# Patient Record
Sex: FEMALE | Race: WHITE | NOT HISPANIC OR LATINO | URBAN - METROPOLITAN AREA
[De-identification: names, ages, dates, MRNs, and addresses within clinical notes are randomized per-mention and may not be internally consistent; named-entity substitution may affect disease eponyms.]

---

## 2017-01-17 ENCOUNTER — LAB CONVERSION - ENCOUNTER (OUTPATIENT)
Dept: OTHER | Facility: OTHER | Age: 57
End: 2017-01-17

## 2017-01-17 LAB
A/G RATIO (HISTORICAL): 1.7 (CALC) (ref 1–2.5)
ALBUMIN SERPL BCP-MCNC: 4.3 G/DL (ref 3.6–5.1)
ALP SERPL-CCNC: 79 U/L (ref 33–130)
ALT SERPL W P-5'-P-CCNC: 75 U/L (ref 6–29)
AST SERPL W P-5'-P-CCNC: 44 U/L (ref 10–35)
BILIRUB SERPL-MCNC: 0.3 MG/DL (ref 0.2–1.2)
BUN SERPL-MCNC: 20 MG/DL (ref 7–25)
BUN/CREA RATIO (HISTORICAL): ABNORMAL (CALC) (ref 6–22)
CALCIUM SERPL-MCNC: 10.3 MG/DL (ref 8.6–10.4)
CHLORIDE SERPL-SCNC: 102 MMOL/L (ref 98–110)
CHOLEST SERPL-MCNC: 193 MG/DL (ref 125–200)
CHOLEST/HDLC SERPL: 4.4 (CALC)
CO2 SERPL-SCNC: 28 MMOL/L (ref 20–31)
CREAT SERPL-MCNC: 0.77 MG/DL (ref 0.5–1.05)
EGFR AFRICAN AMERICAN (HISTORICAL): 99 ML/MIN/1.73M2
EGFR-AMERICAN CALC (HISTORICAL): 86 ML/MIN/1.73M2
GAMMA GLOBULIN (HISTORICAL): 2.6 G/DL (CALC) (ref 1.9–3.7)
GLUCOSE (HISTORICAL): 71 MG/DL (ref 65–99)
HBA1C MFR BLD HPLC: 9.1 % OF TOTAL HGB
HDLC SERPL-MCNC: 44 MG/DL
LDL CHOLESTEROL (HISTORICAL): 129 MG/DL (CALC)
NON-HDL-CHOL (CHOL-HDL) (HISTORICAL): 149 MG/DL (CALC)
POTASSIUM SERPL-SCNC: 3.9 MMOL/L (ref 3.5–5.3)
SODIUM SERPL-SCNC: 139 MMOL/L (ref 135–146)
TOTAL PROTEIN (HISTORICAL): 6.9 G/DL (ref 6.1–8.1)
TRIGL SERPL-MCNC: 101 MG/DL

## 2017-01-19 ENCOUNTER — ALLSCRIPTS OFFICE VISIT (OUTPATIENT)
Dept: OTHER | Facility: OTHER | Age: 57
End: 2017-01-19

## 2017-01-19 ENCOUNTER — GENERIC CONVERSION - ENCOUNTER (OUTPATIENT)
Dept: OTHER | Facility: OTHER | Age: 57
End: 2017-01-19

## 2017-01-23 LAB
HPV 18 (HISTORICAL): NOT DETECTED
HPV HIGH RISK 16/18 (HISTORICAL): NOT DETECTED
HPV16 (HISTORICAL): NOT DETECTED
PAP (HISTORICAL): NORMAL

## 2017-05-01 ENCOUNTER — LAB CONVERSION - ENCOUNTER (OUTPATIENT)
Dept: OTHER | Facility: OTHER | Age: 57
End: 2017-05-01

## 2017-05-01 LAB — HBA1C MFR BLD HPLC: 6.3 % OF TOTAL HGB

## 2017-05-03 ENCOUNTER — ALLSCRIPTS OFFICE VISIT (OUTPATIENT)
Dept: OTHER | Facility: OTHER | Age: 57
End: 2017-05-03

## 2017-05-03 DIAGNOSIS — E78.00 PURE HYPERCHOLESTEROLEMIA: ICD-10-CM

## 2017-05-03 DIAGNOSIS — I10 ESSENTIAL (PRIMARY) HYPERTENSION: ICD-10-CM

## 2017-05-03 DIAGNOSIS — E11.9 TYPE 2 DIABETES MELLITUS WITHOUT COMPLICATIONS (HCC): ICD-10-CM

## 2017-09-05 ENCOUNTER — GENERIC CONVERSION - ENCOUNTER (OUTPATIENT)
Dept: OTHER | Facility: OTHER | Age: 57
End: 2017-09-05

## 2017-09-24 DIAGNOSIS — M25.512 PAIN IN LEFT SHOULDER: ICD-10-CM

## 2017-10-16 ENCOUNTER — LAB CONVERSION - ENCOUNTER (OUTPATIENT)
Dept: OTHER | Facility: OTHER | Age: 57
End: 2017-10-16

## 2017-10-16 LAB
A/G RATIO (HISTORICAL): 1.8 (CALC) (ref 1–2.5)
ALBUMIN SERPL BCP-MCNC: 4.3 G/DL (ref 3.6–5.1)
ALP SERPL-CCNC: 70 U/L (ref 33–130)
ALT SERPL W P-5'-P-CCNC: 48 U/L (ref 6–29)
AST SERPL W P-5'-P-CCNC: 28 U/L (ref 10–35)
BILIRUB SERPL-MCNC: 0.3 MG/DL (ref 0.2–1.2)
BUN SERPL-MCNC: 17 MG/DL (ref 7–25)
BUN/CREA RATIO (HISTORICAL): ABNORMAL (CALC) (ref 6–22)
CALCIUM SERPL-MCNC: 10.2 MG/DL (ref 8.6–10.4)
CHLORIDE SERPL-SCNC: 104 MMOL/L (ref 98–110)
CHOLEST SERPL-MCNC: 178 MG/DL
CHOLEST/HDLC SERPL: 4.2 (CALC)
CO2 SERPL-SCNC: 32 MMOL/L (ref 20–31)
CREAT SERPL-MCNC: 0.8 MG/DL (ref 0.5–1.05)
CREATININE, RANDOM URINE (HISTORICAL): 280 MG/DL (ref 20–320)
EGFR AFRICAN AMERICAN (HISTORICAL): 94 ML/MIN/1.73M2
EGFR-AMERICAN CALC (HISTORICAL): 81 ML/MIN/1.73M2
GAMMA GLOBULIN (HISTORICAL): 2.4 G/DL (CALC) (ref 1.9–3.7)
GLUCOSE (HISTORICAL): 94 MG/DL (ref 65–99)
HBA1C MFR BLD HPLC: 7.5 % OF TOTAL HGB
HDLC SERPL-MCNC: 42 MG/DL
LDL CHOLESTEROL (HISTORICAL): 114 MG/DL (CALC)
MAGNESIUM, UR (HISTORICAL): 4.4 MG/DL
MICROALBUMIN/CREATININE RATIO (HISTORICAL): 16 MCG/MG CREAT
NON-HDL-CHOL (CHOL-HDL) (HISTORICAL): 136 MG/DL (CALC)
POTASSIUM SERPL-SCNC: 3.6 MMOL/L (ref 3.5–5.3)
SODIUM SERPL-SCNC: 142 MMOL/L (ref 135–146)
TOTAL PROTEIN (HISTORICAL): 6.7 G/DL (ref 6.1–8.1)
TRIGL SERPL-MCNC: 114 MG/DL

## 2017-10-20 ENCOUNTER — GENERIC CONVERSION - ENCOUNTER (OUTPATIENT)
Dept: OTHER | Facility: OTHER | Age: 57
End: 2017-10-20

## 2017-11-17 ENCOUNTER — GENERIC CONVERSION - ENCOUNTER (OUTPATIENT)
Dept: OTHER | Facility: OTHER | Age: 57
End: 2017-11-17

## 2017-12-15 ENCOUNTER — GENERIC CONVERSION - ENCOUNTER (OUTPATIENT)
Dept: OBGYN CLINIC | Facility: CLINIC | Age: 57
End: 2017-12-15

## 2018-01-11 NOTE — PROGRESS NOTES
Assessment    1  Encounter for gynecological examination with Papanicolaou smear of cervix   (V72 31,V76 2) (A22 716,P01 8)   2  Encounter for preventive health examination (V70 0) (Z00 00)    Plan  Encounter for gynecological examination with Papanicolaou smear of cervix    · Follow-up visit in 1 year Evaluation and Treatment  Follow-up  Status: Hold For -  Scheduling  Requested for: [id number]   Ordered; For: Encounter for gynecological examination with Papanicolaou smear of cervix; Ordered By: Noelle Lerma Performed:  Due: [id number]    Discussion/Summary  healthy adult female Currently, she eats a healthy diet  the risks and benefits of cervical cancer screening were discussed Pap test was done today Breast cancer screening: the risks and benefits of breast cancer screening were discussed and mammogram is current  Colorectal cancer screening: the risks and benefits of colorectal cancer screening were discussed and colorectal cancer screening is current  Normal APE  continue f/u with PCP  Chief Complaint  Annual Gyn Exam      History of Present Illness  HPI: Here for annual gyn exam - no vaginal bleeding or discharge - no menopausal complaints - bowels and bladder normal - up to date with colonoscopy, mammogram, blood work - and f/u with PMD -   GYN , Adult Female [facility]: The patient is being seen for a gynecology evaluation  General Health: The patient's health since the last visit is described as good  Lifestyle:  She consumes a diverse and healthy diet  She does not have any weight concerns  She does not exercise regularly  She does not use tobacco  She denies alcohol use  She denies drug use  Reproductive health: the patient is postmenopausal    Screening: cancer screening reviewed and current  metabolic screening reviewed and current  risk screening reviewed and current        Review of Systems    Constitutional: No fever, no chills, feels well, no tiredness, no recent weight gain or loss    ENT: no ear ache, no loss of hearing, no nosebleeds or nasal discharge, no sore throat or hoarseness  Cardiovascular: no complaints of slow or fast heart rate, no chest pain, no palpitations, no leg claudication or lower extremity edema  Respiratory: no complaints of shortness of breath, no wheezing, no dyspnea on exertion, no orthopnea or PND  Breasts: no complaints of breast pain, breast lump or nipple discharge  Gastrointestinal: no complaints of abdominal pain, no constipation, no nausea or diarrhea, no vomiting, no bloody stools  Genitourinary: no complaints of dysuria, no incontinence, no pelvic pain, no dysmenorrhea, no vaginal discharge or abnormal vaginal bleeding  Musculoskeletal: no complaints of arthralgia, no myalgia, no joint swelling or stiffness, no limb pain or swelling  Integumentary: no complaints of skin rash or lesion, no itching or dry skin, no skin wounds  Neurological: no complaints of headache, no confusion, no numbness or tingling, no dizziness or fainting  ROS reviewed  Active Problems    1  Abnormal weight gain (783 1) (R63 5)   2  Arthritis (716 90) (M19 90)   3  Carpal tunnel syndrome, unspecified laterality (354 0) (G56 00)   4  Chest pain (786 50) (R07 9)   5  Diabetes mellitus type II, controlled (250 00) (E11 9)   6  Diabetic Autonomic Neuropathy - Uncontrolled (337 1)   7  DM2 (diabetes mellitus, type 2) (250 00) (E11 9)   8  Dyspnea on exertion (786 09) (R06 09)   9  Elevated LFTs (790 6) (R79 89)   10  Headache (784 0) (R51)   11  Hypercholesterolemia (272 0) (E78 0)   12  Hypertension (401 9) (I10)   13  Insomnia (780 52) (G47 00)   14  Left flank pain (789 09) (R10 9)   15  Obesity (278 00) (E66 9)   16  Pain in joint of right shoulder region (719 41) (M25 511)   17   Rib pain on left side (786 50) (R07 81)    Past Medical History    · History of Breast cancer screening (V76 10) (Z12 39)   · History of Colon cancer screening (V76 51) (Z12 11)   ·  2 para 2 (V22 2) (Z33 1)   · History of Visit for routine gyn exam (V72 31) (Z01 419)    Surgical History    · History of Heart Surgery   · History of Hysterectomy   · History of Total Abdominal Hysterectomy    Family History    · Adopted (V68 89) (Z02 82)   · Family history unknown (V49 89) (Z78 9)    Social History    · Denied: History of Alcohol Use (History)   · Cultural background   · NON-   · Denied: History of Daily Coffee Consumption (___ Cups/Day)   · Denied: History of Daily Cola Consumption (___ Cans/Day)   · Denied: History of Daily Tea Consumption (___ Cups/Day)   · Marital History - Currently    · Never A Smoker   · No drug use   · Personal Protective Equipment Seatbelts   · Primary spoken language English   · Racial background   · WHITE   · Retired    Current Meds   1  Aspirin 81 MG Oral Tablet Delayed Release; Take 1 daily Recorded   2  BD Pen Needle Short U/F 31G X 8 MM Miscellaneous; Therapy: 91ZSB7922 to (Last Cheryl Corporal)  Requested for: 2011 Ordered   3  [employer] Fish Oil CAPS; TAKE 1 CAPSULE Daily Recorded   4  Daily Multiple Vitamin TABS; CHEW OR SWALLOW 1 TABLET DAILY Recorded   5  Hydrochlorothiazide 50 MG Oral Tablet; TAKE 1 TABLET TWICE DAILY; Therapy: 75ZPU2038 to (Evaluate:2016)  Requested for: 24TSR8715; Last   Rx:2015 Ordered   6  Lisinopril 5 MG Oral Tablet; TAKE 1 TABLET DAILY AS DIRECTED; Therapy: 00FOY1902 to (Evaluate:2016)  Requested for: 86HCY7317; Last   Rx:2015 Ordered   7  MetFORMIN HCl  MG Oral Tablet Extended Release 24 Hour; TAKE 2 TABLET   Twice daily  Requested for: 84KDU6173; Last Rx:2015 Ordered   8  Omega-3-acid Ethyl Esters 1 GM Oral Capsule; TAKE TWO (2) CAPSULE(S) TWICE   DAILY; Therapy: 98JSD6307 to (Evaluate:2016); Last Rx:2015 Ordered   9  OneTouch Ultra Blue In [employer]; USE 1 TEST STRIP SIX TIMES DAILY; Therapy: 07YXB8561 to (Evaluate:2016)  Requested for: 64HEI3857;  Last Rx:19Nov2015 Ordered   10  Pravastatin Sodium 10 MG Oral Tablet; Take 1 tablet twice daily; Last Rx:19Nov2015    Ordered   11  ReliOn N SUSP; 30 units in am 30 units in pm Recorded   12  UltiCare Short Pen Needles 31G X 8 MM Miscellaneous; INJECTING FOUR TIMES    DAILY; Therapy: 08Oag5250 to (Evaluate:42Zaj1710)  Requested for: 22EMF2666; Last    Rx:19Nov2015 Ordered   13  Zolpidem Tartrate 5 MG Oral Tablet; TAKE 1 TABLET AT BEDTIME AS NEEDED; Last    Rx:19Nov2015 Ordered    Allergies    1  Lasix TABS   2  Latex Gloves MISC   3  Lipitor TABS    Vitals   Recorded: 93SRN0793 53:60VQ   Systolic 888, LUE, Sitting   Diastolic 80, LUE, Sitting   Height 5 ft 4 in   Weight 191 lb    BMI Calculated 32 79   BSA Calculated 1 92     Physical Exam    Constitutional   General appearance: No acute distress, well appearing and well nourished  overweight white female in NAD  Neck   Neck: Normal, supple, trachea midline, no masses  Thyroid: Normal, no thyromegaly  Pulmonary   Respiratory effort: No increased work of breathing or signs of respiratory distress  Auscultation of lungs: Clear to auscultation  Cardiovascular   Auscultation of heart: Normal rate and rhythm, normal S1 and S2, no murmurs  Peripheral vascular exam: Normal pulses Throughout  Genitourinary   External genitalia: Normal and no lesions appreciated  Vagina: Normal, no lesions or dryness appreciated  Urethra: Normal     Urethral meatus: Normal     Cervix: Surgically absent  cuff intact and well healed - pap taken without friability  Uterus: Surgically absent  Adnexa/parametria: Normal, non-tender and no fullness or masses appreciated  Anus, perineum, and rectum: Normal sphincter tone, no masses, and no prolapse  Chest   Breasts: Normal and no dimpling or skin changes noted  Abdomen   Abdomen: Normal, non-tender, and no organomegaly noted  Liver and spleen: No hepatomegaly or splenomegaly      Examination for hernias: No hernias appreciated  Stool sample for occult blood: Negative  Lymphatic   Palpation of lymph nodes in neck, axillae, groin and/or other locations: No lymphadenopathy or masses noted      Psychiatric   Orientation to person, place, and time: Normal     Mood and affect: Normal        Future Appointments    Date/Time Provider Specialty Site   03/24/2016 08:30 AM Shira Garcia, [address] [place] [address]     Signatures   Electronically signed by : DORA Huerta ; Jan 14 2016  4:49PM EST                       (Author)

## 2018-01-12 VITALS
HEART RATE: 114 BPM | SYSTOLIC BLOOD PRESSURE: 132 MMHG | BODY MASS INDEX: 31.99 KG/M2 | TEMPERATURE: 98.6 F | DIASTOLIC BLOOD PRESSURE: 68 MMHG | WEIGHT: 187.38 LBS | RESPIRATION RATE: 16 BRPM | HEIGHT: 64 IN

## 2018-01-14 VITALS
HEIGHT: 64 IN | DIASTOLIC BLOOD PRESSURE: 62 MMHG | BODY MASS INDEX: 32.27 KG/M2 | SYSTOLIC BLOOD PRESSURE: 136 MMHG | WEIGHT: 189 LBS

## 2018-01-22 VITALS
WEIGHT: 185.25 LBS | BODY MASS INDEX: 31.63 KG/M2 | TEMPERATURE: 97.7 F | DIASTOLIC BLOOD PRESSURE: 78 MMHG | SYSTOLIC BLOOD PRESSURE: 142 MMHG | HEIGHT: 64 IN | HEART RATE: 70 BPM

## 2018-01-25 ENCOUNTER — OFFICE VISIT (OUTPATIENT)
Dept: OBGYN CLINIC | Facility: CLINIC | Age: 58
End: 2018-01-25
Payer: COMMERCIAL

## 2018-01-25 VITALS — SYSTOLIC BLOOD PRESSURE: 128 MMHG | BODY MASS INDEX: 31.76 KG/M2 | WEIGHT: 185 LBS | DIASTOLIC BLOOD PRESSURE: 72 MMHG

## 2018-01-25 DIAGNOSIS — Z01.419 ENCOUNTER FOR ANNUAL ROUTINE GYNECOLOGICAL EXAMINATION: Primary | ICD-10-CM

## 2018-01-25 PROCEDURE — S0612 ANNUAL GYNECOLOGICAL EXAMINA: HCPCS | Performed by: OBSTETRICS & GYNECOLOGY

## 2018-01-25 RX ORDER — OMEGA-3-ACID ETHYL ESTERS 1 G/1
2 CAPSULE, LIQUID FILLED ORAL 2 TIMES DAILY
COMMUNITY
End: 2019-03-06 | Stop reason: SDUPTHER

## 2018-01-25 RX ORDER — METFORMIN HYDROCHLORIDE 500 MG/1
2 TABLET, EXTENDED RELEASE ORAL 2 TIMES DAILY
COMMUNITY
Start: 2016-12-01 | End: 2018-09-10 | Stop reason: SDUPTHER

## 2018-01-25 RX ORDER — HYDROCHLOROTHIAZIDE 50 MG/1
1 TABLET ORAL 2 TIMES DAILY
COMMUNITY
Start: 2013-01-28 | End: 2019-02-24 | Stop reason: SDUPTHER

## 2018-01-25 RX ORDER — PRAVASTATIN SODIUM 20 MG
1 TABLET ORAL DAILY
COMMUNITY
Start: 2017-03-24 | End: 2018-12-11 | Stop reason: SDUPTHER

## 2018-01-25 RX ORDER — LISINOPRIL 5 MG/1
1 TABLET ORAL DAILY
COMMUNITY
Start: 2013-01-28 | End: 2018-09-10 | Stop reason: SDUPTHER

## 2018-01-25 NOTE — PROGRESS NOTES
Assessment/Plan:    Normal APE  Up to date with mammogram and colonoscopy         Subjective:      Patient ID: Yinka Jones is a 60 y o  female  Here for annual gyn exam - overall well - no vaginal bleeding or discharge - rare menopausal complaints with few hot flashes - improved since last year - bowels and bladder normal and up to date with mammogram, colonoscopy and blood work         The following portions of the patient's history were reviewed and updated as appropriate:   She  has a past medical history of Arthritis; Diabetes mellitus ([address] );  2 para 2; and Hyperlipidemia  She  does not have any pertinent problems on file  She  has a past surgical history that includes Cardiac surgery; Hysterectomy; and Total abdominal hysterectomy  Her She was adopted  Family history is unknown by patient  She  reports that she has never smoked  She does not have any smokeless tobacco history on file  She reports that she drinks alcohol  She reports that she does not use drugs  Current Outpatient Prescriptions   Medication Sig Dispense Refill    hydrochlorothiazide (HYDRODIURIL) 50 mg tablet Take 1 tablet by mouth 2 (two) times a day      insulin detemir (LEVEMIR) 100 units/mL subcutaneous injection Inject under the skin Twice daily      insulin lispro (HUMALOG) 100 units/mL injection Inject under the skin      lisinopril (ZESTRIL) 5 mg tablet Take 1 tablet by mouth daily      metFORMIN (GLUCOPHAGE-XR) 500 mg 24 hr tablet Take 2 tablets by mouth 2 (two) times a day      omega-3-acid ethyl esters (LOVAZA) 1 g capsule Take 2 g by mouth 2 (two) times a day      pravastatin (PRAVACHOL) 20 mg tablet Take 1 tablet by mouth daily       No current facility-administered medications for this visit  No current outpatient prescriptions on file prior to visit  No current facility-administered medications on file prior to visit  She is allergic to atorvastatin; furosemide; and latex       Review of Systems Constitutional: Negative for fatigue, fever and unexpected weight change  HENT: Negative for dental problem, ear pain, sinus pain and sinus pressure  Eyes: Negative for discharge  Respiratory: Negative for cough, chest tightness, shortness of breath and wheezing  Cardiovascular: Negative for chest pain, palpitations and leg swelling  Gastrointestinal: Negative for abdominal pain, blood in stool, constipation, diarrhea, nausea and vomiting  Genitourinary: Negative for difficulty urinating, dysuria, pelvic pain, vaginal bleeding, vaginal discharge and vaginal pain  Musculoskeletal: Negative for back pain, joint swelling and neck pain  Neurological: Negative for dizziness, seizures, weakness, light-headedness, numbness and headaches  Objective:     Physical Exam   Constitutional: She is oriented to person, place, and time  She appears well-developed and well-nourished  HENT:   Head: Normocephalic and atraumatic  Neck: Normal range of motion  No thyromegaly present  Cardiovascular: Normal rate, regular rhythm and normal heart sounds  Pulmonary/Chest: Effort normal and breath sounds normal    Abdominal: Soft  She exhibits no mass  There is no tenderness  There is no rebound and no guarding  Genitourinary: Rectum normal, vagina normal and uterus normal  Rectal exam shows no mass, no tenderness, anal tone normal and guaiac negative stool  No breast swelling, tenderness or discharge  Uterus is not enlarged and not tender  Cervix exhibits friability  Cervix exhibits no discharge  Right adnexum displays no mass, no tenderness and no fullness  Left adnexum displays no mass, no tenderness and no fullness  Neurological: She is alert and oriented to person, place, and time  Psychiatric: She has a normal mood and affect   Her behavior is normal

## 2018-01-29 LAB
HPV HR 12 DNA CVX QL NAA+PROBE: NOT DETECTED
HPV16 DNA SPEC QL NAA+PROBE: NOT DETECTED
HPV18 DNA SPEC QL NAA+PROBE: NOT DETECTED
THIN PREP CVX: NORMAL

## 2018-02-15 ENCOUNTER — TELEPHONE (OUTPATIENT)
Dept: FAMILY MEDICINE CLINIC | Facility: CLINIC | Age: 58
End: 2018-02-15

## 2018-02-15 NOTE — TELEPHONE ENCOUNTER
If she does to a [facility]'s lab the orders are in computer but a=i also printed them out and will be available for

## 2018-02-22 ENCOUNTER — APPOINTMENT (OUTPATIENT)
Dept: LAB | Facility: CLINIC | Age: 58
End: 2018-02-22
Payer: COMMERCIAL

## 2018-02-22 DIAGNOSIS — I10 ESSENTIAL (PRIMARY) HYPERTENSION: ICD-10-CM

## 2018-02-22 DIAGNOSIS — E11.9 TYPE 2 DIABETES MELLITUS WITHOUT COMPLICATIONS (HCC): ICD-10-CM

## 2018-02-22 DIAGNOSIS — E78.00 PURE HYPERCHOLESTEROLEMIA: ICD-10-CM

## 2018-02-22 LAB
ALBUMIN SERPL BCP-MCNC: 4 G/DL (ref 3.5–5)
ALP SERPL-CCNC: 66 U/L (ref 46–116)
ALT SERPL W P-5'-P-CCNC: 57 U/L (ref 12–78)
ANION GAP SERPL CALCULATED.3IONS-SCNC: 5 MMOL/L (ref 4–13)
AST SERPL W P-5'-P-CCNC: 41 U/L (ref 5–45)
BILIRUB SERPL-MCNC: 0.31 MG/DL (ref 0.2–1)
BUN SERPL-MCNC: 19 MG/DL (ref 5–25)
CALCIUM ALBUM COR SERPL-MCNC: 10.8 MG/DL (ref 8.3–10.1)
CALCIUM SERPL-MCNC: 10.8 MG/DL (ref 8.3–10.1)
CHLORIDE SERPL-SCNC: 101 MMOL/L (ref 100–108)
CHOLEST SERPL-MCNC: 173 MG/DL (ref 50–200)
CO2 SERPL-SCNC: 31 MMOL/L (ref 21–32)
CREAT SERPL-MCNC: 0.68 MG/DL (ref 0.6–1.3)
EST. AVERAGE GLUCOSE BLD GHB EST-MCNC: 212 MG/DL
GFR SERPL CREATININE-BSD FRML MDRD: 96 ML/MIN/1.73SQ M
GLUCOSE P FAST SERPL-MCNC: 40 MG/DL (ref 65–99)
HBA1C MFR BLD: 9 % (ref 4.2–6.3)
HDLC SERPL-MCNC: 51 MG/DL (ref 40–60)
LDLC SERPL CALC-MCNC: 101 MG/DL (ref 0–100)
POTASSIUM SERPL-SCNC: 4.3 MMOL/L (ref 3.5–5.3)
PROT SERPL-MCNC: 8 G/DL (ref 6.4–8.2)
SODIUM SERPL-SCNC: 137 MMOL/L (ref 136–145)
TRIGL SERPL-MCNC: 105 MG/DL

## 2018-02-22 PROCEDURE — 80061 LIPID PANEL: CPT

## 2018-02-22 PROCEDURE — 83036 HEMOGLOBIN GLYCOSYLATED A1C: CPT

## 2018-02-22 PROCEDURE — 80053 COMPREHEN METABOLIC PANEL: CPT

## 2018-02-22 PROCEDURE — 36415 COLL VENOUS BLD VENIPUNCTURE: CPT

## 2018-02-23 ENCOUNTER — OFFICE VISIT (OUTPATIENT)
Dept: FAMILY MEDICINE CLINIC | Facility: CLINIC | Age: 58
End: 2018-02-23
Payer: COMMERCIAL

## 2018-02-23 VITALS
DIASTOLIC BLOOD PRESSURE: 78 MMHG | HEART RATE: 78 BPM | TEMPERATURE: 97.9 F | BODY MASS INDEX: 30.22 KG/M2 | WEIGHT: 177 LBS | HEIGHT: 64 IN | SYSTOLIC BLOOD PRESSURE: 108 MMHG

## 2018-02-23 DIAGNOSIS — I10 ESSENTIAL HYPERTENSION: ICD-10-CM

## 2018-02-23 DIAGNOSIS — M54.6 THORACIC BACK PAIN, UNSPECIFIED BACK PAIN LATERALITY, UNSPECIFIED CHRONICITY: ICD-10-CM

## 2018-02-23 DIAGNOSIS — E78.00 HYPERCHOLESTEROLEMIA: ICD-10-CM

## 2018-02-23 DIAGNOSIS — E13.9 DIABETES 1.5, MANAGED AS TYPE 2 (HCC): Primary | ICD-10-CM

## 2018-02-23 PROCEDURE — 99214 OFFICE O/P EST MOD 30 MIN: CPT | Performed by: FAMILY MEDICINE

## 2018-02-23 RX ORDER — MELOXICAM 15 MG/1
15 TABLET ORAL DAILY
Qty: 30 TABLET | Refills: 0 | Status: SHIPPED | OUTPATIENT
Start: 2018-02-23 | End: 2020-08-21 | Stop reason: ALTCHOICE

## 2018-02-23 RX ORDER — METHOCARBAMOL 500 MG/1
500 TABLET, FILM COATED ORAL
Qty: 30 TABLET | Refills: 0 | Status: SHIPPED | OUTPATIENT
Start: 2018-02-23 | End: 2020-08-21 | Stop reason: ALTCHOICE

## 2018-02-23 NOTE — PROGRESS NOTES
Patient ID: Yinka Jones is a 60 y o  female  HPI: 60 y  o female presents for follow up of NIDDM, htn, and hypercholesterolemia  Her hgbA1c went up to 9 0 because she left her insulin in car and it froze and pt was unable to take her insulin for past 3 mos  She c/o thoracic back pain on right when she twists or turns  SUBJECTIVE    Family History   Problem Relation Age of Onset    Adopted:  Yes    Family history unknown: Yes     Social History     Social History    Marital status: /Civil Union     Spouse name: N/A    Number of children: N/A    Years of education: N/A     Occupational History    Retired      Social History Main Topics    Smoking status: Never Smoker    Smokeless tobacco: Not on file    Alcohol use Yes    Drug use: No    Sexual activity: Not on file     Other Topics Concern    Not on file     Social History Narrative    Personal Protective Equipment Seatbelts         Past Medical History:   Diagnosis Date    Arthritis     Diabetes mellitus ([facility] )      2 para 2      x2 -, -    Hyperlipidemia      Past Surgical History:   Procedure Laterality Date    CARDIAC SURGERY      HYSTERECTOMY      TOTAL ABDOMINAL HYSTERECTOMY      2010     Allergies   Allergen Reactions    Atorvastatin      Reaction Date: 2011;     Furosemide      Reaction Date: 2011;     Latex        Current Outpatient Prescriptions:     hydrochlorothiazide (HYDRODIURIL) 50 mg tablet, Take 1 tablet by mouth 2 (two) times a day, Disp: , Rfl:     insulin detemir (LEVEMIR) 100 units/mL subcutaneous injection, Inject under the skin Twice daily, Disp: , Rfl:     insulin lispro (HUMALOG) 100 units/mL injection, Inject under the skin, Disp: , Rfl:     lisinopril (ZESTRIL) 5 mg tablet, Take 1 tablet by mouth daily, Disp: , Rfl:     metFORMIN (GLUCOPHAGE-XR) 500 mg 24 hr tablet, Take 2 tablets by mouth 2 (two) times a day, Disp: , Rfl:     omega-3-acid ethyl esters (LOVAZA) 1 g capsule, Take 2 g by mouth 2 (two) times a day, Disp: , Rfl:     pravastatin (PRAVACHOL) 20 mg tablet, Take 1 tablet by mouth daily, Disp: , Rfl:     Review of Systems  Constitutional:     Denies fever, chills ,fatigue ,weakness ,weight loss, weight gain     ENT: Denies earache ,loss of hearing ,nosebleed, nasal discharge,nasal congestion ,sore throat ,hoarseness  Pulmonary: Denies shortness of breath ,cough  ,dyspnea on exertion, orthopnea  ,PND   Cardiovascular:  Denies bradycardia , tachycardia  ,palpations, lower extremity edema leg, claudication  Breast:  Denies new or changing breast lumps ,nipple discharge ,nipple changes  Abdomen:  Denies abdominal pain , anorexia , indigestion, nausea, vomiting, constipation, diarrhea  Musculoskeletal: Denies myalgias, arthralgias, joint swelling, joint stiffness , limb pain, limb swelling+ thoracic back pain rigt sided with twisting or turning  Gu: denies dysuria, polyuria  Skin: Denies skin rash, skin lesion, skin wound, itching, dry skin  Neuro: Denies headache, numbness, tingling, confusion, loss of consciousness, dizziness, vertigo  Psychiatric: Denies feelings of depression, suicidal ideation, anxiety, sleep disturbances    OBJECTIVE    Constitutional:   NAD, well appearing and well nourished      ENT:   Conjunctiva and lids: no injection, edema, or discharge     Pupils and iris: NIKKI bilaterally    External inspection of ears and nose: normal without deformities or discharge  Otoscopic exam: Canals patent without erythema  Nasal mucosa, septum and turbinates: Normal or edema or discharge         Oropharynx:  Moist mucosa, normal tongue and tonsils without lesions  No erythema        Pulmonary:Respiratory effort normal rate and rhythm, no increased work of breathing   Auscultation of lungs:  Clear bilaterally with no adventitious breath sounds       Cardiovascular: regular rate and rhythm, S1 and S2, no murmur, no edema and/or varicosities of LE Abdomen: Soft and non-distended     Positive bowel sounds      No heptomegaly or splenomegaly      Gu: no suprapubic tenderness or CVA tenderness, no urethral discharge  Lymphatic:  No anterior or posterior cervical lymphadenopathy         Musculoskeletal:  Gait and station: Normal gait      Digits and nails normal without clubbing or cyanosis       Inspection/palpation of joints, bones, and muscles:  No joint tenderness, swelling, full active and passive range of motion; no tenderness on palpation of thoracic spien; full flexion , extension and rotation without reproduction fo symptoms  Skin: Normal skin turgor and no rashes      Neuro:    Normal reflexes     Psych:   alert and oriented to person, place and time     normal mood and affect   Right Foot/Ankle   Right Foot Inspection  Skin Exam: skin normal and skin intact no dry skin, no warmth, no callus, no erythema, no maceration, no abnormal color, no pre-ulcer, no ulcer and no callus                          Toe Exam: ROM and strength within normal limits  Sensory   Vibration: intact  Proprioception: intact   Monofilament testing: intact  Vascular  Capillary refills: < 3 seconds  The right DP pulse is 2+  The right PT pulse is 2+  Left Foot/Ankle  Left Foot Inspection  Skin Exam: skin normal and skin intactno dry skin, no warmth, no erythema, no maceration, normal color, no pre-ulcer, no ulcer and no callus                         Toe Exam: ROM and strength within normal limits                   Sensory   Vibration: intact  Proprioception: intact  Monofilament: intact  Vascular  Capillary refills: < 3 seconds  The left DP pulse is 2+  The left PT pulse is 2+  Assign Risk Category:  No deformity present; No loss of protective sensation;        Risk: 2      Assessment/Plan:Diagnoses and all orders for this visit:    Diabetes 1 5, managed as type 2 ([facility] )  -     Hemoglobin A1c;  Future  -     One Touch Verio IQ    Essential hypertension  - Comprehensive metabolic panel; Future    Hypercholesterolemia  -     Lipid Panel with Direct LDL reflex; Future    Thoracic back pain, unspecified back pain laterality, unspecified chronicity  -     meloxicam (MOBIC) 15 mg tablet; Take 1 tablet (15 mg total) by mouth daily for 30 days  -     methocarbamol (ROBAXIN) 500 mg tablet; Take 1 tablet (500 mg total) by mouth daily at bedtime      I will see patient back in 4 mos or sooner prn

## 2018-04-06 ENCOUNTER — TELEPHONE (OUTPATIENT)
Dept: FAMILY MEDICINE CLINIC | Facility: CLINIC | Age: 58
End: 2018-04-06

## 2018-04-06 DIAGNOSIS — E13.9 DIABETES 1.5, MANAGED AS TYPE 2 (HCC): Primary | ICD-10-CM

## 2018-05-21 ENCOUNTER — TELEPHONE (OUTPATIENT)
Dept: FAMILY MEDICINE CLINIC | Facility: CLINIC | Age: 58
End: 2018-05-21

## 2018-05-21 DIAGNOSIS — H57.9 EYE LESION: Primary | ICD-10-CM

## 2018-05-21 RX ORDER — CEPHALEXIN 500 MG/1
500 CAPSULE ORAL 3 TIMES DAILY
Qty: 30 CAPSULE | Refills: 0 | Status: SHIPPED | OUTPATIENT
Start: 2018-05-21 | End: 2018-05-31

## 2018-05-21 NOTE — TELEPHONE ENCOUNTER
Patient saw Marlyn Anderson today and would like a prescription for her bump above her eye to please be called into the pharmacy       Chaparrita

## 2018-06-25 LAB
LEFT EYE DIABETIC RETINOPATHY: NORMAL
RIGHT EYE DIABETIC RETINOPATHY: NORMAL

## 2018-07-27 LAB
ALBUMIN SERPL-MCNC: 4.3 G/DL (ref 3.6–5.1)
ALBUMIN/GLOB SERPL: 1.7 (CALC) (ref 1–2.5)
ALP SERPL-CCNC: 66 U/L (ref 33–130)
ALT SERPL-CCNC: 40 U/L (ref 6–29)
AST SERPL-CCNC: 29 U/L (ref 10–35)
BILIRUB SERPL-MCNC: 0.3 MG/DL (ref 0.2–1.2)
BUN SERPL-MCNC: 23 MG/DL (ref 7–25)
BUN/CREAT SERPL: ABNORMAL (CALC) (ref 6–22)
CALCIUM SERPL-MCNC: 11 MG/DL (ref 8.6–10.4)
CHLORIDE SERPL-SCNC: 100 MMOL/L (ref 98–110)
CHOLEST SERPL-MCNC: 189 MG/DL
CHOLEST/HDLC SERPL: 4.2 (CALC)
CO2 SERPL-SCNC: 30 MMOL/L (ref 20–31)
CREAT SERPL-MCNC: 0.75 MG/DL (ref 0.5–1.05)
GLOBULIN SER CALC-MCNC: 2.6 G/DL (CALC) (ref 1.9–3.7)
GLUCOSE SERPL-MCNC: 75 MG/DL (ref 65–99)
HBA1C MFR BLD: 6.6 % OF TOTAL HGB
HDLC SERPL-MCNC: 45 MG/DL
LDLC SERPL CALC-MCNC: 121 MG/DL (CALC)
NONHDLC SERPL-MCNC: 144 MG/DL (CALC)
POTASSIUM SERPL-SCNC: 3.8 MMOL/L (ref 3.5–5.3)
PROT SERPL-MCNC: 6.9 G/DL (ref 6.1–8.1)
SL AMB EGFR AFRICAN AMERICAN: 101 ML/MIN/1.73M2
SL AMB EGFR NON AFRICAN AMERICAN: 87 ML/MIN/1.73M2
SODIUM SERPL-SCNC: 137 MMOL/L (ref 135–146)
TRIGL SERPL-MCNC: 122 MG/DL

## 2018-07-30 ENCOUNTER — OFFICE VISIT (OUTPATIENT)
Dept: FAMILY MEDICINE CLINIC | Facility: CLINIC | Age: 58
End: 2018-07-30
Payer: COMMERCIAL

## 2018-07-30 VITALS
HEART RATE: 80 BPM | BODY MASS INDEX: 30.19 KG/M2 | SYSTOLIC BLOOD PRESSURE: 124 MMHG | HEIGHT: 64 IN | DIASTOLIC BLOOD PRESSURE: 70 MMHG | WEIGHT: 176.8 LBS | TEMPERATURE: 97.9 F

## 2018-07-30 DIAGNOSIS — F41.9 ANXIETY: ICD-10-CM

## 2018-07-30 DIAGNOSIS — E13.9 DIABETES 1.5, MANAGED AS TYPE 2 (HCC): Primary | ICD-10-CM

## 2018-07-30 DIAGNOSIS — E78.00 HYPERCHOLESTEROLEMIA: ICD-10-CM

## 2018-07-30 DIAGNOSIS — I10 ESSENTIAL HYPERTENSION: ICD-10-CM

## 2018-07-30 DIAGNOSIS — H61.23 HEARING LOSS DUE TO CERUMEN IMPACTION, BILATERAL: ICD-10-CM

## 2018-07-30 PROCEDURE — 3078F DIAST BP <80 MM HG: CPT | Performed by: FAMILY MEDICINE

## 2018-07-30 PROCEDURE — 99214 OFFICE O/P EST MOD 30 MIN: CPT | Performed by: FAMILY MEDICINE

## 2018-07-30 PROCEDURE — 3074F SYST BP LT 130 MM HG: CPT | Performed by: FAMILY MEDICINE

## 2018-07-30 PROCEDURE — 3008F BODY MASS INDEX DOCD: CPT | Performed by: FAMILY MEDICINE

## 2018-07-30 RX ORDER — HYDROXYZINE HYDROCHLORIDE 25 MG/1
25 TABLET, FILM COATED ORAL
Qty: 30 TABLET | Refills: 0 | Status: SHIPPED | OUTPATIENT
Start: 2018-07-30 | End: 2018-09-10 | Stop reason: SDUPTHER

## 2018-07-31 PROCEDURE — 69210 REMOVE IMPACTED EAR WAX UNI: CPT | Performed by: FAMILY MEDICINE

## 2018-07-31 NOTE — PROGRESS NOTES
Patient ID: Abel Guadarrama is a 60 y o  female  HPI: 60 y  o female presents for follow up of type  2 diabetes, htn, hypercholesterolemia and trouble sleeping due to anxiety  Her labs were all favorable  SUBJECTIVE    Family History   Problem Relation Age of Onset    Adopted:  Yes    Family history unknown: Yes     Social History     Social History    Marital status: /Civil Union     Spouse name: N/A    Number of children: N/A    Years of education: N/A     Occupational History    Retired      Social History Main Topics    Smoking status: Never Smoker    Smokeless tobacco: Not on file    Alcohol use Yes    Drug use: No    Sexual activity: Not on file     Other Topics Concern    Not on file     Social History Narrative    Personal Protective Equipment Seatbelts         Past Medical History:   Diagnosis Date    Arthritis     Diabetes mellitus ([facility] [address] )      2 para 2      x2 -, -    Hyperlipidemia      Past Surgical History:   Procedure Laterality Date    CARDIAC SURGERY      HYSTERECTOMY      TOTAL ABDOMINAL HYSTERECTOMY      2010     Allergies   Allergen Reactions    Atorvastatin      Reaction Date: 2011;     Furosemide      Reaction Date: 2011;     Latex        Current Outpatient Prescriptions:     hydrochlorothiazide (HYDRODIURIL) 50 mg tablet, Take 1 tablet by mouth 2 (two) times a day, Disp: , Rfl:     insulin lispro (HUMALOG) 100 units/mL injection, Inject under the skin, Disp: , Rfl:     lisinopril (ZESTRIL) 5 mg tablet, Take 1 tablet by mouth daily, Disp: , Rfl:     metFORMIN (GLUCOPHAGE-XR) 500 mg 24 hr tablet, Take 2 tablets by mouth 2 (two) times a day, Disp: , Rfl:     methocarbamol (ROBAXIN) 500 mg tablet, Take 1 tablet (500 mg total) by mouth daily at bedtime, Disp: 30 tablet, Rfl: 0    omega-3-acid ethyl esters (LOVAZA) 1 g capsule, Take 2 g by mouth 2 (two) times a day, Disp: , Rfl:     pravastatin (PRAVACHOL) 20 mg tablet, Take 1 tablet by mouth daily, Disp: , Rfl:     hydrOXYzine HCL (ATARAX) 25 mg tablet, Take 1 tablet (25 mg total) by mouth daily at bedtime, Disp: 30 tablet, Rfl: 0    insulin detemir (LEVEMIR) 100 units/mL subcutaneous injection, Inject 50 Units under the skin 2 (two) times a day for 90 days, Disp: 9000 Units, Rfl: 2    meloxicam (MOBIC) 15 mg tablet, Take 1 tablet (15 mg total) by mouth daily for 30 days, Disp: 30 tablet, Rfl: 0    Review of Systems  Constitutional:     Denies fever, chills ,fatigue ,weakness ,weight loss, weight gain     ENT: Denies earache ,loss of hearing ,nosebleed, nasal discharge,nasal congestion ,sore throat ,hoarseness  Pulmonary: Denies shortness of breath ,cough  ,dyspnea on exertion, orthopnea  ,PND   Cardiovascular:  Denies bradycardia , tachycardia  ,palpations, lower extremity edema leg, claudication  Breast:  Denies new or changing breast lumps ,nipple discharge ,nipple changes  Abdomen:  Denies abdominal pain , anorexia , indigestion, nausea, vomiting, constipation, diarrhea  Musculoskeletal: Denies myalgias, arthralgias, joint swelling, joint stiffness , limb pain, limb swelling  Gu: denies dysuria, polyuria  Skin: Denies skin rash, skin lesion, skin wound, itching, dry skin  Neuro: Denies headache, numbness, tingling, confusion, loss of consciousness, dizziness, vertigo  Psychiatric: Denies feelings of depression, suicidal ideation,+ anxiety, +sleep disturbances    OBJECTIVE  /70   Pulse 80   Temp 97 9 °F (36 6 °C)   Ht 5' 4" (1 626 m)   Wt 80 2 kg (176 lb 12 8 oz)   BMI 30 35 kg/m²   Constitutional:   NAD, well appearing and well nourished      ENT:   Conjunctiva and lids: no injection, edema, or discharge     Pupils and iris: NIKKI bilaterally    External inspection of ears and nose: normal without deformities or discharge  Otoscopic exam:  Right TM occluded with cerumen and removed with a loop until tm fuly visualized and intact    Nasal mucosa, septum and turbinates: Normal or edema or discharge        Oropharynx:  Moist mucosa, normal tongue and tonsils without lesions  No erythema        Pulmonary:Respiratory effort normal rate and rhythm, no increased work of breathing  Auscultation of lungs:  Clear bilaterally with no adventitious breath sounds       Cardiovascular: regular rate and rhythm, S1 and S2, no murmur, no edema and/or varicosities of LE      Abdomen: Soft and non-distended     Positive bowel sounds      No heptomegaly or splenomegaly      Gu: no suprapubic tenderness or CVA tenderness, no urethral discharge  Lymphatic:  No anterior or posterior cervical lymphadenopathy         Musculoskeletal:  Gait and station: Normal gait      Digits and nails normal without clubbing or cyanosis       Inspection/palpation of joints, bones, and muscles:  No joint tenderness, swelling, full active and passive range of motion       Skin: Normal skin turgor and no rashes      Neuro:     Normal reflexes     Psych:   alert and oriented to person, place and time     normal mood and affect   Patient's shoes and socks removed  Right Foot/Ankle   Right Foot Inspection  Skin Exam: skin normal and skin intact no dry skin, no warmth, no callus, no erythema, no maceration, no abnormal color, no pre-ulcer, no ulcer and no callus                          Toe Exam: ROM and strength within normal limitsno swelling, no tenderness, erythema and  no right toe deformity  Sensory   Vibration: absent  Proprioception: absent   Monofilament testing: absent  Vascular  Capillary refills: < 3 seconds  The right DP pulse is 2+  The right PT pulse is 2+       Left Foot/Ankle  Left Foot Inspection  Skin Exam: skin normal and skin intactno dry skin, no warmth, no erythema, no maceration, normal color, no pre-ulcer, no ulcer and no callus                         Toe Exam: ROM and strength within normal limitsno swelling, no tenderness, no erythema and no left toe deformity                   Sensory Vibration: absent  Proprioception: absent  Monofilament: absent  Vascular  Capillary refills: < 3 seconds  The left DP pulse is 2+  The left PT pulse is 2+  Assign Risk Category:  No deformity present; No loss of protective sensation; No weak pulses       Risk: 0  Ear cerumen removal  Date/Time: 7/31/2018 7:24 AM  Performed by: Lynda Luke by: Donovan Coker, [address] [place] [address]     Patient location:  Clinic  Consent:     Consent obtained:  Verbal    Consent given by:  Patient    Risks discussed:  Bleeding, dizziness and incomplete removal  Procedure details:     Location:  R ear    Procedure type: curette      Approach:  External  Post-procedure details:     Complication:  None    Hearing quality:  Improved    Patient tolerance of procedure: Tolerated well, no immediate complications        Assessment/Plan:Diagnoses and all orders for this visit:    Diabetes 1 5, managed as type 2 ([facility] )  -     Microalbumin,Urine  -     HEMOGLOBIN A1C W/ EAG ESTIMATION; Future    Anxiety  -     hydrOXYzine HCL (ATARAX) 25 mg tablet; Take 1 tablet (25 mg total) by mouth daily at bedtime    Essential hypertension  -     Comprehensive metabolic panel; Future    Hypercholesterolemia  -     Lipid Panel with Direct LDL reflex; Future    Hearing loss due to cerumen impaction, bilateral    Other orders  -     Ear cerumen removal    I will see patient back in 4 mos or sooner prn      Answers for HPI/ROS submitted by the patient on 7/29/2018   Diabetes problem  Diabetes type: type 1  MedicAlert ID: No  Home blood tests: 5+ x per day  Monitoring compliance: excellent

## 2018-09-10 ENCOUNTER — TELEPHONE (OUTPATIENT)
Dept: FAMILY MEDICINE CLINIC | Facility: CLINIC | Age: 58
End: 2018-09-10

## 2018-09-10 DIAGNOSIS — F41.9 ANXIETY: ICD-10-CM

## 2018-09-10 DIAGNOSIS — I10 ESSENTIAL HYPERTENSION: Primary | ICD-10-CM

## 2018-09-10 DIAGNOSIS — E13.9 DIABETES 1.5, MANAGED AS TYPE 2 (HCC): ICD-10-CM

## 2018-09-10 DIAGNOSIS — E13.9 DIABETES 1.5, MANAGED AS TYPE 2 (HCC): Primary | ICD-10-CM

## 2018-09-10 DIAGNOSIS — L21.9 SEBORRHEA: Primary | ICD-10-CM

## 2018-09-10 RX ORDER — HYDROXYZINE HYDROCHLORIDE 25 MG/1
25 TABLET, FILM COATED ORAL
Qty: 90 TABLET | Refills: 3 | Status: SHIPPED | OUTPATIENT
Start: 2018-09-10 | End: 2019-09-30 | Stop reason: ALTCHOICE

## 2018-09-10 RX ORDER — METFORMIN HYDROCHLORIDE 500 MG/1
1000 TABLET, EXTENDED RELEASE ORAL 2 TIMES DAILY
Qty: 360 TABLET | Refills: 0 | Status: SHIPPED | OUTPATIENT
Start: 2018-09-10 | End: 2019-02-26 | Stop reason: SDUPTHER

## 2018-09-10 RX ORDER — LISINOPRIL 5 MG/1
5 TABLET ORAL DAILY
Qty: 90 TABLET | Refills: 0 | Status: SHIPPED | OUTPATIENT
Start: 2018-09-10 | End: 2018-12-06 | Stop reason: SDUPTHER

## 2018-09-10 NOTE — TELEPHONE ENCOUNTER
Patient called stating she is doing well on her Atarax and would like a 90 day supply called into [facility] please

## 2018-09-11 RX ORDER — KETOCONAZOLE 20 MG/ML
SHAMPOO TOPICAL
Qty: 120 ML | Refills: 1 | Status: SHIPPED | OUTPATIENT
Start: 2018-09-11 | End: 2019-05-23 | Stop reason: SDUPTHER

## 2018-12-06 DIAGNOSIS — I10 ESSENTIAL HYPERTENSION: ICD-10-CM

## 2018-12-06 RX ORDER — LISINOPRIL 5 MG/1
TABLET ORAL
Qty: 90 TABLET | Refills: 0 | Status: SHIPPED | OUTPATIENT
Start: 2018-12-06 | End: 2019-02-26 | Stop reason: SDUPTHER

## 2018-12-11 ENCOUNTER — TELEPHONE (OUTPATIENT)
Dept: FAMILY MEDICINE CLINIC | Facility: CLINIC | Age: 58
End: 2018-12-11

## 2018-12-11 DIAGNOSIS — E78.00 HYPERCHOLESTEROLEMIA: Primary | ICD-10-CM

## 2018-12-11 RX ORDER — PRAVASTATIN SODIUM 20 MG
20 TABLET ORAL DAILY
Qty: 90 TABLET | Refills: 2 | Status: SHIPPED | OUTPATIENT
Start: 2018-12-11 | End: 2019-05-13

## 2018-12-12 PROBLEM — E11.3293 TYPE 2 DIABETES MELLITUS WITH MILD NONPROLIFERATIVE RETINOPATHY OF BOTH EYES WITHOUT MACULAR EDEMA (HCC): Status: ACTIVE | Noted: 2018-12-12

## 2019-01-28 ENCOUNTER — ANNUAL EXAM (OUTPATIENT)
Dept: OBGYN CLINIC | Facility: CLINIC | Age: 59
End: 2019-01-28
Payer: COMMERCIAL

## 2019-01-28 VITALS
WEIGHT: 160 LBS | DIASTOLIC BLOOD PRESSURE: 78 MMHG | BODY MASS INDEX: 27.31 KG/M2 | SYSTOLIC BLOOD PRESSURE: 122 MMHG | HEIGHT: 64 IN

## 2019-01-28 DIAGNOSIS — Z12.39 SCREENING FOR MALIGNANT NEOPLASM OF BREAST: ICD-10-CM

## 2019-01-28 DIAGNOSIS — Z01.419 ENCOUNTER FOR ANNUAL ROUTINE GYNECOLOGICAL EXAMINATION: Primary | ICD-10-CM

## 2019-01-28 DIAGNOSIS — Z13.820 SCREENING FOR OSTEOPOROSIS: ICD-10-CM

## 2019-01-28 PROCEDURE — S0612 ANNUAL GYNECOLOGICAL EXAMINA: HCPCS | Performed by: OBSTETRICS & GYNECOLOGY

## 2019-01-28 NOTE — PATIENT INSTRUCTIONS

## 2019-01-28 NOTE — PROGRESS NOTES
Assessment/Plan:    Normal annual gynecological exam  Pap smear deferred secondary to normal Pap smear with negative Co testing done last year  Script given for mammogram which we do the end of this year and a screening bone density, DEXA scan, exam  Return to office 1 year earlier as needed       Problem List Items Addressed This Visit     Encounter for annual routine gynecological examination - Primary      Other Visit Diagnoses     Screening for malignant neoplasm of breast        Relevant Orders    Mammo screening bilateral w cad    Screening for osteoporosis        Relevant Orders    DXA bone density spine hip and pelvis            Subjective:      Patient ID: Renzo Bhatti is a 60 y o  female  Here for annual gyn exam - overall well - no vaginal bleeding or discharge - no menopausal complaints - bowels and bladder normal - does have some constipation issues but overall well - up to date with colonoscopy, mammogram and blood work -         The following portions of the patient's history were reviewed and updated as appropriate:   She  has a past medical history of Arthritis; Diabetes mellitus ([facility] [address] );  2 para 2; and Hyperlipidemia  She   Patient Active Problem List    Diagnosis Date Noted    Type 2 diabetes mellitus with mild nonproliferative retinopathy of both eyes without macular edema ([facility] [address] ) 2018    Encounter for annual routine gynecological examination 2018     She  has a past surgical history that includes Cardiac surgery; Hysterectomy; and Total abdominal hysterectomy  Her She was adopted  Family history is unknown by patient  She  reports that she has never smoked  She does not have any smokeless tobacco history on file  She reports that she does not drink alcohol or use drugs    Current Outpatient Prescriptions   Medication Sig Dispense Refill    glucose blood test strip Test 6 times daily 600 each 1    hydrochlorothiazide (HYDRODIURIL) 50 mg tablet Take 1 tablet by mouth 2 (two) times a day      insulin lispro (HUMALOG) 100 units/mL injection Inject under the skin      lisinopril (ZESTRIL) 5 mg tablet TAKE ONE TABLET BY MOUTH ONCE DAILY  90 tablet 0    metFORMIN (GLUCOPHAGE-XR) 500 mg 24 hr tablet Take 2 tablets (1,000 mg total) by mouth 2 (two) times a day 360 tablet 0    omega-3-acid ethyl esters (LOVAZA) 1 g capsule Take 2 g by mouth 2 (two) times a day      pravastatin (PRAVACHOL) 20 mg tablet Take 1 tablet (20 mg total) by mouth daily 90 tablet 2    hydrOXYzine HCL (ATARAX) 25 mg tablet Take 1 tablet (25 mg total) by mouth daily at bedtime for 90 days 90 tablet 3    insulin detemir (LEVEMIR) 100 units/mL subcutaneous injection Inject 50 Units under the skin 2 (two) times a day for 90 days 9000 Units 2    ketoconazole (NIZORAL) 2 % shampoo apply to scalp every other day for 5 minutes and rinse 120 mL 1    meloxicam (MOBIC) 15 mg tablet Take 1 tablet (15 mg total) by mouth daily for 30 days 30 tablet 0    methocarbamol (ROBAXIN) 500 mg tablet Take 1 tablet (500 mg total) by mouth daily at bedtime 30 tablet 0     No current facility-administered medications for this visit        Current Outpatient Prescriptions on File Prior to Visit   Medication Sig    glucose blood test strip Test 6 times daily    hydrochlorothiazide (HYDRODIURIL) 50 mg tablet Take 1 tablet by mouth 2 (two) times a day    insulin lispro (HUMALOG) 100 units/mL injection Inject under the skin    lisinopril (ZESTRIL) 5 mg tablet TAKE ONE TABLET BY MOUTH ONCE DAILY     metFORMIN (GLUCOPHAGE-XR) 500 mg 24 hr tablet Take 2 tablets (1,000 mg total) by mouth 2 (two) times a day    omega-3-acid ethyl esters (LOVAZA) 1 g capsule Take 2 g by mouth 2 (two) times a day    pravastatin (PRAVACHOL) 20 mg tablet Take 1 tablet (20 mg total) by mouth daily    hydrOXYzine HCL (ATARAX) 25 mg tablet Take 1 tablet (25 mg total) by mouth daily at bedtime for 90 days    insulin detemir (LEVEMIR) 100 units/mL subcutaneous injection Inject 50 Units under the skin 2 (two) times a day for 90 days    ketoconazole (NIZORAL) 2 % shampoo apply to scalp every other day for 5 minutes and rinse    meloxicam (MOBIC) 15 mg tablet Take 1 tablet (15 mg total) by mouth daily for 30 days    methocarbamol (ROBAXIN) 500 mg tablet Take 1 tablet (500 mg total) by mouth daily at bedtime     No current facility-administered medications on file prior to visit  She is allergic to atorvastatin; furosemide; and latex       Review of Systems   Constitutional: Negative for fatigue, fever and unexpected weight change  HENT: Negative for dental problem, mouth sores, nosebleeds, rhinorrhea, sinus pain, sinus pressure and sore throat  Eyes: Negative for pain, discharge and visual disturbance  Respiratory: Negative for cough, chest tightness, shortness of breath and wheezing  Cardiovascular: Negative for chest pain, palpitations and leg swelling  Gastrointestinal: Negative for blood in stool, constipation, diarrhea, nausea and vomiting  Endocrine: Negative for polydipsia  Hot flashes   Genitourinary: Negative for difficulty urinating, dyspareunia, dysuria, menstrual problem, pelvic pain, urgency, vaginal discharge and vaginal pain  Musculoskeletal: Negative for arthralgias, back pain and joint swelling  Allergic/Immunologic: Negative for environmental allergies  Neurological: Negative for seizures, light-headedness and headaches  Hematological: Does not bruise/bleed easily  Psychiatric/Behavioral: Negative for sleep disturbance  The patient is not nervous/anxious  All other systems reviewed and are negative  Objective: There were no vitals taken for this visit  Physical Exam   Constitutional: She is oriented to person, place, and time  She appears well-developed and well-nourished  No distress  Petite older white female   HENT:   Head: Normocephalic and atraumatic  Neck: Normal range of motion   Neck supple  No thyromegaly present  Cardiovascular: Normal rate and regular rhythm  Pulmonary/Chest: Effort normal and breath sounds normal  No respiratory distress  She has no wheezes  She has no rales  She exhibits no tenderness  Right breast exhibits no inverted nipple, no mass, no nipple discharge, no skin change and no tenderness  Left breast exhibits no inverted nipple, no mass, no nipple discharge, no skin change and no tenderness  Breasts are symmetrical    Abdominal: Soft  She exhibits no distension and no mass  There is no tenderness  There is no rebound and no guarding  Genitourinary: Rectal exam shows guaiac negative stool  Genitourinary Comments: Normal female, no vulvar or vaginal lesions, vaginal cuff intact - atrophic, cervix and uterus is surgically absent, no adnexal masses are noted and the exam is nontender  Rectal exam has normal sphincter tone and no masses   Neurological: She is alert and oriented to person, place, and time  Psychiatric: She has a normal mood and affect  Her behavior is normal    Vitals reviewed

## 2019-02-24 DIAGNOSIS — R60.9 EDEMA, UNSPECIFIED TYPE: Primary | ICD-10-CM

## 2019-02-25 RX ORDER — HYDROCHLOROTHIAZIDE 50 MG/1
TABLET ORAL
Qty: 180 TABLET | Refills: 2 | Status: SHIPPED | OUTPATIENT
Start: 2019-02-25 | End: 2019-12-11 | Stop reason: SDUPTHER

## 2019-02-26 DIAGNOSIS — E13.9 DIABETES 1.5, MANAGED AS TYPE 2 (HCC): ICD-10-CM

## 2019-02-26 DIAGNOSIS — I10 ESSENTIAL HYPERTENSION: ICD-10-CM

## 2019-02-26 RX ORDER — LISINOPRIL 5 MG/1
TABLET ORAL
Qty: 90 TABLET | Refills: 0 | Status: SHIPPED | OUTPATIENT
Start: 2019-02-26 | End: 2019-03-06 | Stop reason: SDUPTHER

## 2019-02-26 RX ORDER — METFORMIN HYDROCHLORIDE 500 MG/1
TABLET, EXTENDED RELEASE ORAL
Qty: 360 TABLET | Refills: 0 | Status: SHIPPED | OUTPATIENT
Start: 2019-02-26 | End: 2019-03-06 | Stop reason: SDUPTHER

## 2019-03-06 DIAGNOSIS — E13.9 DIABETES 1.5, MANAGED AS TYPE 2 (HCC): ICD-10-CM

## 2019-03-06 DIAGNOSIS — I10 ESSENTIAL HYPERTENSION: ICD-10-CM

## 2019-03-06 DIAGNOSIS — E78.00 HYPERCHOLESTEROLEMIA: Primary | ICD-10-CM

## 2019-03-06 RX ORDER — LISINOPRIL 5 MG/1
TABLET ORAL
Qty: 90 TABLET | Refills: 0 | Status: SHIPPED | OUTPATIENT
Start: 2019-03-06 | End: 2019-06-04 | Stop reason: SDUPTHER

## 2019-03-06 RX ORDER — OMEGA-3-ACID ETHYL ESTERS 1 G/1
CAPSULE, LIQUID FILLED ORAL
Qty: 360 CAPSULE | Refills: 2 | Status: SHIPPED | OUTPATIENT
Start: 2019-03-06 | End: 2020-03-09

## 2019-03-06 RX ORDER — METFORMIN HYDROCHLORIDE 500 MG/1
TABLET, EXTENDED RELEASE ORAL
Qty: 360 TABLET | Refills: 0 | Status: SHIPPED | OUTPATIENT
Start: 2019-03-06 | End: 2019-06-04 | Stop reason: SDUPTHER

## 2019-04-24 ENCOUNTER — TELEPHONE (OUTPATIENT)
Dept: FAMILY MEDICINE CLINIC | Facility: CLINIC | Age: 59
End: 2019-04-24

## 2019-04-24 DIAGNOSIS — R52 PAIN: Primary | ICD-10-CM

## 2019-04-24 DIAGNOSIS — R60.9 SWELLING: ICD-10-CM

## 2019-05-08 ENCOUNTER — TELEPHONE (OUTPATIENT)
Dept: FAMILY MEDICINE CLINIC | Facility: CLINIC | Age: 59
End: 2019-05-08

## 2019-05-08 DIAGNOSIS — E13.9 DIABETES 1.5, MANAGED AS TYPE 2 (HCC): Primary | ICD-10-CM

## 2019-05-08 DIAGNOSIS — I10 ESSENTIAL HYPERTENSION: ICD-10-CM

## 2019-05-08 DIAGNOSIS — E78.00 HYPERCHOLESTEROLEMIA: ICD-10-CM

## 2019-05-10 LAB
ALBUMIN SERPL-MCNC: 4.4 G/DL (ref 3.6–5.1)
ALBUMIN/GLOB SERPL: 1.8 (CALC) (ref 1–2.5)
ALP SERPL-CCNC: 84 U/L (ref 33–130)
ALT SERPL-CCNC: 22 U/L (ref 6–29)
AST SERPL-CCNC: 19 U/L (ref 10–35)
BILIRUB SERPL-MCNC: 0.3 MG/DL (ref 0.2–1.2)
BUN SERPL-MCNC: 21 MG/DL (ref 7–25)
BUN/CREAT SERPL: ABNORMAL (CALC) (ref 6–22)
CALCIUM SERPL-MCNC: 10.7 MG/DL (ref 8.6–10.4)
CHLORIDE SERPL-SCNC: 101 MMOL/L (ref 98–110)
CHOLEST SERPL-MCNC: 180 MG/DL
CHOLEST/HDLC SERPL: 3.1 (CALC)
CO2 SERPL-SCNC: 29 MMOL/L (ref 20–32)
CREAT SERPL-MCNC: 0.79 MG/DL (ref 0.5–0.99)
EST. AVERAGE GLUCOSE BLD GHB EST-MCNC: 183 (CALC)
EST. AVERAGE GLUCOSE BLD GHB EST-SCNC: 10.1 (CALC)
GLOBULIN SER CALC-MCNC: 2.4 G/DL (CALC) (ref 1.9–3.7)
GLUCOSE SERPL-MCNC: 99 MG/DL (ref 65–99)
HBA1C MFR BLD: 8 % OF TOTAL HGB
HDLC SERPL-MCNC: 58 MG/DL
LDLC SERPL CALC-MCNC: 103 MG/DL (CALC)
NONHDLC SERPL-MCNC: 122 MG/DL (CALC)
POTASSIUM SERPL-SCNC: 3.9 MMOL/L (ref 3.5–5.3)
PROT SERPL-MCNC: 6.8 G/DL (ref 6.1–8.1)
SL AMB EGFR AFRICAN AMERICAN: 94 ML/MIN/1.73M2
SL AMB EGFR NON AFRICAN AMERICAN: 81 ML/MIN/1.73M2
SODIUM SERPL-SCNC: 139 MMOL/L (ref 135–146)
TRIGL SERPL-MCNC: 91 MG/DL

## 2019-05-13 ENCOUNTER — OFFICE VISIT (OUTPATIENT)
Dept: FAMILY MEDICINE CLINIC | Facility: CLINIC | Age: 59
End: 2019-05-13
Payer: COMMERCIAL

## 2019-05-13 VITALS
HEART RATE: 74 BPM | WEIGHT: 164.2 LBS | HEIGHT: 64 IN | SYSTOLIC BLOOD PRESSURE: 120 MMHG | DIASTOLIC BLOOD PRESSURE: 78 MMHG | TEMPERATURE: 97.9 F | BODY MASS INDEX: 28.03 KG/M2

## 2019-05-13 DIAGNOSIS — F41.9 ANXIETY: ICD-10-CM

## 2019-05-13 DIAGNOSIS — Z79.4 TYPE 2 DIABETES MELLITUS WITH BOTH EYES AFFECTED BY MILD NONPROLIFERATIVE RETINOPATHY WITHOUT MACULAR EDEMA, WITH LONG-TERM CURRENT USE OF INSULIN (HCC): Primary | ICD-10-CM

## 2019-05-13 DIAGNOSIS — L65.9 ALOPECIA: ICD-10-CM

## 2019-05-13 DIAGNOSIS — E78.00 HYPERCHOLESTEROLEMIA: ICD-10-CM

## 2019-05-13 DIAGNOSIS — E11.3293 TYPE 2 DIABETES MELLITUS WITH BOTH EYES AFFECTED BY MILD NONPROLIFERATIVE RETINOPATHY WITHOUT MACULAR EDEMA, WITH LONG-TERM CURRENT USE OF INSULIN (HCC): Primary | ICD-10-CM

## 2019-05-13 PROBLEM — E78.5 HYPERLIPIDEMIA: Status: ACTIVE | Noted: 2019-05-13

## 2019-05-13 PROCEDURE — 99214 OFFICE O/P EST MOD 30 MIN: CPT | Performed by: FAMILY MEDICINE

## 2019-05-13 PROCEDURE — 3008F BODY MASS INDEX DOCD: CPT | Performed by: FAMILY MEDICINE

## 2019-05-13 PROCEDURE — 1036F TOBACCO NON-USER: CPT | Performed by: FAMILY MEDICINE

## 2019-05-13 RX ORDER — CITALOPRAM 10 MG/1
10 TABLET ORAL DAILY
Qty: 30 TABLET | Refills: 5 | Status: SHIPPED | OUTPATIENT
Start: 2019-05-13 | End: 2020-08-21 | Stop reason: ALTCHOICE

## 2019-05-13 RX ORDER — PRAVASTATIN SODIUM 40 MG
40 TABLET ORAL
Qty: 90 TABLET | Refills: 3 | Status: SHIPPED | OUTPATIENT
Start: 2019-05-13 | End: 2020-06-08

## 2019-05-23 DIAGNOSIS — E13.9 DIABETES 1.5, MANAGED AS TYPE 2 (HCC): ICD-10-CM

## 2019-05-23 DIAGNOSIS — L21.9 SEBORRHEA: ICD-10-CM

## 2019-05-23 RX ORDER — KETOCONAZOLE 20 MG/ML
SHAMPOO TOPICAL
Qty: 120 ML | Refills: 0 | Status: SHIPPED | OUTPATIENT
Start: 2019-05-23 | End: 2020-03-09

## 2019-05-26 LAB — TSH SERPL-ACNC: 3.03 MIU/L (ref 0.4–4.5)

## 2019-05-28 DIAGNOSIS — E03.9 HYPOTHYROIDISM, UNSPECIFIED TYPE: Primary | ICD-10-CM

## 2019-05-28 RX ORDER — LEVOTHYROXINE SODIUM 0.03 MG/1
25 TABLET ORAL
Qty: 30 TABLET | Refills: 5 | Status: SHIPPED | OUTPATIENT
Start: 2019-05-28 | End: 2019-09-30 | Stop reason: SDUPTHER

## 2019-06-04 DIAGNOSIS — E13.9 DIABETES 1.5, MANAGED AS TYPE 2 (HCC): ICD-10-CM

## 2019-06-04 DIAGNOSIS — I10 ESSENTIAL HYPERTENSION: ICD-10-CM

## 2019-06-04 PROCEDURE — 4010F ACE/ARB THERAPY RXD/TAKEN: CPT | Performed by: FAMILY MEDICINE

## 2019-06-04 RX ORDER — METFORMIN HYDROCHLORIDE 500 MG/1
TABLET, EXTENDED RELEASE ORAL
Qty: 360 TABLET | Refills: 0 | Status: SHIPPED | OUTPATIENT
Start: 2019-06-04 | End: 2020-03-09

## 2019-06-04 RX ORDER — LISINOPRIL 5 MG/1
TABLET ORAL
Qty: 90 TABLET | Refills: 0 | Status: SHIPPED | OUTPATIENT
Start: 2019-06-04 | End: 2019-12-11 | Stop reason: SDUPTHER

## 2019-06-25 DIAGNOSIS — J06.9 UPPER RESPIRATORY TRACT INFECTION, UNSPECIFIED TYPE: Primary | ICD-10-CM

## 2019-06-25 RX ORDER — AZITHROMYCIN 250 MG/1
TABLET, FILM COATED ORAL
Qty: 6 TABLET | Refills: 0 | Status: SHIPPED | OUTPATIENT
Start: 2019-06-25 | End: 2019-06-29

## 2019-09-30 ENCOUNTER — OFFICE VISIT (OUTPATIENT)
Dept: FAMILY MEDICINE CLINIC | Facility: CLINIC | Age: 59
End: 2019-09-30

## 2019-09-30 VITALS
BODY MASS INDEX: 26.16 KG/M2 | SYSTOLIC BLOOD PRESSURE: 124 MMHG | WEIGHT: 153.25 LBS | TEMPERATURE: 98.5 F | DIASTOLIC BLOOD PRESSURE: 82 MMHG | HEIGHT: 64 IN | HEART RATE: 74 BPM

## 2019-09-30 DIAGNOSIS — E03.9 HYPOTHYROIDISM, UNSPECIFIED TYPE: ICD-10-CM

## 2019-09-30 DIAGNOSIS — Z79.4 TYPE 2 DIABETES MELLITUS WITH BOTH EYES AFFECTED BY MILD NONPROLIFERATIVE RETINOPATHY WITHOUT MACULAR EDEMA, WITH LONG-TERM CURRENT USE OF INSULIN (HCC): Primary | ICD-10-CM

## 2019-09-30 DIAGNOSIS — E11.3293 TYPE 2 DIABETES MELLITUS WITH BOTH EYES AFFECTED BY MILD NONPROLIFERATIVE RETINOPATHY WITHOUT MACULAR EDEMA, WITH LONG-TERM CURRENT USE OF INSULIN (HCC): Primary | ICD-10-CM

## 2019-09-30 PROCEDURE — 99212 OFFICE O/P EST SF 10 MIN: CPT | Performed by: FAMILY MEDICINE

## 2019-09-30 RX ORDER — LEVOTHYROXINE SODIUM 0.03 MG/1
25 TABLET ORAL
Qty: 30 TABLET | Refills: 5 | Status: SHIPPED | OUTPATIENT
Start: 2019-09-30 | End: 2020-10-05

## 2019-09-30 NOTE — PROGRESS NOTES
BMI Counseling: Body mass index is 26 31 kg/m²  The BMI is above normal  Nutrition recommendations include reducing portion sizes  Patient ID: Leigh Ann Frances is a 60 y o  female  HPI: 60 y  o female presenting for eval of niddm  She has lost her insurance and we discussed ways of her getting labs drawn at a discounted price  She also needs a tsh  She denies any problems at this time        SUBJECTIVE    Family History   Adopted: Yes   Family history unknown: Yes     Social History     Socioeconomic History    Marital status: /Civil Union     Spouse name: Not on file    Number of children: Not on file    Years of education: Not on file    Highest education level: Not on file   Occupational History    Occupation: Retired   Social Needs    Financial resource strain: Not on file    Food insecurity:     Worry: Not on file     Inability: Not on file   [employer] needs:     Medical: Not on file     Non-medical: Not on file   Tobacco Use    Smoking status: Never Smoker    Smokeless tobacco: Never Used   Substance and Sexual Activity    Alcohol use: No    Drug use: No    Sexual activity: Not on file     Comment: declines std/hiv testing   Lifestyle    Physical activity:     Days per week: Not on file     Minutes per session: Not on file    Stress: Not on file   Relationships    Social connections:     Talks on phone: Not on file     Gets together: Not on file     Attends Alevism service: Not on file     Active member of club or organization: Not on file     Attends meetings of clubs or organizations: Not on file     Relationship status: Not on file    Intimate partner violence:     Fear of current or ex partner: Not on file     Emotionally abused: Not on file     Physically abused: Not on file     Forced sexual activity: Not on file   Other Topics Concern    Not on file   Social History Narrative    Personal Protective Equipment Seatbelts     Past Medical History:   Diagnosis Date    Arthritis     Diabetes mellitus ([facility] [address] )      2 para 2      x2 -, -    Hyperlipidemia      Past Surgical History:   Procedure Laterality Date    CARDIAC SURGERY      HYSTERECTOMY      TOTAL ABDOMINAL HYSTERECTOMY      2010     Allergies   Allergen Reactions    Atorvastatin      Reaction Date: 2011;     Furosemide      Reaction Date: 2011;     Latex        Current Outpatient Medications:     citalopram (CeleXA) 10 mg tablet, Take 1 tablet (10 mg total) by mouth daily, Disp: 30 tablet, Rfl: 5    glucose blood (ONE TOUCH ULTRA TEST) test strip, test 6 times a day, Disp: 600 each, Rfl: 0    hydrochlorothiazide (HYDRODIURIL) 50 mg tablet, TAKE ONE TABLET BY MOUTH TWICE DAILY , Disp: 180 tablet, Rfl: 2    insulin detemir (LEVEMIR) 100 units/mL subcutaneous injection, Inject 50 Units under the skin 2 (two) times a day for 90 days, Disp: 9000 Units, Rfl: 2    insulin lispro (HUMALOG) 100 units/mL injection, Inject under the skin, Disp: , Rfl:     ketoconazole (NIZORAL) 2 % shampoo, apply to scalp every other day for 5 minutes and rinse , Disp: 120 mL, Rfl: 0    levothyroxine 25 mcg tablet, Take 1 tablet (25 mcg total) by mouth daily in the early morning, Disp: 30 tablet, Rfl: 5    lisinopril (ZESTRIL) 5 mg tablet, TAKE ONE TABLET BY MOUTH ONCE DAILY , Disp: 90 tablet, Rfl: 0    meloxicam (MOBIC) 15 mg tablet, Take 1 tablet (15 mg total) by mouth daily for 30 days, Disp: 30 tablet, Rfl: 0    metFORMIN (GLUCOPHAGE-XR) 500 mg 24 hr tablet, TAKE TWO TABLETS BY MOUTH TWICE DAILY , Disp: 360 tablet, Rfl: 0    methocarbamol (ROBAXIN) 500 mg tablet, Take 1 tablet (500 mg total) by mouth daily at bedtime, Disp: 30 tablet, Rfl: 0    omega-3-acid ethyl esters (LOVAZA) 1 g capsule, TAKE TWO CAPSULES BY MOUTH TWICE DAILY , Disp: 360 capsule, Rfl: 2    pravastatin (PRAVACHOL) 40 mg tablet, Take 1 tablet (40 mg total) by mouth daily at bedtime, Disp: 90 tablet, Rfl: 3    Review of Systems  Constitutional:     Denies fever, chills ,fatigue ,weakness ,weight loss, weight gain     ENT: Denies earache ,loss of hearing ,nosebleed, nasal discharge,nasal congestion ,sore throat ,hoarseness  Pulmonary: Denies shortness of breath ,cough  ,dyspnea on exertion, orthopnea  ,PND   Cardiovascular:  Denies bradycardia , tachycardia  ,palpations, lower extremity edema leg, claudication  Breast:  Denies new or changing breast lumps ,nipple discharge ,nipple changes  Abdomen:  Denies abdominal pain , anorexia , indigestion, nausea, vomiting, constipation, diarrhea  Musculoskeletal: Denies myalgias, arthralgias, joint swelling, joint stiffness , limb pain, limb swelling  Gu: denies dysuria, polyuria  Skin: Denies skin rash, skin lesion, skin wound, itching, dry skin  Neuro: Denies headache, numbness, tingling, confusion, loss of consciousness, dizziness, vertigo  Psychiatric: Denies feelings of depression, suicidal ideation, anxiety, sleep disturbances    OBJECTIVE  /82   Pulse 74   Temp 98 5 °F (36 9 °C)   Ht 5' 4" (1 626 m)   Wt 69 5 kg (153 lb 4 oz)   BMI 26 31 kg/m²   Constitutional:   NAD, well appearing and well nourished      ENT:   Conjunctiva and lids: no injection, edema, or discharge     Pupils and iris: NIKKI bilaterally    External inspection of ears and nose: normal without deformities or discharge  Otoscopic exam: Canals patent without erythema  Nasal mucosa, septum and turbinates: Normal or edema or discharge         Oropharynx:  Moist mucosa, normal tongue and tonsils without lesions  No erythema        Pulmonary:Respiratory effort normal rate and rhythm, no increased work of breathing   Auscultation of lungs:  Clear bilaterally with no adventitious breath sounds       Cardiovascular: regular rate and rhythm, S1 and S2, no murmur, no edema and/or varicosities of LE      Abdomen: Soft and non-distended     Positive bowel sounds      No heptomegaly or splenomegaly      Gu: no suprapubic tenderness or CVA tenderness, no urethral discharge  Lymphatic:  No anterior or posterior cervical lymphadenopathy        Musculoskeletal:  Gait and station: Normal gait      Digits and nails normal without clubbing or cyanosis       Inspection/palpation of joints, bones, and muscles:  No joint tenderness, swelling, full active and passive range of motion       Skin: Normal skin turgor and no rashes      Neuro:   Normal reflexes       Psych:   alert and oriented to person, place and time     normal mood and affect       Assessment/Plan:Diagnoses and all orders for this visit:    Type 2 diabetes mellitus with both eyes affected by mild nonproliferative retinopathy without macular edema, with long-term current use of insulin (HCC)    Hypothyroidism, unspecified type  -     levothyroxine 25 mcg tablet; Take 1 tablet (25 mcg total) by mouth daily in the early morning    Other orders  -     Cancel: Cologuard; Future  -     Cancel: Microalbumin / creatinine urine ratio  -     Cancel: POCT hemoglobin A1c  -     Cancel: Hepatitis C antibody; Future  -     Cancel: PNEUMOCOCCAL CONJUGATE VACCINE 13-VALENT GREATER THAN 6 MONTHS        Reviewed with patient plan to treat with above plan  Info was provided for self pay labs  Patient instructed to call in 72 hours if not feeling better or if symptoms worsen

## 2019-10-01 ENCOUNTER — TELEPHONE (OUTPATIENT)
Dept: FAMILY MEDICINE CLINIC | Facility: CLINIC | Age: 59
End: 2019-10-01

## 2019-10-01 DIAGNOSIS — E11.42 DIABETIC PERIPHERAL NEUROPATHY (HCC): Primary | ICD-10-CM

## 2019-10-01 DIAGNOSIS — E03.9 HYPOTHYROIDISM, UNSPECIFIED TYPE: ICD-10-CM

## 2019-10-01 RX ORDER — LEVOTHYROXINE SODIUM 0.03 MG/1
25 TABLET ORAL
Qty: 90 TABLET | Refills: 3 | Status: CANCELLED | OUTPATIENT
Start: 2019-10-01

## 2019-10-01 RX ORDER — GABAPENTIN 100 MG/1
100 CAPSULE ORAL 3 TIMES DAILY
Qty: 270 CAPSULE | Refills: 2 | Status: SHIPPED | OUTPATIENT
Start: 2019-10-01 | End: 2020-11-10

## 2019-10-01 NOTE — TELEPHONE ENCOUNTER
She thought you told her to take the Hydroxyzine HCL 25mg for anxiety, but the after visit summary says to STOP taking  Please advise  She also wanted something for the nerve damage in feet  Send to [facility] 90 day  She took something in the past that worked, either Meloxicam or Gabapentin

## 2019-10-01 NOTE — TELEPHONE ENCOUNTER
She should continue to take the hydroxyzine, I don't know why this said to stop  I will send in gabapentin for her feet

## 2019-12-11 DIAGNOSIS — I10 ESSENTIAL HYPERTENSION: ICD-10-CM

## 2019-12-11 DIAGNOSIS — R60.9 EDEMA, UNSPECIFIED TYPE: ICD-10-CM

## 2019-12-11 DIAGNOSIS — F41.9 ANXIETY: ICD-10-CM

## 2019-12-12 PROCEDURE — 4010F ACE/ARB THERAPY RXD/TAKEN: CPT | Performed by: FAMILY MEDICINE

## 2019-12-12 RX ORDER — HYDROXYZINE HYDROCHLORIDE 25 MG/1
TABLET, FILM COATED ORAL
Qty: 90 TABLET | Refills: 2 | Status: SHIPPED | OUTPATIENT
Start: 2019-12-12 | End: 2020-12-21

## 2019-12-12 RX ORDER — HYDROCHLOROTHIAZIDE 50 MG/1
TABLET ORAL
Qty: 180 TABLET | Refills: 1 | Status: SHIPPED | OUTPATIENT
Start: 2019-12-12 | End: 2020-07-10

## 2019-12-12 RX ORDER — LISINOPRIL 5 MG/1
TABLET ORAL
Qty: 90 TABLET | Refills: 0 | Status: SHIPPED | OUTPATIENT
Start: 2019-12-12 | End: 2020-03-09

## 2020-03-07 DIAGNOSIS — E78.00 HYPERCHOLESTEROLEMIA: ICD-10-CM

## 2020-03-07 DIAGNOSIS — L21.9 SEBORRHEA: ICD-10-CM

## 2020-03-07 DIAGNOSIS — I10 ESSENTIAL HYPERTENSION: ICD-10-CM

## 2020-03-07 DIAGNOSIS — E13.9 DIABETES 1.5, MANAGED AS TYPE 2 (HCC): ICD-10-CM

## 2020-03-09 RX ORDER — OMEGA-3-ACID ETHYL ESTERS 1 G/1
CAPSULE, LIQUID FILLED ORAL
Qty: 360 CAPSULE | Refills: 1 | Status: SHIPPED | OUTPATIENT
Start: 2020-03-09

## 2020-03-09 RX ORDER — KETOCONAZOLE 20 MG/ML
SHAMPOO TOPICAL
Qty: 120 ML | Refills: 0 | Status: SHIPPED | OUTPATIENT
Start: 2020-03-09 | End: 2022-04-12

## 2020-03-09 RX ORDER — LISINOPRIL 5 MG/1
TABLET ORAL
Qty: 90 TABLET | Refills: 0 | Status: SHIPPED | OUTPATIENT
Start: 2020-03-09 | End: 2020-07-10

## 2020-03-09 RX ORDER — METFORMIN HYDROCHLORIDE 500 MG/1
TABLET, EXTENDED RELEASE ORAL
Qty: 360 TABLET | Refills: 0 | Status: SHIPPED | OUTPATIENT
Start: 2020-03-09 | End: 2021-03-23

## 2020-06-06 DIAGNOSIS — E78.00 HYPERCHOLESTEROLEMIA: ICD-10-CM

## 2020-06-08 RX ORDER — PRAVASTATIN SODIUM 40 MG
TABLET ORAL
Qty: 90 TABLET | Refills: 0 | Status: SHIPPED | OUTPATIENT
Start: 2020-06-08 | End: 2020-10-05

## 2020-07-10 DIAGNOSIS — R60.9 EDEMA, UNSPECIFIED TYPE: ICD-10-CM

## 2020-07-10 DIAGNOSIS — I10 ESSENTIAL HYPERTENSION: ICD-10-CM

## 2020-07-10 RX ORDER — LISINOPRIL 5 MG/1
TABLET ORAL
Qty: 90 TABLET | Refills: 0 | Status: SHIPPED | OUTPATIENT
Start: 2020-07-10 | End: 2020-11-10

## 2020-07-10 RX ORDER — HYDROCHLOROTHIAZIDE 50 MG/1
TABLET ORAL
Qty: 180 TABLET | Refills: 0 | Status: SHIPPED | OUTPATIENT
Start: 2020-07-10 | End: 2020-08-21 | Stop reason: ALTCHOICE

## 2020-08-21 ENCOUNTER — OFFICE VISIT (OUTPATIENT)
Dept: FAMILY MEDICINE CLINIC | Facility: CLINIC | Age: 60
End: 2020-08-21

## 2020-08-21 VITALS
TEMPERATURE: 98.1 F | SYSTOLIC BLOOD PRESSURE: 108 MMHG | HEART RATE: 74 BPM | DIASTOLIC BLOOD PRESSURE: 60 MMHG | WEIGHT: 156 LBS | BODY MASS INDEX: 26.63 KG/M2 | HEIGHT: 64 IN

## 2020-08-21 DIAGNOSIS — R60.9 EDEMA, UNSPECIFIED TYPE: ICD-10-CM

## 2020-08-21 DIAGNOSIS — Z00.00 ANNUAL PHYSICAL EXAM: Primary | ICD-10-CM

## 2020-08-21 DIAGNOSIS — R07.89 CHEST PRESSURE: ICD-10-CM

## 2020-08-21 PROCEDURE — 3008F BODY MASS INDEX DOCD: CPT | Performed by: FAMILY MEDICINE

## 2020-08-21 PROCEDURE — 3078F DIAST BP <80 MM HG: CPT | Performed by: FAMILY MEDICINE

## 2020-08-21 PROCEDURE — 1036F TOBACCO NON-USER: CPT | Performed by: FAMILY MEDICINE

## 2020-08-21 PROCEDURE — 3074F SYST BP LT 130 MM HG: CPT | Performed by: FAMILY MEDICINE

## 2020-08-21 PROCEDURE — 99212 OFFICE O/P EST SF 10 MIN: CPT | Performed by: FAMILY MEDICINE

## 2020-08-21 RX ORDER — SPIRONOLACTONE 50 MG/1
50 TABLET, FILM COATED ORAL DAILY
Qty: 30 TABLET | Refills: 5 | Status: SHIPPED | OUTPATIENT
Start: 2020-08-21 | End: 2021-04-15 | Stop reason: ALTCHOICE

## 2020-08-21 NOTE — PROGRESS NOTES
[address]    NAME: Nancy Brennan [facility]  AGE: 63 y o  SEX: female  : 1960     DATE: 2020     Assessment and Plan:     Problem List Items Addressed This Visit     None          Immunizations and preventive care screenings were discussed with patient today  Appropriate education was printed on patient's after visit summary  Counseling:  · Exercise: the importance of regular exercise/physical activity was discussed  Recommend exercise 3-5 times per week for at least 30 minutes  No follow-ups on file  Chief Complaint:     Chief Complaint   Patient presents with    Annual Exam      History of Present Illness:     Adult Annual Physical   Patient here for a comprehensive physical exam  The patient reports no problems  Diet and Physical Activity  · Diet/Nutrition: diabetic diet and low carb diet  · Exercise: moderate cardiovascular exercise  Depression Screening  PHQ-9 Depression Screening    PHQ-9:    Frequency of the following problems over the past two weeks:       Little interest or pleasure in doing things:  0 - not at all  Feeling down, depressed, or hopeless:  0 - not at all  PHQ-2 Score:  0       General Health  · Sleep: sleeps well  · Hearing: normal - bilateral   · Vision: no vision problems  · Dental: regular dental visits  /GYN Health  · Patient is: postmenopausal  · Last menstrual period:   · Contraceptive method:       Review of Systems:     Review of Systems   Constitutional: Negative for appetite change, chills and fever  HENT: Negative for ear pain, facial swelling, rhinorrhea, sinus pain, sore throat and trouble swallowing  Eyes: Negative for discharge and redness  Respiratory: Negative for chest tightness, shortness of breath and wheezing  Cardiovascular: Negative for chest pain and palpitations          + chest pressure chronically   Gastrointestinal: Negative for abdominal pain, diarrhea, nausea and vomiting  Endocrine: Negative for polyuria  Genitourinary: Negative for dysuria and urgency  Musculoskeletal: Negative for arthralgias and back pain  Skin: Negative for rash  Neurological: Negative for dizziness, weakness and headaches  Hematological: Negative for adenopathy  Psychiatric/Behavioral: Negative for behavioral problems, confusion and sleep disturbance  The patient is nervous/anxious  All other systems reviewed and are negative       Past Medical History:     Past Medical History:   Diagnosis Date    Arthritis     Diabetes mellitus ([facility] [address] )      2 para 2      x2 -, -    Hyperlipidemia       Past Surgical History:     Past Surgical History:   Procedure Laterality Date    CARDIAC SURGERY      HYSTERECTOMY      TOTAL ABDOMINAL HYSTERECTOMY      2010      Social History:        Social History     Socioeconomic History    Marital status: /Civil Union     Spouse name: None    Number of children: None    Years of education: None    Highest education level: None   Occupational History    Occupation: Retired   Social Needs    Financial resource strain: None    Food insecurity     Worry: None     Inability: None    Transportation needs     Medical: None     Non-medical: None   Tobacco Use    Smoking status: Never Smoker    Smokeless tobacco: Never Used   Substance and Sexual Activity    Alcohol use: No    Drug use: No    Sexual activity: None     Comment: declines std/hiv testing   Lifestyle    Physical activity     Days per week: None     Minutes per session: None    Stress: None   Relationships    Social connections     Talks on phone: None     Gets together: None     Attends Faith service: None     Active member of club or organization: None     Attends meetings of clubs or organizations: None     Relationship status: None    Intimate partner violence     Fear of current or ex partner: None Emotionally abused: None     Physically abused: None     Forced sexual activity: None   Other Topics Concern    None   Social History Narrative    Personal Protective Equipment Seatbelts      Family History:     Family History   Adopted: Yes   Family history unknown: Yes      Current Medications:     Current Outpatient Medications   Medication Sig Dispense Refill    citalopram (CeleXA) 10 mg tablet Take 1 tablet (10 mg total) by mouth daily 30 tablet 5    gabapentin (NEURONTIN) 100 mg capsule Take 1 capsule (100 mg total) by mouth 3 (three) times a day 270 capsule 2    glucose blood (ONE TOUCH ULTRA TEST) test strip test 6 times a day 600 each 0    hydrochlorothiazide (HYDRODIURIL) 50 mg tablet TAKE ONE TABLET BY MOUTH TWICE DAILY  180 tablet 0    hydrOXYzine HCL (ATARAX) 25 mg tablet TAKE ONE TABLET BY MOUTH ONCE DAILY AT BEDTIME  90 tablet 2    ketoconazole (NIZORAL) 2 % shampoo apply to scalp every other day for 5 minutes and then rinse 120 mL 0    levothyroxine 25 mcg tablet Take 1 tablet (25 mcg total) by mouth daily in the early morning 30 tablet 5    lisinopril (ZESTRIL) 5 mg tablet TAKE ONE TABLET BY MOUTH ONCE DAILY  90 tablet 0    metFORMIN (GLUCOPHAGE-XR) 500 mg 24 hr tablet TAKE TWO TABLETS BY MOUTH TWICE DAILY  360 tablet 0    omega-3-acid ethyl esters (LOVAZA) 1 g capsule TAKE TWO CAPSULES BY MOUTH TWICE DAILY  360 capsule 1    pravastatin (PRAVACHOL) 40 mg tablet TAKE ONE TABLET BY MOUTH ONCE DAILY AT BEDTIME  90 tablet 0     No current facility-administered medications for this visit  Allergies: Allergies   Allergen Reactions    Atorvastatin      Reaction Date: [id number];     Furosemide      Reaction Date: [id number];     Latex       Physical Exam:     /60   Pulse 74   Temp 98 1 °F (36 7 °C)   Ht 5' 4" (1 626 m)   Wt 70 8 kg (156 lb)   BMI 26 78 kg/m²     BMI Counseling: Body mass index is 26 78 kg/m²   The BMI is above normal  Nutrition recommendations include reducing portion sizes, decreasing overall calorie intake and 3-5 servings of fruits/vegetables daily  Physical Exam  Vitals signs and nursing note reviewed  Constitutional:       General: She is not in acute distress  Appearance: Normal appearance  She is not ill-appearing or diaphoretic  HENT:      Head: Normocephalic and atraumatic  Right Ear: Tympanic membrane, ear canal and external ear normal       Left Ear: Tympanic membrane, ear canal and external ear normal       Nose: Nose normal  No congestion or rhinorrhea  Mouth/Throat:      Mouth: Mucous membranes are moist       Pharynx: Oropharynx is clear  No posterior oropharyngeal erythema  Eyes:      General:         Right eye: No discharge  Left eye: No discharge  Extraocular Movements: Extraocular movements intact  Conjunctiva/sclera: Conjunctivae normal       Pupils: Pupils are equal, round, and reactive to light  Neck:      Musculoskeletal: Normal range of motion and neck supple  No neck rigidity  Vascular: No carotid bruit  Cardiovascular:      Rate and Rhythm: Normal rate and regular rhythm  Pulses: Normal pulses  Dorsalis pedis pulses are 2+ on the right side and 2+ on the left side  Posterior tibial pulses are 2+ on the right side and 2+ on the left side  Heart sounds: Normal heart sounds  No murmur  Pulmonary:      Effort: Pulmonary effort is normal  No respiratory distress  Breath sounds: Normal breath sounds  No wheezing or rhonchi  Abdominal:      General: Abdomen is flat  Bowel sounds are normal  There is no distension  Palpations: There is no mass  Tenderness: There is no abdominal tenderness  Musculoskeletal: Normal range of motion  General: Swelling present  No deformity  Right lower leg: No edema  Left lower leg: No edema        Comments: +1 pitting edema of lower legs bilaterally   Feet:      Right foot:      Skin integrity: No ulcer, skin breakdown, erythema, warmth, callus or dry skin  Left foot:      Skin integrity: No ulcer, skin breakdown, erythema, warmth, callus or dry skin  Lymphadenopathy:      Cervical: No cervical adenopathy  Skin:     General: Skin is warm and dry  Capillary Refill: Capillary refill takes less than 2 seconds  Coloration: Skin is not jaundiced  Findings: No bruising, erythema or rash  Neurological:      General: No focal deficit present  Mental Status: She is alert and oriented to person, place, and time  Cranial Nerves: No cranial nerve deficit  Sensory: No sensory deficit  Gait: Gait normal       Deep Tendon Reflexes: Reflexes normal    Psychiatric:         Mood and Affect: Mood normal          Behavior: Behavior normal          Thought Content: Thought content normal          Judgment: Judgment normal       Patient's shoes and socks removed  Right Foot/Ankle   Right Foot Inspection  Skin Exam: skin normal and skin intact no dry skin, no warmth, no callus, no erythema, no maceration, no abnormal color, no pre-ulcer, no ulcer and no callus                          Toe Exam: ROM and strength within normal limitsno swelling, no tenderness, erythema and  no right toe deformity  Sensory   Vibration: diminished  Proprioception: diminished   Monofilament testing: diminished  Vascular  Capillary refills: < 3 seconds  The right DP pulse is 2+  The right PT pulse is 2+  Left Foot/Ankle  Left Foot Inspection  Skin Exam: skin normal and skin intactno dry skin, no warmth, no erythema, no maceration, normal color, no pre-ulcer, no ulcer and no callus                         Toe Exam: ROM and strength within normal limitsno swelling, no tenderness, no erythema and no left toe deformity                   Sensory   Vibration: diminished  Proprioception: diminished  Monofilament: diminished  Vascular  Capillary refills: < 3 seconds  The left DP pulse is 2+   The left PT pulse is 2+    Assign Risk Category:  No deformity present;  Loss of protective sensation;        Risk: Myles, [address] [address]

## 2020-09-03 ENCOUNTER — TELEPHONE (OUTPATIENT)
Dept: ADMINISTRATIVE | Facility: OTHER | Age: 60
End: 2020-09-03

## 2020-09-03 NOTE — LETTER
Diabetic Eye Exam Form    Date Requested: 20  Patient: Yinka Jones [facility]  Patient : 1960   Referring Provider: Slava Roa DO    2nd Request    Dilated Retinal Exam, Optomap-Iris Exam, or Fundus Photography Done         Yes ([place] one above)         No     Date of Diabetic Eye Exam ______________________________  Left Eye      Exam did show retinopathy    Exam did not show retinopathy         Mild       Moderate       None       Proliferative       Severe     Right Eye     Exam did show retinopathy    Exam did not show retinopathy         Mild       Moderate       None       Proliferative       Severe     Comments __________________________________________________________    Practice Providing Exam ______________________________________________    Exam Performed By (print name) _______________________________________      Provider Signature ___________________________________________________      These reports are needed for  compliance    Please fax this completed form and a copy of the Diabetic Eye Exam report to our office located at [place] [address] as soon as possible to [phone] abdon Doll: Phone [phone]    We thank you for your assistance in treating our mutual patient

## 2020-09-03 NOTE — LETTER
Diabetic Eye Exam Form    Date Requested: 20  Patient: Marian Maldonado [facility]  Patient : 1960   Referring Provider: La Stroud,     Dilated Retinal Exam, Optomap-Iris Exam, or Fundus Photography Done         Yes ([place] one above)         No     Date of Diabetic Eye Exam ______________________________  Left Eye      Exam did show retinopathy    Exam did not show retinopathy         Mild       Moderate       None       Proliferative       Severe     Right Eye     Exam did show retinopathy    Exam did not show retinopathy         Mild       Moderate       None       Proliferative       Severe     Comments __________________________________________________________    Practice Providing Exam ______________________________________________    Exam Performed By (print name) _______________________________________      Provider Signature ___________________________________________________      These reports are needed for  compliance    Please fax this completed form and a copy of the Diabetic Eye Exam report to our office located at [place] [address] as soon as possible to [phone] abdon Hope: Phone [phone]    We thank you for your assistance in treating our mutual patient

## 2020-09-03 NOTE — TELEPHONE ENCOUNTER
----- Message from Qasim Josue, [address] [place] sent at 9/3/2020  8:18 AM EDT -----  Regarding: dm eye exam - [facility] kiara  Contact: [phone]  09/03/20 8:18 AM    Hello, our patient Kameron Chen has had Diabetic Eye Exam completed/performed  Please assist in updating the patient chart by making an External outreach to Dr Colette Cranker         Thank you,  Qasim Josue, [facility] Jacques Colvin

## 2020-09-03 NOTE — TELEPHONE ENCOUNTER
Upon review of the In Basket request and the patient's chart, initial outreach has been made via fax, please see Contacts section for details  A second outreach attempt will be made within 5 business days      Thank you  Yani Alston Dr, Lawrence Needy [phone] Fax [phone]

## 2020-09-09 NOTE — TELEPHONE ENCOUNTER
As a follow-up, a second attempt has been made for outreach via fax, please see Contacts section for details  A third and final attempt will be made within 5 business days      Thank you  Cristobal Sacks, [place] Dr Rollo Gourd, [facility] [phone] Fax [phone]

## 2020-09-15 NOTE — TELEPHONE ENCOUNTER
As a final attempt, a third outreach has been made via telephone call  Please see Contacts section for details  This encounter will be closed and completed by end of day  Should we receive the requested information because of previous outreach attempts, the requested patient's chart will be updated appropriately       Thank you  Dalila Albert, [address] [place]

## 2020-10-03 DIAGNOSIS — E03.9 HYPOTHYROIDISM, UNSPECIFIED TYPE: ICD-10-CM

## 2020-10-03 DIAGNOSIS — E78.00 HYPERCHOLESTEROLEMIA: ICD-10-CM

## 2020-10-05 RX ORDER — LEVOTHYROXINE SODIUM 0.03 MG/1
TABLET ORAL
Qty: 30 TABLET | Refills: 0 | Status: SHIPPED | OUTPATIENT
Start: 2020-10-05 | End: 2020-11-11

## 2020-10-05 RX ORDER — PRAVASTATIN SODIUM 40 MG
TABLET ORAL
Qty: 90 TABLET | Refills: 0 | Status: SHIPPED | OUTPATIENT
Start: 2020-10-05 | End: 2020-12-21

## 2020-11-10 DIAGNOSIS — I10 ESSENTIAL HYPERTENSION: ICD-10-CM

## 2020-11-10 DIAGNOSIS — R60.9 EDEMA, UNSPECIFIED TYPE: ICD-10-CM

## 2020-11-10 DIAGNOSIS — E11.42 DIABETIC PERIPHERAL NEUROPATHY (HCC): ICD-10-CM

## 2020-11-10 RX ORDER — GABAPENTIN 100 MG/1
CAPSULE ORAL
Qty: 270 CAPSULE | Refills: 0 | Status: SHIPPED | OUTPATIENT
Start: 2020-11-10 | End: 2021-03-23

## 2020-11-10 RX ORDER — HYDROCHLOROTHIAZIDE 50 MG/1
TABLET ORAL
Qty: 180 TABLET | Refills: 0 | Status: SHIPPED | OUTPATIENT
Start: 2020-11-10 | End: 2021-03-23

## 2020-11-10 RX ORDER — LISINOPRIL 5 MG/1
TABLET ORAL
Qty: 90 TABLET | Refills: 0 | Status: SHIPPED | OUTPATIENT
Start: 2020-11-10 | End: 2021-03-23

## 2020-11-11 DIAGNOSIS — E03.9 HYPOTHYROIDISM, UNSPECIFIED TYPE: ICD-10-CM

## 2020-11-11 RX ORDER — LEVOTHYROXINE SODIUM 0.03 MG/1
TABLET ORAL
Qty: 30 TABLET | Refills: 0 | Status: SHIPPED | OUTPATIENT
Start: 2020-11-11 | End: 2020-12-21

## 2020-12-18 DIAGNOSIS — E78.00 HYPERCHOLESTEROLEMIA: ICD-10-CM

## 2020-12-18 DIAGNOSIS — F41.9 ANXIETY: ICD-10-CM

## 2020-12-18 DIAGNOSIS — E03.9 HYPOTHYROIDISM, UNSPECIFIED TYPE: ICD-10-CM

## 2020-12-21 RX ORDER — HYDROXYZINE HYDROCHLORIDE 25 MG/1
TABLET, FILM COATED ORAL
Qty: 90 TABLET | Refills: 0 | Status: SHIPPED | OUTPATIENT
Start: 2020-12-21 | End: 2021-03-23

## 2020-12-21 RX ORDER — PRAVASTATIN SODIUM 40 MG
TABLET ORAL
Qty: 90 TABLET | Refills: 0 | Status: SHIPPED | OUTPATIENT
Start: 2020-12-21 | End: 2021-03-23

## 2020-12-21 RX ORDER — LEVOTHYROXINE SODIUM 0.03 MG/1
TABLET ORAL
Qty: 30 TABLET | Refills: 0 | Status: SHIPPED | OUTPATIENT
Start: 2020-12-21 | End: 2021-03-23

## 2021-01-18 ENCOUNTER — TELEMEDICINE (OUTPATIENT)
Dept: FAMILY MEDICINE CLINIC | Facility: CLINIC | Age: 61
End: 2021-01-18

## 2021-01-18 DIAGNOSIS — R05.9 COUGH: Primary | ICD-10-CM

## 2021-01-18 DIAGNOSIS — J01.40 ACUTE NON-RECURRENT PANSINUSITIS: ICD-10-CM
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
## 2021-01-18 PROCEDURE — 99213 OFFICE O/P EST LOW 20 MIN: CPT | Performed by: NURSE PRACTITIONER

## 2021-01-18 RX ORDER — BROMPHENIRAMINE MALEATE, PSEUDOEPHEDRINE HYDROCHLORIDE, AND DEXTROMETHORPHAN HYDROBROMIDE 2; 30; 10 MG/5ML; MG/5ML; MG/5ML
5 SYRUP ORAL EVERY 6 HOURS PRN
Qty: 118 ML | Refills: 0 | Status: SHIPPED | OUTPATIENT
Start: 2021-01-18 | End: 2021-01-24

## 2021-01-18 RX ORDER — AMOXICILLIN AND CLAVULANATE POTASSIUM 875; 125 MG/1; MG/1
1 TABLET, FILM COATED ORAL EVERY 12 HOURS SCHEDULED
Qty: 14 TABLET | Refills: 0 | Status: SHIPPED | OUTPATIENT
Start: 2021-01-18 | End: 2021-01-25

## 2021-01-18 NOTE — PROGRESS NOTES
Virtual Regular Visit    Assessment/Plan:    Problem List Items Addressed This Visit     None      Visit Diagnoses     Cough    -  Primary    Relevant Medications    brompheniramine-pseudoephedrine-DM 30-2-10 MG/5ML syrup    Other Relevant Orders    XR chest pa & lateral    Acute non-recurrent pansinusitis        Relevant Medications    amoxicillin-clavulanate (AUGMENTIN) 875-125 mg per tablet    Fall, initial encounter        Relevant Orders    XR knee 4+ vw left injury        Reason for visit is No chief complaint on file  Encounter provider Melanie Patel, [facility]    Provider located at [address] [place] [address] [address] [facility]      Recent Visits  No visits were found meeting these conditions  Showing recent visits within past 7 days and meeting all other requirements     Future Appointments  No visits were found meeting these conditions  Showing future appointments within next 150 days and meeting all other requirements        The patient was identified by name and date of birth  Abundio Uribe was informed that this is a telemedicine visit and that the visit is being conducted through [employer] and patient was informed that this is a secure, HIPAA-compliant platform  She agrees to proceed     My office door was closed  No one else was in the room  She acknowledged consent and understanding of privacy and security of the video platform  The patient has agreed to participate and understands they can discontinue the visit at any time  Patient is aware this is a billable service  Subjective    Abundio Uribe is a 63 y  o female presenting with nasal congestion, postnasal drip, cough with slight mucous production for the past few days  Patient's daughter as been sick for last month with pneumonia and she as been around her a lot   Patient denies fever, chills, generalized body aches, loss of smell/taste, abdominal pain, nausea, diarrhea, shortness of breath or chest tightness  Patient fell approximately a week ago injuring her knee  Patient states that the medial side of her knee is painful with pain extending into the kneecap  Patient requesting x-ray of knee to check for possible injury  Past Medical History:   Diagnosis Date    Arthritis     Diabetes mellitus ([facility] [address] )      2 para 2      x2 -, -    Hyperlipidemia        Past Surgical History:   Procedure Laterality Date    CARDIAC SURGERY      HYSTERECTOMY      TOTAL ABDOMINAL HYSTERECTOMY      2010       Current Outpatient Medications   Medication Sig Dispense Refill    amoxicillin-clavulanate (AUGMENTIN) 875-125 mg per tablet Take 1 tablet by mouth every 12 (twelve) hours for 7 days 14 tablet 0    brompheniramine-pseudoephedrine-DM 30-2-10 MG/5ML syrup Take 5 mL by mouth every 6 (six) hours as needed for congestion or cough for up to 6 days 118 mL 0    gabapentin (NEURONTIN) 100 mg capsule TAKE ONE CAPSULE BY MOUTH THREE TIMES DAILY  270 capsule 0    glucose blood (ONE TOUCH ULTRA TEST) test strip test 6 times a day 600 each 0    hydrochlorothiazide (HYDRODIURIL) 50 mg tablet TAKE ONE TABLET BY MOUTH TWICE DAILY  180 tablet 0    hydrOXYzine HCL (ATARAX) 25 mg tablet TAKE ONE TABLET BY MOUTH ONCE DAILY AT BEDTIME  90 tablet 0    ketoconazole (NIZORAL) 2 % shampoo apply to scalp every other day for 5 minutes and then rinse 120 mL 0    levothyroxine 25 mcg tablet TAKE ONE TABLET BY MOUTH EVERY DAY IN THE EARLY MORNING    30 tablet 0    lisinopril (ZESTRIL) 5 mg tablet TAKE ONE TABLET BY MOUTH ONCE DAILY  90 tablet 0    metFORMIN (GLUCOPHAGE-XR) 500 mg 24 hr tablet TAKE TWO TABLETS BY MOUTH TWICE DAILY  360 tablet 0    omega-3-acid ethyl esters (LOVAZA) 1 g capsule TAKE TWO CAPSULES BY MOUTH TWICE DAILY  360 capsule 1    pravastatin (PRAVACHOL) 40 mg tablet TAKE ONE TABLET BY MOUTH ONCE DAILY AT BEDTIME  90 tablet 0    spironolactone (ALDACTONE) 50 mg tablet Take 1 tablet (50 mg total) by mouth daily 30 tablet 5     No current facility-administered medications for this visit  Allergies   Allergen Reactions    Atorvastatin      Reaction Date: [id number];     Furosemide      Reaction Date: [id number];     Latex        Review of Systems   Constitutional: Negative for chills, fatigue and fever  HENT: Positive for congestion, ear pain, postnasal drip, sinus pressure and voice change  Negative for sore throat, tinnitus and trouble swallowing  Eyes: Negative  Respiratory: Positive for cough  Negative for chest tightness and shortness of breath  Cardiovascular: Negative for chest pain and palpitations  Gastrointestinal: Negative for abdominal pain, diarrhea and nausea  Endocrine: Negative  Genitourinary: Negative  Musculoskeletal: Negative for myalgias  Skin: Negative for color change and rash  Allergic/Immunologic: Negative  Neurological: Negative for dizziness, weakness, light-headedness and headaches  Hematological: Negative  Psychiatric/Behavioral: Negative  Video Exam    There were no vitals filed for this visit  (Vital signs not performed during visit due to limitations of telemedicine format along with patient's availability to needed equipment)    Physical Exam  Constitutional:       General: She is not in acute distress  Appearance: Normal appearance  She is well-developed and well-groomed  She is not ill-appearing  HENT:      Head: Normocephalic and atraumatic  Right Ear: External ear normal       Left Ear: External ear normal       Nose: Congestion present  Right Sinus: Maxillary sinus tenderness and frontal sinus tenderness present  Left Sinus: Maxillary sinus tenderness and frontal sinus tenderness present  Mouth/Throat:      Lips: Pink  Mouth: Mucous membranes are moist       Pharynx: Oropharynx is clear     Eyes:      General: Lids are normal       Extraocular Movements: Extraocular movements intact  Conjunctiva/sclera: Conjunctivae normal       Pupils: Pupils are equal, round, and reactive to light  Neck:      Musculoskeletal: Normal range of motion  Trachea: Trachea normal    Cardiovascular:      Rate and Rhythm: Normal rate and regular rhythm  Pulmonary:      Effort: Pulmonary effort is normal  No tachypnea, bradypnea, prolonged expiration or respiratory distress  Skin:     Coloration: Skin is not ashen, cyanotic or pale  Neurological:      Mental Status: She is alert and oriented to person, place, and time  Psychiatric:         Mood and Affect: Mood normal          Behavior: Behavior normal  Behavior is cooperative  Thought Content: Thought content normal         No one else was in the room  VIRTUAL VISIT DISCLAIMER    Shikha Valeriy acknowledges that she has consented to an online visit or consultation  She understands that the online visit is based solely on information provided by her, and that, in the absence of a face-to-face physical evaluation by the physician, the diagnosis she receives is both limited and provisional in terms of accuracy and completeness  This is not intended to replace a full medical face-to-face evaluation by the physician  Shikha Crooks understands and accepts these terms

## 2021-03-23 DIAGNOSIS — E11.42 DIABETIC PERIPHERAL NEUROPATHY (HCC): ICD-10-CM

## 2021-03-23 DIAGNOSIS — I10 ESSENTIAL HYPERTENSION: ICD-10-CM

## 2021-03-23 DIAGNOSIS — R60.9 EDEMA, UNSPECIFIED TYPE: ICD-10-CM

## 2021-03-23 DIAGNOSIS — E03.9 HYPOTHYROIDISM, UNSPECIFIED TYPE: ICD-10-CM

## 2021-03-23 DIAGNOSIS — F41.9 ANXIETY: ICD-10-CM

## 2021-03-23 DIAGNOSIS — E13.9 DIABETES 1.5, MANAGED AS TYPE 2 (HCC): ICD-10-CM

## 2021-03-23 DIAGNOSIS — E78.00 HYPERCHOLESTEROLEMIA: ICD-10-CM

## 2021-03-23 RX ORDER — LISINOPRIL 5 MG/1
TABLET ORAL
Qty: 90 TABLET | Refills: 0 | Status: SHIPPED | OUTPATIENT
Start: 2021-03-23 | End: 2021-08-20

## 2021-03-23 RX ORDER — LEVOTHYROXINE SODIUM 0.03 MG/1
TABLET ORAL
Qty: 30 TABLET | Refills: 0 | Status: SHIPPED | OUTPATIENT
Start: 2021-03-23 | End: 2021-08-20

## 2021-03-23 RX ORDER — PRAVASTATIN SODIUM 40 MG
TABLET ORAL
Qty: 90 TABLET | Refills: 0 | Status: SHIPPED | OUTPATIENT
Start: 2021-03-23 | End: 2021-08-20

## 2021-03-23 RX ORDER — HYDROCHLOROTHIAZIDE 50 MG/1
TABLET ORAL
Qty: 180 TABLET | Refills: 0 | Status: SHIPPED | OUTPATIENT
Start: 2021-03-23 | End: 2021-08-20

## 2021-03-23 RX ORDER — GABAPENTIN 100 MG/1
CAPSULE ORAL
Qty: 270 CAPSULE | Refills: 0 | Status: SHIPPED | OUTPATIENT
Start: 2021-03-23 | End: 2021-08-20

## 2021-03-23 RX ORDER — HYDROXYZINE HYDROCHLORIDE 25 MG/1
TABLET, FILM COATED ORAL
Qty: 90 TABLET | Refills: 0 | Status: SHIPPED | OUTPATIENT
Start: 2021-03-23 | End: 2021-08-20

## 2021-03-23 RX ORDER — METFORMIN HYDROCHLORIDE 500 MG/1
TABLET, EXTENDED RELEASE ORAL
Qty: 360 TABLET | Refills: 0 | Status: SHIPPED | OUTPATIENT
Start: 2021-03-23 | End: 2021-08-20

## 2021-04-08 ENCOUNTER — TELEPHONE (OUTPATIENT)
Dept: FAMILY MEDICINE CLINIC | Facility: CLINIC | Age: 61
End: 2021-04-08

## 2021-04-13 ENCOUNTER — APPOINTMENT (OUTPATIENT)
Dept: RADIOLOGY | Facility: CLINIC | Age: 61
End: 2021-04-13
Payer: COMMERCIAL
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
## 2021-04-13 PROCEDURE — 73564 X-RAY EXAM KNEE 4 OR MORE: CPT

## 2021-04-15 ENCOUNTER — ANNUAL EXAM (OUTPATIENT)
Dept: OBGYN CLINIC | Facility: CLINIC | Age: 61
End: 2021-04-15
Payer: COMMERCIAL

## 2021-04-15 VITALS
SYSTOLIC BLOOD PRESSURE: 142 MMHG | DIASTOLIC BLOOD PRESSURE: 74 MMHG | BODY MASS INDEX: 28.68 KG/M2 | HEIGHT: 64 IN | WEIGHT: 168 LBS

## 2021-04-15 DIAGNOSIS — Z12.11 SCREENING FOR COLORECTAL CANCER: ICD-10-CM

## 2021-04-15 DIAGNOSIS — Z12.31 ENCOUNTER FOR SCREENING MAMMOGRAM FOR MALIGNANT NEOPLASM OF BREAST: ICD-10-CM

## 2021-04-15 DIAGNOSIS — Z01.419 ENCOUNTER FOR ANNUAL ROUTINE GYNECOLOGICAL EXAMINATION: Primary | ICD-10-CM

## 2021-04-15 DIAGNOSIS — Z13.820 SCREENING FOR OSTEOPOROSIS: ICD-10-CM

## 2021-04-15 DIAGNOSIS — Z12.12 SCREENING FOR COLORECTAL CANCER: ICD-10-CM

## 2021-04-15 PROCEDURE — 99396 PREV VISIT EST AGE 40-64: CPT | Performed by: OBSTETRICS & GYNECOLOGY

## 2021-04-15 PROCEDURE — 3008F BODY MASS INDEX DOCD: CPT | Performed by: OBSTETRICS & GYNECOLOGY

## 2021-04-15 PROCEDURE — 1036F TOBACCO NON-USER: CPT | Performed by: OBSTETRICS & GYNECOLOGY

## 2021-04-15 NOTE — ADDENDUM NOTE
[facility] AND [facility] in [address] [address] [place] Enrique    Phone:  [phone]    Fax:  [phone]           Lizzy Baig   MRN: [id number]    Department:  [facility] in [address] [place] [address]   Date of Visit Addended by: Colin Morris on: 4/15/2021 02:30 PM     Modules accepted: Orders, [employer] Insurance plans vary and the physician(s) referred by the Immediate Care may not be covered by your plan. It is possible that the physician may not participate in your health insurance plan.   This may result in a lower benefit level being available to yo CARE PHYSICIAN AT ONCE OR GO TO THE EMERGENCY DEPARTMENT. If you have been prescribed any medication(s), please fill your prescription right away and begin taking the medication(s) as directed.   If you believe that any of the medications or instructions - If you don’t have insurance, Lexie Livingston has partnered with [facility] to help you get signed up for insurance coverage.   [facility] is a Federal Navigator program that can help with your Affordable Care Act cover

## 2021-04-15 NOTE — PROGRESS NOTES
Assessment/Plan:    Normal annual gynecological exam   Pap smear deferred secondary to normal Pap smear done 2018 that was normal with negative Co testing and hysterectomy  Given script for mammogram which will be due in   Given script for baseline bone density testing    Given referral for colorectal screening   Return to office in 1 year earlier as needed       Problem List Items Addressed This Visit        Other    Encounter for annual routine gynecological examination - Primary      Other Visit Diagnoses     Encounter for screening mammogram for malignant neoplasm of breast        Relevant Orders    Mammo screening bilateral w cad    Screening for colorectal cancer        Relevant Orders    Ambulatory referral to Colorectal Surgery            Subjective:      Patient ID: Nilay Bagley is a 57 y o  female  Edgar Jaimes is here for annual gyn exam - overall well - no vaginal bleeding or discharge - no menopausal complaints - bowels and bladder normal - up to date with mammogram, needs colorectal screening  - up to date with pap 2018 normal with neg cotesting -       The following portions of the patient's history were reviewed and updated as appropriate:   She  has a past medical history of Arthritis, CTS (carpal tunnel syndrome), Diabetes mellitus ([address] ), Fibroid,  2 para 2, and Hyperlipidemia  She   Patient Active Problem List    Diagnosis Date Noted    Hyperlipidemia 2019    Type 2 diabetes mellitus with mild nonproliferative retinopathy of both eyes without macular edema ([address] ) 2018    Encounter for annual routine gynecological examination 2018    Diabetic peripheral neuropathy ([address] ) 2016    Arthritis 2012    Hypertension 2012    Type 2 diabetes mellitus ([address] ) 2012     She  has a past surgical history that includes Cardiac surgery; Hysterectomy; Total abdominal hysterectomy; and Myomectomy  Her She was adopted   Family history is unknown by patient  She  reports that she has never smoked  She has never used smokeless tobacco  She reports that she does not drink alcohol or use drugs  Current Outpatient Medications   Medication Sig Dispense Refill    gabapentin (NEURONTIN) 100 mg capsule TAKE ONE CAPSULE BY MOUTH THREE TIMES DAILY  270 capsule 0    glucose blood (ONE TOUCH ULTRA TEST) test strip test 6 times a day 600 each 0    hydrochlorothiazide (HYDRODIURIL) 50 mg tablet TAKE ONE TABLET BY MOUTH TWICE DAILY  180 tablet 0    hydrOXYzine HCL (ATARAX) 25 mg tablet TAKE ONE TABLET BY MOUTH ONCE DAILY AT BEDTIME  90 tablet 0    ketoconazole (NIZORAL) 2 % shampoo apply to scalp every other day for 5 minutes and then rinse 120 mL 0    levothyroxine 25 mcg tablet TAKE ONE TABLET BY MOUTH EVERY DAY IN THE EARLY MORNING 30 tablet 0    lisinopril (ZESTRIL) 5 mg tablet TAKE ONE TABLET BY MOUTH ONCE DAILY  90 tablet 0    metFORMIN (GLUCOPHAGE-XR) 500 mg 24 hr tablet TAKE TWO TABLETS BY MOUTH TWICE DAILY  (Patient taking differently: 500 mg ) 360 tablet 0    omega-3-acid ethyl esters (LOVAZA) 1 g capsule TAKE TWO CAPSULES BY MOUTH TWICE DAILY  360 capsule 1    pravastatin (PRAVACHOL) 40 mg tablet TAKE ONE TABLET BY MOUTH ONCE DAILY AT BEDTIME  90 tablet 0     No current facility-administered medications for this visit        Current Outpatient Medications on File Prior to Visit   Medication Sig    gabapentin (NEURONTIN) 100 mg capsule TAKE ONE CAPSULE BY MOUTH THREE TIMES DAILY     glucose blood (ONE TOUCH ULTRA TEST) test strip test 6 times a day    hydrochlorothiazide (HYDRODIURIL) 50 mg tablet TAKE ONE TABLET BY MOUTH TWICE DAILY     hydrOXYzine HCL (ATARAX) 25 mg tablet TAKE ONE TABLET BY MOUTH ONCE DAILY AT BEDTIME     ketoconazole (NIZORAL) 2 % shampoo apply to scalp every other day for 5 minutes and then rinse    levothyroxine 25 mcg tablet TAKE ONE TABLET BY MOUTH EVERY DAY IN THE EARLY MORNING    lisinopril (ZESTRIL) 5 mg tablet TAKE ONE TABLET BY MOUTH ONCE DAILY     metFORMIN (GLUCOPHAGE-XR) 500 mg 24 hr tablet TAKE TWO TABLETS BY MOUTH TWICE DAILY  (Patient taking differently: 500 mg )    omega-3-acid ethyl esters (LOVAZA) 1 g capsule TAKE TWO CAPSULES BY MOUTH TWICE DAILY     pravastatin (PRAVACHOL) 40 mg tablet TAKE ONE TABLET BY MOUTH ONCE DAILY AT BEDTIME     [DISCONTINUED] spironolactone (ALDACTONE) 50 mg tablet Take 1 tablet (50 mg total) by mouth daily (Patient not taking: Reported on 4/15/2021)     No current facility-administered medications on file prior to visit  She is allergic to atorvastatin; furosemide; and latex       Review of Systems   Constitutional: Negative for chills, fatigue, fever and unexpected weight change  HENT: Negative for dental problem, sinus pressure and sinus pain  Eyes: Negative for visual disturbance  Respiratory: Negative for cough, shortness of breath and wheezing  Cardiovascular: Negative for chest pain and leg swelling  Gastrointestinal: Negative for constipation, diarrhea, nausea and vomiting  Genitourinary: Negative for urgency  Musculoskeletal: Negative for back pain and joint swelling  Allergic/Immunologic: Negative for environmental allergies  Neurological: Negative for dizziness and headaches  Psychiatric/Behavioral: The patient is not nervous/anxious  Objective:      /74 (BP Location: Left arm, Patient Position: Sitting, Cuff Size: Standard)   Ht 5' 4" (1 626 m)   Wt 76 2 kg (168 lb)   BMI 28 84 kg/m²          Physical Exam  Vitals signs reviewed  Constitutional:       General: She is not in acute distress  Appearance: Normal appearance  She is normal weight  She is not ill-appearing  Comments: Well nourished white female    HENT:      Head: Normocephalic and atraumatic  Neck:      Musculoskeletal: Neck supple  No muscular tenderness  Thyroid: No thyromegaly or thyroid tenderness     Cardiovascular:      Rate and Rhythm: Normal rate and regular rhythm  Pulses: Normal pulses  Heart sounds: Normal heart sounds  No murmur  Pulmonary:      Effort: Pulmonary effort is normal  No respiratory distress  Breath sounds: Normal breath sounds  No wheezing  Chest:      Breasts: Breasts are symmetrical          Right: Normal  No swelling, inverted nipple, mass, nipple discharge, skin change or tenderness  Left: Normal  No swelling, inverted nipple, mass, nipple discharge, skin change or tenderness  Abdominal:      General: There is no distension  Palpations: There is no mass  Tenderness: There is no abdominal tenderness  There is no right CVA tenderness, left CVA tenderness, guarding or rebound  Hernia: No hernia is present  Genitourinary:     Comments: Normal female, no vulvar or vaginal lesions and is atrophic, Hindman's and Bartholin glands are grossly normal   Urethra is in the midline and normal appearance  Cervix and  Uterus are surgically absent and no palpable masses or tenderness is noted  There are no adnexal masses  The exam is nontender  Rectal exam reveals normal sphincter tone, no palpable masses and stool is guaiac negative  Lymphadenopathy:      Upper Body:      Right upper body: No supraclavicular, axillary or pectoral adenopathy  Left upper body: No supraclavicular, axillary or pectoral adenopathy  Neurological:      General: No focal deficit present  Mental Status: She is alert and oriented to person, place, and time     Psychiatric:         Mood and Affect: Mood normal          Behavior: Behavior normal

## 2021-04-15 NOTE — PATIENT INSTRUCTIONS

## 2021-04-19 DIAGNOSIS — I10 ESSENTIAL HYPERTENSION: ICD-10-CM

## 2021-04-19 DIAGNOSIS — E11.3293 TYPE 2 DIABETES MELLITUS WITH BOTH EYES AFFECTED BY MILD NONPROLIFERATIVE RETINOPATHY WITHOUT MACULAR EDEMA, WITH LONG-TERM CURRENT USE OF INSULIN (HCC): ICD-10-CM

## 2021-04-19 DIAGNOSIS — E03.9 HYPOTHYROIDISM, UNSPECIFIED TYPE: Primary | ICD-10-CM

## 2021-04-19 DIAGNOSIS — M25.562 CHRONIC PAIN OF BOTH KNEES: Primary | ICD-10-CM

## 2021-04-19 DIAGNOSIS — G89.29 CHRONIC PAIN OF BOTH KNEES: Primary | ICD-10-CM

## 2021-04-19 DIAGNOSIS — E78.2 MIXED HYPERLIPIDEMIA: ICD-10-CM

## 2021-04-19 DIAGNOSIS — E78.00 PURE HYPERCHOLESTEROLEMIA: ICD-10-CM

## 2021-04-19 DIAGNOSIS — M25.561 CHRONIC PAIN OF BOTH KNEES: Primary | ICD-10-CM

## 2021-04-19 DIAGNOSIS — Z79.4 TYPE 2 DIABETES MELLITUS WITH BOTH EYES AFFECTED BY MILD NONPROLIFERATIVE RETINOPATHY WITHOUT MACULAR EDEMA, WITH LONG-TERM CURRENT USE OF INSULIN (HCC): ICD-10-CM

## 2021-04-19 RX ORDER — NAPROXEN 500 MG/1
500 TABLET ORAL
Qty: 90 TABLET | Refills: 0 | Status: SHIPPED | OUTPATIENT
Start: 2021-04-19 | End: 2022-04-15

## 2021-06-11 ENCOUNTER — OFFICE VISIT (OUTPATIENT)
Dept: URGENT CARE | Facility: CLINIC | Age: 61
End: 2021-06-11
Payer: COMMERCIAL

## 2021-06-11 VITALS
SYSTOLIC BLOOD PRESSURE: 135 MMHG | HEART RATE: 107 BPM | RESPIRATION RATE: 20 BRPM | HEIGHT: 64 IN | DIASTOLIC BLOOD PRESSURE: 73 MMHG | BODY MASS INDEX: 28.68 KG/M2 | WEIGHT: 168 LBS

## 2021-06-11 DIAGNOSIS — S01.01XA LACERATION OF SCALP, INITIAL ENCOUNTER: Primary | ICD-10-CM

## 2021-06-11 PROCEDURE — G0382 LEV 3 HOSP TYPE B ED VISIT: HCPCS | Performed by: NURSE PRACTITIONER

## 2021-06-11 NOTE — PATIENT INSTRUCTIONS
Sutures (9) to be removed in 5-7 days  Keep sutures clean and dry for the next 24 hours  After 24 hours you can get it wet but avoid submerging  Tylenol/motrin as needed for pain   Ice to the head to reduce swelling   Monitor for signs of head injury: persistent headache not relieved or improved with tylenol/motrin, dizziness, confusion, visual changes- if there occur proceed to the ER    Head Injury   WHAT YOU NEED TO KNOW:   A head injury can include your scalp, face, skull, or brain and range from mild to severe  Effects can appear immediately after the injury or develop later  The effects may last a short time or be permanent  Healthcare providers may want to check your recovery over time  Treatment may change as you recover or develop new health problems from the head injury  DISCHARGE INSTRUCTIONS:   Call your local emergency number (911 in the [address] [place] [address],[address]), or have someone else call if:   · You cannot be woken  · You have a seizure  · You stop responding to others or you faint  · You have blurry or double vision  · Your speech becomes slurred or confused  · You have arm or leg weakness, loss of feeling, or new problems with coordination  · Your pupils are larger than usual, or one pupil is a different size than the other  · You have blood or clear fluid coming out of your ears or nose  Return to the emergency department if:   · You have repeated or forceful vomiting  · You feel confused  · Your headache gets worse or becomes severe  · You or someone caring for you notices that you are harder to wake than usual     Call your doctor if:   · Your symptoms last longer than 6 weeks after the injury  · You have questions or concerns about your condition or care  Medicines:   · Acetaminophen  decreases pain and fever  It is available without a doctor's order  Ask how much to take and how often to take it  Follow directions   Read the labels of all other medicines you are using to see if they also contain acetaminophen, or ask your doctor or pharmacist  Acetaminophen can cause liver damage if not taken correctly  Do not use more than 4 grams (4,000 milligrams) total of acetaminophen in one day  · Take your medicine as directed  Contact your healthcare provider if you think your medicine is not helping or if you have side effects  Tell him or her if you are allergic to any medicine  Keep a list of the medicines, vitamins, and herbs you take  Include the amounts, and when and why you take them  Bring the list or the pill bottles to follow-up visits  Carry your medicine list with you in case of an emergency  Self-care:   · Rest  or do quiet activities  Limit your time watching TV, using the computer, or doing tasks that require a lot of thinking  Slowly return to your normal activities as directed  Do not play sports or do activities that may cause you to get hit in the head  Ask your healthcare provider when you can return to sports  · Apply ice  on your head for 15 to 20 minutes every hour or as directed  Use an ice pack, or put crushed ice in a plastic bag  Cover it with a towel before you apply it to your skin  Ice helps prevent tissue damage and decreases swelling and pain  · Have someone stay with you for 24 hours  , or as directed  This person can monitor you for problems and call for help if needed  When you are awake, the person should ask you a few questions every few hours to see if you are thinking clearly  An example is to ask your name or address  Prevent another head injury:   · Wear a helmet that fits properly  Do this when you play sports, or ride a bike, scooter, or skateboard  Helmets help decrease your risk for a serious head injury  Talk to your healthcare provider about other ways you can protect yourself if you play sports  · Wear your seatbelt every time you are in a car    This helps lower your risk for a head injury if you are in a car accident  Follow up with your doctor as directed:  Write down your questions so you remember to ask them during your visits  © Copyright [address] Information is for End User's use only and may not be sold, redistributed or otherwise used for commercial purposes  All illustrations and images included in CareNotes® are the copyrighted property of A D A M , [employer]  or Jean-Pierre Espinoza   The above information is an  only  It is not intended as medical advice for individual conditions or treatments  Talk to your doctor, nurse or pharmacist before following any medical regimen to see if it is safe and effective for you  Care For Your Stitches   WHAT YOU NEED TO KNOW:   Stitches, or sutures, are used to close cuts and wounds on the skin  Stitches need to be removed after your wound has healed  DISCHARGE INSTRUCTIONS:   Return to the emergency department if:   · Your stitches come apart  · Blood soaks through your bandages  · You suddenly cannot move your injured joint  · You have sudden numbness around your wound  · You see red streaks coming from your wound  Contact your healthcare provider if:   · You have a fever and chills  · Your wound is red, warm, swollen, or leaking pus  · There is a bad smell coming from your wound  · You have increased pain in the wound area  · You have questions or concerns about your condition or care  Care for your stitches:   · Protect the stitches  You may need to cover your stitches with a bandage for 24 to 48 hours, or as directed  Do not bump or hit the suture area  This could open the wound  Do not trim or shorten the ends of your stitches  If they rub on your clothing, put a gauze bandage between the stitches and your clothes  · Clean the area as directed  Carefully wash your wound with soap and water  For mouth and lip wounds, rinse your mouth after meals and at bedtime   Ask your healthcare provider what to use to rinse your mouth  If you have a scalp wound, you may gently wash your hair every 2 days with mild shampoo  Do not use hair products, such as hair spray  Check your wound for signs of infection when you clean it  Signs include redness, swelling, and pus  · Keep the area dry as directed  Wait 12 to 24 hours after you receive your stitches before you take a shower  Take showers instead of baths  Do not take a bath or swim  Your healthcare provider will give you instructions for bathing with your stitches  Help your wound heal:   · Elevate your wound  Keep your wound above the level of your heart as often as you can  This will help decrease swelling and pain  Prop the area on pillows or blankets, if possible, to keep it elevated comfortably  · Limit activity  Do not stretch the skin around your wound  This will help prevent bleeding and swelling  Follow up with your healthcare provider as directed: You may need to return to have your stitches removed  Write down your questions so you remember to ask them during your visits  © Copyright [address] Information is for End User's use only and may not be sold, redistributed or otherwise used for commercial purposes  All illustrations and images included in CareNotes® are the copyrighted property of A D A M , Inc  or [address] [place], [place] [address]   The above information is an  only  It is not intended as medical advice for individual conditions or treatments  Talk to your doctor, nurse or pharmacist before following any medical regimen to see if it is safe and effective for you

## 2021-06-11 NOTE — PROGRESS NOTES
[facility]        NAME: Donnell Ace is a 57 y o  female  : 1960    MRN: [id number]  DATE: 2021  TIME: 11:04 AM    Assessment and Plan   Laceration of scalp, initial encounter [S01 01XA]  1  Laceration of scalp, initial encounter           Patient Instructions   Sutures (9) to be removed in 5-7 days  Keep sutures clean and dry for the next 24 hours  After 24 hours you can get it wet but avoid submerging  Tylenol/motrin as needed for pain   Ice to the head to reduce swelling   Monitor for signs of head injury: persistent headache not relieved or improved with tylenol/motrin, dizziness, confusion, visual changes- if there occur proceed to the ER    Follow up with PCP in 3-5 days  Proceed to  ER if symptoms worsen  Chief Complaint     Chief Complaint   Patient presents with    Head Injury     fell and hit back of head on doorway  Called ambulance  Tdap UTD         History of Present Illness       Patient is a 57year old female presenting for evaluation of scalp laceartion  She slipped this morning around 6 am on a trash bag  She fell backwards into a door  Denies LOC  Denies headache, neck pain, nausea, vomiting, or dizziness  She called her neighbor for help, who then called an ambulance  EMS was at the home but she declined transport to the hospital  Last tetanus was 2019  Review of Systems   Review of Systems   Constitutional: Negative for activity change, chills and fever  Eyes: Negative for photophobia and visual disturbance  Gastrointestinal: Negative for nausea and vomiting  Musculoskeletal: Negative for neck pain  Skin: Positive for wound  Negative for color change  Neurological: Negative for dizziness, syncope, light-headedness, numbness and headaches           Current Medications       Current Outpatient Medications:     gabapentin (NEURONTIN) 100 mg capsule, TAKE ONE CAPSULE BY MOUTH THREE TIMES DAILY , Disp: 270 capsule, Rfl: 0    glucose blood (ONE TOUCH ULTRA TEST) test strip, test 6 times a day, Disp: 600 each, Rfl: 0    hydrochlorothiazide (HYDRODIURIL) 50 mg tablet, TAKE ONE TABLET BY MOUTH TWICE DAILY , Disp: 180 tablet, Rfl: 0    hydrOXYzine HCL (ATARAX) 25 mg tablet, TAKE ONE TABLET BY MOUTH ONCE DAILY AT BEDTIME , Disp: 90 tablet, Rfl: 0    levothyroxine 25 mcg tablet, TAKE ONE TABLET BY MOUTH EVERY DAY IN THE EARLY MORNING, Disp: 30 tablet, Rfl: 0    lisinopril (ZESTRIL) 5 mg tablet, TAKE ONE TABLET BY MOUTH ONCE DAILY , Disp: 90 tablet, Rfl: 0    metFORMIN (GLUCOPHAGE-XR) 500 mg 24 hr tablet, TAKE TWO TABLETS BY MOUTH TWICE DAILY  (Patient taking differently: 500 mg ), Disp: 360 tablet, Rfl: 0    omega-3-acid ethyl esters (LOVAZA) 1 g capsule, TAKE TWO CAPSULES BY MOUTH TWICE DAILY , Disp: 360 capsule, Rfl: 1    pravastatin (PRAVACHOL) 40 mg tablet, TAKE ONE TABLET BY MOUTH ONCE DAILY AT BEDTIME , Disp: 90 tablet, Rfl: 0    ketoconazole (NIZORAL) 2 % shampoo, apply to scalp every other day for 5 minutes and then rinse (Patient not taking: Reported on 2021), Disp: 120 mL, Rfl: 0    naproxen (NAPROSYN) 500 mg tablet, Take 1 tablet (500 mg total) by mouth 3 (three) times a day with meals, Disp: 90 tablet, Rfl: 0    Current Allergies     Allergies as of 2021 - Reviewed 2021   Allergen Reaction Noted    Atorvastatin Other (See Comments) 2012    Furosemide  2012    Latex  04/15/2013            The following portions of the patient's history were reviewed and updated as appropriate: allergies, current medications, past family history, past medical history, past social history, past surgical history and problem list      Past Medical History:   Diagnosis Date    Arthritis     CTS (carpal tunnel syndrome)     had surgery    Diabetes mellitus [facility] )     Fibroid     dr Go Sites removed     2 para 2      x2 -F, -    Hyperlipidemia        Past Surgical History:   Procedure Laterality Date    CARDIAC Ely Michael removed    TOTAL ABDOMINAL HYSTERECTOMY      4/2010       Family History   Adopted: Yes   Family history unknown: Yes         Medications have been verified  Objective   /73 (Patient Position: Sitting)   Pulse (!) 107   Resp 20   Ht 5' 4" (1 626 m)   Wt 76 2 kg (168 lb)   BMI 28 84 kg/m²          Physical Exam     Physical Exam  Vitals signs reviewed  Constitutional:       General: She is awake  She is not in acute distress  Appearance: Normal appearance  She is normal weight  HENT:      Head: Normocephalic  Laceration present  Right Ear: Hearing normal       Left Ear: Hearing normal       Nose: Nose normal       Mouth/Throat:      Lips: Pink  Mouth: Mucous membranes are moist    Neck:      Musculoskeletal: Full passive range of motion without pain and normal range of motion  Cardiovascular:      Rate and Rhythm: Normal rate and regular rhythm  Pulmonary:      Effort: Pulmonary effort is normal    Neurological:      General: No focal deficit present  Mental Status: She is alert, oriented to person, place, and time and easily aroused  GCS: GCS eye subscore is 4  GCS verbal subscore is 5  GCS motor subscore is 6  Psychiatric:         Behavior: Behavior is cooperative           Laceration repair    Date/Time: 6/11/2021 9:30 AM  Performed by: MARYLIN Rodriguez  Authorized by: MARYLIN Rodriguez   Risks and benefits: risks, benefits and alternatives were discussed  Consent given by: patient  Patient understanding: patient states understanding of the procedure being performed  Patient identity confirmed: verbally with patient  Body area: head/neck  Location details: scalp  Laceration length: 6 5 cm  Foreign bodies: no foreign bodies  Tendon involvement: none  Nerve involvement: none  Vascular damage: no  Anesthesia: local infiltration    Anesthesia:  Local Anesthetic: lidocaine 1% with epinephrine  Anesthetic total: 8 mL    Wound Dehiscence:  Superficial Wound Dehiscence: simple closure      Procedure Details:  Preparation: Patient was prepped and draped in the usual sterile fashion    Irrigation solution: saline  Irrigation method: syringe  Debridement: none  Degree of undermining: none  Skin closure: 4-0 nylon  Number of sutures: 9  Technique: simple  Approximation: close  Approximation difficulty: simple  Patient tolerance: patient tolerated the procedure well with no immediate complications

## 2021-06-18 ENCOUNTER — OFFICE VISIT (OUTPATIENT)
Dept: URGENT CARE | Facility: CLINIC | Age: 61
End: 2021-06-18
Payer: COMMERCIAL

## 2021-06-18 VITALS
WEIGHT: 163 LBS | OXYGEN SATURATION: 96 % | TEMPERATURE: 98.3 F | RESPIRATION RATE: 14 BRPM | HEART RATE: 104 BPM | DIASTOLIC BLOOD PRESSURE: 69 MMHG | SYSTOLIC BLOOD PRESSURE: 149 MMHG | HEIGHT: 64 IN | BODY MASS INDEX: 27.83 KG/M2

## 2021-06-18 DIAGNOSIS — Z48.02 ENCOUNTER FOR REMOVAL OF SUTURES: Primary | ICD-10-CM

## 2021-06-18 PROCEDURE — 99213 OFFICE O/P EST LOW 20 MIN: CPT | Performed by: PHYSICIAN ASSISTANT

## 2021-06-18 NOTE — PROGRESS NOTES
[facility] Now        NAME: Ania Mora is a 57 y o  female  : 1960    MRN: [id number]  DATE: 2021  TIME: 9:25 AM    Assessment and Plan   Encounter for removal of sutures [Z48 02]  1  Encounter for removal of sutures           Patient Instructions     Continue to monitor wound for signs of infection including: increased redness, warmth, drainage  Fever or chills  Avoid vigorous washing around the area   use over-the-counter Tylenol or ibuprofen as needed to help with pain  Follow up with PCP in 3-5 days  Proceed to  ER if symptoms worsen  Chief Complaint     Chief Complaint   Patient presents with    Suture / Staple Removal     Put in a week ago          History of Present Illness       57year old female presents to the office for c/o of suture removal  Patient had 9 sutures placed in the posterior aspect of the scalp on 2021  Patient notes that wound has been healing well  She denies any signs of infection  Denies any severe headache, visual changes, fever chills  Denies any drainage from the area  Eating and drinking well  Voiding normally  Wound Check  She was originally treated 5 to 10 days ago  Previous treatment included laceration repair  Maximum temperature: no fever  There has been no drainage from the wound  There is no redness present  The swelling has improved  The pain has improved  Review of Systems   Review of Systems   Constitutional: Negative for chills and fever  Eyes: Negative for photophobia and visual disturbance  Respiratory: Negative  Cardiovascular: Negative  Gastrointestinal: Negative  Genitourinary: Negative  Musculoskeletal: Negative for neck pain and neck stiffness  Skin: Positive for wound  Neurological: Negative for dizziness and headaches  Psychiatric/Behavioral: Negative for confusion           Current Medications       Current Outpatient Medications:     gabapentin (NEURONTIN) 100 mg capsule, TAKE ONE CAPSULE BY MOUTH THREE TIMES DAILY , Disp: 270 capsule, Rfl: 0    glucose blood (ONE TOUCH ULTRA TEST) test strip, test 6 times a day, Disp: 600 each, Rfl: 0    hydrochlorothiazide (HYDRODIURIL) 50 mg tablet, TAKE ONE TABLET BY MOUTH TWICE DAILY , Disp: 180 tablet, Rfl: 0    hydrOXYzine HCL (ATARAX) 25 mg tablet, TAKE ONE TABLET BY MOUTH ONCE DAILY AT BEDTIME , Disp: 90 tablet, Rfl: 0    ketoconazole (NIZORAL) 2 % shampoo, apply to scalp every other day for 5 minutes and then rinse, Disp: 120 mL, Rfl: 0    levothyroxine 25 mcg tablet, TAKE ONE TABLET BY MOUTH EVERY DAY IN THE EARLY MORNING, Disp: 30 tablet, Rfl: 0    lisinopril (ZESTRIL) 5 mg tablet, TAKE ONE TABLET BY MOUTH ONCE DAILY , Disp: 90 tablet, Rfl: 0    metFORMIN (GLUCOPHAGE-XR) 500 mg 24 hr tablet, TAKE TWO TABLETS BY MOUTH TWICE DAILY  (Patient taking differently: 500 mg ), Disp: 360 tablet, Rfl: 0    omega-3-acid ethyl esters (LOVAZA) 1 g capsule, TAKE TWO CAPSULES BY MOUTH TWICE DAILY , Disp: 360 capsule, Rfl: 1    pravastatin (PRAVACHOL) 40 mg tablet, TAKE ONE TABLET BY MOUTH ONCE DAILY AT BEDTIME , Disp: 90 tablet, Rfl: 0    naproxen (NAPROSYN) 500 mg tablet, Take 1 tablet (500 mg total) by mouth 3 (three) times a day with meals, Disp: 90 tablet, Rfl: 0    Current Allergies     Allergies as of 2021 - Reviewed 2021   Allergen Reaction Noted    Atorvastatin Other (See Comments) 2012    Furosemide  2012    Latex  04/15/2013            The following portions of the patient's history were reviewed and updated as appropriate: allergies, current medications, past family history, past medical history, past social history, past surgical history and problem list      Past Medical History:   Diagnosis Date    Arthritis     CTS (carpal tunnel syndrome)     had surgery    Diabetes mellitus ([facility] [address] )     Fibroid     dr Gareth No removed     2 para 2      x2 -F, -    Hyperlipidemia        Past Surgical History:   Procedure Laterality Date    CARDIAC SURGERY      Natalee Inman removed    TOTAL ABDOMINAL HYSTERECTOMY      4/2010       Family History   Adopted: Yes   Family history unknown: Yes         Medications have been verified  Objective   /69 (BP Location: Right arm, Patient Position: Sitting)   Pulse 104   Temp 98 3 °F (36 8 °C)   Resp 14   Ht 5' 4" (1 626 m)   Wt 73 9 kg (163 lb)   SpO2 96%   BMI 27 98 kg/m²   No LMP recorded  Patient is postmenopausal        Physical Exam     Physical Exam  Vitals and nursing note reviewed  Constitutional:       General: She is not in acute distress  Appearance: Normal appearance  She is well-developed  She is not diaphoretic  HENT:      Head: Normocephalic  Laceration present  Right Ear: External ear normal       Left Ear: External ear normal       Nose: No mucosal edema or rhinorrhea  Right Sinus: No maxillary sinus tenderness or frontal sinus tenderness  Left Sinus: No maxillary sinus tenderness or frontal sinus tenderness  Mouth/Throat:      Dentition: No dental caries  Pharynx: Uvula midline  No oropharyngeal exudate, posterior oropharyngeal erythema or uvula swelling  Tonsils: No tonsillar exudate or tonsillar abscesses  Eyes:      General: Lids are normal  No scleral icterus  Right eye: No discharge  Left eye: No discharge  Conjunctiva/sclera: Conjunctivae normal       Pupils: Pupils are equal, round, and reactive to light  Cardiovascular:      Rate and Rhythm: Normal rate and regular rhythm  Heart sounds: Normal heart sounds, S1 normal and S2 normal  No murmur heard  No S3 or S4 sounds  Pulmonary:      Effort: Pulmonary effort is normal  No respiratory distress  Breath sounds: Normal breath sounds  No stridor  No wheezing, rhonchi or rales  Abdominal:      General: Bowel sounds are normal  There is no distension        Palpations: Abdomen is soft  Abdomen is not rigid  Tenderness: There is no abdominal tenderness  There is no guarding  Lymphadenopathy:      Cervical: No cervical adenopathy  Skin:     General: Skin is warm and dry  Coloration: Skin is not pale  Findings: No rash  Neurological:      Mental Status: She is alert  Suture removal    Date/Time: 6/18/2021 9:21 AM  Performed by: Rylee Johnson PA-C  Authorized by: Rylee Johnson PA-C   Universal Protocol:  Consent: Verbal consent obtained  Consent given by: patient  Time out: Immediately prior to procedure a "time out" was called to verify the correct patient, procedure, equipment, support staff and site/side marked as required  Patient identity confirmed: verbally with patient        Patient location:  Bedside  Location:     Location:  Head/neck    Head/neck location:  Scalp  Procedure details: Tools used:  Suture removal kit    Wound appearance:  No sign(s) of infection and good wound healing    Number of sutures removed:  9  Post-procedure details:     Post-removal:  No dressing applied    Patient tolerance of procedure:   Tolerated well, no immediate complications

## 2021-06-18 NOTE — PATIENT INSTRUCTIONS
Continue to monitor wound for signs of infection including: increased redness, warmth, drainage  Fever or chills  Avoid vigorous washing around the area   use over-the-counter Tylenol or ibuprofen as needed to help with pain  Stitches Removal   WHAT YOU NEED TO KNOW:   Stitches may need to be removed in 3 to 14 days depending on the location of your wound  Your healthcare provider will use sterile forceps or tweezers to  the knot of each stitch  He will cut the stitch with scissors and pull the stitch out  You may feel a slight tug as the stitch comes out  He may place small steristrips across your wound after the stitches have been removed  These pieces of tape will peel and fall of on their own  Do not pull them off  DISCHARGE INSTRUCTIONS:   Return to the emergency department if:   · Your wound splits open  · You suddenly cannot move your injured joint  · You have sudden numbness around your wound  · You see red streaks coming from your wound  Contact your healthcare provider if:   · You have a fever and chills  · Your wound is red, warm, swollen, or leaking pus  · There is a bad smell coming from your wound  · You have increased pain in the wound area  · You have questions or concerns about your condition or care  Care for your wound:   · Clean your wound as directed  Carefully wash your wound with soap and water  Pat the area dry with a clean towel  · Protect your wound  Your wound can swell, bleed, or split open if it is stretched or bumped  You may need to wear a bandage that supports your wound until it is completely healed  · Minimize your scar  Use sunblock if your wound is exposed to the sun  Apply it every day after the stitches are removed  This will help prevent skin discoloration  Follow up with your healthcare provider as directed: You may need to return in 3 to 5 days if the stitches are on your face   Stitches on your scalp need to be removed after 7 to 14 days  Stitches over joints may remain in place up to 14 days  Write down your questions so you remember to ask them during your visits  © 2017 [employer] [employer] Information is for End User's use only and may not be sold, redistributed or otherwise used for commercial purposes  All illustrations and images included in CareNotes® are the copyrighted property of [employer]  or [employer]  The above information is an  only  It is not intended as medical advice for individual conditions or treatments  Talk to your doctor, nurse or pharmacist before following any medical regimen to see if it is safe and effective for you

## 2021-06-25 ENCOUNTER — TELEPHONE (OUTPATIENT)
Dept: FAMILY MEDICINE CLINIC | Facility: CLINIC | Age: 61
End: 2021-06-25

## 2021-06-25 NOTE — TELEPHONE ENCOUNTER
Patient called  She fell on 06/11/21 and hit her head on a door  She has been off balance, dizzy, and has a lump on the back of her neck  She does not want to go to ED as urgent care recommended because she has a high deductible and it will cost her too much money   She is asking if she can just get an order from you for an MRI or a CT of her head

## 2021-06-25 NOTE — TELEPHONE ENCOUNTER
She needs to do this stat and unfortunately, to justify a stat study it needs to go through the ED  The hospital is so backlogged that we cannot order a stat CT of the head as an outpatient any longer

## 2021-06-26 ENCOUNTER — APPOINTMENT (EMERGENCY)
Dept: CT IMAGING | Facility: HOSPITAL | Age: 61
End: 2021-06-26
Payer: COMMERCIAL

## 2021-06-26 ENCOUNTER — HOSPITAL ENCOUNTER (EMERGENCY)
Facility: HOSPITAL | Age: 61
Discharge: HOME | End: 2021-06-26
Payer: COMMERCIAL

## 2021-06-26 ENCOUNTER — OFFICE VISIT (OUTPATIENT)
Dept: URGENT CARE | Facility: CLINIC | Age: 61
End: 2021-06-26
Payer: COMMERCIAL

## 2021-06-26 VITALS
RESPIRATION RATE: 16 BRPM | TEMPERATURE: 98.2 F | OXYGEN SATURATION: 98 % | SYSTOLIC BLOOD PRESSURE: 125 MMHG | DIASTOLIC BLOOD PRESSURE: 74 MMHG | HEART RATE: 103 BPM

## 2021-06-26 VITALS
HEIGHT: 64 IN | DIASTOLIC BLOOD PRESSURE: 60 MMHG | OXYGEN SATURATION: 99 % | BODY MASS INDEX: 27.83 KG/M2 | RESPIRATION RATE: 18 BRPM | TEMPERATURE: 96.5 F | HEART RATE: 96 BPM | SYSTOLIC BLOOD PRESSURE: 136 MMHG | WEIGHT: 163 LBS

## 2021-06-26 DIAGNOSIS — S09.90XA INJURY OF HEAD, INITIAL ENCOUNTER: Primary | ICD-10-CM

## 2021-06-26 DIAGNOSIS — S06.0X0A CONCUSSION WITHOUT LOSS OF CONSCIOUSNESS, INITIAL ENCOUNTER: Primary | ICD-10-CM

## 2021-06-26 PROCEDURE — 99213 OFFICE O/P EST LOW 20 MIN: CPT | Performed by: NURSE PRACTITIONER

## 2021-06-26 PROCEDURE — 99284 EMERGENCY DEPT VISIT MOD MDM: CPT

## 2021-06-26 PROCEDURE — 99283 EMERGENCY DEPT VISIT LOW MDM: CPT

## 2021-06-26 PROCEDURE — G1004 CDSM NDSC: HCPCS

## 2021-06-26 PROCEDURE — 70450 CT HEAD/BRAIN W/O DYE: CPT

## 2021-06-26 NOTE — PROGRESS NOTES
[facility] Now        NAME: Guerline Poole is a 57 y o  female  : 1960    MRN: [id number]  DATE: 2021  TIME: 9:56 AM    Assessment and Plan   Injury of head, initial encounter [S09 90XA]  1  Injury of head, initial encounter  Transfer to other facility         Patient Instructions       Follow up with PCP in 3-5 days  Proceed to  ER if symptoms worsen  Chief Complaint     Chief Complaint   Patient presents with    Dizziness         History of Present Illness       Patient is a 57year old female presenting s/p fall 2 weeks ago  She was seen here in urgent care and had sutures placed  Sutures removed  Wound healing well  She reports feeling a lump on right right posterior aspect of there hair line  Lump is tender to palpate  She also reports feeling off balance and dizzy since the fall  Denies visual changes, vomiting, nausea, or headache  Denies N/T/W  She called her PCP for an order for "CT or MRI"  PCP advised that she proceed to the ER for stat testing  She is concerned about high out of pocket cost        Review of Systems   Review of Systems   Constitutional: Negative for activity change, chills and fever  Respiratory: Negative for cough and shortness of breath  Gastrointestinal: Negative for nausea and vomiting  Skin: Positive for wound (well approximated- sutures removed)  Negative for rash  Neurological: Positive for dizziness  Negative for speech difficulty, weakness, light-headedness, numbness and headaches  Hematological: Positive for adenopathy           Current Medications       Current Outpatient Medications:     gabapentin (NEURONTIN) 100 mg capsule, TAKE ONE CAPSULE BY MOUTH THREE TIMES DAILY , Disp: 270 capsule, Rfl: 0    glucose blood (ONE TOUCH ULTRA TEST) test strip, test 6 times a day, Disp: 600 each, Rfl: 0    hydrochlorothiazide (HYDRODIURIL) 50 mg tablet, TAKE ONE TABLET BY MOUTH TWICE DAILY , Disp: 180 tablet, Rfl: 0    hydrOXYzine HCL (ATARAX) 25 mg tablet, TAKE ONE TABLET BY MOUTH ONCE DAILY AT BEDTIME , Disp: 90 tablet, Rfl: 0    ketoconazole (NIZORAL) 2 % shampoo, apply to scalp every other day for 5 minutes and then rinse, Disp: 120 mL, Rfl: 0    levothyroxine 25 mcg tablet, TAKE ONE TABLET BY MOUTH EVERY DAY IN THE EARLY MORNING, Disp: 30 tablet, Rfl: 0    lisinopril (ZESTRIL) 5 mg tablet, TAKE ONE TABLET BY MOUTH ONCE DAILY , Disp: 90 tablet, Rfl: 0    metFORMIN (GLUCOPHAGE-XR) 500 mg 24 hr tablet, TAKE TWO TABLETS BY MOUTH TWICE DAILY  (Patient taking differently: 500 mg ), Disp: 360 tablet, Rfl: 0    naproxen (NAPROSYN) 500 mg tablet, Take 1 tablet (500 mg total) by mouth 3 (three) times a day with meals, Disp: 90 tablet, Rfl: 0    omega-3-acid ethyl esters (LOVAZA) 1 g capsule, TAKE TWO CAPSULES BY MOUTH TWICE DAILY , Disp: 360 capsule, Rfl: 1    pravastatin (PRAVACHOL) 40 mg tablet, TAKE ONE TABLET BY MOUTH ONCE DAILY AT BEDTIME , Disp: 90 tablet, Rfl: 0    Current Allergies     Allergies as of 2021 - Reviewed 2021   Allergen Reaction Noted    Atorvastatin Other (See Comments) 2012    Furosemide  2012    Latex  04/15/2013            The following portions of the patient's history were reviewed and updated as appropriate: allergies, current medications, past family history, past medical history, past social history, past surgical history and problem list      Past Medical History:   Diagnosis Date    Arthritis     CTS (carpal tunnel syndrome)     had surgery    Diabetes mellitus (HCC)     Fibroid     dr Ángel James removed     2 para 2      x2 -, -    Hyperlipidemia        Past Surgical History:   Procedure Laterality Date    CARDIAC SURGERY      Sunitha Graceus      dr Ángel James removed    TOTAL ABDOMINAL HYSTERECTOMY      2010       Family History   Adopted: Yes   Family history unknown: Yes         Medications have been verified          Objective   Pulse 96   Temp (!) 96 5 °F (35 8 °C)   Resp 18   Ht 5' 4" (1 626 m)   Wt 73 9 kg (163 lb)   SpO2 99%   BMI 27 98 kg/m²        Physical Exam     Physical Exam  Vitals reviewed  Constitutional:       General: She is awake  She is not in acute distress  Appearance: Normal appearance  She is normal weight  Cardiovascular:      Rate and Rhythm: Normal rate and regular rhythm  Lymphadenopathy:      Cervical: Cervical adenopathy present  Right cervical: Posterior cervical adenopathy present  Skin:     General: Skin is warm and moist       Comments: Right posterior scalp wound, well healed  Minor scabbing present  No redness, drainage, or bleeding  Neurological:      General: No focal deficit present  Mental Status: She is alert, oriented to person, place, and time and easily aroused  Psychiatric:         Behavior: Behavior is cooperative

## 2021-06-26 NOTE — ED PROVIDER NOTES
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 she tripped falling backwards of her trash bags hitting her head on the edge of a door frame and then landing on the ground  There was no loss of consciousness at the time  Patient did have a laceration which was repaired  Patient denied having imaging studies initially  Patient states always been noticing things seem fuzzy she seems little dizzy at times when she is walking she feels little more off balance than usual   She denies any focalizing weakness of the arms or the legs  No nausea no vomiting patient denies any visual changes other than some mild photophobia and blurriness sensation  She was seen at local urgent care center [address] [place] [address] and sent here for further evaluation  Prior to Admission Medications   Prescriptions Last Dose Informant Patient Reported?  Taking?   gabapentin (NEURONTIN) 100 mg capsule   No Yes   Sig: TAKE ONE CAPSULE BY MOUTH THREE TIMES DAILY    glucose blood (ONE TOUCH ULTRA TEST) test strip  Self No Yes   Sig: test 6 times a day   hydrOXYzine HCL (ATARAX) 25 mg tablet   No Yes   Sig: TAKE ONE TABLET BY MOUTH ONCE DAILY AT BEDTIME    hydrochlorothiazide (HYDRODIURIL) 50 mg tablet   No Yes   Sig: TAKE ONE TABLET BY MOUTH TWICE DAILY    ketoconazole (NIZORAL) 2 % shampoo  Self No Yes   Sig: apply to scalp every other day for 5 minutes and then rinse   levothyroxine 25 mcg tablet   No Yes   Sig: TAKE ONE TABLET BY MOUTH EVERY DAY IN THE EARLY MORNING   lisinopril (ZESTRIL) 5 mg tablet   No Yes   Sig: TAKE ONE TABLET BY MOUTH ONCE DAILY    metFORMIN (GLUCOPHAGE-XR) 500 mg 24 hr tablet   No Yes   Sig: TAKE TWO TABLETS BY MOUTH TWICE DAILY    Patient taking differently: 500 mg    naproxen (NAPROSYN) 500 mg tablet   No No   Sig: Take 1 tablet (500 mg total) by mouth 3 (three) times a day with meals   omega-3-acid ethyl esters (LOVAZA) 1 g capsule  Self No Yes   Sig: TAKE TWO CAPSULES BY MOUTH TWICE DAILY    pravastatin (PRAVACHOL) 40 mg tablet   No Yes   Sig: TAKE ONE TABLET BY MOUTH ONCE DAILY AT BEDTIME       Facility-Administered Medications: None       Past Medical History:   Diagnosis Date    Arthritis     CTS (carpal tunnel syndrome)     had surgery    Diabetes mellitus (HCC)     Fibroid     dr Jayy Donohue removed     2 para 2      x2 -, -    Hyperlipidemia        Past Surgical History:   Procedure Laterality Date    CARDIAC SURGERY      Kika Donohue removed    TOTAL ABDOMINAL HYSTERECTOMY      2010       Family History   Adopted: Yes   Family history unknown: Yes     I have reviewed and agree with the history as documented  E-Cigarette/Vaping    E-Cigarette Use Never User      E-Cigarette/Vaping Substances    Nicotine No     THC No     CBD No     Flavoring No     Other No     Unknown No      Social History     Tobacco Use    Smoking status: Never Smoker    Smokeless tobacco: Never Used   Vaping Use    Vaping Use: Never used   Substance Use Topics    Alcohol use: Never    Drug use: No       Review of Systems   Constitutional: Negative for chills and fever  HENT: Negative for congestion  Eyes: Negative for visual disturbance  Respiratory: Negative for shortness of breath  Cardiovascular: Negative for chest pain  Gastrointestinal: Negative for abdominal pain  Endocrine: Negative for cold intolerance  Genitourinary: Negative for frequency  Musculoskeletal: Negative for gait problem  Skin: Negative for rash     Neurological: Positive for dizziness, weakness and light-headedness  Psychiatric/Behavioral: Negative for behavioral problems and confusion  Physical Exam  Physical Exam  Vitals and nursing note reviewed  Constitutional:       Appearance: She is well-developed  HENT:      Head: Normocephalic  Comments: Mild tenderness posterior aspect occipital area of scalp and skull     Right Ear: Tympanic membrane normal       Left Ear: Tympanic membrane normal       Nose: Nose normal       Mouth/Throat:      Mouth: Mucous membranes are moist    Eyes:      Conjunctiva/sclera: Conjunctivae normal       Pupils: Pupils are equal, round, and reactive to light  Cardiovascular:      Rate and Rhythm: Normal rate and regular rhythm  Heart sounds: Normal heart sounds  Pulmonary:      Effort: Pulmonary effort is normal       Breath sounds: Normal breath sounds  Abdominal:      General: Bowel sounds are normal       Palpations: Abdomen is soft  Musculoskeletal:         General: Normal range of motion  Cervical back: Normal range of motion and neck supple  Comments: Patient with tenderness palpation lower lumbar spine no wounds no bruising noted   Skin:     General: Skin is warm and dry  Capillary Refill: Capillary refill takes less than 2 seconds  Neurological:      General: No focal deficit present  Mental Status: She is alert and oriented to person, place, and time  Cranial Nerves: No cranial nerve deficit  Sensory: No sensory deficit     Psychiatric:         Behavior: Behavior normal          Vital Signs  ED Triage Vitals [06/26/21 0912]   Temperature Pulse Respirations Blood Pressure SpO2   98 2 °F (36 8 °C) 103 16 125/74 98 %      Temp Source Heart Rate Source Patient Position - Orthostatic VS BP Location FiO2 (%)   Oral Monitor Lying Left arm --      Pain Score       --           Vitals:    06/26/21 0912   BP: 125/74   Pulse: 103   Patient Position - Orthostatic VS: Lying         Visual Acuity      ED Medications  Medications - No data to display    Diagnostic Studies  Results Reviewed     None                 CT head without contrast   Final Result by [facility], DO (06/26 0233)      No acute intracranial abnormality  Workstation performed: [id number]                    Procedures  Procedures         ED Course                             SBIRT 20yo+      Most Recent Value   SBIRT (25 yo +)   In order to provide better care to our patients, we are screening all of our patients for alcohol and drug use  Would it be okay to ask you these screening questions? Yes Filed at: 06/26/2021 6753   Initial Alcohol Screen: US AUDIT-C    1  How often do you have a drink containing alcohol?  0 Filed at: 06/26/2021 0921   2  How many drinks containing alcohol do you have on a typical day you are drinking? 0 Filed at: 06/26/2021 0921   3a  Male UNDER 65: How often do you have five or more drinks on one occasion? 0 Filed at: 06/26/2021 0921   3b  FEMALE Any Age, or MALE 65+: How often do you have 4 or more drinks on one occassion? 0 Filed at: 06/26/2021 5625   Audit-C Score  0 Filed at: 06/26/2021 2319   CIERRA: How many times in the past year have you    Used an illegal drug or used a prescription medication for non-medical reasons? Never Filed at: 06/26/2021 1645                    [facility]  Number of Diagnoses or Management Options  Diagnosis management comments: CT scan head demonstrates no acute findings  Patient is awake alert oriented no focal neurological deficits my impression is that she has a concussion  Patient will need to be followed up by her physician in the next few days for re-evaluation possible referral for concussion protocol I instructed patient for brain rest over the next few days as well        Disposition  Final diagnoses:   Concussion without loss of consciousness, initial encounter     Time reflects when diagnosis was documented in both MDM as applicable and the Disposition within this note     Time User Action Codes Description Comment    2021 10:02 AM Ramiro Kuhn Add [S06 0X0A] Concussion without loss of consciousness, initial encounter       ED Disposition     ED Disposition Condition Date/Time Comment    Discharge Stable Sat 2021 10:01 AM [id number] [address] Wilhelmd discharge to home/self care  Follow-up Information     Follow up With Specialties Details Why DO [facility] Schedule an appointment as soon as possible for a visit in 3 days recommend referral to concussion program [address]  [phone]            Patient's Medications   Discharge Prescriptions    No medications on file     No discharge procedures on file      PDMP Review     None          ED Provider  Electronically Signed by           Zackary Nance MD  [phone]

## 2021-08-19 ENCOUNTER — HOSPITAL ENCOUNTER (EMERGENCY)
Facility: HOSPITAL | Age: 61
Discharge: HOME | End: 2021-08-19
Admitting: EMERGENCY MEDICINE
Payer: COMMERCIAL

## 2021-08-19 ENCOUNTER — APPOINTMENT (EMERGENCY)
Dept: RADIOLOGY | Facility: HOSPITAL | Age: 61
End: 2021-08-19
Payer: COMMERCIAL

## 2021-08-19 VITALS
DIASTOLIC BLOOD PRESSURE: 68 MMHG | SYSTOLIC BLOOD PRESSURE: 135 MMHG | WEIGHT: 163 LBS | TEMPERATURE: 98.2 F | HEART RATE: 97 BPM | HEIGHT: 55 IN | RESPIRATION RATE: 16 BRPM | OXYGEN SATURATION: 97 % | BODY MASS INDEX: 37.72 KG/M2

## 2021-08-19 DIAGNOSIS — S86.819A BICEPS FEMORIS TENDON STRAIN AT KNEE: ICD-10-CM

## 2021-08-19 DIAGNOSIS — E11.42 DIABETIC PERIPHERAL NEUROPATHY (HCC): ICD-10-CM

## 2021-08-19 DIAGNOSIS — E03.9 HYPOTHYROIDISM, UNSPECIFIED TYPE: ICD-10-CM

## 2021-08-19 DIAGNOSIS — E78.00 HYPERCHOLESTEROLEMIA: ICD-10-CM

## 2021-08-19 DIAGNOSIS — I10 ESSENTIAL HYPERTENSION: ICD-10-CM

## 2021-08-19 DIAGNOSIS — R60.9 EDEMA, UNSPECIFIED TYPE: ICD-10-CM

## 2021-08-19 DIAGNOSIS — M23.91 INTERNAL DERANGEMENT OF RIGHT KNEE: Primary | ICD-10-CM

## 2021-08-19 DIAGNOSIS — E13.9 DIABETES 1.5, MANAGED AS TYPE 2 (HCC): ICD-10-CM

## 2021-08-19 DIAGNOSIS — F41.9 ANXIETY: ICD-10-CM

## 2021-08-19 PROCEDURE — 73564 X-RAY EXAM KNEE 4 OR MORE: CPT

## 2021-08-19 PROCEDURE — 86618 LYME DISEASE ANTIBODY: CPT | Performed by: PHYSICIAN ASSISTANT

## 2021-08-19 PROCEDURE — 36415 COLL VENOUS BLD VENIPUNCTURE: CPT | Performed by: PHYSICIAN ASSISTANT

## 2021-08-19 PROCEDURE — 99284 EMERGENCY DEPT VISIT MOD MDM: CPT | Performed by: PHYSICIAN ASSISTANT

## 2021-08-19 PROCEDURE — 99284 EMERGENCY DEPT VISIT MOD MDM: CPT

## 2021-08-19 NOTE — ED PROVIDER NOTES
History  Chief Complaint   Patient presents with    Knee Injury     right knee pain,  chronic knee pain/aching   reported hearing a pop in right knee today uableto ambulate      57year old female comes in today complaining of R knee pain that has been progressively getting worse over some time, but today became acutely worse when she felt a tear/pop in her R posterior lateral knee  She reports it caused her to collapse and she has been having difficulty ambulating since then  She was told she has arthritis in her L knee, but she hasn't scheduled a f/u with ortho yet  Nothing taken for pain prior to arrival  She doesn't want anything for pain at this time  Prior to Admission Medications   Prescriptions Last Dose Informant Patient Reported?  Taking?   glucose blood (ONE TOUCH ULTRA TEST) test strip  Self No No   Sig: test 6 times a day   insulin detemir (LEVEMIR) 100 units/mL subcutaneous injection   Yes Yes   Sig: Inject 50 Units under the skin every 12 (twelve) hours   insulin regular (HumuLIN R,NovoLIN R) 100 units/mL injection   Yes Yes   Sig: Inject under the skin   ketoconazole (NIZORAL) 2 % shampoo  Self No No   Sig: apply to scalp every other day for 5 minutes and then rinse   naproxen (NAPROSYN) 500 mg tablet   No No   Sig: Take 1 tablet (500 mg total) by mouth 3 (three) times a day with meals   omega-3-acid ethyl esters (LOVAZA) 1 g capsule Past Week at Unknown time Self No Yes   Sig: TAKE TWO CAPSULES BY MOUTH TWICE DAILY       Facility-Administered Medications: None       Past Medical History:   Diagnosis Date    Arthritis     CTS (carpal tunnel syndrome)     had surgery    Diabetes mellitus (HCC)     Fibroid     dr Elizabeth Hatch removed     2 para 2      x2 -F, -F    Hyperlipidemia        Past Surgical History:   Procedure Laterality Date    CARDIAC SURGERY      HYSTERECTOMY      MYOMECTOMY      dr Elizabeth Hatch removed    TOTAL ABDOMINAL HYSTERECTOMY      2010       Family History   Adopted: Yes   Family history unknown: Yes     I have reviewed and agree with the history as documented  E-Cigarette/Vaping    E-Cigarette Use Never User      E-Cigarette/Vaping Substances    Nicotine No     THC No     CBD No     Flavoring No     Other No     Unknown No      Social History     Tobacco Use    Smoking status: Never Smoker    Smokeless tobacco: Never Used   Vaping Use    Vaping Use: Never used   Substance Use Topics    Alcohol use: Never    Drug use: No       Review of Systems   Constitutional: Negative for fever  HENT: Negative for nosebleeds  Eyes: Negative for redness  Respiratory: Negative for shortness of breath  Cardiovascular: Negative for chest pain  Gastrointestinal: Negative for blood in stool  Genitourinary: Negative for hematuria  Musculoskeletal: Positive for arthralgias and gait problem  Skin: Negative for rash  Neurological: Negative for seizures  Psychiatric/Behavioral: Negative for behavioral problems  Physical Exam  Physical Exam  Constitutional:       Appearance: Normal appearance  HENT:      Head: Normocephalic and atraumatic  Right Ear: External ear normal       Left Ear: External ear normal    Eyes:      Extraocular Movements: Extraocular movements intact  Pulmonary:      Effort: Pulmonary effort is normal    Musculoskeletal:         General: Tenderness (R biceps femoris tendon and muscle body, reproduces complaint) present  Cervical back: Normal range of motion  Right knee: No MCL laxity or ACL laxity  Normal alignment and normal patellar mobility  Legs:    Skin:     General: Skin is warm and dry  Neurological:      General: No focal deficit present  Mental Status: She is alert     Psychiatric:         Mood and Affect: Mood normal          Behavior: Behavior normal          Vital Signs  ED Triage Vitals [08/19/21 1620]   Temperature Pulse Respirations Blood Pressure SpO2   98 2 °F (36 8 °C) 97 16 135/68 97 %      Temp Source Heart Rate Source Patient Position - Orthostatic VS BP Location FiO2 (%)   Oral -- Lying Left arm --      Pain Score       Worst Possible Pain           Vitals:    08/19/21 1620   BP: 135/68   Pulse: 97   Patient Position - Orthostatic VS: Lying         Visual Acuity      ED Medications  Medications - No data to display    Diagnostic Studies  Results Reviewed     Procedure Component Value Units Date/Time    Lyme Antibody Profile with reflex to WB [[id number]]  (Normal) Collected: 08/19/21 1747    Lab Status: Final result Specimen: Blood from Arm, Right Updated: 08/20/21 0609     Lyme total antibody 15                 XR knee 4+ vw right injury   Final Result by Jason Kennedy MD (08/19 1700)      No acute osseous abnormality  Mild degenerative changes  Workstation performed: [id number]                    Procedures  Procedures         ED Course                                           MDM  Number of Diagnoses or Management Options  Biceps femoris tendon strain at knee  Internal derangement of right knee  Diagnosis management comments: Patient was placed in the knee immobilizer due to her feeling of instability  Recommended follow up with her orthopedic doctor soon as possible for further evaluation  I did explain to her that the x-ray shows as bony abnormalities, however I am concerned that she may have a soft tissue abnormalities such as a meniscus, ligament or tendon tear  Patient understands and will schedule follow-up in outpatient basis    She was told that she has a history of rheumatoid arthritis and is requesting a rheumatologist to follow up with as well      Disposition  Final diagnoses:   Internal derangement of right knee   Biceps femoris tendon strain at knee     Time reflects when diagnosis was documented in both MDM as applicable and the Disposition within this note     Time User Action Codes Description Comment    8/19/2021  5:27 PM Rima Thibodeaux Add [M23 91] Internal derangement of right knee     8/19/2021  5:29 PM Memory Holli Add [D07 773A] Biceps femoris tendon strain at knee       ED Disposition     ED Disposition Condition Date/Time Comment    Discharge Stable Thu Aug 19, 2021  5:27 PM [id number] [address] V[address]quez discharge to home/self care              Follow-up Information     Follow up With Specialties Details Why Contact Info Additional [address] [facility] [place] [facility] Schedule an appointment as soon as possible for a visit   [address] [phone]  [facility] [address], [address], [place], [place], [address] [facility]    Raoul Langston MD Rheumatology Schedule an appointment as soon as possible for a visit   [facility]  [facility]             Discharge Medication List as of 8/19/2021  5:32 PM      START taking these medications    Details   Diclofenac Sodium (VOLTAREN) 1 % Apply 2 g topically 4 (four) times a day, Starting Thu 8/19/2021, Normal         CONTINUE these medications which have NOT CHANGED    Details   insulin detemir (LEVEMIR) 100 units/mL subcutaneous injection Inject 50 Units under the skin every 12 (twelve) hours, Historical Med      insulin regular (HumuLIN R,NovoLIN R) 100 units/mL injection Inject under the skin, Historical Med      omega-3-acid ethyl esters (LOVAZA) 1 g capsule TAKE TWO CAPSULES BY MOUTH TWICE DAILY , Normal      gabapentin (NEURONTIN) 100 mg capsule TAKE ONE CAPSULE BY MOUTH THREE TIMES DAILY , Normal      hydrochlorothiazide (HYDRODIURIL) 50 mg tablet TAKE ONE TABLET BY MOUTH TWICE DAILY , Normal      hydrOXYzine HCL (ATARAX) 25 mg tablet TAKE ONE TABLET BY MOUTH ONCE DAILY AT BEDTIME , Normal      levothyroxine 25 mcg tablet TAKE ONE TABLET BY MOUTH EVERY DAY IN THE EARLY MORNING, Normal      lisinopril (ZESTRIL) 5 mg tablet TAKE ONE TABLET BY MOUTH ONCE DAILY , Normal      metFORMIN (GLUCOPHAGE-XR) 500 mg 24 hr tablet TAKE TWO TABLETS BY MOUTH TWICE DAILY , Normal      pravastatin (PRAVACHOL) 40 mg tablet TAKE ONE TABLET BY MOUTH ONCE DAILY AT BEDTIME , Normal      glucose blood (ONE TOUCH ULTRA TEST) test strip test 6 times a day, Normal      ketoconazole (NIZORAL) 2 % shampoo apply to scalp every other day for 5 minutes and then rinse, Normal      naproxen (NAPROSYN) 500 mg tablet Take 1 tablet (500 mg total) by mouth 3 (three) times a day with meals, Starting Mon 4/19/2021, Until Wed 5/19/2021, Normal               PDMP Review     None          ED Provider  Electronically Signed by           Gee Tovar PA-C  08/21/21 7392

## 2021-08-19 NOTE — ED NOTES
Pt refused crutches, states she has a walker at home she can use  Rafaele Bridger PAC made aware        Latrell Marsh RN  08/19/21 [id number]

## 2021-08-20 LAB — B BURGDOR IGG+IGM SER-ACNC: 15

## 2021-08-20 RX ORDER — PRAVASTATIN SODIUM 40 MG
TABLET ORAL
Qty: 90 TABLET | Refills: 0 | Status: SHIPPED | OUTPATIENT
Start: 2021-08-20 | End: 2021-12-06

## 2021-08-20 RX ORDER — METFORMIN HYDROCHLORIDE 500 MG/1
TABLET, EXTENDED RELEASE ORAL
Qty: 360 TABLET | Refills: 0 | Status: SHIPPED | OUTPATIENT
Start: 2021-08-20 | End: 2021-12-03

## 2021-08-20 RX ORDER — HYDROCHLOROTHIAZIDE 50 MG/1
TABLET ORAL
Qty: 180 TABLET | Refills: 0 | Status: SHIPPED | OUTPATIENT
Start: 2021-08-20 | End: 2021-12-03

## 2021-08-20 RX ORDER — HYDROXYZINE HYDROCHLORIDE 25 MG/1
TABLET, FILM COATED ORAL
Qty: 90 TABLET | Refills: 0 | Status: SHIPPED | OUTPATIENT
Start: 2021-08-20 | End: 2021-12-03

## 2021-08-20 RX ORDER — LEVOTHYROXINE SODIUM 0.03 MG/1
TABLET ORAL
Qty: 90 TABLET | Refills: 0 | Status: SHIPPED | OUTPATIENT
Start: 2021-08-20 | End: 2021-12-03

## 2021-08-20 RX ORDER — LISINOPRIL 5 MG/1
TABLET ORAL
Qty: 90 TABLET | Refills: 0 | Status: SHIPPED | OUTPATIENT
Start: 2021-08-20 | End: 2021-12-03

## 2021-08-20 RX ORDER — GABAPENTIN 100 MG/1
CAPSULE ORAL
Qty: 270 CAPSULE | Refills: 0 | Status: SHIPPED | OUTPATIENT
Start: 2021-08-20 | End: 2021-12-03

## 2021-10-20 ENCOUNTER — RA CDI HCC (OUTPATIENT)
Dept: OTHER | Facility: HOSPITAL | Age: 61
End: 2021-10-20

## 2021-10-26 LAB
ALBUMIN SERPL-MCNC: 4.5 G/DL (ref 3.6–5.1)
ALBUMIN/CREAT UR: 29 MCG/MG CREAT
ALBUMIN/GLOB SERPL: 1.9 (CALC) (ref 1–2.5)
ALP SERPL-CCNC: 72 U/L (ref 37–153)
ALT SERPL-CCNC: 18 U/L (ref 6–29)
AST SERPL-CCNC: 19 U/L (ref 10–35)
BILIRUB SERPL-MCNC: 0.3 MG/DL (ref 0.2–1.2)
BUN SERPL-MCNC: 22 MG/DL (ref 7–25)
BUN/CREAT SERPL: ABNORMAL (CALC) (ref 6–22)
CALCIUM SERPL-MCNC: 11.1 MG/DL (ref 8.6–10.4)
CHLORIDE SERPL-SCNC: 101 MMOL/L (ref 98–110)
CHOLEST SERPL-MCNC: 193 MG/DL
CHOLEST/HDLC SERPL: 3.7 (CALC)
CO2 SERPL-SCNC: 32 MMOL/L (ref 20–32)
CREAT SERPL-MCNC: 0.6 MG/DL (ref 0.5–0.99)
CREAT UR-MCNC: 163 MG/DL (ref 20–275)
EST. AVERAGE GLUCOSE BLD GHB EST-MCNC: 229 (CALC)
EST. AVERAGE GLUCOSE BLD GHB EST-SCNC: 12.7 (CALC)
GLOBULIN SER CALC-MCNC: 2.4 G/DL (CALC) (ref 1.9–3.7)
GLUCOSE SERPL-MCNC: 123 MG/DL (ref 65–99)
HBA1C MFR BLD: 9.6 % OF TOTAL HGB
HDLC SERPL-MCNC: 52 MG/DL
LDLC SERPL CALC-MCNC: 116 MG/DL (CALC)
MICROALBUMIN UR-MCNC: 4.8 MG/DL
NONHDLC SERPL-MCNC: 141 MG/DL (CALC)
POTASSIUM SERPL-SCNC: 3.9 MMOL/L (ref 3.5–5.3)
PROT SERPL-MCNC: 6.9 G/DL (ref 6.1–8.1)
SL AMB EGFR AFRICAN AMERICAN: 113 ML/MIN/1.73M2
SL AMB EGFR NON AFRICAN AMERICAN: 98 ML/MIN/1.73M2
SODIUM SERPL-SCNC: 139 MMOL/L (ref 135–146)
TRIGL SERPL-MCNC: 134 MG/DL
TSH SERPL-ACNC: 2.25 MIU/L (ref 0.4–4.5)

## 2021-10-27 ENCOUNTER — OFFICE VISIT (OUTPATIENT)
Dept: FAMILY MEDICINE CLINIC | Facility: CLINIC | Age: 61
End: 2021-10-27

## 2021-10-27 VITALS
DIASTOLIC BLOOD PRESSURE: 72 MMHG | WEIGHT: 166 LBS | SYSTOLIC BLOOD PRESSURE: 118 MMHG | TEMPERATURE: 98.7 F | HEIGHT: 64 IN | HEART RATE: 92 BPM | BODY MASS INDEX: 28.34 KG/M2

## 2021-10-27 DIAGNOSIS — E11.3293 TYPE 2 DIABETES MELLITUS WITH BOTH EYES AFFECTED BY MILD NONPROLIFERATIVE RETINOPATHY WITHOUT MACULAR EDEMA, WITH LONG-TERM CURRENT USE OF INSULIN (HCC): Primary | ICD-10-CM

## 2021-10-27 DIAGNOSIS — M65.341 ACQUIRED TRIGGER FINGER OF BOTH RING FINGERS: ICD-10-CM

## 2021-10-27 DIAGNOSIS — M54.16 LUMBAR RADICULOPATHY: ICD-10-CM

## 2021-10-27 DIAGNOSIS — Z79.4 TYPE 2 DIABETES MELLITUS WITH BOTH EYES AFFECTED BY MILD NONPROLIFERATIVE RETINOPATHY WITHOUT MACULAR EDEMA, WITH LONG-TERM CURRENT USE OF INSULIN (HCC): Primary | ICD-10-CM

## 2021-10-27 DIAGNOSIS — I10 PRIMARY HYPERTENSION: ICD-10-CM

## 2021-10-27 DIAGNOSIS — M65.342 ACQUIRED TRIGGER FINGER OF BOTH RING FINGERS: ICD-10-CM

## 2021-10-27 DIAGNOSIS — E78.2 MIXED HYPERLIPIDEMIA: ICD-10-CM

## 2021-10-27 PROCEDURE — 99213 OFFICE O/P EST LOW 20 MIN: CPT | Performed by: FAMILY MEDICINE

## 2021-12-03 DIAGNOSIS — E03.9 HYPOTHYROIDISM, UNSPECIFIED TYPE: ICD-10-CM

## 2021-12-03 DIAGNOSIS — R60.9 EDEMA, UNSPECIFIED TYPE: ICD-10-CM

## 2021-12-03 DIAGNOSIS — F41.9 ANXIETY: ICD-10-CM

## 2021-12-03 DIAGNOSIS — E13.9 DIABETES 1.5, MANAGED AS TYPE 2 (HCC): ICD-10-CM

## 2021-12-03 DIAGNOSIS — E11.42 DIABETIC PERIPHERAL NEUROPATHY (HCC): ICD-10-CM

## 2021-12-03 DIAGNOSIS — I10 ESSENTIAL HYPERTENSION: ICD-10-CM

## 2021-12-03 RX ORDER — LISINOPRIL 5 MG/1
TABLET ORAL
Qty: 90 TABLET | Refills: 0 | Status: SHIPPED | OUTPATIENT
Start: 2021-12-03 | End: 2022-04-12

## 2021-12-03 RX ORDER — HYDROCHLOROTHIAZIDE 50 MG/1
TABLET ORAL
Qty: 180 TABLET | Refills: 0 | Status: SHIPPED | OUTPATIENT
Start: 2021-12-03 | End: 2022-04-12

## 2021-12-03 RX ORDER — LEVOTHYROXINE SODIUM 0.03 MG/1
TABLET ORAL
Qty: 90 TABLET | Refills: 0 | Status: SHIPPED | OUTPATIENT
Start: 2021-12-03 | End: 2022-04-12

## 2021-12-03 RX ORDER — GABAPENTIN 100 MG/1
CAPSULE ORAL
Qty: 270 CAPSULE | Refills: 0 | Status: SHIPPED | OUTPATIENT
Start: 2021-12-03 | End: 2022-04-12

## 2021-12-03 RX ORDER — METFORMIN HYDROCHLORIDE 500 MG/1
TABLET, EXTENDED RELEASE ORAL
Qty: 360 TABLET | Refills: 0 | Status: SHIPPED | OUTPATIENT
Start: 2021-12-03 | End: 2022-04-12

## 2021-12-03 RX ORDER — HYDROXYZINE HYDROCHLORIDE 25 MG/1
TABLET, FILM COATED ORAL
Qty: 90 TABLET | Refills: 0 | Status: SHIPPED | OUTPATIENT
Start: 2021-12-03 | End: 2022-04-12

## 2021-12-06 DIAGNOSIS — E78.00 HYPERCHOLESTEROLEMIA: ICD-10-CM

## 2021-12-06 RX ORDER — PRAVASTATIN SODIUM 40 MG
TABLET ORAL
Qty: 90 TABLET | Refills: 0 | Status: SHIPPED | OUTPATIENT
Start: 2021-12-06 | End: 2022-04-12

## 2022-02-23 ENCOUNTER — TELEPHONE (OUTPATIENT)
Dept: FAMILY MEDICINE CLINIC | Facility: CLINIC | Age: 62
End: 2022-02-23

## 2022-02-23 DIAGNOSIS — E03.9 HYPOTHYROIDISM, UNSPECIFIED TYPE: ICD-10-CM

## 2022-02-23 DIAGNOSIS — E78.00 HYPERCHOLESTEROLEMIA: Primary | ICD-10-CM

## 2022-02-23 DIAGNOSIS — E11.3293 TYPE 2 DIABETES MELLITUS WITH BOTH EYES AFFECTED BY MILD NONPROLIFERATIVE RETINOPATHY WITHOUT MACULAR EDEMA, WITH LONG-TERM CURRENT USE OF INSULIN (HCC): ICD-10-CM

## 2022-02-23 DIAGNOSIS — Z79.4 TYPE 2 DIABETES MELLITUS WITH BOTH EYES AFFECTED BY MILD NONPROLIFERATIVE RETINOPATHY WITHOUT MACULAR EDEMA, WITH LONG-TERM CURRENT USE OF INSULIN (HCC): ICD-10-CM

## 2022-02-23 DIAGNOSIS — I10 ESSENTIAL HYPERTENSION: ICD-10-CM

## 2022-03-15 ENCOUNTER — APPOINTMENT (OUTPATIENT)
Dept: RADIOLOGY | Facility: CLINIC | Age: 62
End: 2022-03-15
Payer: COMMERCIAL

## 2022-03-15 PROCEDURE — 72110 X-RAY EXAM L-2 SPINE 4/>VWS: CPT

## 2022-03-24 ENCOUNTER — RA CDI HCC (OUTPATIENT)
Dept: OTHER | Facility: HOSPITAL | Age: 62
End: 2022-03-24

## 2022-03-24 NOTE — PROGRESS NOTES
[facility]  coding opportunities       Chart reviewed, no opportunity found: CHART REVIEWED, NO OPPORTUNITY FOUND        Patients Insurance        Commercial Insurance: [address] [place] [address]

## 2022-03-31 LAB
ALBUMIN SERPL-MCNC: 4.5 G/DL (ref 3.6–5.1)
ALBUMIN/GLOB SERPL: 2 (CALC) (ref 1–2.5)
ALP SERPL-CCNC: 64 U/L (ref 37–153)
ALT SERPL-CCNC: 20 U/L (ref 6–29)
AST SERPL-CCNC: 19 U/L (ref 10–35)
BILIRUB SERPL-MCNC: 0.4 MG/DL (ref 0.2–1.2)
BUN SERPL-MCNC: 25 MG/DL (ref 7–25)
BUN/CREAT SERPL: ABNORMAL (CALC) (ref 6–22)
CALCIUM SERPL-MCNC: 11 MG/DL (ref 8.6–10.4)
CHLORIDE SERPL-SCNC: 101 MMOL/L (ref 98–110)
CHOLEST SERPL-MCNC: 160 MG/DL
CHOLEST/HDLC SERPL: 3.2 (CALC)
CO2 SERPL-SCNC: 32 MMOL/L (ref 20–32)
CREAT SERPL-MCNC: 0.7 MG/DL (ref 0.5–0.99)
EST. AVERAGE GLUCOSE BLD GHB EST-MCNC: 200 MG/DL
EST. AVERAGE GLUCOSE BLD GHB EST-SCNC: 11.1 MMOL/L
GLOBULIN SER CALC-MCNC: 2.2 G/DL (CALC) (ref 1.9–3.7)
GLUCOSE SERPL-MCNC: 80 MG/DL (ref 65–99)
HBA1C MFR BLD: 8.6 % OF TOTAL HGB
HDLC SERPL-MCNC: 50 MG/DL
LDLC SERPL CALC-MCNC: 90 MG/DL (CALC)
NONHDLC SERPL-MCNC: 110 MG/DL (CALC)
POTASSIUM SERPL-SCNC: 3.7 MMOL/L (ref 3.5–5.3)
PROT SERPL-MCNC: 6.7 G/DL (ref 6.1–8.1)
SL AMB EGFR AFRICAN AMERICAN: 107 ML/MIN/1.73M2
SL AMB EGFR NON AFRICAN AMERICAN: 92 ML/MIN/1.73M2
SODIUM SERPL-SCNC: 138 MMOL/L (ref 135–146)
TRIGL SERPL-MCNC: 100 MG/DL
TSH SERPL-ACNC: 1.72 MIU/L (ref 0.4–4.5)

## 2022-04-07 ENCOUNTER — VBI (OUTPATIENT)
Dept: ADMINISTRATIVE | Facility: OTHER | Age: 62
End: 2022-04-07

## 2022-04-12 DIAGNOSIS — E11.42 DIABETIC PERIPHERAL NEUROPATHY (HCC): ICD-10-CM

## 2022-04-12 DIAGNOSIS — R60.9 EDEMA, UNSPECIFIED TYPE: ICD-10-CM

## 2022-04-12 DIAGNOSIS — E03.9 HYPOTHYROIDISM, UNSPECIFIED TYPE: ICD-10-CM

## 2022-04-12 DIAGNOSIS — E13.9 DIABETES 1.5, MANAGED AS TYPE 2 (HCC): ICD-10-CM

## 2022-04-12 DIAGNOSIS — E78.00 HYPERCHOLESTEROLEMIA: ICD-10-CM

## 2022-04-12 DIAGNOSIS — L21.9 SEBORRHEA: ICD-10-CM

## 2022-04-12 DIAGNOSIS — I10 ESSENTIAL HYPERTENSION: ICD-10-CM

## 2022-04-12 DIAGNOSIS — F41.9 ANXIETY: ICD-10-CM

## 2022-04-12 RX ORDER — GABAPENTIN 100 MG/1
CAPSULE ORAL
Qty: 270 CAPSULE | Refills: 0 | Status: SHIPPED | OUTPATIENT
Start: 2022-04-12

## 2022-04-12 RX ORDER — KETOCONAZOLE 20 MG/ML
SHAMPOO TOPICAL
Qty: 120 ML | Refills: 0 | Status: SHIPPED | OUTPATIENT
Start: 2022-04-12

## 2022-04-12 RX ORDER — HYDROCHLOROTHIAZIDE 50 MG/1
TABLET ORAL
Qty: 180 TABLET | Refills: 0 | Status: SHIPPED | OUTPATIENT
Start: 2022-04-12

## 2022-04-12 RX ORDER — PRAVASTATIN SODIUM 40 MG
TABLET ORAL
Qty: 90 TABLET | Refills: 0 | Status: SHIPPED | OUTPATIENT
Start: 2022-04-12

## 2022-04-12 RX ORDER — LEVOTHYROXINE SODIUM 0.03 MG/1
TABLET ORAL
Qty: 90 TABLET | Refills: 0 | Status: SHIPPED | OUTPATIENT
Start: 2022-04-12

## 2022-04-12 RX ORDER — HYDROXYZINE HYDROCHLORIDE 25 MG/1
TABLET, FILM COATED ORAL
Qty: 90 TABLET | Refills: 0 | Status: SHIPPED | OUTPATIENT
Start: 2022-04-12

## 2022-04-12 RX ORDER — LISINOPRIL 5 MG/1
TABLET ORAL
Qty: 90 TABLET | Refills: 0 | Status: SHIPPED | OUTPATIENT
Start: 2022-04-12 | End: 2022-04-15

## 2022-04-12 RX ORDER — METFORMIN HYDROCHLORIDE 500 MG/1
TABLET, EXTENDED RELEASE ORAL
Qty: 360 TABLET | Refills: 0 | Status: SHIPPED | OUTPATIENT
Start: 2022-04-12

## 2022-04-15 ENCOUNTER — TELEPHONE (OUTPATIENT)
Dept: FAMILY MEDICINE CLINIC | Facility: CLINIC | Age: 62
End: 2022-04-15

## 2022-04-15 ENCOUNTER — OFFICE VISIT (OUTPATIENT)
Dept: FAMILY MEDICINE CLINIC | Facility: CLINIC | Age: 62
End: 2022-04-15
Payer: COMMERCIAL

## 2022-04-15 VITALS
HEART RATE: 94 BPM | SYSTOLIC BLOOD PRESSURE: 98 MMHG | OXYGEN SATURATION: 97 % | BODY MASS INDEX: 28.49 KG/M2 | TEMPERATURE: 97.6 F | DIASTOLIC BLOOD PRESSURE: 60 MMHG | HEIGHT: 64 IN

## 2022-04-15 DIAGNOSIS — I73.9 CLAUDICATION (HCC): ICD-10-CM

## 2022-04-15 DIAGNOSIS — E11.3293 TYPE 2 DIABETES MELLITUS WITH BOTH EYES AFFECTED BY MILD NONPROLIFERATIVE RETINOPATHY WITHOUT MACULAR EDEMA, WITH LONG-TERM CURRENT USE OF INSULIN (HCC): Primary | ICD-10-CM

## 2022-04-15 DIAGNOSIS — I10 ESSENTIAL HYPERTENSION: ICD-10-CM

## 2022-04-15 DIAGNOSIS — Z78.0 ASYMPTOMATIC POSTMENOPAUSAL ESTROGEN DEFICIENCY: ICD-10-CM

## 2022-04-15 DIAGNOSIS — Z79.4 TYPE 2 DIABETES MELLITUS WITH BOTH EYES AFFECTED BY MILD NONPROLIFERATIVE RETINOPATHY WITHOUT MACULAR EDEMA, WITH LONG-TERM CURRENT USE OF INSULIN (HCC): Primary | ICD-10-CM

## 2022-04-15 DIAGNOSIS — I35.0 AORTIC VALVE STENOSIS, ETIOLOGY OF CARDIAC VALVE DISEASE UNSPECIFIED: ICD-10-CM

## 2022-04-15 DIAGNOSIS — E78.00 HYPERCHOLESTEROLEMIA: Primary | ICD-10-CM

## 2022-04-15 DIAGNOSIS — K21.9 GASTROESOPHAGEAL REFLUX DISEASE WITHOUT ESOPHAGITIS: ICD-10-CM

## 2022-04-15 DIAGNOSIS — M48.061 LUMBAR FORAMINAL STENOSIS: ICD-10-CM

## 2022-04-15 DIAGNOSIS — E03.9 HYPOTHYROIDISM, UNSPECIFIED TYPE: ICD-10-CM

## 2022-04-15 DIAGNOSIS — E78.2 MIXED HYPERLIPIDEMIA: ICD-10-CM

## 2022-04-15 PROCEDURE — 99214 OFFICE O/P EST MOD 30 MIN: CPT | Performed by: FAMILY MEDICINE

## 2022-04-15 PROCEDURE — 4010F ACE/ARB THERAPY RXD/TAKEN: CPT | Performed by: FAMILY MEDICINE

## 2022-04-15 PROCEDURE — 3078F DIAST BP <80 MM HG: CPT | Performed by: FAMILY MEDICINE

## 2022-04-15 PROCEDURE — 3074F SYST BP LT 130 MM HG: CPT | Performed by: FAMILY MEDICINE

## 2022-04-15 PROCEDURE — 1036F TOBACCO NON-USER: CPT | Performed by: FAMILY MEDICINE

## 2022-04-15 RX ORDER — PANTOPRAZOLE SODIUM 40 MG/1
40 TABLET, DELAYED RELEASE ORAL
Qty: 30 TABLET | Refills: 5 | Status: SHIPPED | OUTPATIENT
Start: 2022-04-15

## 2022-04-15 RX ORDER — LISINOPRIL 5 MG/1
2.5 TABLET ORAL EVERY OTHER DAY
Qty: 90 TABLET | Refills: 0
Start: 2022-04-15

## 2022-04-27 NOTE — PROGRESS NOTES
Patient ID: Edgardo Stock is a 60 y o  female  HPI: 60 y  o female presents for follow up of niddm, hypertension , GERD  and hypercholesterolemia  Hgba1c is          and patient's issues are well controlled  Patient deneis any dyspnea, chest pain or palpitations  She is experiencing claudication, lumbar foraminal stenosis pain  She also has a history of aortic valve stenosis disease         SUBJECTIVE    Family History   Adopted: Yes   Family history unknown: Yes     Social History     Socioeconomic History    Marital status: /Civil Union     Spouse name: Not on file    Number of children: Not on file    Years of education: Not on file    Highest education level: Not on file   Occupational History    Occupation: Retired   Tobacco Use    Smoking status: Never Smoker    Smokeless tobacco: Never Used   Vaping Use    Vaping Use: Never used   Substance and Sexual Activity    Alcohol use: Never    Drug use: No    Sexual activity: Yes     Partners: Male     Birth control/protection: Male Sterilization     Comment: declines std/hiv testing   Other Topics Concern    Not on file   Social History Narrative    Personal Protective Equipment Seatbelts     Social Determinants of Health     Financial Resource Strain: Not on file   Food Insecurity: Not on file   Transportation Needs: Not on file   Physical Activity: Not on file   Stress: Not on file   Social Connections: Not on file   Intimate Partner Violence: Not on file   Housing Stability: Not on file     Past Medical History:   Diagnosis Date    Arthritis     CTS (carpal tunnel syndrome)     had surgery    [facility]     Fibroid     dr Sydney Herbert removed     2 para 2      x2 -, -    Hyperlipidemia      Past Surgical History:   Procedure Laterality Date    Santosh Herbert removed    TOTAL ABDOMINAL HYSTERECTOMY      2010     Allergies   Allergen Reactions    Atorvastatin Other (See Comments)     Reaction Date: 25Apr2011;     Furosemide      Reaction Date: 25Apr2011;     Latex        Current Outpatient Medications:     gabapentin (NEURONTIN) 100 mg capsule, TAKE ONE CAPSULE BY MOUTH THREE TIMES DAILY, Disp: 270 capsule, Rfl: 0    glucose blood (ONE TOUCH ULTRA TEST) test strip, test 6 times a day, Disp: 600 each, Rfl: 0    hydrochlorothiazide (HYDRODIURIL) 50 mg tablet, TAKE ONE TABLET BY MOUTH TWICE DAILY, Disp: 180 tablet, Rfl: 0    hydrOXYzine HCL (ATARAX) 25 mg tablet, TAKE ONE TABLET BY MOUTH NIGHTLY AT BEDTIME, Disp: 90 tablet, Rfl: 0    insulin regular (HumuLIN R,NovoLIN R) 100 units/mL injection, Inject under the skin  a day , Disp: , Rfl:     ketoconazole (NIZORAL) 2 % shampoo, apply to scalp every other day for 5 minutes and then rinse, Disp: 120 mL, Rfl: 0    levothyroxine 25 mcg tablet, TAKE ONE TABLET BY MOUTH DAILY IN the early MORNING, Disp: 90 tablet, Rfl: 0    lisinopril (ZESTRIL) 5 mg tablet, Take 0 5 tablets (2 5 mg total) by mouth every other day, Disp: 90 tablet, Rfl: 0    metFORMIN (GLUCOPHAGE-XR) 500 mg 24 hr tablet, TAKE TWO TABLETS BY MOUTH TWICE DAILY, Disp: 360 tablet, Rfl: 0    naproxen (NAPROSYN) 500 mg tablet, Take 1 tablet (500 mg total) by mouth 3 (three) times a day with meals, Disp: 90 tablet, Rfl: 0    omega-3-acid ethyl esters (LOVAZA) 1 g capsule, TAKE TWO CAPSULES BY MOUTH TWICE DAILY , Disp: 360 capsule, Rfl: 1    pravastatin (PRAVACHOL) 40 mg tablet, TAKE ONE TABLET BY MOUTH ONCE DAILY, Disp: 90 tablet, Rfl: 0    Diclofenac Sodium (VOLTAREN) 1 %, Apply 2 g topically 4 (four) times a day (Patient not taking: Reported on 4/15/2022 ), Disp: 50 g, Rfl: 0    Dulaglutide 0 75 MG/0 5ML SOPN, Inject 0 5 mL (0 75 mg total) under the skin once a week (Patient not taking: Reported on 4/15/2022 ), Disp: 2 mL, Rfl: 3    insulin detemir (LEVEMIR) 100 units/mL subcutaneous injection, Inject 20 Units under the skin every 12 (twelve) hours, Disp: 10 mL, Rfl: 3    pantoprazole (PROTONIX) 40 mg tablet, Take 1 tablet (40 mg total) by mouth daily before breakfast, Disp: 30 tablet, Rfl: 5    Review of Systems  Constitutional:     Denies fever, chills ,fatigue ,weakness ,weight loss, weight gain     ENT: Denies earache ,loss of hearing ,nosebleed, nasal discharge,nasal congestion ,sore throat ,hoarseness  Pulmonary: Denies shortness of breath ,cough  ,dyspnea on exertion, orthopnea  ,PND   Cardiovascular:  Denies bradycardia , tachycardia  ,palpations, lower extremity edema leg, claudication  Breast:  Denies new or changing breast lumps ,nipple discharge ,nipple changes  Abdomen:  Denies abdominal pain , anorexia , indigestion, nausea, vomiting, constipation, diarrhea  Musculoskeletal: Denies myalgias,, joint swelling, joint stiffness , limb pain, limb swelling+ lumbar back pain + claudiacation  Gu: denies dysuria, polyuria  Skin: Denies skin rash, skin lesion, skin wound, itching, dry skin  Neuro: Denies headache, numbness, tingling, confusion, loss of consciousness, dizziness, vertigo  Psychiatric: Denies feelings of depression, suicidal ideation, anxiety, sleep disturbances    OBJECTIVE  BP 98/60   Pulse 94   Temp 97 6 °F (36 4 °C)   Ht 5' 4" (1 626 m)   SpO2 97%   BMI 28 49 kg/m²   Constitutional:   NAD, well appearing and well nourished      ENT:   Conjunctiva and lids: no injection, edema, or discharge     Pupils and iris: NIKKI bilaterally    External inspection of ears and nose: normal without deformities or discharge  Otoscopic exam: Canals patent without erythema  Nasal mucosa, septum and turbinates: Normal or edema or discharge         Oropharynx:  Moist mucosa, normal tongue and tonsils without lesions  No erythema        Pulmonary:Respiratory effort normal rate and rhythm, no increased work of breathing   Auscultation of lungs:  Clear bilaterally with no adventitious breath sounds       Cardiovascular: regular rate and rhythm, S1 and S2, no murmur, no edema and/or varicosities of LE      Abdomen: Soft and non-distended     Positive bowel sounds      No heptomegaly or splenomegaly      Gu: no suprapubic tenderness or CVA tenderness, no urethral discharge  Lymphatic:  No anterior or posterior cervical lymphadenopathy         Musculoskeletal:  Gait and station: Normal gait      Digits and nails normal without clubbing or cyanosis       Inspection/palpation of joints, bones, and muscles:  No joint tenderness, swelling, full active and passive range of motion       Skin: Normal skin turgor and no rashes      Neuro:    Normal reflexes     Psych:   alert and oriented to person, place and time     normal mood and affect       Assessment/Plan:Diagnoses and all orders for this visit:    Type 2 diabetes mellitus with both eyes affected by mild nonproliferative retinopathy without macular edema, with long-term current use of insulin ([facility] )  Comments: Will change insulin dosing to bid  Orders:  -     insulin detemir (LEVEMIR) 100 units/mL subcutaneous injection; Inject 20 Units under the skin every 12 (twelve) hours    Aortic valve stenosis, etiology of cardiac valve disease unspecified  -     Echo complete w/ contrast if indicated; Future    Essential hypertension  Comments:  Continue wiht ACE therapy  Orders:  -     lisinopril (ZESTRIL) 5 mg tablet; Take 0 5 tablets (2 5 mg total) by mouth every other day    Claudication (HCC)  -     VAS lower limb arterial duplex, complete bilateral; Future    Gastroesophageal reflux disease without esophagitis  Comments:  Contnue PPI therapy  Orders:  -     pantoprazole (PROTONIX) 40 mg tablet; Take 1 tablet (40 mg total) by mouth daily before breakfast    Lumbar foraminal stenosis  Comments:  Continue current meds  Orders:  -     Ambulatory Referral to Comprehensive Spine PT; Future    Mixed hyperlipidemia  Comments:  Continue statin tx        Asymptomatic postmenopausal estrogen deficiency  -     DXA bone density spine hip and pelvis; Future        Reviewed with patient plan to treat with above plan      Patient instructed to call in 72 hours if not feeling better or if symptoms worsen

## 2022-07-27 ENCOUNTER — VBI (OUTPATIENT)
Dept: ADMINISTRATIVE | Facility: OTHER | Age: 62
End: 2022-07-27

## 2022-09-06 DIAGNOSIS — E03.9 HYPOTHYROIDISM, UNSPECIFIED TYPE: ICD-10-CM

## 2022-09-06 DIAGNOSIS — R60.9 EDEMA, UNSPECIFIED TYPE: ICD-10-CM

## 2022-09-06 DIAGNOSIS — E78.00 HYPERCHOLESTEROLEMIA: ICD-10-CM

## 2022-09-06 DIAGNOSIS — L21.9 SEBORRHEA: ICD-10-CM

## 2022-09-06 DIAGNOSIS — E13.9 DIABETES 1.5, MANAGED AS TYPE 2 (HCC): ICD-10-CM

## 2022-09-06 DIAGNOSIS — M48.061 LUMBAR FORAMINAL STENOSIS: Primary | ICD-10-CM

## 2022-09-06 RX ORDER — LEVOTHYROXINE SODIUM 0.03 MG/1
TABLET ORAL
Qty: 90 TABLET | Refills: 0 | Status: SHIPPED | OUTPATIENT
Start: 2022-09-06

## 2022-09-06 RX ORDER — METFORMIN HYDROCHLORIDE 500 MG/1
TABLET, EXTENDED RELEASE ORAL
Qty: 360 TABLET | Refills: 0 | Status: SHIPPED | OUTPATIENT
Start: 2022-09-06

## 2022-09-06 RX ORDER — OMEGA-3-ACID ETHYL ESTERS 1 G/1
CAPSULE, LIQUID FILLED ORAL
Qty: 360 CAPSULE | Refills: 0 | Status: SHIPPED | OUTPATIENT
Start: 2022-09-06

## 2022-09-06 RX ORDER — KETOCONAZOLE 20 MG/ML
SHAMPOO TOPICAL
Qty: 120 ML | Refills: 0 | Status: SHIPPED | OUTPATIENT
Start: 2022-09-06

## 2022-09-06 RX ORDER — HYDROCHLOROTHIAZIDE 50 MG/1
TABLET ORAL
Qty: 180 TABLET | Refills: 0 | Status: SHIPPED | OUTPATIENT
Start: 2022-09-06

## 2022-09-06 RX ORDER — DIAZEPAM 5 MG/1
TABLET ORAL
Qty: 30 TABLET | Refills: 0 | Status: SHIPPED | OUTPATIENT
Start: 2022-09-06

## 2022-09-06 NOTE — TELEPHONE ENCOUNTER
Pt called stating she is scheduled on 9/12 for a physical and won't be home  Asking for it to be rescheduled for sometime in October (not before 10/10/22)

## 2022-09-09 ENCOUNTER — VBI (OUTPATIENT)
Dept: ADMINISTRATIVE | Facility: OTHER | Age: 62
End: 2022-09-09

## 2022-09-12 ENCOUNTER — OFFICE VISIT (OUTPATIENT)
Dept: FAMILY MEDICINE CLINIC | Facility: CLINIC | Age: 62
End: 2022-09-12
Payer: COMMERCIAL

## 2022-09-12 VITALS
DIASTOLIC BLOOD PRESSURE: 68 MMHG | HEIGHT: 64 IN | SYSTOLIC BLOOD PRESSURE: 130 MMHG | HEART RATE: 98 BPM | WEIGHT: 171.4 LBS | TEMPERATURE: 97.8 F | OXYGEN SATURATION: 99 % | BODY MASS INDEX: 29.26 KG/M2

## 2022-09-12 DIAGNOSIS — I10 PRIMARY HYPERTENSION: ICD-10-CM

## 2022-09-12 DIAGNOSIS — E11.3293 TYPE 2 DIABETES MELLITUS WITH BOTH EYES AFFECTED BY MILD NONPROLIFERATIVE RETINOPATHY WITHOUT MACULAR EDEMA, WITH LONG-TERM CURRENT USE OF INSULIN (HCC): ICD-10-CM

## 2022-09-12 DIAGNOSIS — E78.2 MIXED HYPERLIPIDEMIA: ICD-10-CM

## 2022-09-12 DIAGNOSIS — M25.561 CHRONIC PAIN OF RIGHT KNEE: ICD-10-CM

## 2022-09-12 DIAGNOSIS — Z00.00 ANNUAL PHYSICAL EXAM: Primary | ICD-10-CM

## 2022-09-12 DIAGNOSIS — G89.29 CHRONIC PAIN OF RIGHT KNEE: ICD-10-CM

## 2022-09-12 DIAGNOSIS — Z79.4 TYPE 2 DIABETES MELLITUS WITH BOTH EYES AFFECTED BY MILD NONPROLIFERATIVE RETINOPATHY WITHOUT MACULAR EDEMA, WITH LONG-TERM CURRENT USE OF INSULIN (HCC): ICD-10-CM

## 2022-09-12 DIAGNOSIS — M19.90 OSTEOARTHRITIS, UNSPECIFIED OSTEOARTHRITIS TYPE, UNSPECIFIED SITE: ICD-10-CM

## 2022-09-12 PROCEDURE — 3078F DIAST BP <80 MM HG: CPT | Performed by: FAMILY MEDICINE

## 2022-09-12 PROCEDURE — 99396 PREV VISIT EST AGE 40-64: CPT | Performed by: FAMILY MEDICINE

## 2022-09-12 PROCEDURE — 99214 OFFICE O/P EST MOD 30 MIN: CPT | Performed by: FAMILY MEDICINE

## 2022-09-12 PROCEDURE — 3075F SYST BP GE 130 - 139MM HG: CPT | Performed by: FAMILY MEDICINE

## 2022-09-13 DIAGNOSIS — E11.42 DIABETIC PERIPHERAL NEUROPATHY (HCC): ICD-10-CM

## 2022-09-13 RX ORDER — GABAPENTIN 100 MG/1
CAPSULE ORAL
Qty: 270 CAPSULE | Refills: 0 | Status: SHIPPED | OUTPATIENT
Start: 2022-09-13

## 2022-09-14 NOTE — PROGRESS NOTES
[address]    NAME: Marshall Merritt  AGE: 60 y o  SEX: female  : 1960     DATE: 2022     Assessment and Plan:     Problem List Items Addressed This Visit        Endocrine    Type 2 diabetes mellitus ([address] ) - Primary    Relevant Orders    HEMOGLOBIN A1C W/ EAG ESTIMATION      Other Visit Diagnoses     Osteoarthritis, unspecified osteoarthritis type, unspecified site        Chronic pain of right knee              Immunizations and preventive care screenings were discussed with patient today  Appropriate education was printed on patient's after visit summary  Counseling:  · Exercise: the importance of regular exercise/physical activity was discussed  Recommend exercise 3-5 times per week for at least 30 minutes  Return in about 4 months (around 2023)  Chief Complaint:     Chief Complaint   Patient presents with    Physical Exam      History of Present Illness:     Adult Annual Physical   Patient here for a comprehensive physical exam  The patient reports no problems  Diet and Physical Activity  · Diet/Nutrition: well balanced diet  · Exercise: walking  Depression Screening  PHQ-2/9 Depression Screening         General Health  · Sleep: sleeps well  · Hearing: normal - bilateral   · Vision: no vision problems  · Dental: regular dental visits  /GYN Health  · Patient is: postmenopausal  · Last menstrual period:   · Contraceptive method:       Review of Systems:     Review of Systems   Constitutional: Negative for appetite change, chills and fever  HENT: Negative for ear pain, facial swelling, rhinorrhea, sinus pain, sore throat and trouble swallowing  Eyes: Negative for discharge and redness  Respiratory: Negative for chest tightness, shortness of breath and wheezing  Cardiovascular: Negative for chest pain and palpitations     Gastrointestinal: Negative for abdominal pain, diarrhea, nausea and vomiting  Endocrine: Negative for polyuria  Genitourinary: Negative for dysuria and urgency  Musculoskeletal: Negative for arthralgias and back pain  Skin: Negative for rash  Neurological: Negative for dizziness, weakness and headaches  Hematological: Negative for adenopathy  Psychiatric/Behavioral: Negative for behavioral problems, confusion and sleep disturbance  All other systems reviewed and are negative       Past Medical History:     Past Medical History:   Diagnosis Date    Arthritis     CTS (carpal tunnel syndrome)     had surgery    [facility]     Fibroid     dr Dahiana Bertrand removed     2 para 2      x2 -F, -F    Hyperlipidemia       Past Surgical History:     Past Surgical History:   Procedure Laterality Date    CARDIAC SURGERY      Ewelina See      dr Dahiana Bertrand removed    TOTAL ABDOMINAL HYSTERECTOMY      2010      Social History:     Social History     Socioeconomic History    Marital status: /Civil Union     Spouse name: None    Number of children: None    Years of education: None    Highest education level: None   Occupational History    Occupation: Retired   Tobacco Use    Smoking status: Never Smoker    Smokeless tobacco: Never Used   Vaping Use    Vaping Use: Never used   Substance and Sexual Activity    Alcohol use: Never    Drug use: No    Sexual activity: Yes     Partners: Male     Birth control/protection: Male Sterilization     Comment: declines std/hiv testing   Other Topics Concern    None   Social History Narrative    Personal Protective Equipment Seatbelts     Social Determinants of Health     Financial Resource Strain: Not on file   Food Insecurity: Not on file   Transportation Needs: Not on file   Physical Activity: Not on file   Stress: Not on file   Social Connections: Not on file   Intimate Partner Violence: Not on file   Housing Stability: Not on file      Family History: Family History   Adopted: Yes   Family history unknown: Yes      Current Medications:     Current Outpatient Medications   Medication Sig Dispense Refill    diazepam (VALIUM) 5 mg tablet Take one tablet by mouth at bedtime  as needed for sleep 30 tablet 0    gabapentin (NEURONTIN) 100 mg capsule TAKE ONE CAPSULE BY MOUTH THREE TIMES DAILY 270 capsule 0    glucose blood (ONE TOUCH ULTRA TEST) test strip test 6 times a day 600 each 0    hydrochlorothiazide (HYDRODIURIL) 50 mg tablet TAKE ONE TABLET BY MOUTH TWICE DAILY 180 tablet 0    hydrOXYzine HCL (ATARAX) 25 mg tablet TAKE ONE TABLET BY MOUTH NIGHTLY AT BEDTIME 90 tablet 0    insulin detemir (LEVEMIR) 100 units/mL subcutaneous injection Inject 20 Units under the skin every 12 (twelve) hours 10 mL 3    insulin regular (HumuLIN R,NovoLIN R) 100 units/mL injection Inject under the skin  a day       ketoconazole (NIZORAL) 2 % shampoo apply to scalp every other day for 5 minutes and then rinse 120 mL 0    levothyroxine 25 mcg tablet TAKE ONE TABLET BY MOUTH DAILY IN the early MORNING 90 tablet 0    lisinopril (ZESTRIL) 5 mg tablet Take 0 5 tablets (2 5 mg total) by mouth every other day 90 tablet 0    metFORMIN (GLUCOPHAGE-XR) 500 mg 24 hr tablet TAKE TWO TABLETS BY MOUTH TWICE DAILY 360 tablet 0    omega-3-acid ethyl esters (LOVAZA) 1 g capsule TAKE TWO CAPSULES BY MOUTH TWICE DAILY 360 capsule 0    pantoprazole (PROTONIX) 40 mg tablet Take 1 tablet (40 mg total) by mouth daily before breakfast 30 tablet 5    pravastatin (PRAVACHOL) 40 mg tablet TAKE ONE TABLET BY MOUTH ONCE DAILY 90 tablet 0    Diclofenac Sodium (VOLTAREN) 1 % Apply 2 g topically 4 (four) times a day (Patient not taking: No sig reported) 50 g 0    Dulaglutide 0 75 MG/0 5ML SOPN Inject 0 5 mL (0 75 mg total) under the skin once a week (Patient not taking: No sig reported) 2 mL 3    naproxen (NAPROSYN) 500 mg tablet Take 1 tablet (500 mg total) by mouth 3 (three) times a day with meals (Patient not taking: Reported on 9/12/2022) 90 tablet 0     No current facility-administered medications for this visit  Allergies: Allergies   Allergen Reactions    Atorvastatin Other (See Comments)     Reaction Date: [id number];     Furosemide      Reaction Date: [id number];     Latex       Physical Exam:     /68   Pulse 98   Temp 97 8 °F (36 6 °C)   Ht 5' 4" (1 626 m)   Wt 77 7 kg (171 lb 6 4 oz)   SpO2 99%   BMI 29 42 kg/m²     Physical Exam  Vitals and nursing note reviewed  Constitutional:       General: She is not in acute distress  Appearance: Normal appearance  She is not ill-appearing or diaphoretic  HENT:      Head: Normocephalic and atraumatic  Right Ear: Tympanic membrane, ear canal and external ear normal       Left Ear: Tympanic membrane, ear canal and external ear normal       Nose: Nose normal  No congestion or rhinorrhea  Mouth/Throat:      Mouth: Mucous membranes are moist       Pharynx: Oropharynx is clear  No posterior oropharyngeal erythema  Eyes:      General:         Right eye: No discharge  Left eye: No discharge  Extraocular Movements: Extraocular movements intact  Conjunctiva/sclera: Conjunctivae normal       Pupils: Pupils are equal, round, and reactive to light  Neck:      Vascular: No carotid bruit  Cardiovascular:      Rate and Rhythm: Normal rate and regular rhythm  Pulses: Normal pulses  Heart sounds: Normal heart sounds  No murmur heard  Pulmonary:      Effort: Pulmonary effort is normal  No respiratory distress  Breath sounds: Normal breath sounds  No wheezing or rhonchi  Abdominal:      General: Abdomen is flat  Bowel sounds are normal  There is no distension  Palpations: There is no mass  Tenderness: There is no abdominal tenderness  Musculoskeletal:         General: No swelling or deformity  Normal range of motion  Cervical back: Normal range of motion and neck supple  No rigidity  Right lower leg: No edema  Left lower leg: No edema  Lymphadenopathy:      Cervical: No cervical adenopathy  Skin:     General: Skin is warm and dry  Capillary Refill: Capillary refill takes less than 2 seconds  Coloration: Skin is not jaundiced  Findings: No bruising, erythema or rash  Neurological:      General: No focal deficit present  Mental Status: She is alert and oriented to person, place, and time  Cranial Nerves: No cranial nerve deficit  Sensory: No sensory deficit  Gait: Gait normal       Deep Tendon Reflexes: Reflexes normal    Psychiatric:         Mood and Affect: Mood normal          Behavior: Behavior normal          Thought Content:  Thought content normal          Judgment: Judgment normal           DO Griselda Quijano

## 2022-09-14 NOTE — PROGRESS NOTES
lPatient ID: Zofia Morse is a 60 y o  female  HPI: 60 y  o female presents for follow up of niddm, hypertension and hypercholesterolemia  Hgba1c is 8 6        and patient's issues are well controlled  Patient deneis any dyspnea, chest pain or palpitations  She does complain of chronic knee pain and diffuse arthralgia      SUBJECTIVE    Family History   Adopted: Yes   Family history unknown: Yes     Social History     Socioeconomic History    Marital status: /Civil Union     Spouse name: Not on file    Number of children: Not on file    Years of education: Not on file    Highest education level: Not on file   Occupational History    Occupation: Retired   Tobacco Use    Smoking status: Never Smoker    Smokeless tobacco: Never Used   Vaping Use    Vaping Use: Never used   Substance and Sexual Activity    Alcohol use: Never    Drug use: No    Sexual activity: Yes     Partners: Male     Birth control/protection: Male Sterilization     Comment: declines std/hiv testing   Other Topics Concern    Not on file   Social History Narrative    Personal Protective Equipment Seatbelts     Social Determinants of Health     Financial Resource Strain: Not on file   Food Insecurity: Not on file   Transportation Needs: Not on file   Physical Activity: Not on file   Stress: Not on file   Social Connections: Not on file   Intimate Partner Violence: Not on file   Housing Stability: Not on file     Past Medical History:   Diagnosis Date    Arthritis     CTS (carpal tunnel syndrome)     had surgery    [facility] [address] )     Fibroid     dr Edmond Mcguire removed     2 para 2      x2 -F, -    Hyperlipidemia      Past Surgical History:   Procedure Laterality Date    CARDIAC SURGERY      Cinda Mcguire removed    TOTAL ABDOMINAL HYSTERECTOMY      2010     Allergies   Allergen Reactions    Atorvastatin Other (See Comments)     Reaction Date: 2011;     Furosemide      Reaction Date: [id number];     Latex        Current Outpatient Medications:     diazepam (VALIUM) 5 mg tablet, Take one tablet by mouth at bedtime  as needed for sleep, Disp: 30 tablet, Rfl: 0    gabapentin (NEURONTIN) 100 mg capsule, TAKE ONE CAPSULE BY MOUTH THREE TIMES DAILY, Disp: 270 capsule, Rfl: 0    glucose blood (ONE TOUCH ULTRA TEST) test strip, test 6 times a day, Disp: 600 each, Rfl: 0    hydrochlorothiazide (HYDRODIURIL) 50 mg tablet, TAKE ONE TABLET BY MOUTH TWICE DAILY, Disp: 180 tablet, Rfl: 0    hydrOXYzine HCL (ATARAX) 25 mg tablet, TAKE ONE TABLET BY MOUTH NIGHTLY AT BEDTIME, Disp: 90 tablet, Rfl: 0    insulin detemir (LEVEMIR) 100 units/mL subcutaneous injection, Inject 20 Units under the skin every 12 (twelve) hours, Disp: 10 mL, Rfl: 3    insulin regular (HumuLIN R,NovoLIN R) 100 units/mL injection, Inject under the skin  a day , Disp: , Rfl:     ketoconazole (NIZORAL) 2 % shampoo, apply to scalp every other day for 5 minutes and then rinse, Disp: 120 mL, Rfl: 0    levothyroxine 25 mcg tablet, TAKE ONE TABLET BY MOUTH DAILY IN the early MORNING, Disp: 90 tablet, Rfl: 0    lisinopril (ZESTRIL) 5 mg tablet, Take 0 5 tablets (2 5 mg total) by mouth every other day, Disp: 90 tablet, Rfl: 0    metFORMIN (GLUCOPHAGE-XR) 500 mg 24 hr tablet, TAKE TWO TABLETS BY MOUTH TWICE DAILY, Disp: 360 tablet, Rfl: 0    omega-3-acid ethyl esters (LOVAZA) 1 g capsule, TAKE TWO CAPSULES BY MOUTH TWICE DAILY, Disp: 360 capsule, Rfl: 0    pantoprazole (PROTONIX) 40 mg tablet, Take 1 tablet (40 mg total) by mouth daily before breakfast, Disp: 30 tablet, Rfl: 5    pravastatin (PRAVACHOL) 40 mg tablet, TAKE ONE TABLET BY MOUTH ONCE DAILY, Disp: 90 tablet, Rfl: 0    Diclofenac Sodium (VOLTAREN) 1 %, Apply 2 g topically 4 (four) times a day (Patient not taking: No sig reported), Disp: 50 g, Rfl: 0    Dulaglutide 0 75 MG/0 5ML SOPN, Inject 0 5 mL (0 75 mg total) under the skin once a week (Patient not taking: No sig reported), Disp: 2 mL, Rfl: 3    naproxen (NAPROSYN) 500 mg tablet, Take 1 tablet (500 mg total) by mouth 3 (three) times a day with meals (Patient not taking: Reported on 9/12/2022), Disp: 90 tablet, Rfl: 0    Review of Systems  Constitutional:     Denies fever, chills ,fatigue ,weakness ,weight loss, weight gain     ENT: Denies earache ,loss of hearing ,nosebleed, nasal discharge,nasal congestion ,sore throat ,hoarseness  Pulmonary: Denies shortness of breath ,cough  ,dyspnea on exertion, orthopnea  ,PND   Cardiovascular:  Denies bradycardia , tachycardia  ,palpations, lower extremity edema leg, claudication  Breast:  Denies new or changing breast lumps ,nipple discharge ,nipple changes  Abdomen:  Denies abdominal pain , anorexia , indigestion, nausea, vomiting, constipation, diarrhea  Musculoskeletal: Denies myalgias, +arthralgias,+ knee  joint swelling intermittnetly , joint stiffness , limb pain, limb swelling  Gu: denies dysuria, polyuria  Skin: Denies skin rash, skin lesion, skin wound, itching, dry skin  Neuro: Denies headache, numbness, tingling, confusion, loss of consciousness, dizziness, vertigo  Psychiatric: Denies feelings of depression, suicidal ideation, anxiety, sleep disturbances    OBJECTIVE  /68   Pulse 98   Temp 97 8 °F (36 6 °C)   Ht 5' 4" (1 626 m)   Wt 77 7 kg (171 lb 6 4 oz)   SpO2 99%   BMI 29 42 kg/m²   Constitutional:   NAD, well appearing and well nourished      ENT:   Conjunctiva and lids: no injection, edema, or discharge     Pupils and iris: NIKKI bilaterally    External inspection of ears and nose: normal without deformities or discharge  Otoscopic exam: Canals patent without erythema  Nasal mucosa, septum and turbinates: Normal or edema or discharge         Oropharynx:  Moist mucosa, normal tongue and tonsils without lesions   No erythema        Pulmonary:Respiratory effort normal rate and rhythm, no increased work of breathing  Auscultation of lungs:  Clear bilaterally with no adventitious breath sounds       Cardiovascular: regular rate and rhythm, S1 and S2, no murmur, no edema and/or varicosities of LE      Abdomen: Soft and non-distended     Positive bowel sounds      No heptomegaly or splenomegaly      Gu: no suprapubic tenderness or CVA tenderness, no urethral discharge  Lymphatic:  No anterior or posterior cervical lymphadenopathy         Musculoskeletal:  Gait and station: Normal gait      Digits and nails normal without clubbing or cyanosis       Inspection/palpation of joints, bones, and muscles:  No joint tenderness, swelling, full active and passive range of motion + crepitance of knees bilaterally R>L      Skin: Normal skin turgor and no rashes      Neuro:    Normal reflexes     Psych:   alert and oriented to person, place and time     normal mood and affect   Patient's shoes and socks removed  Right Foot/Ankle   Right Foot Inspection  Skin Exam: skin normal and skin intact  No dry skin, no warmth, no callus, no erythema, no maceration, no abnormal color, no pre-ulcer, no ulcer and no callus  Toe Exam: ROM and strength within normal limits  No swelling, no tenderness, erythema and  no right toe deformity    Sensory   Vibration: diminished  Proprioception: diminished  Monofilament testing: diminished    Vascular  Capillary refills: < 3 seconds  The right DP pulse is 2+  The right PT pulse is 2+  Left Foot/Ankle  Left Foot Inspection  Skin Exam: skin normal and skin intact  No dry skin, no warmth, no erythema, no maceration, normal color, no pre-ulcer, no ulcer and no callus  Toe Exam: ROM and strength within normal limits  No swelling, no tenderness, no erythema and no left toe deformity  Sensory   Vibration: diminished  Proprioception: diminished  Monofilament testing: diminished    Vascular  Capillary refills: < 3 seconds  The left DP pulse is 2+  The left PT pulse is 2+       Assign Risk Category  No deformity present  Loss of protective sensation        Assessment/Plan:Diagnoses and all orders for this visit:    Annual physical exam    Type 2 diabetes mellitus with both eyes affected by mild nonproliferative retinopathy without macular edema, with long-term current use of insulin (HCC)  Comments:  Continue current tx    Orders:  -     Cancel: POCT hemoglobin A1c  -     HEMOGLOBIN A1C W/ EAG ESTIMATION; Future    Osteoarthritis, unspecified osteoarthritis type, unspecified site    Chronic pain of right knee    Primary hypertension  Comments:  Continue with ACE therapy  Mixed hyperlipidemia  Comments:  Continue pravastin therapy  Above plan      Reviewed with patient plan to treat with   Patient instructed to call in 72 hours if not feeling better or if symptoms worsen

## 2022-09-14 NOTE — PATIENT INSTRUCTIONS

## 2022-09-26 DIAGNOSIS — E78.00 HYPERCHOLESTEROLEMIA: ICD-10-CM

## 2022-09-26 DIAGNOSIS — I10 ESSENTIAL HYPERTENSION: ICD-10-CM

## 2022-09-26 PROCEDURE — 4010F ACE/ARB THERAPY RXD/TAKEN: CPT | Performed by: FAMILY MEDICINE

## 2022-09-26 RX ORDER — LISINOPRIL 5 MG/1
TABLET ORAL
Qty: 90 TABLET | Refills: 0 | Status: SHIPPED | OUTPATIENT
Start: 2022-09-26

## 2022-09-26 RX ORDER — PRAVASTATIN SODIUM 40 MG
TABLET ORAL
Qty: 90 TABLET | Refills: 0 | Status: SHIPPED | OUTPATIENT
Start: 2022-09-26

## 2022-11-03 ENCOUNTER — VBI (OUTPATIENT)
Dept: ADMINISTRATIVE | Facility: OTHER | Age: 62
End: 2022-11-03

## 2022-11-07 PROBLEM — Z01.419 ENCOUNTER FOR ANNUAL ROUTINE GYNECOLOGICAL EXAMINATION: Status: RESOLVED | Noted: 2018-01-25 | Resolved: 2022-11-07

## 2022-12-08 ENCOUNTER — VBI (OUTPATIENT)
Dept: ADMINISTRATIVE | Facility: OTHER | Age: 62
End: 2022-12-08

## 2022-12-29 ENCOUNTER — VBI (OUTPATIENT)
Dept: ADMINISTRATIVE | Facility: OTHER | Age: 62
End: 2022-12-29

## 2023-01-05 ENCOUNTER — RA CDI HCC (OUTPATIENT)
Dept: OTHER | Facility: HOSPITAL | Age: 63
End: 2023-01-05

## 2023-01-05 NOTE — PROGRESS NOTES
[facility]  coding opportunities       Chart reviewed, no opportunity found: CHART REVIEWED, NO OPPORTUNITY FOUND        Patients Insurance        Commercial Insurance: [address] [place] [address]

## 2023-02-14 ENCOUNTER — VBI (OUTPATIENT)
Dept: ADMINISTRATIVE | Facility: OTHER | Age: 63
End: 2023-02-14

## 2023-02-14 LAB
LEFT EYE DIABETIC RETINOPATHY: POSITIVE
RIGHT EYE DIABETIC RETINOPATHY: POSITIVE

## 2023-02-14 NOTE — TELEPHONE ENCOUNTER
Upon review of the received faxes folder,    we were able to locate, review, and update the patient chart as requested for Diabetic Eye Exam     Any additional questions or concerns should be emailed to the Practice Liaisons via the appropriate education email address, please do not reply via In Basket      Thank you  Kindra Albert MA         Insurance submission portal closed for 2023 submissions

## 2023-02-19 DIAGNOSIS — E78.00 HYPERCHOLESTEROLEMIA: ICD-10-CM

## 2023-02-19 DIAGNOSIS — E03.9 HYPOTHYROIDISM, UNSPECIFIED TYPE: ICD-10-CM

## 2023-02-19 DIAGNOSIS — R60.9 EDEMA, UNSPECIFIED TYPE: ICD-10-CM

## 2023-02-19 DIAGNOSIS — E13.9 DIABETES 1.5, MANAGED AS TYPE 2 (HCC): ICD-10-CM

## 2023-02-19 DIAGNOSIS — I10 ESSENTIAL HYPERTENSION: ICD-10-CM

## 2023-02-19 DIAGNOSIS — L21.9 SEBORRHEA: ICD-10-CM

## 2023-02-19 DIAGNOSIS — M48.061 LUMBAR FORAMINAL STENOSIS: ICD-10-CM

## 2023-02-20 RX ORDER — PRAVASTATIN SODIUM 40 MG
TABLET ORAL
Qty: 90 TABLET | Refills: 0 | Status: SHIPPED | OUTPATIENT
Start: 2023-02-20

## 2023-02-20 RX ORDER — DIAZEPAM 5 MG/1
TABLET ORAL
Qty: 30 TABLET | Refills: 0 | Status: SHIPPED | OUTPATIENT
Start: 2023-02-20

## 2023-02-20 RX ORDER — OMEGA-3-ACID ETHYL ESTERS 1 G/1
CAPSULE, LIQUID FILLED ORAL
Qty: 360 CAPSULE | Refills: 0 | Status: SHIPPED | OUTPATIENT
Start: 2023-02-20

## 2023-02-20 RX ORDER — LEVOTHYROXINE SODIUM 0.03 MG/1
TABLET ORAL
Qty: 90 TABLET | Refills: 0 | Status: SHIPPED | OUTPATIENT
Start: 2023-02-20

## 2023-02-20 RX ORDER — KETOCONAZOLE 20 MG/ML
SHAMPOO TOPICAL
Qty: 120 ML | Refills: 0 | Status: SHIPPED | OUTPATIENT
Start: 2023-02-20

## 2023-02-20 RX ORDER — LISINOPRIL 5 MG/1
TABLET ORAL
Qty: 90 TABLET | Refills: 0 | Status: SHIPPED | OUTPATIENT
Start: 2023-02-20

## 2023-02-20 RX ORDER — METFORMIN HYDROCHLORIDE 500 MG/1
TABLET, EXTENDED RELEASE ORAL
Qty: 360 TABLET | Refills: 0 | Status: SHIPPED | OUTPATIENT
Start: 2023-02-20

## 2023-02-20 RX ORDER — HYDROCHLOROTHIAZIDE 50 MG/1
TABLET ORAL
Qty: 180 TABLET | Refills: 0 | Status: SHIPPED | OUTPATIENT
Start: 2023-02-20

## 2023-02-20 NOTE — TELEPHONE ENCOUNTER
[id number] 09/08/2022 09/06/2022 diazePAM (Tablet)  30 0 30 5 [id number]  Other 0 / 0 PA

## 2023-03-19 ENCOUNTER — HOSPITAL ENCOUNTER (OUTPATIENT)
Dept: VASCULAR ULTRASOUND | Facility: HOSPITAL | Age: 63
Discharge: HOME | End: 2023-03-19

## 2023-03-19 DIAGNOSIS — I73.9 CLAUDICATION (HCC): ICD-10-CM

## 2023-03-22 DIAGNOSIS — R06.09 DYSPNEA ON EXERTION: Primary | ICD-10-CM

## 2023-03-23 ENCOUNTER — HOSPITAL ENCOUNTER (OUTPATIENT)
Dept: RADIOLOGY | Facility: MEDICAL CENTER | Age: 63
End: 2023-03-23

## 2023-03-23 DIAGNOSIS — Z78.0 ASYMPTOMATIC POSTMENOPAUSAL ESTROGEN DEFICIENCY: ICD-10-CM

## 2023-04-19 PROBLEM — Q23.81 BICUSPID AORTIC VALVE: Status: ACTIVE | Noted: 2023-04-19

## 2023-04-19 PROBLEM — I35.0 NONRHEUMATIC AORTIC VALVE STENOSIS: Status: ACTIVE | Noted: 2023-04-19

## 2023-04-19 PROBLEM — Q23.1 BICUSPID AORTIC VALVE: Status: ACTIVE | Noted: 2023-04-19

## 2023-04-19 NOTE — H&P (VIEW-ONLY)
"Consultation - Cardiothoracic Surgery   Baron Ermelinda Viveros 58 y o  female MRN: [id number]    Physician Requesting Consult: Dr Azalia Hilton    Reason for Consult / Principal Problem: Bicuspid Aortic Valve, Aortic stenosis, Non-Rheumatic    History of Present Illness: Megan Kirkpatrick is a 58y o  year old female who presents for initial outpatient surgical consultation for symptomatic severe aortic stenosis  Patient's PMHs is notable for BAV, AS, HTN, DM2(A1c7 6%) w/ peripheral neuropathy, fatty liver and OA (knees)  Patient states she underwent remote cardiac testing when she was 29 and told she had a bicuspid aortic valve  She states she sees her PCP every 6 months for management of her diabetes  She states her blood sugar generally averages 350  She takes insulin  Her PCP recommended an echocardiogram, preformed 23 and demonstrates possible BAV with severe AS; BENJI 0 78 cm2, Vmax 3 35 m/s, mean & peak gradients 26/45 respectively and DVI 0 23  Patient reports worsening fatigue over the past 6-12 months  She experiences dizziness & lightheadedness and random mid chest \"pinching\" with stabbing pain that radiates down her left arm  She denies fluid retention  Patient was adopted at age 7 months, denies h/o heart murmur or rheumatic fever in childhood  She denies tobacco, alcohol or substance abuse  Last dental visit was earlier this month        Past Medical History:  Past Medical History:   Diagnosis Date    Arthritis     CTS (carpal tunnel syndrome)     had surgery    Diabetes mellitus ([facility] [address] )     Fibroid     dr Adri Corrigan removed     2 para 2      x2 -F, -F    Hyperlipidemia          Past Surgical History:   Past Surgical History:   Procedure Laterality Date    CARDIAC SURGERY      Malachi Corrigan removed    TOTAL ABDOMINAL HYSTERECTOMY      2010         Family History:  Family History   Adopted: Yes   Family history unknown: Yes         Social " History:    Social History     Substance and Sexual Activity   Alcohol Use Never     Social History     Substance and Sexual Activity   Drug Use No     Social History     Tobacco Use   Smoking Status Never   Smokeless Tobacco Never         Home Medications:   Prior to Admission medications    Medication Sig Start Date End Date Taking?  Authorizing Provider   glucose blood (ONE TOUCH ULTRA TEST) test strip test 6 times a day 3/9/20  Yes Charito Pereira DO   hydrochlorothiazide (HYDRODIURIL) 50 mg tablet TAKE ONE TABLET BY MOUTH TWICE DAILY 2/20/23  Yes Arabella Pereira DO   HYDROcodone-acetaminophen (Norco) 5-325 mg per tablet Take 1 tablet by mouth every 6 (six) hours as needed for pain Max Daily Amount: 4 tablets 4/14/23  Yes Charito Pereira DO   hydrOXYzine HCL (ATARAX) 25 mg tablet TAKE ONE TABLET BY MOUTH NIGHTLY AT BEDTIME 4/12/22  Yes Charito Pereira DO   insulin regular (HumuLIN R,NovoLIN R) 100 units/mL injection Inject under the skin  a day    Yes Historical Provider, MD   ketoconazole (NIZORAL) 2 % shampoo apply to scalp every other day for 5 minutes and then rinse 2/20/23  Yes Charito Cheatham DO   lisinopril (ZESTRIL) 5 mg tablet TAKE ONE TABLET BY MOUTH ONCE DAILY 2/20/23  Yes Charito Pereira DO   metFORMIN (GLUCOPHAGE-XR) 500 mg 24 hr tablet TAKE TWO TABLETS BY MOUTH TWICE DAILY 2/20/23  Yes Arabella Pereira DO   qeqaf-6-jumh ethyl esters (LOVAZA) 1 g capsule TAKE TWO CAPSULES BY MOUTH TWICE DAILY 2/20/23  Yes Charito Pereira DO   pantoprazole (PROTONIX) 40 mg tablet Take 1 tablet (40 mg total) by mouth daily before breakfast 4/15/22  Yes Charito Pereira DO   pravastatin (PRAVACHOL) 40 mg tablet TAKE ONE TABLET BY MOUTH ONCE DAILY 2/20/23  Yes Arabella Pereira DO   diazepam (VALIUM) 5 mg tablet take 1 tablet by mouth once a day at bedtime as needed for sleep  Patient not taking: Reported on 4/19/2023 2/20/23 Charito Pereira DO   Diclofenac Sodium (VOLTAREN) 1 % Apply 2 g topically 4 (four) times a day  Patient not taking: Reported on 4/15/2022 8/19/21   Lawson Moron D Severino, PA-C   Dulaglutide 0 75 MG/0 5ML SOPN Inject 0 5 mL (0 75 mg total) under the skin once a week  Patient not taking: Reported on 4/15/2022 10/28/21   Veronica Pereira DO   gabapentin (NEURONTIN) 100 mg capsule TAKE ONE CAPSULE BY MOUTH THREE TIMES DAILY  Patient not taking: Reported on 4/19/2023 9/13/22   Veronica Pereira DO   insulin detemir (LEVEMIR) 100 units/mL subcutaneous injection Inject 20 Units under the skin every 12 (twelve) hours  Patient not taking: Reported on 4/19/2023 4/15/22   Veronica Pereira DO   levothyroxine 25 mcg tablet TAKE ONE TABLET BY MOUTH DAILY IN early MORNING  Patient not taking: Reported on 4/19/2023 2/20/23   Veronica Pereira DO   meloxicam (MOBIC) 15 mg tablet Take 1 tablet (15 mg total) by mouth daily as needed for moderate pain  Patient not taking: Reported on 4/19/2023 4/14/23   Veronica Pereira DO   naproxen (NAPROSYN) 500 mg tablet Take 1 tablet (500 mg total) by mouth 3 (three) times a day with meals  Patient not taking: Reported on 9/12/2022 4/19/21 9/12/22  Veronica Pereira DO       Allergies:   Allergies   Allergen Reactions    Atorvastatin Other (See Comments)     Reaction Date: [id number];     Furosemide      Reaction Date: [id number];     Latex        Review of Systems:  Review of Systems - History obtained from chart review and the patient  General ROS: positive for  - fatigue and change in activity tolerance  negative for - chills, fever, sleep disturbance or weight gain  Psychological ROS: negative  Ophthalmic ROS: negative  ENT ROS: negative  Allergy and Immunology ROS: negative  Hematological and Lymphatic ROS: negative  Endocrine ROS: negative  Breast ROS: negative  Respiratory ROS: no cough, shortness of breath, or wheezing  Cardiovascular ROS: "positive for - chest pain and murmur  negative for - dyspnea on exertion, edema, irregular heartbeat, loss of consciousness, palpitations, paroxysmal nocturnal dyspnea or rapid heart rate  Gastrointestinal ROS: no abdominal pain, change in bowel habits, or black or bloody stools  Genito-Urinary ROS: no dysuria, trouble voiding, or hematuria  Musculoskeletal ROS: positive for - arthralgias  Neurological ROS: positive for - dizziness and lightheadedness  Dermatological ROS: negative    Vital Signs:     Vitals:    04/19/23 1200 04/19/23 1202   BP: 128/64 119/56   BP Location: Left arm Right arm   Patient Position: Sitting Sitting   Cuff Size: Standard Standard   Pulse: 98    SpO2: 98%    Weight: 75 9 kg (167 lb 4 8 oz)    Height: 5' 4\" (1 626 m)        Physical Exam:    General: Alert, oriented, well developed, no acute distress  HEENT/NECK:  PERRLA  No jugular venous distention  Cardiac:Regular rate and rhythm, III/VI harsh systolic murmur RUSB   Carotid arteries: 1+ pulses, bilateral bruits vs radiation of cardiac murmur to neck bilaterally   Pulmonary:  Breath sounds clear bilaterally  Abdomen:  Non-tender, Non-distended  Positive bowel sounds  Upper extremities: 2+ radial pulses; brisk capillary refill; hands warm  Lower extremities: Extremities warm/dry  PT/DP pulses 2+ bilaterally  No edema B/L  Neuro: Alert and oriented X 3  Sensation is grossly intact  No focal deficits  Musculoskeletal: MAEE, stable gait  Skin: Warm/Dry, without rashes or lesions        Lab Results:   Lab Results   Component Value Date     10/13/2017    SODIUM 139 04/12/2023    K 3 6 04/12/2023     04/12/2023    CO2 30 04/12/2023    AGAP 8 04/12/2023    BUN 24 04/12/2023    CREATININE 0 79 04/12/2023    GLUC 80 03/30/2022    GLUF 139 (H) 04/12/2023    CALCIUM 10 7 (H) 04/12/2023    AST 16 04/12/2023    ALT 18 04/12/2023    ALKPHOS 76 04/12/2023    PROT 6 7 10/13/2017    TP 7 4 04/12/2023    BILITOT 0 3 10/13/2017    " TBILI 0 33 04/12/2023    EGFR 79 04/12/2023     Lab Results   Component Value Date    CHOLESTEROL 196 04/12/2023    CHOLESTEROL 160 03/30/2022    CHOLESTEROL 193 10/25/2021     Lab Results   Component Value Date    HDL 55 04/12/2023    HDL 50 03/30/2022    HDL 52 10/25/2021     Lab Results   Component Value Date    TRIG 128 04/12/2023    TRIG 100 03/30/2022    TRIG 134 10/25/2021     Lab Results   Component Value Date    NONHDLC 136 (H) 10/13/2017    [place] 146 04/29/2017    [place] 149 01/16/2017     Lab Results   Component Value Date    HGBA1C 7 6 (H) 04/12/2023         Imaging Studies:     Echocardiogram: 4/12/23      Left Ventricle: Left ventricular cavity size is normal  Wall thickness is mildly increased  There is mild concentric hypertrophy  The left ventricular ejection fraction is 70%  Systolic function is hyperdynamic  Wall motion is normal  Diastolic function is mildly abnormal, consistent with grade I (abnormal) relaxation    Right Ventricle: Right ventricular cavity size is normal  Systolic function is normal     Aortic Valve: The aortic valve is possibly bicuspid  The leaflets are severely calcified  There is severely reduced mobility  There is moderate to severe stenosis  The aortic valve mean gradient is 26 mmHg  The dimensionless velocity index is 0 23  The aortic valve area is 0 78 cm2  The aortic valve velocity is increased due to stenosis      Findings    Left Ventricle Left ventricular cavity size is normal  Wall thickness is mildly increased  There is mild concentric hypertrophy  The left ventricular ejection fraction is 70%  Systolic function is hyperdynamic  Wall motion is normal  Diastolic function is mildly abnormal, consistent with grade I (abnormal) relaxation  Right Ventricle Right ventricular cavity size is normal  Systolic function is normal  Wall thickness is normal    Left Atrium The atrium is upper normal in size  Right Atrium The atrium is normal in size     Aortic Valve The aortic valve is possibly bicuspid  The leaflets are severely calcified  There is severely reduced mobility  There is trace regurgitation  There is moderate to severe stenosis  The aortic valve mean gradient is 26 mmHg  The dimensionless velocity index is 0 23  The aortic valve area is 0 78 cm2  The aortic valve velocity is increased due to stenosis  Mitral Valve Mitral valve structure is normal  There is no evidence of regurgitation  There is no evidence of stenosis  Tricuspid Valve Tricuspid valve structure is normal  There is trace regurgitation  There is no evidence of stenosis  The right ventricular systolic pressure is normal  The estimated right ventricular systolic pressure is 29 39 mmHg  Pulmonic Valve Pulmonic valve structure is normal  There is trace regurgitation  There is no evidence of stenosis  Ascending Aorta The aortic root is normal in size  IVC/SVC The right atrial pressure is estimated at 4 0 mmHg  The inferior vena cava is normal in size  Respirophasic changes were normal    Pericardium There is no pericardial effusion  The pericardium is normal in appearance       Left Ventricle Measurements    Function/Volumes   A4C EF 77 %         LVOT stroke volume 57 9 cm3         LVOT stroke volume index 31 1 ml/m2         Dimensions   LVIDd 3 7 cm         LVIDS 2 cm         IVSd 1 2 cm         LVPWd 1 2 cm         LVOT area 3 14 cm2         FS 46 %         Diastolic Filling   MV E' Tissue Velocity Septal 5 cm/s         E wave deceleration time 274 ms         MV Peak E Corey 88 cm/s         MV Peak A Corey 1 17 m/s          Report Measurements   AV LVOT peak gradient 2 mmHg              Interventricular Septum Measurements    Shunt Ratio   LVOT peak VTI 18 44 cm         LVOT peak corey 0 78 m/s              Right Ventricle Measurements    Dimensions   RVID d 2 9 cm         Tricuspid annular plane systolic excursion 2 cm               Left Atrium Measurements    Dimensions   LA size 3 7 cm         LA length (A2C) 5 3 cm               Right Atrium Measurements    Dimensions   RAA A4C 12 7 cm2               Atrial Septum Measurements    Shunt Ratio   LVOT peak VTI 18 44 cm         LVOT peak corey 0 78 m/s               Aortic Valve Measurements    Stenosis   Aortic valve peak velocity 3 35 m/s         LVOT peak corey 0 78 m/s         Ao VTI 74 28 cm         LVOT peak VTI 18 44 cm         AV mean gradient 26 mmHg         LVOT mn grad 1 mmHg         AV peak gradient 45 mmHg         AV LVOT peak gradient 2 mmHg         Area/Dimensions   DVI 0 23          AV valve area 0 78 cm2         AV area by cont VTI 0 8 cm2         AV area peak corey 0 7 cm2         LVOT diameter 2 cm         LVOT area 3 14 cm2               Mitral Valve Measurements    Stenosis   MV stenosis pressure 1/2 time 79 ms         MV valve area p 1/2 method 2 78 cm2               Tricuspid Valve Measurements    RVSP Parameters   TR Peak Corey 2 m/s         Est  RA pres 4 mmHg         Triscuspid Valve Regurgitation Peak Gradient 15 mmHg         Right Ventricular Peak Systolic Pressure 19 mmHg               Aorta Measurements    Aortic Dimensions   Ao root 3 1 cm         Asc Ao 3 6 cm               IVC/SVC Measurements    IVC/SVC   Est  RA pres 4 mmHg               I have personally reviewed pertinent films in PACS     PCP notes reviewed      TAVR evaluation Assessment:     Abdoulaye Martell 122: III    Aortic Stenosis Stage: D3    [id number] completed    Assessment:  Patient Active Problem List    Diagnosis Date Noted    Nonrheumatic aortic valve stenosis 04/19/2023    Bicuspid aortic valve 04/19/2023    Hyperlipidemia 05/13/2019    Type 2 diabetes mellitus with mild nonproliferative retinopathy of both eyes without macular edema ([facility] [address] ) 12/12/2018    Diabetic peripheral neuropathy ([facility] [address] ) 04/12/2016    Arthritis 12/18/2012    Hypertension 12/17/2012         Impression/Plan:    Suspected Bicuspid Aortic Valve with severe stenosis  Poorly controlled Diabetes      Latha Eugene Swapna Stevens has symptomatic severe aortic stenosis  They will undergo the following testing for transcatheter aortic valve replacement: Gated CTA of the chest/abdomen/pelvis, 3D VALERIE, cardiac catheterization, dental clearance and carotid artery ultrasound  Once these studies have been completed, Caitlin Woodson will follow up in our office to review the results and to be evaluated to confirm the suitability of proceeding with transcatheter aortic valve replacement vs open surgical aortic valve replacement  Caitlin Woodson was comfortable with our recommendations, and their questions were answered to their satisfaction  Thank you for allowing us to participate in the care of this patient  The patient recently had a screening colonoscopy in 3/1/13  Therefore GI referral is not indicated at this time       SIGNATURE: MARYLIN Mnoet  DATE: April 19, 2023  TIME: 12:31 PM

## 2023-04-25 ENCOUNTER — VBI (OUTPATIENT)
Dept: ADMINISTRATIVE | Facility: OTHER | Age: 63
End: 2023-04-25

## 2023-04-25 NOTE — TELEPHONE ENCOUNTER
04/25/23 7:22 AM     VB [employer] SmartForm used to document caregap status      [facility]

## 2023-04-27 ENCOUNTER — HOSPITAL ENCOUNTER (OUTPATIENT)
Dept: RADIOLOGY | Facility: HOSPITAL | Age: 63
Discharge: HOME | End: 2023-04-27

## 2023-04-27 DIAGNOSIS — R14.0 BLOATING SYMPTOM: ICD-10-CM

## 2023-04-28 ENCOUNTER — APPOINTMENT (OUTPATIENT)
Dept: LAB | Facility: HOSPITAL | Age: 63
End: 2023-04-28

## 2023-04-28 ENCOUNTER — HOSPITAL ENCOUNTER (OUTPATIENT)
Dept: NON INVASIVE DIAGNOSTICS | Facility: HOSPITAL | Age: 63
Discharge: HOME | End: 2023-04-28

## 2023-04-28 ENCOUNTER — HOSPITAL ENCOUNTER (OUTPATIENT)
Dept: RADIOLOGY | Facility: HOSPITAL | Age: 63
Discharge: HOME | End: 2023-04-28

## 2023-04-28 DIAGNOSIS — Q23.1 BICUSPID AORTIC VALVE: ICD-10-CM

## 2023-04-28 DIAGNOSIS — R07.9 CHEST PAIN, UNSPECIFIED TYPE: ICD-10-CM

## 2023-04-28 DIAGNOSIS — R42 LIGHTHEADEDNESS: ICD-10-CM

## 2023-04-28 DIAGNOSIS — R14.0 BLOATING: Primary | ICD-10-CM

## 2023-04-28 DIAGNOSIS — I35.0 NONRHEUMATIC AORTIC VALVE STENOSIS: ICD-10-CM

## 2023-04-28 DIAGNOSIS — R09.89 BILATERAL CAROTID BRUITS: ICD-10-CM

## 2023-04-28 DIAGNOSIS — Z13.6 ENCOUNTER FOR SCREENING FOR STENOSIS OF CAROTID ARTERY: ICD-10-CM

## 2023-04-28 LAB
ALBUMIN SERPL BCP-MCNC: 4.3 G/DL (ref 3.5–5)
ALP SERPL-CCNC: 71 U/L (ref 34–104)
ALT SERPL W P-5'-P-CCNC: 19 U/L (ref 7–52)
ANION GAP SERPL CALCULATED.3IONS-SCNC: 7 MMOL/L (ref 4–13)
AST SERPL W P-5'-P-CCNC: 15 U/L (ref 13–39)
BASOPHILS # BLD AUTO: 0.06 THOUSANDS/ΜL (ref 0–0.1)
BASOPHILS NFR BLD AUTO: 1 % (ref 0–1)
BILIRUB SERPL-MCNC: 0.25 MG/DL (ref 0.2–1)
BUN SERPL-MCNC: 20 MG/DL (ref 5–25)
CALCIUM SERPL-MCNC: 10.9 MG/DL (ref 8.4–10.2)
CHLORIDE SERPL-SCNC: 105 MMOL/L (ref 96–108)
CO2 SERPL-SCNC: 28 MMOL/L (ref 21–32)
CREAT SERPL-MCNC: 0.77 MG/DL (ref 0.6–1.3)
EOSINOPHIL # BLD AUTO: 0.54 THOUSAND/ΜL (ref 0–0.61)
EOSINOPHIL NFR BLD AUTO: 6 % (ref 0–6)
ERYTHROCYTE [DISTWIDTH] IN BLOOD BY AUTOMATED COUNT: 13.7 % (ref 11.6–15.1)
GFR SERPL CREATININE-BSD FRML MDRD: 81 ML/MIN/1.73SQ M
GLUCOSE P FAST SERPL-MCNC: 87 MG/DL (ref 65–99)
HCT VFR BLD AUTO: 37.4 % (ref 34.8–46.1)
HGB BLD-MCNC: 11.5 G/DL (ref 11.5–15.4)
IMM GRANULOCYTES # BLD AUTO: 0.04 THOUSAND/UL (ref 0–0.2)
IMM GRANULOCYTES NFR BLD AUTO: 0 % (ref 0–2)
LYMPHOCYTES # BLD AUTO: 2.09 THOUSANDS/ΜL (ref 0.6–4.47)
LYMPHOCYTES NFR BLD AUTO: 22 % (ref 14–44)
MCH RBC QN AUTO: 25.5 PG (ref 26.8–34.3)
MCHC RBC AUTO-ENTMCNC: 30.7 G/DL (ref 31.4–37.4)
MCV RBC AUTO: 83 FL (ref 82–98)
MONOCYTES # BLD AUTO: 0.48 THOUSAND/ΜL (ref 0.17–1.22)
MONOCYTES NFR BLD AUTO: 5 % (ref 4–12)
NEUTROPHILS # BLD AUTO: 6.17 THOUSANDS/ΜL (ref 1.85–7.62)
NEUTS SEG NFR BLD AUTO: 66 % (ref 43–75)
NRBC BLD AUTO-RTO: 0 /100 WBCS
PLATELET # BLD AUTO: 229 THOUSANDS/UL (ref 149–390)
PMV BLD AUTO: 10.3 FL (ref 8.9–12.7)
POTASSIUM SERPL-SCNC: 4.2 MMOL/L (ref 3.5–5.3)
PROT SERPL-MCNC: 6.9 G/DL (ref 6.4–8.4)
RBC # BLD AUTO: 4.51 MILLION/UL (ref 3.81–5.12)
SODIUM SERPL-SCNC: 140 MMOL/L (ref 135–147)
WBC # BLD AUTO: 9.38 THOUSAND/UL (ref 4.31–10.16)

## 2023-04-28 RX ORDER — IODIXANOL 320 MG/ML
120 INJECTION, SOLUTION INTRAVASCULAR
Status: COMPLETED | OUTPATIENT
Start: 2023-04-28 | End: 2023-04-28

## 2023-04-28 RX ADMIN — IODIXANOL 120 ML: 320 INJECTION, SOLUTION INTRAVASCULAR at 10:20

## 2023-05-03 DIAGNOSIS — I35.0 AORTIC VALVE STENOSIS, ETIOLOGY OF CARDIAC VALVE DISEASE UNSPECIFIED: Primary | ICD-10-CM

## 2023-05-04 ENCOUNTER — HOSPITAL ENCOUNTER (OUTPATIENT)
Facility: HOSPITAL | Age: 63
Setting detail: OUTPATIENT SURGERY
Discharge: HOME | End: 2023-05-04
Attending: INTERNAL MEDICINE | Admitting: INTERNAL MEDICINE

## 2023-05-04 VITALS
RESPIRATION RATE: 16 BRPM | OXYGEN SATURATION: 97 % | HEIGHT: 64 IN | SYSTOLIC BLOOD PRESSURE: 105 MMHG | BODY MASS INDEX: 29.02 KG/M2 | HEART RATE: 70 BPM | TEMPERATURE: 97.5 F | DIASTOLIC BLOOD PRESSURE: 60 MMHG | WEIGHT: 170 LBS

## 2023-05-04 DIAGNOSIS — I25.10 CORONARY ARTERY DISEASE INVOLVING NATIVE CORONARY ARTERY: Primary | ICD-10-CM

## 2023-05-04 DIAGNOSIS — I25.10 CORONARY ARTERY DISEASE INVOLVING NATIVE CORONARY ARTERY: ICD-10-CM

## 2023-05-04 DIAGNOSIS — I35.0 NONRHEUMATIC AORTIC VALVE STENOSIS: ICD-10-CM

## 2023-05-04 PROBLEM — Q23.0 AORTIC STENOSIS DUE TO BICUSPID AORTIC VALVE: Status: ACTIVE | Noted: 2023-04-19

## 2023-05-04 PROBLEM — Q23.81 AORTIC STENOSIS DUE TO BICUSPID AORTIC VALVE: Status: ACTIVE | Noted: 2023-04-19

## 2023-05-04 PROBLEM — Q23.1 AORTIC STENOSIS DUE TO BICUSPID AORTIC VALVE: Status: ACTIVE | Noted: 2023-04-19

## 2023-05-04 RX ORDER — LIDOCAINE HYDROCHLORIDE 10 MG/ML
INJECTION, SOLUTION EPIDURAL; INFILTRATION; INTRACAUDAL; PERINEURAL CODE/TRAUMA/SEDATION MEDICATION
Status: DISCONTINUED | OUTPATIENT
Start: 2023-05-04 | End: 2023-05-04 | Stop reason: HOSPADM

## 2023-05-04 RX ORDER — NITROGLYCERIN 20 MG/100ML
INJECTION INTRAVENOUS CODE/TRAUMA/SEDATION MEDICATION
Status: DISCONTINUED | OUTPATIENT
Start: 2023-05-04 | End: 2023-05-04 | Stop reason: HOSPADM

## 2023-05-04 RX ORDER — SODIUM CHLORIDE 9 MG/ML
125 INJECTION, SOLUTION INTRAVENOUS CONTINUOUS
Status: DISCONTINUED | OUTPATIENT
Start: 2023-05-04 | End: 2023-05-04 | Stop reason: HOSPADM

## 2023-05-04 RX ORDER — ASPIRIN 81 MG/1
324 TABLET, CHEWABLE ORAL ONCE
Status: COMPLETED | OUTPATIENT
Start: 2023-05-04 | End: 2023-05-04

## 2023-05-04 RX ORDER — ROSUVASTATIN CALCIUM 5 MG/1
20 TABLET, COATED ORAL DAILY
Qty: 30 TABLET | Refills: 4 | Status: SHIPPED | OUTPATIENT
Start: 2023-05-04 | End: 2023-05-04 | Stop reason: SDUPTHER

## 2023-05-04 RX ORDER — MIDAZOLAM HYDROCHLORIDE 2 MG/2ML
INJECTION, SOLUTION INTRAMUSCULAR; INTRAVENOUS CODE/TRAUMA/SEDATION MEDICATION
Status: DISCONTINUED | OUTPATIENT
Start: 2023-05-04 | End: 2023-05-04 | Stop reason: HOSPADM

## 2023-05-04 RX ORDER — ROSUVASTATIN CALCIUM 20 MG/1
20 TABLET, COATED ORAL DAILY
Qty: 30 TABLET | Refills: 11 | Status: SHIPPED | OUTPATIENT
Start: 2023-05-04

## 2023-05-04 RX ORDER — FENTANYL CITRATE 50 UG/ML
INJECTION, SOLUTION INTRAMUSCULAR; INTRAVENOUS CODE/TRAUMA/SEDATION MEDICATION
Status: DISCONTINUED | OUTPATIENT
Start: 2023-05-04 | End: 2023-05-04 | Stop reason: HOSPADM

## 2023-05-04 RX ORDER — HEPARIN SODIUM 1000 [USP'U]/ML
INJECTION, SOLUTION INTRAVENOUS; SUBCUTANEOUS CODE/TRAUMA/SEDATION MEDICATION
Status: DISCONTINUED | OUTPATIENT
Start: 2023-05-04 | End: 2023-05-04 | Stop reason: HOSPADM

## 2023-05-04 RX ORDER — VERAPAMIL HCL 2.5 MG/ML
AMPUL (ML) INTRAVENOUS CODE/TRAUMA/SEDATION MEDICATION
Status: DISCONTINUED | OUTPATIENT
Start: 2023-05-04 | End: 2023-05-04 | Stop reason: HOSPADM

## 2023-05-04 RX ADMIN — SODIUM CHLORIDE 125 ML/HR: 0.9 INJECTION, SOLUTION INTRAVENOUS at 08:25

## 2023-05-04 RX ADMIN — ASPIRIN 81 MG 324 MG: 81 TABLET ORAL at 08:24

## 2023-05-04 NOTE — DISCHARGE INSTR - AVS FIRST PAGE
1  Please see the post cardiac catheterization dishcarge instructions  No heavy lifting, greater than 10 lbs  or strenuous  activity for 48 hrs  2 Remove band aid tomorrow  Shower and wash area- wrist gently with soap and water- beginning tomorrow  Rinse and pat dry  Apply new water seal band aid  Repeat this process for 5 days  No powders, creams lotions or antibiotic ointments  for 5 days  No tub baths, hot tubs or swimming for 5 days  3  Please call our office ([phone]) if you have any fever, redness, swelling, discharge from your wrist access site      4 No driving for 1 day

## 2023-05-04 NOTE — INTERVAL H&P NOTE
H&P reviewed  After examining the patient I find no changes in the patients condition since the H&P had been written  Vitals:    05/04/23 0806   BP: 107/67   Pulse: 90   Resp: 16   Temp: 98 °F (36 7 °C)   SpO2: 98%         For pre-SAVR vs TAVR coronary angiogram due to bicuspid AV with severe stenosis   - BENJI 0 8 cm2, mean 26 mmHg, peak 3 4 m/s, DI 0 25  - 4/12 TTE: LVEF 75%, mild LVH, no WMA, G1 DD, normal RV size & function, RVSP 18 mmHg    Ischemic evals  - no known CAD with mild to moderate coronary calcification  - 4/2013 exercise nuc stress: 7 min 30 sec for 9 3 METs, no ECG changes or perfusion defects at 100% MPHR   Gated EF 80%        Brandie Bagley MD / 05/04/23 / 8:10 AM

## 2023-05-06 LAB
ATRIAL RATE: 78 BPM
P AXIS: 70 DEGREES
PR INTERVAL: 146 MS
QRS AXIS: 45 DEGREES
QRSD INTERVAL: 68 MS
QT INTERVAL: 344 MS
QTC INTERVAL: 392 MS
T WAVE AXIS: 5 DEGREES
VENTRICULAR RATE: 78 BPM

## 2023-05-07 ENCOUNTER — OFFICE VISIT (OUTPATIENT)
Dept: URGENT CARE | Facility: MEDICAL CENTER | Age: 63
End: 2023-05-07

## 2023-05-07 VITALS
OXYGEN SATURATION: 99 % | RESPIRATION RATE: 18 BRPM | SYSTOLIC BLOOD PRESSURE: 112 MMHG | HEIGHT: 64 IN | BODY MASS INDEX: 29.02 KG/M2 | TEMPERATURE: 98 F | HEART RATE: 80 BPM | DIASTOLIC BLOOD PRESSURE: 60 MMHG | WEIGHT: 170 LBS

## 2023-05-07 DIAGNOSIS — R58 ECCHYMOSIS: Primary | ICD-10-CM

## 2023-05-07 NOTE — PATIENT INSTRUCTIONS
1  Keep skin clean and dry  2  Watch for S&S of infection (redness, swelling, skin drainage)   3  Follow-up with Cardiology as scheduled

## 2023-05-07 NOTE — PROGRESS NOTES
[facility]        NAME: Tara Garcia is a 58 y o  female  : 1960    MRN: [id number]  DATE: May 7, 2023  TIME: 9:19 AM    Assessment and Plan   Ecchymosis [R58]  1  Ecchymosis              Patient Instructions     1  Keep skin clean and dry  2  Watch for S&S of infection (redness, swelling, skin drainage)   3  Follow-up with Cardiology as scheduled  Chief Complaint     Chief Complaint   Patient presents with   • Wound Check     Patient had cardiac cath on Thursday; patient states she has bruising that has spread up the arm since the cath; also has antecubital pain; also requesting dressing change          History of Present Illness       Antonietta Lopes a 74-year-old female who presents with bruising of her right forearm and wrist after having a cardiac cath completed 3 days prior  Was instructed she would have some bruising but she is concerned about the amount of bruising  She denies any numbness, tingling or weakness in her forearm wrist or hand  Patient also requested dressing change  Review of Systems   Review of Systems   Respiratory: Negative  Cardiovascular: Negative  Skin: Positive for color change  Neurological: Negative for weakness and numbness           Current Medications       Current Outpatient Medications:   •  diazepam (VALIUM) 5 mg tablet, take 1 tablet by mouth once a day at bedtime as needed for sleep, Disp: 30 tablet, Rfl: 0  •  Diclofenac Sodium (VOLTAREN) 1 %, Apply 2 g topically 4 (four) times a day, Disp: 50 g, Rfl: 0  •  gabapentin (NEURONTIN) 100 mg capsule, TAKE ONE CAPSULE BY MOUTH THREE TIMES DAILY, Disp: 270 capsule, Rfl: 0  •  glucose blood (ONE TOUCH ULTRA TEST) test strip, test 6 times a day, Disp: 600 each, Rfl: 0  •  hydrochlorothiazide (HYDRODIURIL) 50 mg tablet, TAKE ONE TABLET BY MOUTH TWICE DAILY, Disp: 180 tablet, Rfl: 0  •  HYDROcodone-acetaminophen (Norco) 5-325 mg per tablet, Take 1 tablet by mouth every 6 (six) hours as needed for pain Max Daily Amount: 4 tablets, Disp: 60 tablet, Rfl: 0  •  hydrOXYzine HCL (ATARAX) 25 mg tablet, TAKE ONE TABLET BY MOUTH NIGHTLY AT BEDTIME, Disp: 90 tablet, Rfl: 0  •  insulin detemir (LEVEMIR) 100 units/mL subcutaneous injection, Inject 20 Units under the skin every 12 (twelve) hours, Disp: 10 mL, Rfl: 3  •  insulin regular (HumuLIN R,NovoLIN R) 100 units/mL injection, Inject under the skin  a day , Disp: , Rfl:   •  ketoconazole (NIZORAL) 2 % shampoo, apply to scalp every other day for 5 minutes and then rinse, Disp: 120 mL, Rfl: 0  •  levothyroxine 25 mcg tablet, TAKE ONE TABLET BY MOUTH DAILY IN early MORNING, Disp: 90 tablet, Rfl: 0  •  lisinopril (ZESTRIL) 5 mg tablet, TAKE ONE TABLET BY MOUTH ONCE DAILY, Disp: 90 tablet, Rfl: 0  •  meloxicam (MOBIC) 15 mg tablet, Take 1 tablet (15 mg total) by mouth daily as needed for moderate pain, Disp: 30 tablet, Rfl: 3  •  metFORMIN (GLUCOPHAGE-XR) 500 mg 24 hr tablet, TAKE TWO TABLETS BY MOUTH TWICE DAILY, Disp: 360 tablet, Rfl: 0  •  omega-3-acid ethyl esters (LOVAZA) 1 g capsule, TAKE TWO CAPSULES BY MOUTH TWICE DAILY, Disp: 360 capsule, Rfl: 0  •  pantoprazole (PROTONIX) 40 mg tablet, Take 1 tablet (40 mg total) by mouth daily before breakfast, Disp: 30 tablet, Rfl: 5  •  rosuvastatin (CRESTOR) 20 MG tablet, Take 1 tablet (20 mg total) by mouth daily, Disp: 30 tablet, Rfl: 11    Current Allergies     Allergies as of 05/07/2023 - Reviewed 05/07/2023   Allergen Reaction Noted   • Atorvastatin Other (See Comments) 04/22/2012   • Furosemide  04/22/2012   • Latex  04/15/2013            The following portions of the patient's history were reviewed and updated as appropriate: allergies, current medications, past family history, past medical history, past social history, past surgical history and problem list      Past Medical History:   Diagnosis Date   • Arthritis    • CTS (carpal tunnel syndrome)     had surgery   • [facility]    • Fibroid      "nadjachantal removed   •  2 para 2      x2 -F, -F   • Hyperlipidemia        Past Surgical History:   Procedure Laterality Date   • CARDIAC CATHETERIZATION  2023    Procedure: Cardiac catheterization;  Surgeon: Renetta Rojas DO;  Location: [facility]; Service: Cardiology   • CARDIAC CATHETERIZATION N/A 2023    Procedure: Cardiac Coronary Angiogram;  Surgeon: Renetta Rojas DO;  Location: [facility]; Service: Cardiology   • CARDIAC CATHETERIZATION N/A 2023    Procedure: Cardiac other - DFR for Hemodaynamic Assessment;  Surgeon: Renetta Rojas DO;  Location: [facility]; Service: Cardiology   • CARDIAC SURGERY     • HYSTERECTOMY     • MYOMECTOMY      dr Afshin Cormier removed   • TOTAL ABDOMINAL HYSTERECTOMY      2010       Family History   Adopted: Yes   Family history unknown: Yes         Medications have been verified  Objective   /60   Pulse 80   Temp 98 °F (36 7 °C) (Temporal)   Resp 18   Ht 5' 4\" (1 626 m)   Wt 77 1 kg (170 lb)   SpO2 99%   BMI 29 18 kg/m²   No LMP recorded  Patient is postmenopausal        Physical Exam     Physical Exam  Constitutional:       General: She is not in acute distress  Appearance: Normal appearance  She is not ill-appearing  Cardiovascular:      Rate and Rhythm: Normal rate and regular rhythm  Heart sounds: Normal heart sounds  No murmur heard  Pulmonary:      Effort: Pulmonary effort is normal       Breath sounds: Normal breath sounds  Musculoskeletal:      Right forearm: Edema present  No tenderness  Right hand: Normal range of motion  Normal sensation  Comments: Range of motion and strength in the forearm wrist and hand is intact as compared bilateral    strength and intrinsic muscle function testing of the fingers was equal as compared bilateral    Skin:     Comments: Diffuse ecchymosis noted over the medial aspect of the right forearm elbow and upper arm    No warmth to " touch or tenderness to palpation  Neurological:      Mental Status: She is alert  Old dressing was removed and transparent dressing was applied to wound site

## 2023-05-23 ENCOUNTER — VBI (OUTPATIENT)
Dept: ADMINISTRATIVE | Facility: OTHER | Age: 63
End: 2023-05-23

## 2023-05-23 ENCOUNTER — OFFICE VISIT (OUTPATIENT)
Dept: FAMILY MEDICINE CLINIC | Facility: CLINIC | Age: 63
End: 2023-05-23

## 2023-05-23 VITALS
TEMPERATURE: 98 F | HEIGHT: 64 IN | BODY MASS INDEX: 29.53 KG/M2 | OXYGEN SATURATION: 97 % | WEIGHT: 173 LBS | DIASTOLIC BLOOD PRESSURE: 70 MMHG | HEART RATE: 125 BPM | SYSTOLIC BLOOD PRESSURE: 128 MMHG

## 2023-05-23 DIAGNOSIS — J06.9 UPPER RESPIRATORY TRACT INFECTION, UNSPECIFIED TYPE: Primary | ICD-10-CM

## 2023-05-23 RX ORDER — CEFUROXIME AXETIL 500 MG/1
500 TABLET ORAL EVERY 12 HOURS SCHEDULED
Qty: 20 TABLET | Refills: 0 | Status: SHIPPED | OUTPATIENT
Start: 2023-05-23 | End: 2023-06-02

## 2023-05-23 RX ORDER — BENZONATATE 200 MG/1
200 CAPSULE ORAL 3 TIMES DAILY PRN
Qty: 30 CAPSULE | Refills: 0 | Status: SHIPPED | OUTPATIENT
Start: 2023-05-23

## 2023-05-24 NOTE — PROGRESS NOTES
Patient ID: Jessica Rose is a 58 y o  female  HPI: 58 y  o female presenting with 3 day history of sore throat, nasal congestion, ear pain,pnd and cough  Pt denies any fever, chills, or body aches  SUBJECTIVE    Family History   Adopted: Yes   Family history unknown: Yes     Social History     Socioeconomic History   • Marital status: /Civil Union     Spouse name: Not on file   • Number of children: Not on file   • Years of education: Not on file   • Highest education level: Not on file   Occupational History   • Occupation: Retired   Tobacco Use   • Smoking status: Never     Passive exposure: Never   • Smokeless tobacco: Never   Vaping Use   • Vaping Use: Never used   Substance and Sexual Activity   • Alcohol use: Never   • Drug use: No   • Sexual activity: Yes     Partners: Male     Birth control/protection: Male Sterilization     Comment: declines std/hiv testing   Other Topics Concern   • Not on file   Social History Narrative    Personal Protective Equipment Seatbelts     Social Determinants of Health     Financial Resource Strain: Not on file   Food Insecurity: Not on file   Transportation Needs: Not on file   Physical Activity: Not on file   Stress: Not on file   Social Connections: Not on file   Intimate Partner Violence: Not on file   Housing Stability: Not on file     Past Medical History:   Diagnosis Date   • Arthritis    • Chronic kidney disease    • CTS (carpal tunnel syndrome)     had surgery   • [facility]    • Fibroid     dr Mary Kennedy removed   •  2 para 2      x2 -F, -   • Hyperlipidemia      Past Surgical History:   Procedure Laterality Date   • CARDIAC CATHETERIZATION  2023    Procedure: Cardiac catheterization;  Surgeon: Kelsi Russell DO;  Location: [facility];   Service: Cardiology   • CARDIAC CATHETERIZATION N/A 2023    Procedure: Cardiac Coronary Angiogram;  Surgeon: Kelsi Russell DO;  Location: [facility]; Service: Cardiology   • CARDIAC CATHETERIZATION N/A 5/4/2023    Procedure: Cardiac other - DFR for Hemodaynamic Assessment;  Surgeon: Jordy Shannon DO;  Location: [facility];   Service: Cardiology   • CARDIAC SURGERY     • HYSTERECTOMY     • MYOMECTOMY      dr Yemi Brennan removed   • TOTAL ABDOMINAL HYSTERECTOMY      4/2010     Allergies   Allergen Reactions   • Atorvastatin Other (See Comments)     Reaction Date: 25Apr2011;    • Furosemide      Reaction Date: 25Apr2011;    • Latex        Current Outpatient Medications:   •  benzonatate (TESSALON) 200 MG capsule, Take 1 capsule (200 mg total) by mouth 3 (three) times a day as needed for cough, Disp: 30 capsule, Rfl: 0  •  cefuroxime (CEFTIN) 500 mg tablet, Take 1 tablet (500 mg total) by mouth every 12 (twelve) hours for 10 days, Disp: 20 tablet, Rfl: 0  •  diazepam (VALIUM) 5 mg tablet, take 1 tablet by mouth once a day at bedtime as needed for sleep, Disp: 30 tablet, Rfl: 0  •  gabapentin (NEURONTIN) 100 mg capsule, TAKE ONE CAPSULE BY MOUTH THREE TIMES DAILY (Patient taking differently: As needed), Disp: 270 capsule, Rfl: 0  •  glucose blood (ONE TOUCH ULTRA TEST) test strip, test 6 times a day, Disp: 600 each, Rfl: 0  •  hydrochlorothiazide (HYDRODIURIL) 50 mg tablet, TAKE ONE TABLET BY MOUTH TWICE DAILY, Disp: 180 tablet, Rfl: 0  •  HYDROcodone-acetaminophen (Norco) 5-325 mg per tablet, Take 1 tablet by mouth every 6 (six) hours as needed for pain Max Daily Amount: 4 tablets, Disp: 60 tablet, Rfl: 0  •  hydrOXYzine HCL (ATARAX) 25 mg tablet, TAKE ONE TABLET BY MOUTH NIGHTLY AT BEDTIME, Disp: 90 tablet, Rfl: 0  •  insulin detemir (LEVEMIR) 100 units/mL subcutaneous injection, Inject 20 Units under the skin every 12 (twelve) hours, Disp: 10 mL, Rfl: 3  •  insulin regular (HumuLIN R,NovoLIN R) 100 units/mL injection, Inject under the skin  a day , Disp: , Rfl:   •  ketoconazole (NIZORAL) 2 % shampoo, apply to scalp every other day for 5 minutes and then rinse, Disp: 120 mL, Rfl: 0  •  levothyroxine 25 mcg tablet, TAKE ONE TABLET BY MOUTH DAILY IN early MORNING, Disp: 90 tablet, Rfl: 0  •  lisinopril (ZESTRIL) 5 mg tablet, TAKE ONE TABLET BY MOUTH ONCE DAILY, Disp: 90 tablet, Rfl: 0  •  meloxicam (MOBIC) 15 mg tablet, Take 1 tablet (15 mg total) by mouth daily as needed for moderate pain, Disp: 30 tablet, Rfl: 3  •  metFORMIN (GLUCOPHAGE-XR) 500 mg 24 hr tablet, TAKE TWO TABLETS BY MOUTH TWICE DAILY, Disp: 360 tablet, Rfl: 0  •  omega-3-acid ethyl esters (LOVAZA) 1 g capsule, TAKE TWO CAPSULES BY MOUTH TWICE DAILY, Disp: 360 capsule, Rfl: 0  •  pantoprazole (PROTONIX) 40 mg tablet, Take 1 tablet (40 mg total) by mouth daily before breakfast (Patient taking differently: Take 40 mg by mouth daily before breakfast As needed ), Disp: 30 tablet, Rfl: 5  •  rosuvastatin (CRESTOR) 20 MG tablet, Take 1 tablet (20 mg total) by mouth daily, Disp: 30 tablet, Rfl: 11  •  Diclofenac Sodium (VOLTAREN) 1 %, Apply 2 g topically 4 (four) times a day (Patient not taking: Reported on 5/23/2023), Disp: 50 g, Rfl: 0    Review of Systems  Constitutional:     Denies fever, chills ,fatigue ,weakness ,weight loss, weight gain     ENT: Denies loss of hearing ,nosebleed, nasal discharge ,hoarseness; but admits to nasal congestion , sore throat and ear pain      Pulmonary: Denies shortness of breath ,dyspnea on exertion, orthopnea ; but admits to cough and pnd  Cardiovascular:  Denies bradycardia , tachycardia  ,palpations, lower extremity edema leg, claudication  Breast:  Denies new or changing breast lumps ,nipple discharge ,nipple changes  Abdomen:  Denies abdominal pain , anorexia , indigestion, nausea, vomiting, constipation, diarrhea  Musculoskeletal: Denies myalgias, arthralgias, joint swelling, joint stiffness , limb pain, limb swelling  Lymph: + swollen glands  Gu: Denies polyuria or dysuria  Skin: Denies skin rash, skin lesion, skin wound, itching, dry skin  Neuro: Denies "headache, numbness, tingling, confusion, loss of consciousness, dizziness, vertigo  Psychiatric: Denies feelings of depression, suicidal ideation, anxiety, sleep disturbances    OBJECTIVE  /70   Pulse (!) 125   Temp 98 °F (36 7 °C)   Ht 5' 4\" (1 626 m)   Wt 78 5 kg (173 lb)   SpO2 97%   BMI 29 70 kg/m²   Constitutional:   NAD, well appearing and well nourished     ENT:   Conjunctiva and lids: no injection, edema, or discharge    Pupils and iris: NIKKI bilaterally  External inspection of ears and nose: normal without deformities or discharge  Otoscopic exam: Canals patent without erythema, tm dull and erythematous, effusions bilaterally   Nasal mucosa, septum and turbinates: + turbinate injection, nasal discharge         Oropharynx:  Moist mucosa, normal tongue and tonsils without lesions  + erythema and injection of posterior pharynx with pnd    Pulmonary:Respiratory effort normal rate and rhythm, no increased work of breathing  Auscultation of lungs:  Clear bilaterally with no adventitious breath sounds     Cardiovascular: regular rate and rhythm, S1 and S2, no murmur, no edema and/or varicosities of LE     Abdomen: Soft and nontender with + bowel sounds  No heptomegaly or splenomegaly     Gu: no suprapubic tenderness or CVA tenderness  Lymphatic: + anterior  cervical lymphadenopathy      Musculoskeletal:  Gait and station: Normal gait      Digits and nails normal without clubbing or cyanosis      Inspection/palpation of joints, bones, and muscles:  No joint tenderness, swelling, full active and passive range of motion       Skin: Normal skin turgor and no rashes      Neuro:    Normal reflexes  Pych:   alert and oriented to person, place and time     normal mood and affect      Assessment/Plan:Diagnoses and all orders for this visit:    Upper respiratory tract infection, unspecified type  -     cefuroxime (CEFTIN) 500 mg tablet;  Take 1 tablet (500 mg total) by mouth every 12 (twelve) hours for 10 " days  -     benzonatate (TESSALON) 200 MG capsule; Take 1 capsule (200 mg total) by mouth 3 (three) times a day as needed for cough        Reviewed with patient plan to treat with above plan      Patient instructed to call in 72 hours if not feeling better or if symptoms worsen

## 2023-05-26 ENCOUNTER — TELEPHONE (OUTPATIENT)
Dept: FAMILY MEDICINE CLINIC | Facility: CLINIC | Age: 63
End: 2023-05-26

## 2023-05-26 DIAGNOSIS — Z79.4 TYPE 2 DIABETES MELLITUS WITH BOTH EYES AFFECTED BY MILD NONPROLIFERATIVE RETINOPATHY WITHOUT MACULAR EDEMA, WITH LONG-TERM CURRENT USE OF INSULIN (HCC): ICD-10-CM

## 2023-05-26 DIAGNOSIS — E11.3293 TYPE 2 DIABETES MELLITUS WITH BOTH EYES AFFECTED BY MILD NONPROLIFERATIVE RETINOPATHY WITHOUT MACULAR EDEMA, WITH LONG-TERM CURRENT USE OF INSULIN (HCC): ICD-10-CM

## 2023-05-26 DIAGNOSIS — Z79.4 TYPE 2 DIABETES MELLITUS WITH BOTH EYES AFFECTED BY MILD NONPROLIFERATIVE RETINOPATHY WITHOUT MACULAR EDEMA, WITH LONG-TERM CURRENT USE OF INSULIN (HCC): Primary | ICD-10-CM

## 2023-05-26 DIAGNOSIS — E11.3293 TYPE 2 DIABETES MELLITUS WITH BOTH EYES AFFECTED BY MILD NONPROLIFERATIVE RETINOPATHY WITHOUT MACULAR EDEMA, WITH LONG-TERM CURRENT USE OF INSULIN (HCC): Primary | ICD-10-CM

## 2023-05-26 RX ORDER — BLOOD-GLUCOSE SENSOR
EACH MISCELLANEOUS
Qty: 2 EACH | Refills: 3 | Status: SHIPPED | OUTPATIENT
Start: 2023-05-26

## 2023-05-26 RX ORDER — BLOOD-GLUCOSE SENSOR
EACH MISCELLANEOUS
Qty: 2 EACH | Refills: 3 | Status: SHIPPED | OUTPATIENT
Start: 2023-05-26 | End: 2023-05-26 | Stop reason: SDUPTHER

## 2023-05-26 NOTE — TELEPHONE ENCOUNTER
Patient is requesting as per her conversation at the last appointment she is asking for [facility] ,she wants it to go to [facility] in [facility]        She wants to  the prescription and take it to the pharmacy

## 2023-06-14 ENCOUNTER — TELEPHONE (OUTPATIENT)
Dept: FAMILY MEDICINE CLINIC | Facility: CLINIC | Age: 63
End: 2023-06-14

## 2023-06-14 ENCOUNTER — TRANSITIONAL CARE MANAGEMENT (OUTPATIENT)
Dept: FAMILY MEDICINE CLINIC | Facility: CLINIC | Age: 63
End: 2023-06-14

## 2023-06-14 NOTE — TELEPHONE ENCOUNTER
Pt made an appt for a TCM  She was discharged from [facility]  She had two concerns she would like addressed prior to her appointment  The first is her constipation  She is drinking a lot of prune juice and would like other recommendations since the juice has a lot of sugar  She said in the hospital she was given a liquid med that tasted like honey and that helped but she could not recall the name of the med  She also would like advice on her potassium pill  Because she had tubes in her throat the pill is very hard to swallow because it is large  It there either a liquid form of the potassium or can she crush the tablets and place them in juice

## 2023-06-22 ENCOUNTER — OFFICE VISIT (OUTPATIENT)
Dept: FAMILY MEDICINE CLINIC | Facility: CLINIC | Age: 63
End: 2023-06-22
Payer: COMMERCIAL

## 2023-06-22 VITALS
HEART RATE: 83 BPM | SYSTOLIC BLOOD PRESSURE: 108 MMHG | BODY MASS INDEX: 29.71 KG/M2 | WEIGHT: 174 LBS | DIASTOLIC BLOOD PRESSURE: 54 MMHG | HEIGHT: 64 IN | TEMPERATURE: 97.5 F | OXYGEN SATURATION: 98 %

## 2023-06-22 DIAGNOSIS — I10 PRIMARY HYPERTENSION: ICD-10-CM

## 2023-06-22 DIAGNOSIS — Z79.4 TYPE 2 DIABETES MELLITUS WITH BOTH EYES AFFECTED BY MILD NONPROLIFERATIVE RETINOPATHY WITHOUT MACULAR EDEMA, WITH LONG-TERM CURRENT USE OF INSULIN (HCC): ICD-10-CM

## 2023-06-22 DIAGNOSIS — Z95.2 S/P AVR: Primary | ICD-10-CM

## 2023-06-22 DIAGNOSIS — E11.3293 TYPE 2 DIABETES MELLITUS WITH BOTH EYES AFFECTED BY MILD NONPROLIFERATIVE RETINOPATHY WITHOUT MACULAR EDEMA, WITH LONG-TERM CURRENT USE OF INSULIN (HCC): ICD-10-CM

## 2023-06-22 DIAGNOSIS — K59.04 CHRONIC IDIOPATHIC CONSTIPATION: ICD-10-CM

## 2023-06-22 DIAGNOSIS — E78.2 MIXED HYPERLIPIDEMIA: ICD-10-CM

## 2023-06-22 DIAGNOSIS — K29.00 ACUTE GASTRITIS WITHOUT HEMORRHAGE, UNSPECIFIED GASTRITIS TYPE: ICD-10-CM

## 2023-06-22 PROCEDURE — 99495 TRANSJ CARE MGMT MOD F2F 14D: CPT | Performed by: FAMILY MEDICINE

## 2023-06-22 RX ORDER — ASPIRIN 81 MG
TABLET,CHEWABLE ORAL
COMMUNITY
Start: 2023-06-13

## 2023-06-22 RX ORDER — PRAVASTATIN SODIUM 40 MG
40 TABLET ORAL DAILY
COMMUNITY

## 2023-06-22 RX ORDER — SUCRALFATE 1 G/1
1 TABLET ORAL 4 TIMES DAILY
Qty: 120 TABLET | Refills: 0 | Status: SHIPPED | OUTPATIENT
Start: 2023-06-22 | End: 2023-07-22

## 2023-06-22 RX ORDER — METHOCARBAMOL 500 MG/1
TABLET, FILM COATED ORAL
COMMUNITY
Start: 2023-06-13

## 2023-06-22 RX ORDER — INSULIN ASPART 100 [IU]/ML
8 INJECTION, SOLUTION INTRAVENOUS; SUBCUTANEOUS
COMMUNITY
Start: 2023-06-13

## 2023-06-22 RX ORDER — POLYETHYLENE GLYCOL 3350
0.4 POWDER (GRAM) MISCELLANEOUS DAILY
COMMUNITY
Start: 2023-06-17

## 2023-06-22 RX ORDER — BUMETANIDE 1 MG/1
TABLET ORAL
COMMUNITY
Start: 2023-06-13

## 2023-06-22 RX ORDER — INSULIN GLARGINE 100 [IU]/ML
40 INJECTION, SOLUTION SUBCUTANEOUS DAILY
COMMUNITY
Start: 2023-06-13

## 2023-06-22 RX ORDER — AMOXICILLIN 500 MG/1
500 CAPSULE ORAL 3 TIMES DAILY
COMMUNITY
Start: 2023-06-16 | End: 2023-06-23

## 2023-06-22 NOTE — PROGRESS NOTES
Assessment & Plan     1  S/P AVR  Comments:  Patient is following with both cardiology as well as the cardiothoracic surgeon  2  Chronic idiopathic constipation  Comments:  Trial of Linzess and patient is encouraged to keep drinking plenty of fluids  Orders:  -     linaCLOtide 145 MCG CAPS; Take 1 capsule (145 mcg total) by mouth daily at least 30 minutes prior to the first meal of the day    3  Acute gastritis without hemorrhage, unspecified gastritis type  Comments:  If the patient does not improve with Carafate in 1 week's time, she is to call  Orders:  -     sucralfate (CARAFATE) 1 g tablet; Take 1 tablet (1 g total) by mouth 4 (four) times a day    4  Primary hypertension  Comments:  Continue ACE therapy  5  Mixed hyperlipidemia  Comments:  Stable on statin therapy  6  Type 2 diabetes mellitus with both eyes affected by mild nonproliferative retinopathy without macular edema, with long-term current use of insulin (HCC)  Comments:  Continue with current therapy and will await endocrinology consult  In the meantime, we will order a continuous glucose monitor  Subjective     Transitional Care Management Review:   Anmol Zabala is a 58 y o  female here for TCM follow up  She is status post an aortic valve replacement  During the TCM phone call patient stated:  TCM Call     Date and time call was made  6/14/2023 11:13 AM    Hospital care reviewed  Records reviewed    Date of Admission  06/07/23    Date of discharge  06/13/23    Diagnosis  Severe aortic stenosis    Disposition  Home    Were the patients medications reviewed and updated  Yes    Current Symptoms  --  Leg pain      TCM Call     Post hospital issues  None    Should patient be enrolled in anticoag monitoring? No    Scheduled for follow up?   Yes    Patients specialists  Cardiologist    Did you obtain your prescribed medications  Yes    Do you need help managing your prescriptions or medications  No    Is transportation to your "appointment needed  No    I have advised the patient to call PCP with any new or worsening symptoms  JENNYFER Montanez      BMI Counseling: Body mass index is 29 87 kg/m²  The BMI is above normal  Nutrition recommendations include reducing portion sizes, decreasing overall calorie intake and moderation in carbohydrate intake  HPI  Review of Systems   Constitutional: Negative for appetite change, chills and fever  HENT: Negative for ear pain, facial swelling, rhinorrhea, sinus pain, sore throat and trouble swallowing  Eyes: Negative for discharge and redness  Respiratory: Negative for chest tightness, shortness of breath and wheezing  Cardiovascular: Negative for chest pain and palpitations  Gastrointestinal: Positive for constipation  Negative for abdominal pain, diarrhea, nausea and vomiting  Endocrine: Negative for polyuria  Genitourinary: Negative for dysuria and urgency  Musculoskeletal: Negative for arthralgias and back pain  Skin: Negative for rash  Neurological: Negative for dizziness, weakness and headaches  Hematological: Negative for adenopathy  Psychiatric/Behavioral: Negative for behavioral problems, confusion and sleep disturbance  All other systems reviewed and are negative  Objective     /54   Pulse 83   Temp 97 5 °F (36 4 °C)   Ht 5' 4\" (1 626 m)   Wt 78 9 kg (174 lb)   SpO2 98%   BMI 29 87 kg/m²      Physical Exam  Vitals and nursing note reviewed  Constitutional:       General: She is not in acute distress  Appearance: Normal appearance  She is not ill-appearing or diaphoretic  HENT:      Head: Normocephalic and atraumatic  Right Ear: Tympanic membrane, ear canal and external ear normal       Left Ear: Tympanic membrane, ear canal and external ear normal       Nose: Nose normal  No congestion or rhinorrhea  Mouth/Throat:      Mouth: Mucous membranes are moist       Pharynx: Oropharynx is clear  No posterior oropharyngeal erythema     Eyes:      " General:         Right eye: No discharge  Left eye: No discharge  Extraocular Movements: Extraocular movements intact  Conjunctiva/sclera: Conjunctivae normal       Pupils: Pupils are equal, round, and reactive to light  Neck:      Vascular: No carotid bruit  Cardiovascular:      Rate and Rhythm: Normal rate and regular rhythm  Pulses: Normal pulses  Heart sounds: Normal heart sounds  No murmur heard  Pulmonary:      Effort: Pulmonary effort is normal  No respiratory distress  Breath sounds: Normal breath sounds  No wheezing or rhonchi  Abdominal:      General: Abdomen is flat  Bowel sounds are normal  There is no distension  Palpations: There is no mass  Tenderness: There is no abdominal tenderness  Musculoskeletal:         General: No swelling or deformity  Normal range of motion  Cervical back: Normal range of motion and neck supple  No rigidity  Right lower leg: No edema  Left lower leg: No edema  Lymphadenopathy:      Cervical: No cervical adenopathy  Skin:     General: Skin is warm and dry  Capillary Refill: Capillary refill takes less than 2 seconds  Coloration: Skin is not jaundiced  Findings: No bruising, erythema or rash  Comments: Midsternal incision is without drainage or erythema   Neurological:      General: No focal deficit present  Mental Status: She is alert and oriented to person, place, and time  Cranial Nerves: No cranial nerve deficit  Sensory: No sensory deficit  Gait: Gait normal       Deep Tendon Reflexes: Reflexes normal    Psychiatric:         Mood and Affect: Mood normal          Behavior: Behavior normal          Thought Content:  Thought content normal          Judgment: Judgment normal          Daron Perry DO

## 2023-06-26 DIAGNOSIS — Z79.4 TYPE 2 DIABETES MELLITUS WITH BOTH EYES AFFECTED BY MILD NONPROLIFERATIVE RETINOPATHY WITHOUT MACULAR EDEMA, WITH LONG-TERM CURRENT USE OF INSULIN (HCC): Primary | ICD-10-CM

## 2023-06-26 DIAGNOSIS — E11.3293 TYPE 2 DIABETES MELLITUS WITH BOTH EYES AFFECTED BY MILD NONPROLIFERATIVE RETINOPATHY WITHOUT MACULAR EDEMA, WITH LONG-TERM CURRENT USE OF INSULIN (HCC): Primary | ICD-10-CM

## 2023-06-26 PROBLEM — Z95.2 S/P AVR: Status: ACTIVE | Noted: 2023-06-26

## 2023-07-13 ENCOUNTER — OFFICE VISIT (OUTPATIENT)
Dept: FAMILY MEDICINE CLINIC | Facility: CLINIC | Age: 63
End: 2023-07-13
Payer: COMMERCIAL

## 2023-07-13 VITALS
WEIGHT: 164 LBS | HEIGHT: 64 IN | OXYGEN SATURATION: 99 % | TEMPERATURE: 97.6 F | DIASTOLIC BLOOD PRESSURE: 60 MMHG | BODY MASS INDEX: 28 KG/M2 | HEART RATE: 101 BPM | SYSTOLIC BLOOD PRESSURE: 112 MMHG

## 2023-07-13 DIAGNOSIS — E87.6 HYPOKALEMIA: ICD-10-CM

## 2023-07-13 DIAGNOSIS — H53.2 DIPLOPIA: ICD-10-CM

## 2023-07-13 DIAGNOSIS — K21.00 GASTROESOPHAGEAL REFLUX DISEASE WITH ESOPHAGITIS, UNSPECIFIED WHETHER HEMORRHAGE: ICD-10-CM

## 2023-07-13 DIAGNOSIS — R60.9 EDEMA, UNSPECIFIED TYPE: ICD-10-CM

## 2023-07-13 DIAGNOSIS — Z98.890 S/P THORACENTESIS: Primary | ICD-10-CM

## 2023-07-13 DIAGNOSIS — K59.04 CHRONIC IDIOPATHIC CONSTIPATION: ICD-10-CM

## 2023-07-13 PROCEDURE — 99215 OFFICE O/P EST HI 40 MIN: CPT | Performed by: FAMILY MEDICINE

## 2023-07-13 RX ORDER — FAMOTIDINE 20 MG/1
20 TABLET, FILM COATED ORAL
Qty: 30 TABLET | Refills: 3 | Status: SHIPPED | OUTPATIENT
Start: 2023-07-13

## 2023-07-13 RX ORDER — POTASSIUM CHLORIDE 750 MG/1
20 CAPSULE, EXTENDED RELEASE ORAL DAILY
Qty: 60 CAPSULE | Refills: 3 | Status: SHIPPED | OUTPATIENT
Start: 2023-07-13

## 2023-07-14 ENCOUNTER — TELEPHONE (OUTPATIENT)
Dept: FAMILY MEDICINE CLINIC | Facility: CLINIC | Age: 63
End: 2023-07-14

## 2023-07-14 NOTE — TELEPHONE ENCOUNTER
Following message was left in [employer]     "Hi, this is Jaleesa Borer. I'm calling from [facility] with [facility]. I'm calling on patient. Patient, samara. Hector Del Toro date of birth January 23rd, 1960. She's scheduled for two MRI's with us on July 26th, one of the brain with and without contrast and one of the orbits only with and without contrast. And she will need prior authorization for both with her [facility]. The NPI is [id number]. The correct location is [facility], [address] [place] [address]. [address]. My Direct Line is [phone]. Thank you. Remberto.  Remberto."

## 2023-07-17 NOTE — PROGRESS NOTES
Patient ID: Malik Alcala is a 58 y.o. female. HPI: 58 y. o.female presents for due to significant bilateral follow-up after ER visit for thoracentesis due to significant bilateral pleural effusions. Since this procedure, her dyspnea has resolved. We discussed repeating a chest x-ray early next week and following up with cardiology. Patient developed diplopia and was seen by ophthalmology who recommended an MRA and MRI of the brain. Labs revealed hypokalemia and I discussed with the patient replacing her potassium. Her acid reflux is well controlled at this time.     SUBJECTIVE    Family History   Adopted: Yes   Family history unknown: Yes     Social History     Socioeconomic History   • Marital status: /Civil Union     Spouse name: Not on file   • Number of children: Not on file   • Years of education: Not on file   • Highest education level: Not on file   Occupational History   • Occupation: Retired   Tobacco Use   • Smoking status: Never     Passive exposure: Never   • Smokeless tobacco: Never   Vaping Use   • Vaping Use: Never used   Substance and Sexual Activity   • Alcohol use: Never   • Drug use: No   • Sexual activity: Yes     Partners: Male     Birth control/protection: Male Sterilization     Comment: declines std/hiv testing   Other Topics Concern   • Not on file   Social History Narrative    Personal Protective Equipment Seatbelts     Social Determinants of Health     Financial Resource Strain: Not on file   Food Insecurity: Not on file   Transportation Needs: Not on file   Physical Activity: Not on file   Stress: Not on file   Social Connections: Not on file   Intimate Partner Violence: Not on file   Housing Stability: Not on file     Past Medical History:   Diagnosis Date   • Arthritis    • Chronic kidney disease    • CTS (carpal tunnel syndrome)     had surgery   • Diabetes mellitus ([address])    • Fibroid     dr Johnny Adams removed   •  2 para 2      x2 -, -   • Hyperlipidemia      Past Surgical History:   Procedure Laterality Date   • CARDIAC CATHETERIZATION  5/4/2023    Procedure: Cardiac catheterization;  Surgeon: Walt Ahumada DO;  [facility]; Service: Cardiology   • CARDIAC CATHETERIZATION N/A 5/4/2023    Procedure: Cardiac Coronary Angiogram;  Surgeon: Walt Ahumada DO;  [facility]; Service: Cardiology   • CARDIAC CATHETERIZATION N/A 5/4/2023    Procedure: Cardiac other - DFR for Hemodaynamic Assessment;  Surgeon: Walt Ahumada DO;  [facility];   Service: Cardiology   • CARDIAC SURGERY     • HYSTERECTOMY     • MYOMECTOMY      dr Alta Bess removed   • TOTAL ABDOMINAL HYSTERECTOMY      4/2010     Allergies   Allergen Reactions   • Atorvastatin Other (See Comments)     Reaction Date: 25Apr2011;    • Furosemide      Reaction Date: 25Apr2011;    • Latex        Current Outpatient Medications:   •  Aspirin Low Dose 81 MG chewable tablet, , Disp: , Rfl:   •  bumetanide (BUMEX) 1 mg tablet, , Disp: , Rfl:   •  Continuous Blood Gluc Sensor (FreeStyle Nataly 3 Sensor) MISC, Apply one sensor every 14 days, Disp: 2 each, Rfl: 3  •  diazepam (VALIUM) 5 mg tablet, take 1 tablet by mouth once a day at bedtime as needed for sleep, Disp: 30 tablet, Rfl: 0  •  famotidine (PEPCID) 20 mg tablet, Take 1 tablet (20 mg total) by mouth daily at bedtime, Disp: 30 tablet, Rfl: 3  •  gabapentin (NEURONTIN) 100 mg capsule, TAKE ONE CAPSULE BY MOUTH THREE TIMES DAILY (Patient taking differently: As needed), Disp: 270 capsule, Rfl: 0  •  hydrochlorothiazide (HYDRODIURIL) 50 mg tablet, TAKE ONE TABLET BY MOUTH TWICE DAILY (Patient taking differently: Take 50 mg by mouth daily), Disp: 180 tablet, Rfl: 0  •  HYDROcodone-acetaminophen (Norco) 5-325 mg per tablet, Take 1 tablet by mouth every 6 (six) hours as needed for pain Max Daily Amount: 4 tablets, Disp: 60 tablet, Rfl: 0  •  hydrOXYzine HCL (ATARAX) 25 mg tablet, TAKE ONE TABLET BY MOUTH NIGHTLY AT BEDTIME, Disp: 90 tablet, Rfl: 0  •  insulin regular (HumuLIN R,NovoLIN R) 100 units/mL injection, Inject under the skin  a day , Disp: , Rfl:   •  ketoconazole (NIZORAL) 2 % shampoo, apply to scalp every other day for 5 minutes and then rinse, Disp: 120 mL, Rfl: 0  •  Lantus SoloStar 100 units/mL SOPN, Inject 40 Units under the skin in the morning, Disp: , Rfl:   •  linaCLOtide 145 MCG CAPS, Take 2 capsules (290 mcg total) by mouth daily at least 30 minutes prior to the first meal of the day, Disp: 30 capsule, Rfl: 5  •  lisinopril (ZESTRIL) 5 mg tablet, TAKE ONE TABLET BY MOUTH ONCE DAILY, Disp: 90 tablet, Rfl: 0  •  metFORMIN (GLUCOPHAGE-XR) 500 mg 24 hr tablet, TAKE TWO TABLETS BY MOUTH TWICE DAILY, Disp: 360 tablet, Rfl: 0  •  methocarbamol (ROBAXIN) 500 mg tablet, , Disp: , Rfl:   •  metoprolol tartrate (LOPRESSOR) 25 mg tablet, Take 25 mg by mouth daily, Disp: , Rfl:   •  NovoLOG FlexPen 100 units/mL injection pen, 8 Units 3 (three) times a day with meals, Disp: , Rfl:   •  pantoprazole (PROTONIX) 40 mg tablet, Take 1 tablet (40 mg total) by mouth daily before breakfast (Patient taking differently: Take 40 mg by mouth daily before breakfast As needed.), Disp: 30 tablet, Rfl: 5  •  Polyethylene Glycol 3350 POWD, Take 0.4 g/kg by mouth daily, Disp: , Rfl:   •  potassium chloride (MICRO-K) 10 MEQ CR capsule, Take 2 capsules (20 mEq total) by mouth daily, Disp: 60 capsule, Rfl: 3  •  pravastatin (PRAVACHOL) 40 mg tablet, Take 40 mg by mouth daily, Disp: , Rfl:   •  semaglutide, 0.25 or 0.5 mg/dose, (Ozempic, 0.25 or 0.5 MG/DOSE,) 2 mg/3 mL injection pen, Inject 0.38 mL (0.2533 mg total) under the skin every 7 days for 28 days, THEN 0.75 mL (0.5 mg total) every 7 days. , Disp: 9 mL, Rfl: 0  •  sucralfate (CARAFATE) 1 g tablet, Take 1 tablet (1 g total) by mouth 4 (four) times a day, Disp: 120 tablet, Rfl: 0  •  Diclofenac Sodium (VOLTAREN) 1 %, Apply 2 g topically 4 (four) times a day (Patient not taking: Reported on 5/23/2023), Disp: 50 g, Rfl: 0  •  glucose blood (ONE TOUCH ULTRA TEST) test strip, test 6 times a day (Patient not taking: Reported on 7/13/2023), Disp: 600 each, Rfl: 0  •  insulin detemir (LEVEMIR) 100 units/mL subcutaneous injection, Inject 20 Units under the skin every 12 (twelve) hours (Patient not taking: Reported on 6/22/2023), Disp: 10 mL, Rfl: 3  •  levothyroxine 25 mcg tablet, TAKE ONE TABLET BY MOUTH DAILY IN early MORNING (Patient not taking: Reported on 6/22/2023), Disp: 90 tablet, Rfl: 0  •  meloxicam (MOBIC) 15 mg tablet, Take 1 tablet (15 mg total) by mouth daily as needed for moderate pain (Patient not taking: Reported on 7/13/2023), Disp: 30 tablet, Rfl: 3  •  omega-3-acid ethyl esters (LOVAZA) 1 g capsule, TAKE TWO CAPSULES BY MOUTH TWICE DAILY (Patient not taking: Reported on 7/13/2023), Disp: 360 capsule, Rfl: 0    Review of Systems  Constitutional:     Denies fever, chills ,fatigue ,weakness ,weight loss, weight gain     ENT: Denies earache ,loss of hearing ,nosebleed, nasal discharge,nasal congestion ,sore throat ,hoarseness+ diplopia  Pulmonary: Denies shortness of breath ,cough  ,dyspnea on exertion, orthopnea  ,PND   Cardiovascular:  Denies bradycardia , tachycardia  ,palpations, +lower extremity edema leg,no claudication  Breast:  Denies new or changing breast lumps ,nipple discharge ,nipple changes  Abdomen:  Denies abdominal pain , anorexia , positive constipation, + intermittent denies any indigestion, nausea, vomiting,   no diarrhea  Musculoskeletal: Denies myalgias, arthralgias, joint swelling, joint stiffness , limb pain, limb swelling  Gu: denies dysuria, polyuria  Skin: Denies skin rash, skin lesion, skin wound, itching, dry skin  Neuro: Denies headache, numbness, tingling, confusion, loss of consciousness, dizziness, vertigo  Psychiatric: Denies feelings of depression, suicidal ideation, anxiety, sleep disturbances    OBJECTIVE  /60   Pulse 101   Temp 97.6 °F (36.4 °C)   Ht 5' 4" (1.626 m)   Wt 74.4 kg (164 lb)   SpO2 99%   BMI 28.15 kg/m²   Constitutional:   NAD, well appearing and well nourished      ENT:   Conjunctiva and lids: no injection, edema, or discharge     Pupils and iris: NIKKI bilaterally    External inspection of ears and nose: normal without deformities or discharge. Otoscopic exam: Canals patent without erythema. Nasal mucosa, septum and turbinates: Normal or edema or discharge         Oropharynx:  Moist mucosa, normal tongue and tonsils without lesions. No erythema        Pulmonary:Respiratory effort normal rate and rhythm, no increased work of breathing. Auscultation of lungs:  Clear bilaterally with no adventitious breath sounds       Cardiovascular: regular rate and rhythm, S1 and S2, no murmur, +1 pitting  edema of LE      Abdomen: Soft and non-distended     Positive bowel sounds      No heptomegaly or splenomegaly      Gu: no suprapubic tenderness or CVA tenderness, no urethral discharge  Lymphatic:  No anterior or posterior cervical lymphadenopathy         Musculoskeletal:  Gait and station: Normal gait      Digits and nails normal without clubbing or cyanosis       Inspection/palpation of joints, bones, and muscles:  No joint tenderness, swelling, full active and passive range of motion       Skin: Normal skin turgor and no rashes      Neuro:      Normal reflexes     Psych:   alert and oriented to person, place and time     normal mood and affect       Assessment/Plan:Diagnoses and all orders for this visit:    S/P thoracentesis  Comments:  Patient following with cardiology. She is going at the beginning of next week for repeat chest x-ray. Continue low-sodium diet, daily weights. Chronic idiopathic constipation  Comments:  Patient to double up on Linzess and call if not effective in 3 to 4 days time. Orders:  -     linaCLOtide 145 MCG CAPS;  Take 2 capsules (290 mcg total) by mouth daily at least 30 minutes prior to the first meal of the day    Diplopia  Comments: We will check MRI of the brain and MRA of the brain as per hospital suggestion. Orders:  -     MRI brain and orbits wo and w contrast; Future    Hypokalemia  Comments:  Continue potassium supplementation and will recheck potassium in 3 days time. Orders:  -     potassium chloride (MICRO-K) 10 MEQ CR capsule; Take 2 capsules (20 mEq total) by mouth daily    Edema, unspecified type  Comments:  Continue with diuretic therapy. Orders:  -     Basic metabolic panel; Future    Gastroesophageal reflux disease with esophagitis, unspecified whether hemorrhage  Comments:  Stable on PPI therapy. Orders:  -     famotidine (PEPCID) 20 mg tablet; Take 1 tablet (20 mg total) by mouth daily at bedtime        Reviewed with patient plan to treat with above plan.   Patient instructed to call in 72 hours if not feeling better or if symptoms worsen

## 2023-07-18 DIAGNOSIS — K59.04 CHRONIC IDIOPATHIC CONSTIPATION: ICD-10-CM

## 2023-07-21 DIAGNOSIS — H53.2 DIPLOPIA: Primary | ICD-10-CM

## 2023-08-03 ENCOUNTER — APPOINTMENT (OUTPATIENT)
Dept: LAB | Facility: CLINIC | Age: 63
End: 2023-08-03
Payer: COMMERCIAL

## 2023-08-03 DIAGNOSIS — E83.52 HYPERCALCEMIA: ICD-10-CM

## 2023-08-03 DIAGNOSIS — R60.9 EDEMA, UNSPECIFIED TYPE: ICD-10-CM

## 2023-08-03 LAB
ANION GAP SERPL CALCULATED.3IONS-SCNC: 4 MMOL/L
BUN SERPL-MCNC: 19 MG/DL (ref 5–25)
CALCIUM SERPL-MCNC: 10.7 MG/DL (ref 8.3–10.1)
CHLORIDE SERPL-SCNC: 105 MMOL/L (ref 96–108)
CO2 SERPL-SCNC: 31 MMOL/L (ref 21–32)
CREAT SERPL-MCNC: 0.91 MG/DL (ref 0.6–1.3)
GFR SERPL CREATININE-BSD FRML MDRD: 66 ML/MIN/1.73SQ M
GLUCOSE SERPL-MCNC: 165 MG/DL (ref 65–140)
POTASSIUM SERPL-SCNC: 3.7 MMOL/L (ref 3.5–5.3)
SODIUM SERPL-SCNC: 140 MMOL/L (ref 135–147)

## 2023-08-03 PROCEDURE — 83970 ASSAY OF PARATHORMONE: CPT

## 2023-08-03 PROCEDURE — 80048 BASIC METABOLIC PNL TOTAL CA: CPT

## 2023-08-03 PROCEDURE — 36415 COLL VENOUS BLD VENIPUNCTURE: CPT

## 2023-08-04 ENCOUNTER — OFFICE VISIT (OUTPATIENT)
Dept: FAMILY MEDICINE CLINIC | Facility: CLINIC | Age: 63
End: 2023-08-04
Payer: COMMERCIAL

## 2023-08-04 VITALS
HEIGHT: 64 IN | WEIGHT: 170 LBS | SYSTOLIC BLOOD PRESSURE: 118 MMHG | HEART RATE: 86 BPM | TEMPERATURE: 97.2 F | OXYGEN SATURATION: 99 % | DIASTOLIC BLOOD PRESSURE: 60 MMHG | BODY MASS INDEX: 29.02 KG/M2

## 2023-08-04 DIAGNOSIS — E83.52 HYPERCALCEMIA: Primary | ICD-10-CM

## 2023-08-04 DIAGNOSIS — R26.89 IMBALANCE: ICD-10-CM

## 2023-08-04 DIAGNOSIS — Z79.4 TYPE 2 DIABETES MELLITUS WITH BOTH EYES AFFECTED BY MILD NONPROLIFERATIVE RETINOPATHY WITHOUT MACULAR EDEMA, WITH LONG-TERM CURRENT USE OF INSULIN (HCC): Primary | ICD-10-CM

## 2023-08-04 DIAGNOSIS — H53.2 DIPLOPIA: ICD-10-CM

## 2023-08-04 DIAGNOSIS — I10 PRIMARY HYPERTENSION: ICD-10-CM

## 2023-08-04 DIAGNOSIS — E11.3293 TYPE 2 DIABETES MELLITUS WITH BOTH EYES AFFECTED BY MILD NONPROLIFERATIVE RETINOPATHY WITHOUT MACULAR EDEMA, WITH LONG-TERM CURRENT USE OF INSULIN (HCC): Primary | ICD-10-CM

## 2023-08-04 DIAGNOSIS — E78.2 MIXED HYPERLIPIDEMIA: ICD-10-CM

## 2023-08-04 DIAGNOSIS — R10.13 DYSPEPSIA: ICD-10-CM

## 2023-08-04 LAB — PTH-INTACT SERPL-MCNC: 68.2 PG/ML (ref 12–88)

## 2023-08-04 PROCEDURE — 99214 OFFICE O/P EST MOD 30 MIN: CPT | Performed by: FAMILY MEDICINE

## 2023-08-04 RX ORDER — INSULIN GLARGINE 100 [IU]/ML
3 INJECTION, SOLUTION SUBCUTANEOUS DAILY
Qty: 10 ML | Refills: 3
Start: 2023-08-04

## 2023-08-05 NOTE — PROGRESS NOTES
Patient ID: Ethan Mckeon is a 58 y.o. female. HPI: 58 y. o.female presents for follow up of niddm, hypertension  and hypercholesterolemia. Patient deneis any dyspnea, chest pain or palpitations. She continues to experience dyspepsia,  diploplia and imbalance. SUBJECTIVE    Family History   Adopted: Yes   Family history unknown: Yes     Social History     Socioeconomic History   • Marital status: /Civil Union     Spouse name: Not on file   • Number of children: Not on file   • Years of education: Not on file   • Highest education level: Not on file   Occupational History   • Occupation: Retired   Tobacco Use   • Smoking status: Never     Passive exposure: Never   • Smokeless tobacco: Never   Vaping Use   • Vaping Use: Never used   Substance and Sexual Activity   • Alcohol use: Never   • Drug use: No   • Sexual activity: Yes     Partners: Male     Birth control/protection: Male Sterilization     Comment: declines std/hiv testing   Other Topics Concern   • Not on file   Social History Narrative    Personal Protective Equipment Seatbelts     Social Determinants of Health     Financial Resource Strain: Not on file   Food Insecurity: Not on file   Transportation Needs: Not on file   Physical Activity: Not on file   Stress: Not on file   Social Connections: Not on file   Intimate Partner Violence: Not on file   Housing Stability: Not on file     Past Medical History:   Diagnosis Date   • Arthritis    • Chronic kidney disease    • CTS (carpal tunnel syndrome)     had surgery   • Diabetes mellitus ([address] )    • Fibroid     dr Claudell Lei removed   •  2 para 2      x2 -F, -   • Hyperlipidemia      Past Surgical History:   Procedure Laterality Date   • CARDIAC CATHETERIZATION  2023    Procedure: Cardiac catheterization;  Surgeon: Melodie Coto DO;  [facility]: [facility];   Service: Cardiology   • CARDIAC CATHETERIZATION N/A 2023    Procedure: Cardiac Coronary Angiogram; Surgeon: Nikolay Castellanos DO;  [facility]; Service: Cardiology   • CARDIAC CATHETERIZATION N/A 5/4/2023    Procedure: Cardiac other - DFR for Hemodaynamic Assessment;  Surgeon: Nikolay Castellanos DO;  [facility];   Service: Cardiology   • CARDIAC SURGERY     • HYSTERECTOMY     • MYOMECTOMY      dr Norma Howard removed   • TOTAL ABDOMINAL HYSTERECTOMY      4/2010     Allergies   Allergen Reactions   • Atorvastatin Other (See Comments)     Reaction Date: 25Apr2011;    • Furosemide      Reaction Date: 25Apr2011;    • Latex        Current Outpatient Medications:   •  Aspirin Low Dose 81 MG chewable tablet, , Disp: , Rfl:   •  bumetanide (BUMEX) 1 mg tablet, , Disp: , Rfl:   •  Continuous Blood Gluc Sensor (FreeStyle Nataly 3 Sensor) MISC, Apply one sensor every 14 days, Disp: 2 each, Rfl: 3  •  famotidine (PEPCID) 20 mg tablet, Take 1 tablet (20 mg total) by mouth daily at bedtime, Disp: 30 tablet, Rfl: 3  •  glucose blood (ONE TOUCH ULTRA TEST) test strip, test 6 times a day, Disp: 600 each, Rfl: 0  •  hydrochlorothiazide (HYDRODIURIL) 50 mg tablet, TAKE ONE TABLET BY MOUTH TWICE DAILY (Patient taking differently: Take 50 mg by mouth daily), Disp: 180 tablet, Rfl: 0  •  hydrOXYzine HCL (ATARAX) 25 mg tablet, TAKE ONE TABLET BY MOUTH NIGHTLY AT BEDTIME, Disp: 90 tablet, Rfl: 0  •  insulin glargine (Lantus) 100 units/mL subcutaneous injection, Inject 3 Units under the skin daily, Disp: 10 mL, Rfl: 3  •  insulin regular (HumuLIN R,NovoLIN R) 100 units/mL injection, Inject under the skin  a day , Disp: , Rfl:   •  ketoconazole (NIZORAL) 2 % shampoo, apply to scalp every other day for 5 minutes and then rinse, Disp: 120 mL, Rfl: 0  •  linaCLOtide 290 MCG CAPS, Take 1 capsule by mouth in the morning, Disp: 90 capsule, Rfl: 5  •  metFORMIN (GLUCOPHAGE-XR) 500 mg 24 hr tablet, TAKE TWO TABLETS BY MOUTH TWICE DAILY, Disp: 360 tablet, Rfl: 0  •  metoprolol tartrate (LOPRESSOR) 25 mg tablet, Take 25 mg by mouth daily, Disp: , Rfl:   •  NovoLOG FlexPen 100 units/mL injection pen, 8 Units 3 (three) times a day with meals, Disp: , Rfl:   •  omega-3-acid ethyl esters (LOVAZA) 1 g capsule, TAKE TWO CAPSULES BY MOUTH TWICE DAILY, Disp: 360 capsule, Rfl: 0  •  pantoprazole (PROTONIX) 40 mg tablet, Take 1 tablet (40 mg total) by mouth daily before breakfast (Patient taking differently: Take 40 mg by mouth daily before breakfast As needed.), Disp: 30 tablet, Rfl: 5  •  Polyethylene Glycol 3350 POWD, Take 0.4 g/kg by mouth daily, Disp: , Rfl:   •  potassium chloride (MICRO-K) 10 MEQ CR capsule, Take 2 capsules (20 mEq total) by mouth daily, Disp: 60 capsule, Rfl: 3  •  pravastatin (PRAVACHOL) 40 mg tablet, Take 40 mg by mouth daily, Disp: , Rfl:   •  semaglutide, 0.25 or 0.5 mg/dose, (Ozempic, 0.25 or 0.5 MG/DOSE,) 2 mg/3 mL injection pen, Inject 0.38 mL (0.2533 mg total) under the skin every 7 days for 28 days, THEN 0.75 mL (0.5 mg total) every 7 days. , Disp: 9 mL, Rfl: 0  •  sucralfate (CARAFATE) 1 g tablet, Take 1 tablet (1 g total) by mouth 4 (four) times a day, Disp: 120 tablet, Rfl: 0  •  diazepam (VALIUM) 5 mg tablet, take 1 tablet by mouth once a day at bedtime as needed for sleep (Patient not taking: Reported on 8/4/2023), Disp: 30 tablet, Rfl: 0  •  gabapentin (NEURONTIN) 100 mg capsule, TAKE ONE CAPSULE BY MOUTH THREE TIMES DAILY (Patient not taking: Reported on 8/4/2023), Disp: 270 capsule, Rfl: 0  •  HYDROcodone-acetaminophen (Norco) 5-325 mg per tablet, Take 1 tablet by mouth every 6 (six) hours as needed for pain Max Daily Amount: 4 tablets (Patient not taking: Reported on 8/4/2023), Disp: 60 tablet, Rfl: 0  •  levothyroxine 25 mcg tablet, TAKE ONE TABLET BY MOUTH DAILY IN early MORNING (Patient not taking: Reported on 6/22/2023), Disp: 90 tablet, Rfl: 0  •  lisinopril (ZESTRIL) 5 mg tablet, TAKE ONE TABLET BY MOUTH ONCE DAILY (Patient not taking: Reported on 8/4/2023), Disp: 90 tablet, Rfl: 0  • meloxicam (MOBIC) 15 mg tablet, Take 1 tablet (15 mg total) by mouth daily as needed for moderate pain (Patient not taking: Reported on 7/13/2023), Disp: 30 tablet, Rfl: 3  •  methocarbamol (ROBAXIN) 500 mg tablet, , Disp: , Rfl:     Review of Systems  Constitutional:     Denies fever, chills ,fatigue ,weakness ,weight loss, weight gain     ENT: Denies earache ,loss of hearing ,nosebleed, nasal discharge,nasal congestion ,sore throat ,hoarseness positive diplopia  Pulmonary: Denies shortness of breath ,cough  ,dyspnea on exertion, orthopnea  ,PND   Cardiovascular:  Denies bradycardia , tachycardia  ,palpations, lower extremity edema leg, claudication  Breast:  Denies new or changing breast lumps ,nipple discharge ,nipple changes  Abdomen:  Denies abdominal pain , anorexia , indigestion, nausea, vomiting, constipation, diarrhea positive dyspepsia  Musculoskeletal: Denies myalgias, arthralgias, joint swelling, joint stiffness , limb pain, limb swelling  Gu: denies dysuria, polyuria  Skin: Denies skin rash, skin lesion, skin wound, itching, dry skin  Neuro: Denies headache, numbness, tingling, confusion, loss of consciousness, dizziness, vertigo positive imbalance  Psychiatric: Denies feelings of depression, suicidal ideation, anxiety, sleep disturbances    OBJECTIVE  /60   Pulse 86   Temp (!) 97.2 °F (36.2 °C)   Ht 5' 4" (1.626 m)   Wt 77.1 kg (170 lb)   SpO2 99%   BMI 29.18 kg/m²   Constitutional:   NAD, well appearing and well nourished      ENT:   Conjunctiva and lids: no injection, edema, or discharge     Pupils and iris: NIKKI bilaterally    External inspection of ears and nose: normal without deformities or discharge. Otoscopic exam: Canals patent without erythema. Nasal mucosa, septum and turbinates: Normal or edema or discharge         Oropharynx:  Moist mucosa, normal tongue and tonsils without lesions.  No erythema        Pulmonary:Respiratory effort normal rate and rhythm, no increased work of breathing. Auscultation of lungs:  Clear bilaterally with no adventitious breath sounds       Cardiovascular: regular rate and rhythm, S1 and S2, no murmur, no edema and/or varicosities of LE      Abdomen: Soft and non-distended     Positive bowel sounds      No heptomegaly or splenomegaly      Gu: no suprapubic tenderness or CVA tenderness, no urethral discharge  Lymphatic:  No anterior or posterior cervical lymphadenopathy         Musculoskeletal:  Gait and station: Normal gait      Digits and nails normal without clubbing or cyanosis       Inspection/palpation of joints, bones, and muscles:  No joint tenderness, swelling, full active and passive range of motion       Skin: Normal skin turgor and no rashes      Neuro:    Normal reflexes     Psych:   alert and oriented to person, place and time     normal mood and affect       Assessment/Plan:Diagnoses and all orders for this visit:    Type 2 diabetes mellitus with both eyes affected by mild nonproliferative retinopathy without macular edema, with long-term current use of insulin ([address])  Comments:  Continue with current medicine and will continue increasing Ozempic on a monthly basis. As we do this, we will decrease her basal insulin dosing. Orders:  -     insulin glargine (Lantus) 100 units/mL subcutaneous injection; Inject 3 Units under the skin daily  -     Insulin syringes  -     Hemoglobin A1C; Future    Primary hypertension  Comments:  Continue ACE therapy. Orders:  -     Comprehensive metabolic panel; Future    Mixed hyperlipidemia  Comments:  Continue statin therapy. Orders:  -     Lipid Panel with Direct LDL reflex; Future    Dyspepsia  Comments:  Continue PPI therapy as well as H2 blocker therapy. Orders:  -     Ambulatory Referral to Gastroenterology; Future    Diplopia  Comments:  Patient to see ophthalmology and neurology  Orders:  -     Ambulatory Referral to Neurology;  Future    Imbalance  Comments:  Await neurology consult and attempting to get MRI of the brain approved by her insurance. Orders:  -     Ambulatory Referral to Neurology; Future        Reviewed with patient plan to treat with above plan.     Patient instructed to call in 72 hours if not feeling better or if symptoms worsen

## 2023-08-21 DIAGNOSIS — E78.00 HYPERCHOLESTEROLEMIA: ICD-10-CM

## 2023-08-21 DIAGNOSIS — I10 ESSENTIAL HYPERTENSION: ICD-10-CM

## 2023-08-21 DIAGNOSIS — E13.9 DIABETES 1.5, MANAGED AS TYPE 2 (HCC): ICD-10-CM

## 2023-08-21 DIAGNOSIS — L21.9 SEBORRHEA: ICD-10-CM

## 2023-08-21 DIAGNOSIS — F41.9 ANXIETY: ICD-10-CM

## 2023-08-21 LAB
LEFT EYE DIABETIC RETINOPATHY: NORMAL
RIGHT EYE DIABETIC RETINOPATHY: NORMAL

## 2023-08-21 RX ORDER — LISINOPRIL 5 MG/1
TABLET ORAL
Qty: 90 TABLET | Refills: 0 | Status: SHIPPED | OUTPATIENT
Start: 2023-08-21

## 2023-08-21 RX ORDER — METFORMIN HYDROCHLORIDE 500 MG/1
TABLET, EXTENDED RELEASE ORAL
Qty: 360 TABLET | Refills: 0 | Status: SHIPPED | OUTPATIENT
Start: 2023-08-21 | End: 2023-08-21

## 2023-08-21 RX ORDER — METFORMIN HYDROCHLORIDE 500 MG/1
TABLET, EXTENDED RELEASE ORAL
Qty: 360 TABLET | Refills: 0 | Status: SHIPPED | OUTPATIENT
Start: 2023-08-21

## 2023-08-21 RX ORDER — KETOCONAZOLE 20 MG/ML
SHAMPOO TOPICAL
Qty: 120 ML | Refills: 0 | Status: SHIPPED | OUTPATIENT
Start: 2023-08-21

## 2023-08-21 RX ORDER — HYDROXYZINE HYDROCHLORIDE 25 MG/1
TABLET, FILM COATED ORAL
Qty: 90 TABLET | Refills: 0 | Status: SHIPPED | OUTPATIENT
Start: 2023-08-21

## 2023-08-21 RX ORDER — PRAVASTATIN SODIUM 40 MG
40 TABLET ORAL DAILY
Qty: 90 TABLET | Refills: 0 | Status: SHIPPED | OUTPATIENT
Start: 2023-08-21

## 2023-08-21 RX ORDER — OMEGA-3-ACID ETHYL ESTERS 1 G/1
CAPSULE, LIQUID FILLED ORAL
Qty: 360 CAPSULE | Refills: 0 | Status: SHIPPED | OUTPATIENT
Start: 2023-08-21

## 2023-08-22 ENCOUNTER — TELEPHONE (OUTPATIENT)
Dept: FAMILY MEDICINE CLINIC | Facility: CLINIC | Age: 63
End: 2023-08-22

## 2023-08-23 DIAGNOSIS — R60.9 EDEMA, UNSPECIFIED TYPE: ICD-10-CM

## 2023-08-23 RX ORDER — HYDROCHLOROTHIAZIDE 50 MG/1
50 TABLET ORAL 2 TIMES DAILY
Qty: 180 TABLET | Refills: 0 | Status: SHIPPED | OUTPATIENT
Start: 2023-08-23

## 2023-08-25 ENCOUNTER — VBI (OUTPATIENT)
Dept: ADMINISTRATIVE | Facility: OTHER | Age: 63
End: 2023-08-25

## 2023-08-25 NOTE — TELEPHONE ENCOUNTER
Upon review of the In Basket request we were able to locate, review, and update the patient chart as requested for Diabetic Eye Exam.    Any additional questions or concerns should be emailed to the Practice Liaisons via the appropriate education email address, please do not reply via In Basket.     Thank you  Adrian Daniel MA

## 2023-08-30 ENCOUNTER — OFFICE VISIT (OUTPATIENT)
Dept: GASTROENTEROLOGY | Facility: AMBULARY SURGERY CENTER | Age: 63
End: 2023-08-30
Payer: COMMERCIAL

## 2023-08-30 VITALS
SYSTOLIC BLOOD PRESSURE: 128 MMHG | HEART RATE: 74 BPM | BODY MASS INDEX: 29.33 KG/M2 | OXYGEN SATURATION: 98 % | DIASTOLIC BLOOD PRESSURE: 68 MMHG | HEIGHT: 64 IN | WEIGHT: 171.8 LBS

## 2023-08-30 DIAGNOSIS — K59.04 CHRONIC IDIOPATHIC CONSTIPATION: ICD-10-CM

## 2023-08-30 DIAGNOSIS — Z12.11 SCREENING FOR COLON CANCER: Primary | ICD-10-CM

## 2023-08-30 DIAGNOSIS — R10.30 LOWER ABDOMINAL PAIN: ICD-10-CM

## 2023-08-30 DIAGNOSIS — K21.9 GASTROESOPHAGEAL REFLUX DISEASE WITHOUT ESOPHAGITIS: ICD-10-CM

## 2023-08-30 DIAGNOSIS — R14.0 ABDOMINAL BLOATING: ICD-10-CM

## 2023-08-30 PROCEDURE — 99244 OFF/OP CNSLTJ NEW/EST MOD 40: CPT | Performed by: INTERNAL MEDICINE

## 2023-08-30 RX ORDER — POLYETHYLENE GLYCOL 3350 17 G/17G
POWDER, FOR SOLUTION ORAL
Qty: 238 G | Refills: 0 | Status: SHIPPED | OUTPATIENT
Start: 2023-08-30

## 2023-08-30 NOTE — PATIENT INSTRUCTIONS
Constipation   WHAT YOU NEED TO KNOW:   Constipation means you have hard, dry bowel movements, or you go longer than usual between bowel movements. Medicines:   Medicine  such as a laxative may help relax and loosen your intestines to help you have a bowel movement. These medications are safe and help your bowels to move regularly to improve your symptoms. Take your medicine as directed. Contact your healthcare provider if you think your medicine is not helping or if you have side effects. Tell him of her if you are allergic to any medicine. Keep a list of the medicines, vitamins, and herbs you take. Include the amounts, and when and why you take them. Bring the list or the pill bottles to follow-up visits. Carry your medicine list with you in case of an emergency. Prevent constipation:   Drink liquids as directed. You may need to drink extra liquids to help soften and move your bowels. Ask how much liquid to drink each day and which liquids are best for you. Eat high-fiber foods. This may help decrease constipation by adding bulk to your bowel movements. High-fiber foods include fruit, vegetables, whole-grain breads and cereals, and beans. Your healthcare provider or dietitian can help you create a high-fiber meal plan. Your provider may also recommend a fiber supplement if you cannot get enough fiber from food. Exercise regularly. Regular physical activity can help stimulate your intestines. Walking is a good exercise to prevent or relieve constipation. Ask which exercises are best for you. Talk to your healthcare provider about your medicines. Certain medicines, such as opioids, can cause constipation. Your provider may be able to make medicine changes. For example, he or she may change the kind of medicine, or change when you take it. Follow up with your doctor as directed:  Write down your questions so you remember to ask them during your visits.    © Copyright [employer] 2021 Information is for [employer] use only and may not be sold, redistributed or otherwise used for commercial purposes. All illustrations and images included in CareNotes® are the copyrighted property of [employer] or [address] [place] [address]  The above information is an  only. It is not intended as medical advice for individual conditions or treatments. Talk to your doctor, nurse or pharmacist before following any medical regimen to see if it is safe and effective for you. What is constipation? Constipation happens when fecal material (stool) moves through the large bowel (colon) too slowly. The fluid portion of the stool is absorbed back into the body, so the stool becomes hard and dry. This makes it difficult to pass the stool. What causes constipation? Poor nutrition, inadequate sleep, limited exercise, anxiety, emotional stress and age may cause constipation. Certain disease also can cause constipation, and are usually associated with a sudden change in bowel habits, pain, weight loss, fatigue or bloody stools. Contact your doctor if you experience these symptoms. Some medications cause constipation - talk to your doctor if you think your medications are causing constipation. Can eating more fiber help with constipation? Yes. Fiber is the part of plant food that is not digested. There are two kinds of fiber: soluble and insoluble. Soluble fiber gives stool bulk. Foods that are good sources of soluble fiber include apples, bananas, barley, oats, and beans. Insoluble fiber helps speed up the transit of food in the digestive tract and helps prevent constipation. Good sources of insoluble fiber include whole grains, most vegetables, wheat bran, and legumes. Foods that have fiber contain both soluble and insoluble fibers. A good goal for dietary fiber is a total of about 20 to 30 grams each day. GUIDELINES TO TREAT CONSTIPATION    Nutrition  Eat three meals each day.  Do not skip meals. Gradually increase the amount of high-fiber foods in your diet. Choose more whole grain breads, cereals and rice. Select more raw fruits and vegetables -- eat the peel, if appropriate. Read food labels and look for the "dietary fiber" content of foods. Good sources have 2 grams of fiber or more. Drink six to eight glasses of water each day. Limit highly refined and processed foods. Exercise and Sleep  Exercise regularly. Try to do weight-bearing exercise, such as walking, three or more times each week. Go to sleep at a regular time each night. Make sure you get enough sleep. Stress and Anxiety  Try to limit stress in your life. Go for a short walk when you feel anxiety or stress increasing. [id number] [facility] specialists have reviewed this information. It is for educational purposes only and is not intended to replace the advice of your doctor or other health care provider.  We encourage you to discuss any questions or concerns you may have with your provider.    - gas-x (simethicone) or BEANO over the counter for symptoms of bloating      Scheduled date of EGD/colonoscopy (as of today):9/21/2023  Physician performing EGD/colonoscopy:  Location of EGD/colonoscopy: [facility]   Desired bowel prep reviewed with patient: miralax/dulcolax  Instructions reviewed with patient by: bryan  Clearances:  cardiac clearance, diabetic

## 2023-08-30 NOTE — PROGRESS NOTES
[facility] - Outpatient Consultation  Cata Viveros 58 y.o. female MRN: [id number]  Encounter: [id number]      PCP: Emi Mathews DO  Referring: Emi Mathews DO  [address]      ASSESSMENT AND PLAN:      1. Gastroesophageal reflux disease without esophagitis  She is on Protonix, Pepcid, Carafate  She continues to experience pill related dysphagia, as well as symptoms of belching, abdominal bloating, nausea and vomiting which I feel may be multifactorial and also possibly related to her significant constipation  She has previously had a gastric emptying study which is within normal limits, however she is also recently been started on Ozempic  - EGD; Future    2. Lower abdominal pain  3. Abdominal bloating  4. Chronic idiopathic constipation  Therapeutic failure of over-the-counter colonic stimulants, stool softeners, bulking agents/psyllium fiber, osmotic laxative including polyethylene glycol  Additionally she is on osmotic laxative, Linzess 290 mcg  - polyethylene glycol (GLYCOLAX) 17 GM/SCOOP powder; At 5pm take 5mgx2 dulcolax. At 6pm mix 238 g miralax in 64oz gatorade. Drink 8oz glass every 5 mins until 32oz finished. Drink remaining as rec. Dispense: 238 g; Refill: 0  - Ambulatory Referral to Physical Therapy; Future, recommend pelvic floor therapy  - CT abdomen pelvis w contrast; Future, r/o obstructive disease (did have CT angio, limited by lack of oral contrast)    5. Screening for colon cancer  Last colonoscopy March 2013, results not available for my review. - Colonoscopy; Future      ______________________________________________________________________    CC:  Chief Complaint   Patient presents with   • Constipation   • GERD       HPI:      Patient is a 16-year-old female referred for abdominal pain, constipation, GERD, belching.   She has HLD, CKD, R1XF complicated by peripheral neuropathy, CAD, severe aortic stenosis with bicuspid aortic valve s/p recent TAVR. She is present with her  at today's visit, who contributes to history. She is worried about progressive constipation. She is having bowel movements every 3 to 4 days associated with incomplete evacuation this is despite extensive therapy, including daily linzess 290 mcg. Additionally she has been using enemas, and Ex-Lax x2 to stimulate a bowel movement successfully. She has had previous therapeutic failure with MiraLAX, Dulcolax, Metamucil. Remotely describes a childhood procedure which required her rectum being stretched. She is adopted and otherwise does not know her family history. She has been experiencing increasing acid reflux. There is associated belching, nausea, vomiting, bloating. She experiences pill dysphagia with potassium use. She is taking protonix 40 mg AC breakfast, pepcid  Ozempic. Constipation  Associated symptoms include abdominal pain. Abdominal Pain  Associated symptoms include constipation. REVIEW OF SYSTEMS:    CONSTITUTIONAL: Denies any fever, chills, rigors, and weight loss. HEENT: No earache or tinnitus. Denies hearing loss or visual disturbances. CARDIOVASCULAR: No chest pain or palpitations. RESPIRATORY: Denies any cough, hemoptysis, shortness of breath or dyspnea on exertion. GASTROINTESTINAL: As noted in the History of Present Illness. GENITOURINARY: No problems with urination. Denies any hematuria or dysuria. NEUROLOGIC: No dizziness or vertigo, denies headaches. MUSCULOSKELETAL: Denies any muscle or joint pain. SKIN: Denies skin rashes or itching. ENDOCRINE: Denies excessive thirst. Denies intolerance to heat or cold. PSYCHOSOCIAL: Denies depression or anxiety. Denies any recent memory loss.        Historical Information   Past Medical History:   Diagnosis Date   • Arthritis    • Chronic kidney disease    • CTS (carpal tunnel syndrome)     had surgery   • Diabetes mellitus ([address] )    • Fibroid     dr Lazarus Falco removed   •  2 para 2      x2 -F, -   • Hyperlipidemia      Past Surgical History:   Procedure Laterality Date   • CARDIAC CATHETERIZATION  2023    Procedure: Cardiac catheterization;  Surgeon: Lexi Mills DO;  Location: [facility]; Service: Cardiology   • CARDIAC CATHETERIZATION N/A 2023    Procedure: Cardiac Coronary Angiogram;  Surgeon: Lexi Mills DO;  Location: [facility]; Service: Cardiology   • CARDIAC CATHETERIZATION N/A 2023    Procedure: Cardiac other - DFR for Hemodaynamic Assessment;  Surgeon: Lexi Mills DO;  Location: [facility];   Service: Cardiology   • CARDIAC SURGERY     • HYSTERECTOMY     • MYOMECTOMY      dr Lazarus Falco removed   • TOTAL ABDOMINAL HYSTERECTOMY      2010     Social History   Social History     Substance and Sexual Activity   Alcohol Use Never     Social History     Substance and Sexual Activity   Drug Use No     Social History     Tobacco Use   Smoking Status Never   • Passive exposure: Never   Smokeless Tobacco Never     Family History   Adopted: Yes   Family history unknown: Yes       Meds/Allergies       Current Outpatient Medications:   •  Aspirin Low Dose 81 MG chewable tablet  •  bumetanide (BUMEX) 1 mg tablet  •  Continuous Blood Gluc Sensor (FreeStyle Nataly 3 Sensor) MISC  •  diazepam (VALIUM) 5 mg tablet  •  famotidine (PEPCID) 20 mg tablet  •  gabapentin (NEURONTIN) 100 mg capsule  •  hydrochlorothiazide (HYDRODIURIL) 50 mg tablet  •  HYDROcodone-acetaminophen (Norco) 5-325 mg per tablet  •  hydrOXYzine HCL (ATARAX) 25 mg tablet  •  insulin glargine (Lantus) 100 units/mL subcutaneous injection  •  ketoconazole (NIZORAL) 2 % shampoo  •  linaCLOtide 290 MCG CAPS  •  metFORMIN (GLUCOPHAGE-XR) 500 mg 24 hr tablet  •  metoprolol tartrate (LOPRESSOR) 25 mg tablet  •  NovoLOG FlexPen 100 units/mL injection pen  •  pantoprazole (PROTONIX) 40 mg tablet  •  Polyethylene Glycol 3350 POWD  •  potassium chloride (MICRO-K) 10 MEQ CR capsule  •  pravastatin (PRAVACHOL) 40 mg tablet  •  semaglutide, 0.25 or 0.5 mg/dose, (Ozempic, 0.25 or 0.5 MG/DOSE,) 2 mg/3 mL injection pen  •  sucralfate (CARAFATE) 1 g tablet  •  glucose blood (ONE TOUCH ULTRA TEST) test strip  •  insulin regular (HumuLIN R,NovoLIN R) 100 units/mL injection  •  levothyroxine 25 mcg tablet  •  lisinopril (ZESTRIL) 5 mg tablet  •  meloxicam (MOBIC) 15 mg tablet  •  methocarbamol (ROBAXIN) 500 mg tablet  •  omega-3-acid ethyl esters (LOVAZA) 1 g capsule    Allergies   Allergen Reactions   • Atorvastatin Other (See Comments)     Reaction Date: 25Apr2011;    • Furosemide      Reaction Date: [id number];    • Latex            Objective     Blood pressure 128/68, pulse 74, height 5' 4" (1.626 m), weight 77.9 kg (171 lb 12.8 oz), SpO2 98 %. Body mass index is 29.49 kg/m². PHYSICAL EXAM:      General Appearance:   Alert, cooperative, no distress   HEENT:   Normocephalic, atraumatic, anicteric. Neck:  Supple, symmetrical, trachea midline   Lungs:   Clear to auscultation bilaterally; no rales, rhonchi or wheezing; respirations unlabored    Heart[de-identified]   Regular rate and rhythm; no murmur, rub, or gallop.    Abdomen:   Soft, left upper quadrant, left lower quadrant tenderness to palpation, non-distended; normal bowel sounds; no masses, no organomegaly    Genitalia:   Deferred    Rectal:   Deferred    Extremities:  No cyanosis, clubbing or edema    Pulses:  2+ and symmetric    Skin:  No jaundice, rashes, or lesions    Lymph nodes:  No palpable cervical lymphadenopathy        Lab Results:     Lab Results   Component Value Date    WBC 9.38 04/28/2023    HGB 11.5 04/28/2023    HCT 37.4 04/28/2023    MCV 83 04/28/2023     04/28/2023       Lab Results   Component Value Date     10/13/2017    K 3.7 08/03/2023     08/03/2023    CO2 31 08/03/2023    BUN 19 08/03/2023    CREATININE 0.91 08/03/2023    GLUF 87 04/28/2023    CALCIUM 10.7 (H) 08/03/2023    CORRECTEDCA 10.8 (H) 02/22/2018    AST 15 04/28/2023    ALT 19 04/28/2023    ALKPHOS 71 04/28/2023    PROT 6.7 10/13/2017    BILITOT 0.3 10/13/2017    EGFR 66 08/03/2023       No results found for: "INR", "PROTIME"      Radiology Results:   No results found. Portions of the record may have been created with voice recognition software. Occasional wrong word or "sound a like" substitutions may have occurred due to the inherent limitations of voice recognition software. Read the chart carefully and recognize, using context, where substitutions have occurred.

## 2023-09-05 DIAGNOSIS — R10.30 LOWER ABDOMINAL PAIN: Primary | ICD-10-CM

## 2023-09-05 NOTE — Clinical Note
Please call patient and have her schedule the pelvic ultrasound and complete abdominal ultrasound and cancel CT scan. CT scan not approved by insurance they want US first.  Dr. Dudley Stroud will reorder the CT after her colonoscopy is completed this will give the patient time to get the ultrasound done.   Thank you

## 2023-09-06 ENCOUNTER — TELEPHONE (OUTPATIENT)
Dept: GASTROENTEROLOGY | Facility: AMBULARY SURGERY CENTER | Age: 63
End: 2023-09-06

## 2023-09-06 NOTE — TELEPHONE ENCOUNTER
Called and spoke with patient and relayed the following message in detail: MARYLIN Yuan; [facility] [address]  I forwarded the message to the clinical staff to have the patient cancel her appointment for CT scan I am going to order a pelvic ultrasound and a complete abdominal ultrasound.  I am also forwarding this message to the clinical team they should call patient and have her schedule the pelvic ultrasound and complete abdominal ultrasound.  Dr. Bee Lin will reorder the CT after her colonoscopy is completed this will give the patient time to get the ultrasound done hopefully the insurance company will approve the pelvic ultrasound and complete abdominal ultrasound.  Thank you . Pt will call today .

## 2023-09-07 ENCOUNTER — TELEPHONE (OUTPATIENT)
Dept: FAMILY MEDICINE CLINIC | Facility: CLINIC | Age: 63
End: 2023-09-07

## 2023-09-07 ENCOUNTER — HOSPITAL ENCOUNTER (OUTPATIENT)
Dept: CT IMAGING | Facility: HOSPITAL | Age: 63
End: 2023-09-07
Attending: INTERNAL MEDICINE

## 2023-09-07 ENCOUNTER — HOSPITAL ENCOUNTER (OUTPATIENT)
Dept: RADIOLOGY | Facility: HOSPITAL | Age: 63
Discharge: HOME | End: 2023-09-07

## 2023-09-07 DIAGNOSIS — R10.30 LOWER ABDOMINAL PAIN: ICD-10-CM

## 2023-09-07 DIAGNOSIS — H53.2 DIPLOPIA: ICD-10-CM

## 2023-09-07 DIAGNOSIS — K59.04 CHRONIC IDIOPATHIC CONSTIPATION: ICD-10-CM

## 2023-09-07 DIAGNOSIS — R14.0 ABDOMINAL BLOATING: ICD-10-CM

## 2023-09-07 PROCEDURE — G1004 CDSM NDSC: HCPCS

## 2023-09-07 PROCEDURE — 74177 CT ABD & PELVIS W/CONTRAST: CPT

## 2023-09-07 RX ADMIN — IOHEXOL 100 ML: 350 INJECTION, SOLUTION INTRAVENOUS at 13:09

## 2023-09-08 ENCOUNTER — TELEPHONE (OUTPATIENT)
Age: 63
End: 2023-09-08

## 2023-09-08 NOTE — TELEPHONE ENCOUNTER
I spoke with patient, she states she had open heart surgery in June and was advised not to have any procedures for six months afterwards. Patient is asking if there are alternative radiology tests that could be ordered to evaluate her symptoms vs EGD/colon scheduled 9/21/23. She is self pay through [employer]. Please advise. Additionally patient's prior colonoscopy report is in [facility] under media tab 3/1/2013 for review.

## 2023-09-08 NOTE — TELEPHONE ENCOUNTER
Patients GI provider:  Dr. Parul Hernandez    Number to return call: [phone]    Reason for call: Pt calling asking if there are any other test like a CT or MRI that she can have done other than colonoscopy and EGD?     Scheduled procedure/appointment date if applicable: Procedure 5/45/48

## 2023-09-11 ENCOUNTER — NURSE TRIAGE (OUTPATIENT)
Age: 63
End: 2023-09-11

## 2023-09-11 NOTE — TELEPHONE ENCOUNTER
Sonia BRAUN from [facility] cardiology calling in, patient was seen today and cardiologist and vascular surgeon would like patient to wait at least 6 months before having any procedures done. She has open heart surgery on 6/2023 and not cleared from cardiology.

## 2023-09-12 ENCOUNTER — TELEPHONE (OUTPATIENT)
Dept: GASTROENTEROLOGY | Facility: AMBULARY SURGERY CENTER | Age: 63
End: 2023-09-12

## 2023-09-12 NOTE — TELEPHONE ENCOUNTER
Left a message for patient to call back to reschedule colonoscopy. Needs to be rescheduled due to cardiologist response.

## 2023-09-14 ENCOUNTER — OFFICE VISIT (OUTPATIENT)
Dept: FAMILY MEDICINE CLINIC | Facility: CLINIC | Age: 63
End: 2023-09-14

## 2023-09-14 VITALS
BODY MASS INDEX: 28.68 KG/M2 | OXYGEN SATURATION: 98 % | SYSTOLIC BLOOD PRESSURE: 110 MMHG | DIASTOLIC BLOOD PRESSURE: 58 MMHG | WEIGHT: 168 LBS | HEART RATE: 91 BPM | HEIGHT: 64 IN | TEMPERATURE: 97.4 F

## 2023-09-14 DIAGNOSIS — Z79.4 TYPE 2 DIABETES MELLITUS WITH BOTH EYES AFFECTED BY MILD NONPROLIFERATIVE RETINOPATHY WITHOUT MACULAR EDEMA, WITH LONG-TERM CURRENT USE OF INSULIN (HCC): ICD-10-CM

## 2023-09-14 DIAGNOSIS — M48.061 SPINAL STENOSIS OF LUMBAR REGION, UNSPECIFIED WHETHER NEUROGENIC CLAUDICATION PRESENT: ICD-10-CM

## 2023-09-14 DIAGNOSIS — I10 PRIMARY HYPERTENSION: ICD-10-CM

## 2023-09-14 DIAGNOSIS — I50.30 HEART FAILURE WITH PRESERVED EJECTION FRACTION, UNSPECIFIED HF CHRONICITY (HCC): ICD-10-CM

## 2023-09-14 DIAGNOSIS — Z00.00 ANNUAL PHYSICAL EXAM: Primary | ICD-10-CM

## 2023-09-14 DIAGNOSIS — E11.3293 TYPE 2 DIABETES MELLITUS WITH BOTH EYES AFFECTED BY MILD NONPROLIFERATIVE RETINOPATHY WITHOUT MACULAR EDEMA, WITH LONG-TERM CURRENT USE OF INSULIN (HCC): ICD-10-CM

## 2023-09-14 DIAGNOSIS — E78.2 MIXED HYPERLIPIDEMIA: ICD-10-CM

## 2023-09-14 LAB — SL AMB POCT HEMOGLOBIN AIC: 7.8 (ref ?–6.5)

## 2023-09-14 RX ORDER — MELOXICAM 15 MG/1
15 TABLET ORAL DAILY PRN
Qty: 30 TABLET | Refills: 3 | Status: SHIPPED | OUTPATIENT
Start: 2023-09-14

## 2023-09-14 RX ORDER — OXYCODONE HYDROCHLORIDE AND ACETAMINOPHEN 5; 325 MG/1; MG/1
1 TABLET ORAL EVERY 8 HOURS PRN
Qty: 40 TABLET | Refills: 0 | Status: SHIPPED | OUTPATIENT
Start: 2023-09-14

## 2023-09-14 NOTE — PROGRESS NOTES
[id number]    NAME: Dino [facility]  AGE: 58 y.o. SEX: female  : 1960     DATE: 2023     Assessment and Plan:     Problem List Items Addressed This Visit        Endocrine    Type 2 diabetes mellitus with mild nonproliferative retinopathy of both eyes without macular edema ([address]) - Primary    Relevant Orders    POCT hemoglobin A1c       Immunizations and preventive care screenings were discussed with patient today. Appropriate education was printed on patient's after visit summary. Counseling:  · Exercise: the importance of regular exercise/physical activity was discussed. Recommend exercise 3-5 times per week for at least 30 minutes. No follow-ups on file. Chief Complaint:     Chief Complaint   Patient presents with   • Physical Exam      History of Present Illness:     Adult Annual Physical   Patient here for a comprehensive physical exam. The patient's hgba1c is better at 7.8. Her bp, cholesterol, lumbar spinal stenosis and chf are all well controlled. Diet and Physical Activity  · Diet/Nutrition: well balanced diet. · Exercise: no formal exercise. Depression Screening  PHQ-2/9 Depression Screening         General Health  · Sleep: sleeps well. · Hearing: normal - bilateral.  · Vision: no vision problems. · Dental: regular dental visits. /GYN Health  · Patient is: postmenopausal  · Last menstrual period:   · Contraceptive method: .     Review of Systems:     Review of Systems   Constitutional: Negative for appetite change, chills and fever. HENT: Negative for ear pain, facial swelling, rhinorrhea, sinus pain, sore throat and trouble swallowing. Eyes: Negative for discharge and redness. Respiratory: Negative for chest tightness, shortness of breath and wheezing. Cardiovascular: Negative for chest pain and palpitations.    Gastrointestinal: Negative for abdominal pain, diarrhea, nausea and vomiting. Endocrine: Negative for polyuria. Genitourinary: Negative for dysuria and urgency. Musculoskeletal: Positive for back pain. Negative for arthralgias. Positive chronic lumbar back pain   Skin: Negative for rash. Neurological: Negative for dizziness, weakness and headaches. Hematological: Negative for adenopathy. Psychiatric/Behavioral: Negative for behavioral problems, confusion and sleep disturbance. All other systems reviewed and are negative. Past Medical History:     Past Medical History:   Diagnosis Date   • Arthritis    • Chronic kidney disease    • CTS (carpal tunnel syndrome)     had surgery   • Diabetes mellitus ([address] [facility])    • Fibroid     dr Alicia Moyer removed   •  2 para 2      x2 -, -   • Hyperlipidemia       Past Surgical History:     Past Surgical History:   Procedure Laterality Date   • CARDIAC CATHETERIZATION  2023    Procedure: Cardiac catheterization;  Surgeon: Nat Posadas DO;  [facility]; Service: Cardiology   • CARDIAC CATHETERIZATION N/A 2023    Procedure: Cardiac Coronary Angiogram;  Surgeon: Nat Posadas DO;  [facility]; Service: Cardiology   • CARDIAC CATHETERIZATION N/A 2023    Procedure: Cardiac other - DFR for Hemodaynamic Assessment;  Surgeon: Nat Posadas DO;  [facility];   Service: Cardiology   • CARDIAC SURGERY     • HYSTERECTOMY     • MYOMECTOMY      dr Alicia Moyer removed   • TOTAL ABDOMINAL HYSTERECTOMY      2010      Social History:     Social History     Socioeconomic History   • Marital status: /Civil Union     Spouse name: Not on file   • Number of children: Not on file   • Years of education: Not on file   • Highest education level: Not on file   Occupational History   • Occupation: Retired   Tobacco Use   • Smoking status: Never     Passive exposure: Never   • Smokeless tobacco: Never   Vaping Use   • Vaping Use: Never used   Substance and Sexual Activity   • Alcohol use: Never   • Drug use: No   • Sexual activity: Yes     Partners: Male     Birth control/protection: Male Sterilization     Comment: declines std/hiv testing   Other Topics Concern   • Not on file   Social History Narrative    Personal Protective Equipment Seatbelts     Social Determinants of Health     Financial Resource Strain: Not on file   Food Insecurity: Not on file   Transportation Needs: Not on file   Physical Activity: Not on file   Stress: Not on file   Social Connections: Not on file   Intimate Partner Violence: Not on file   Housing Stability: Not on file      Family History:     Family History   Adopted: Yes   Family history unknown: Yes      Current Medications:     Current Outpatient Medications   Medication Sig Dispense Refill   • Aspirin Low Dose 81 MG chewable tablet      • bumetanide (BUMEX) 1 mg tablet      • Continuous Blood Gluc Sensor (ChaoWIFIStyle Nataly 3 Sensor) MISC Apply one sensor every 14 days 2 each 3   • diazepam (VALIUM) 5 mg tablet take 1 tablet by mouth once a day at bedtime as needed for sleep 30 tablet 0   • famotidine (PEPCID) 20 mg tablet Take 1 tablet (20 mg total) by mouth daily at bedtime 30 tablet 3   • gabapentin (NEURONTIN) 100 mg capsule TAKE ONE CAPSULE BY MOUTH THREE TIMES DAILY 270 capsule 0   • glucose blood (ONE TOUCH ULTRA TEST) test strip test 6 times a day (Patient not taking: Reported on 8/30/2023) 600 each 0   • hydrochlorothiazide (HYDRODIURIL) 50 mg tablet Take 1 tablet (50 mg total) by mouth 2 (two) times a day 180 tablet 0   • HYDROcodone-acetaminophen (Norco) 5-325 mg per tablet Take 1 tablet by mouth every 6 (six) hours as needed for pain Max Daily Amount: 4 tablets 60 tablet 0   • hydrOXYzine HCL (ATARAX) 25 mg tablet TAKE ONE TABLET BY MOUTH NIGHTLY AT BEDTIME 90 tablet 0   • insulin glargine (Lantus) 100 units/mL subcutaneous injection Inject 3 Units under the skin daily 10 mL 3   • insulin regular (HumuLIN R,NovoLIN R) 100 units/mL injection Inject under the skin  a day  (Patient not taking: Reported on 8/30/2023)     • ketoconazole (NIZORAL) 2 % shampoo APPLY TO AFFECTED AREA ON SCALP EVERY OTHER DAY FOR 5 MINUTES THEN RINSE 120 mL 0   • levothyroxine 25 mcg tablet TAKE ONE TABLET BY MOUTH DAILY IN early MORNING (Patient not taking: Reported on 6/22/2023) 90 tablet 0   • linaCLOtide 290 MCG CAPS Take 1 capsule by mouth in the morning 90 capsule 5   • lisinopril (ZESTRIL) 5 mg tablet TAKE ONE TABLET BY MOUTH ONCE DAILY (Patient not taking: Reported on 8/30/2023) 90 tablet 0   • meloxicam (MOBIC) 15 mg tablet Take 1 tablet (15 mg total) by mouth daily as needed for moderate pain (Patient not taking: Reported on 7/13/2023) 30 tablet 3   • metFORMIN (GLUCOPHAGE-XR) 500 mg 24 hr tablet TAKE TWO TABLETS BY MOUTH TWICE DAILY 360 tablet 0   • methocarbamol (ROBAXIN) 500 mg tablet  (Patient not taking: Reported on 8/4/2023)     • metoprolol tartrate (LOPRESSOR) 25 mg tablet Take 25 mg by mouth daily     • NovoLOG FlexPen 100 units/mL injection pen 8 Units 3 (three) times a day with meals     • omega-3-acid ethyl esters (LOVAZA) 1 g capsule TAKE TWO CAPSULES BY MOUTH TWICE DAILY (Patient not taking: Reported on 8/30/2023) 360 capsule 0   • pantoprazole (PROTONIX) 40 mg tablet Take 1 tablet (40 mg total) by mouth daily before breakfast (Patient taking differently: Take 40 mg by mouth daily before breakfast As needed.) 30 tablet 5   • polyethylene glycol (GLYCOLAX) 17 GM/SCOOP powder At 5pm take 5mgx2 dulcolax. At 6pm mix 238 g miralax in 64oz gatorade. Drink 8oz glass every 5 mins until 32oz finished. Drink remaining as rec.  238 g 0   • Polyethylene Glycol 3350 POWD Take 0.4 g/kg by mouth daily     • potassium chloride (MICRO-K) 10 MEQ CR capsule Take 2 capsules (20 mEq total) by mouth daily 60 capsule 3   • pravastatin (PRAVACHOL) 40 mg tablet TAKE ONE TABLET BY MOUTH ONCE DAILY 90 tablet 0   • semaglutide, 0.25 or 0.5 mg/dose, (Ozempic, 0.25 or 0.5 MG/DOSE,) 2 mg/3 mL injection pen Inject 0.38 mL (0.2533 mg total) under the skin every 7 days for 28 days, THEN 0.75 mL (0.5 mg total) every 7 days. 9 mL 0   • sucralfate (CARAFATE) 1 g tablet Take 1 tablet (1 g total) by mouth 4 (four) times a day 120 tablet 0     No current facility-administered medications for this visit. Allergies: Allergies   Allergen Reactions   • Atorvastatin Other (See Comments)     Reaction Date: [id number];    • Furosemide      Reaction Date: [id number];    • Latex       Physical Exam:     There were no vitals taken for this visit. Physical Exam  Vitals and nursing note reviewed. Constitutional:       General: She is not in acute distress. Appearance: Normal appearance. She is not ill-appearing or diaphoretic. HENT:      Head: Normocephalic and atraumatic. Right Ear: Tympanic membrane, ear canal and external ear normal.      Left Ear: Tympanic membrane, ear canal and external ear normal.      Nose: Nose normal. No congestion or rhinorrhea. Mouth/Throat:      Mouth: Mucous membranes are moist.      Pharynx: Oropharynx is clear. No posterior oropharyngeal erythema. Eyes:      General:         Right eye: No discharge. Left eye: No discharge. Extraocular Movements: Extraocular movements intact. Conjunctiva/sclera: Conjunctivae normal.      Pupils: Pupils are equal, round, and reactive to light. Neck:      Vascular: No carotid bruit. Cardiovascular:      Rate and Rhythm: Normal rate and regular rhythm. Pulses: Normal pulses. Dorsalis pedis pulses are 2+ on the right side and 2+ on the left side. Posterior tibial pulses are 2+ on the right side and 2+ on the left side. Heart sounds: Normal heart sounds. No murmur heard. Pulmonary:      Effort: Pulmonary effort is normal. No respiratory distress. Breath sounds: Normal breath sounds. No wheezing or rhonchi.    Abdominal: General: Abdomen is flat. Bowel sounds are normal. There is no distension. Palpations: There is no mass. Tenderness: There is no abdominal tenderness. Musculoskeletal:         General: No swelling or deformity. Normal range of motion. Cervical back: Normal range of motion and neck supple. No rigidity. Right lower leg: No edema. Left lower leg: No edema. Feet:      Right foot:      Skin integrity: No ulcer, skin breakdown, erythema, warmth, callus or dry skin. Left foot:      Skin integrity: No ulcer, skin breakdown, erythema, warmth, callus or dry skin. Lymphadenopathy:      Cervical: No cervical adenopathy. Skin:     General: Skin is warm and dry. Capillary Refill: Capillary refill takes less than 2 seconds. Coloration: Skin is not jaundiced. Findings: No bruising, erythema or rash. Neurological:      General: No focal deficit present. Mental Status: She is alert and oriented to person, place, and time. Cranial Nerves: No cranial nerve deficit. Sensory: No sensory deficit. Gait: Gait normal.      Deep Tendon Reflexes: Reflexes normal.   Psychiatric:         Mood and Affect: Mood normal.         Behavior: Behavior normal.         Thought Content: Thought content normal.         Judgment: Judgment normal.      Patient's shoes and socks removed. Right Foot/Ankle   Right Foot Inspection  Skin Exam: skin normal and skin intact. No dry skin, no warmth, no callus, no erythema, no maceration, no abnormal color, no pre-ulcer, no ulcer and no callus. Toe Exam: ROM and strength within normal limits. No swelling, no tenderness, erythema and  no right toe deformity    Sensory   Vibration: diminished  Proprioception: diminished  Monofilament testing: diminished    Vascular  Capillary refills: < 3 seconds  The right DP pulse is 2+. The right PT pulse is 2+. Left Foot/Ankle  Left Foot Inspection  Skin Exam: skin normal and skin intact.  No dry skin, no warmth, no erythema, no maceration, normal color, no pre-ulcer, no ulcer and no callus. Toe Exam: ROM and strength within normal limits. No swelling, no tenderness, no erythema and no left toe deformity. Sensory   Vibration: diminished  Proprioception: diminished  Monofilament testing: diminished    Vascular  Capillary refills: < 3 seconds  The left DP pulse is 2+. The left PT pulse is 2+.      Assign Risk Category  No deformity present  Loss of protective sensation  Risk: [address] [facility], [address] [facility] [address]

## 2023-09-28 DIAGNOSIS — E11.3293 TYPE 2 DIABETES MELLITUS WITH BOTH EYES AFFECTED BY MILD NONPROLIFERATIVE RETINOPATHY WITHOUT MACULAR EDEMA, WITH LONG-TERM CURRENT USE OF INSULIN (HCC): ICD-10-CM

## 2023-09-28 DIAGNOSIS — Z79.4 TYPE 2 DIABETES MELLITUS WITH BOTH EYES AFFECTED BY MILD NONPROLIFERATIVE RETINOPATHY WITHOUT MACULAR EDEMA, WITH LONG-TERM CURRENT USE OF INSULIN (HCC): ICD-10-CM

## 2023-09-28 NOTE — TELEPHONE ENCOUNTER
Medication Refill Request     Name Leo Ansari  Dose/Frequency 1 tablet daily   Quantity 90  Verified pharmacy   [x]  Verified ordering Provider   [x]  Does patient have enough for the next 3 days? Yes [] No [x]      Pt needs 90 day supply, she is on her last pill. Please refill.

## 2023-10-09 DIAGNOSIS — E11.3293 TYPE 2 DIABETES MELLITUS WITH BOTH EYES AFFECTED BY MILD NONPROLIFERATIVE RETINOPATHY WITHOUT MACULAR EDEMA, WITH LONG-TERM CURRENT USE OF INSULIN (HCC): ICD-10-CM

## 2023-10-09 DIAGNOSIS — Z79.4 TYPE 2 DIABETES MELLITUS WITH BOTH EYES AFFECTED BY MILD NONPROLIFERATIVE RETINOPATHY WITHOUT MACULAR EDEMA, WITH LONG-TERM CURRENT USE OF INSULIN (HCC): ICD-10-CM

## 2023-10-09 RX ORDER — BLOOD-GLUCOSE SENSOR
EACH MISCELLANEOUS
Qty: 2 EACH | Refills: 0 | Status: SHIPPED | OUTPATIENT
Start: 2023-10-09

## 2023-10-11 ENCOUNTER — VBI (OUTPATIENT)
Dept: ADMINISTRATIVE | Facility: OTHER | Age: 63
End: 2023-10-11

## 2023-10-24 ENCOUNTER — OFFICE VISIT (OUTPATIENT)
Dept: FAMILY MEDICINE CLINIC | Facility: CLINIC | Age: 63
End: 2023-10-24
Payer: COMMERCIAL

## 2023-10-24 ENCOUNTER — VBI (OUTPATIENT)
Dept: ADMINISTRATIVE | Facility: OTHER | Age: 63
End: 2023-10-24

## 2023-10-24 VITALS
HEIGHT: 64 IN | BODY MASS INDEX: 29.02 KG/M2 | HEART RATE: 110 BPM | SYSTOLIC BLOOD PRESSURE: 130 MMHG | TEMPERATURE: 97.2 F | OXYGEN SATURATION: 97 % | WEIGHT: 170 LBS | DIASTOLIC BLOOD PRESSURE: 76 MMHG

## 2023-10-24 DIAGNOSIS — K29.00 ACUTE GASTRITIS WITHOUT HEMORRHAGE, UNSPECIFIED GASTRITIS TYPE: ICD-10-CM

## 2023-10-24 DIAGNOSIS — J06.9 UPPER RESPIRATORY TRACT INFECTION, UNSPECIFIED TYPE: Primary | ICD-10-CM

## 2023-10-24 PROCEDURE — 99214 OFFICE O/P EST MOD 30 MIN: CPT | Performed by: FAMILY MEDICINE

## 2023-10-24 RX ORDER — SUCRALFATE 1 G/1
1 TABLET ORAL 4 TIMES DAILY
Qty: 360 TABLET | Refills: 0 | Status: SHIPPED | OUTPATIENT
Start: 2023-10-24 | End: 2024-01-22

## 2023-10-24 RX ORDER — DEXTROMETHORPHAN HYDROBROMIDE AND PROMETHAZINE HYDROCHLORIDE 15; 6.25 MG/5ML; MG/5ML
5 SYRUP ORAL 4 TIMES DAILY PRN
Qty: 180 ML | Refills: 0 | Status: SHIPPED | OUTPATIENT
Start: 2023-10-24

## 2023-10-24 RX ORDER — CEFUROXIME AXETIL 500 MG/1
500 TABLET ORAL EVERY 12 HOURS SCHEDULED
Qty: 20 TABLET | Refills: 0 | Status: SHIPPED | OUTPATIENT
Start: 2023-10-24 | End: 2023-11-03

## 2023-10-24 RX ORDER — ALBUTEROL SULFATE 90 UG/1
2 AEROSOL, METERED RESPIRATORY (INHALATION) EVERY 6 HOURS PRN
Qty: 18 G | Refills: 5 | Status: SHIPPED | OUTPATIENT
Start: 2023-10-24

## 2023-10-24 NOTE — TELEPHONE ENCOUNTER
10/24/23 7:17 AM     [employer] SmartForm used to document caregap status.     Beverley Patel Pyelonephritis

## 2023-10-25 NOTE — PROGRESS NOTES
Patient ID: Tran Girard is a 58 y.o. female. HPI: 58 y. o.female presenting with 6 day history of sore throat, nasal congestion, ear pain,pnd and cough. Pt denies any fever, chills, or body aches. She tested negative for COVID and would like a refill on Carafate for chronic gastritis. SUBJECTIVE    Family History   Adopted: Yes   Family history unknown: Yes     Social History     Socioeconomic History    Marital status: /Civil Union     Spouse name: Not on file    Number of children: Not on file    Years of education: Not on file    Highest education level: Not on file   Occupational History    Occupation: Retired   Tobacco Use    Smoking status: Never     Passive exposure: Never    Smokeless tobacco: Never   Vaping Use    Vaping Use: Never used   Substance and Sexual Activity    Alcohol use: Never    Drug use: No    Sexual activity: Yes     Partners: Male     Birth control/protection: Male Sterilization     Comment: declines std/hiv testing   Other Topics Concern    Not on file   Social History Narrative    Personal Protective Equipment Seatbelts     Social Determinants of Health     Financial Resource Strain: Not on file   Food Insecurity: Not on file   Transportation Needs: Not on file   Physical Activity: Not on file   Stress: Not on file   Social Connections: Not on file   Intimate Partner Violence: Not on file   Housing Stability: Not on file     Past Medical History:   Diagnosis Date    Arthritis     Chronic kidney disease     CTS (carpal tunnel syndrome)     had surgery    Diabetes mellitus ([address] [facility])     Fibroid     dr Jimenez Stahl removed     2 para 2      x2 -, -    Hyperlipidemia      Past Surgical History:   Procedure Laterality Date    CARDIAC CATHETERIZATION  2023    Procedure: Cardiac catheterization;  Surgeon: Jamey Bennett DO;  [facility];   Service: Cardiology    CARDIAC CATHETERIZATION N/A 2023    Procedure: Cardiac Coronary Angiogram; Surgeon: Jamey Bennett DO;  [facility]; Service: Cardiology    CARDIAC CATHETERIZATION N/A 5/4/2023    Procedure: Cardiac other - DFR for Hemodaynamic Assessment;  Surgeon: Jamey Bennett DO;  [facility];   Service: Cardiology    Hugo Stahl removed    TOTAL ABDOMINAL HYSTERECTOMY      4/2010     Allergies   Allergen Reactions    Atorvastatin Other (See Comments)     Reaction Date: 25Apr2011;     Furosemide      Reaction Date: 25Apr2011;     Latex        Current Outpatient Medications:     albuterol (Ventolin HFA) 90 mcg/act inhaler, Inhale 2 puffs every 6 (six) hours as needed for wheezing, Disp: 18 g, Rfl: 5    Aspirin Low Dose 81 MG chewable tablet, , Disp: , Rfl:     cefuroxime (CEFTIN) 500 mg tablet, Take 1 tablet (500 mg total) by mouth every 12 (twelve) hours for 10 days, Disp: 20 tablet, Rfl: 0    Continuous Blood Gluc Sensor (FreeStyle Nataly 3 Sensor) MISC, Apply one sensor every 14 days, Disp: 2 each, Rfl: 0    diazepam (VALIUM) 5 mg tablet, take 1 tablet by mouth once a day at bedtime as needed for sleep, Disp: 30 tablet, Rfl: 0    famotidine (PEPCID) 20 mg tablet, Take 1 tablet (20 mg total) by mouth daily at bedtime, Disp: 30 tablet, Rfl: 3    gabapentin (NEURONTIN) 100 mg capsule, TAKE ONE CAPSULE BY MOUTH THREE TIMES DAILY, Disp: 270 capsule, Rfl: 0    glucose blood (ONE TOUCH ULTRA TEST) test strip, test 6 times a day, Disp: 600 each, Rfl: 0    hydrochlorothiazide (HYDRODIURIL) 50 mg tablet, Take 1 tablet (50 mg total) by mouth 2 (two) times a day, Disp: 180 tablet, Rfl: 0    HYDROcodone-acetaminophen (Norco) 5-325 mg per tablet, Take 1 tablet by mouth every 6 (six) hours as needed for pain Max Daily Amount: 4 tablets, Disp: 60 tablet, Rfl: 0    hydrOXYzine HCL (ATARAX) 25 mg tablet, TAKE ONE TABLET BY MOUTH NIGHTLY AT BEDTIME, Disp: 90 tablet, Rfl: 0    insulin glargine (Lantus) 100 units/mL subcutaneous injection, Inject 3 Units under the skin daily, Disp: 10 mL, Rfl: 3    ketoconazole (NIZORAL) 2 % shampoo, APPLY TO AFFECTED AREA ON SCALP EVERY OTHER DAY FOR 5 MINUTES THEN RINSE, Disp: 120 mL, Rfl: 0    meloxicam (MOBIC) 15 mg tablet, Take 1 tablet (15 mg total) by mouth daily as needed for moderate pain, Disp: 30 tablet, Rfl: 3    metFORMIN (GLUCOPHAGE-XR) 500 mg 24 hr tablet, TAKE TWO TABLETS BY MOUTH TWICE DAILY, Disp: 360 tablet, Rfl: 0    metoprolol tartrate (LOPRESSOR) 25 mg tablet, Take 25 mg by mouth daily, Disp: , Rfl:     NovoLOG FlexPen 100 units/mL injection pen, 8 Units 3 (three) times a day with meals, Disp: , Rfl:     omega-3-acid ethyl esters (LOVAZA) 1 g capsule, TAKE TWO CAPSULES BY MOUTH TWICE DAILY, Disp: 360 capsule, Rfl: 0    pantoprazole (PROTONIX) 40 mg tablet, Take 1 tablet (40 mg total) by mouth daily before breakfast (Patient taking differently: Take 40 mg by mouth daily before breakfast As needed.), Disp: 30 tablet, Rfl: 5    Polyethylene Glycol 3350 POWD, Take 0.4 g/kg by mouth daily, Disp: , Rfl:     pravastatin (PRAVACHOL) 40 mg tablet, TAKE ONE TABLET BY MOUTH ONCE DAILY, Disp: 90 tablet, Rfl: 0    promethazine-dextromethorphan (PHENERGAN-DM) 6.25-15 mg/5 mL oral syrup, Take 5 mL by mouth 4 (four) times a day as needed for cough, Disp: 180 mL, Rfl: 0    semaglutide, 1 mg/dose, (Ozempic) 4 mg/3 mL injection pen, Inject 0.75 mL (1 mg total) under the skin once a week, Disp: 9 mL, Rfl: 1    sucralfate (CARAFATE) 1 g tablet, Take 1 tablet (1 g total) by mouth 4 (four) times a day, Disp: 360 tablet, Rfl: 0    bumetanide (BUMEX) 1 mg tablet, , Disp: , Rfl:     Empagliflozin 25 MG TABS, Take 1 tablet (25 mg total) by mouth daily, Disp: 90 tablet, Rfl: 1    linaCLOtide 290 MCG CAPS, Take 1 capsule by mouth in the morning, Disp: 90 capsule, Rfl: 5    lisinopril (ZESTRIL) 5 mg tablet, TAKE ONE TABLET BY MOUTH ONCE DAILY (Patient not taking: Reported on 8/30/2023), Disp: 90 tablet, Rfl: 0    methocarbamol (ROBAXIN) 500 mg tablet, , Disp: , Rfl:     oxyCODONE-acetaminophen (Percocet) 5-325 mg per tablet, Take 1 tablet by mouth every 8 (eight) hours as needed for moderate pain Max Daily Amount: 3 tablets (Patient not taking: Reported on 10/24/2023), Disp: 40 tablet, Rfl: 0    potassium chloride (MICRO-K) 10 MEQ CR capsule, Take 2 capsules (20 mEq total) by mouth daily (Patient not taking: Reported on 10/24/2023), Disp: 60 capsule, Rfl: 3    Review of Systems  Constitutional:     Denies fever, chills ,fatigue ,weakness ,weight loss, weight gain     ENT: Denies loss of hearing ,nosebleed, nasal discharge ,hoarseness; but admits to nasal congestion , sore throat and ear pain. Pulmonary: Denies shortness of breath ,dyspnea on exertion, orthopnea ; but admits to cough and pnd  Cardiovascular:  Denies bradycardia , tachycardia  ,palpations, lower extremity edema leg, claudication  Breast:  Denies new or changing breast lumps ,nipple discharge ,nipple changes  Abdomen:  Denies abdominal pain , anorexia , indigestion, nausea, vomiting, constipation, diarrhea chronic epigastric burning   Musculoskeletal: Denies myalgias, arthralgias, joint swelling, joint stiffness , limb pain, limb swelling  Lymph: + swollen glands  Gu: Denies polyuria or dysuria  Skin: Denies skin rash, skin lesion, skin wound, itching, dry skin  Neuro: Denies headache, numbness, tingling, confusion, loss of consciousness, dizziness, vertigo  Psychiatric: Denies feelings of depression, suicidal ideation, anxiety, sleep disturbances    OBJECTIVE  /76   Pulse (!) 110   Temp (!) 97.2 °F (36.2 °C)   Ht 5' 4" (1.626 m)   Wt 77.1 kg (170 lb)   SpO2 97%   BMI 29.18 kg/m²   Constitutional:   NAD, well appearing and well nourished     ENT:   Conjunctiva and lids: no injection, edema, or discharge    Pupils and iris: NIKKI bilaterally  External inspection of ears and nose: normal without deformities or discharge. Otoscopic exam: Canals patent without erythema, tm dull and erythematous, effusions bilaterally   Nasal mucosa, septum and turbinates: + turbinate injection, nasal discharge         Oropharynx:  Moist mucosa, normal tongue and tonsils without lesions. + erythema and injection of posterior pharynx with pnd    Pulmonary:Respiratory effort normal rate and rhythm, no increased work of breathing. Auscultation of lungs:  Clear bilaterally with no adventitious breath sounds     Cardiovascular: regular rate and rhythm, S1 and S2, no murmur, no edema and/or varicosities of LE     Abdomen: Soft  with mild epigastric tenderness on palpation and + bowel sounds  No heptomegaly or splenomegaly     Gu: no suprapubic tenderness or CVA tenderness  Lymphatic: + anterior  cervical lymphadenopathy      Musculoskeletal:  Gait and station: Normal gait      Digits and nails normal without clubbing or cyanosis      Inspection/palpation of joints, bones, and muscles:  No joint tenderness, swelling, full active and passive range of motion       Skin: Normal skin turgor and no rashes      Neuro:    Normal reflexes  Pych:   alert and oriented to person, place and time     normal mood and affect      Assessment/Plan:Diagnoses and all orders for this visit:    Upper respiratory tract infection, unspecified type  -     cefuroxime (CEFTIN) 500 mg tablet; Take 1 tablet (500 mg total) by mouth every 12 (twelve) hours for 10 days  -     promethazine-dextromethorphan (PHENERGAN-DM) 6.25-15 mg/5 mL oral syrup; Take 5 mL by mouth 4 (four) times a day as needed for cough  -     albuterol (Ventolin HFA) 90 mcg/act inhaler; Inhale 2 puffs every 6 (six) hours as needed for wheezing    Acute gastritis without hemorrhage, unspecified gastritis type  Comments:  If the patient does not improve with Carafate in 1 week's time, she is to call. Orders:  -     sucralfate (CARAFATE) 1 g tablet;  Take 1 tablet (1 g total) by mouth 4 (four) times a day        Reviewed with patient plan to treat with above plan.     Patient instructed to call in 72 hours if not feeling better or if symptoms worsen

## 2023-11-08 DIAGNOSIS — I10 ESSENTIAL HYPERTENSION: ICD-10-CM

## 2023-11-08 DIAGNOSIS — F41.9 ANXIETY: ICD-10-CM

## 2023-11-08 DIAGNOSIS — E78.00 HYPERCHOLESTEROLEMIA: ICD-10-CM

## 2023-11-08 DIAGNOSIS — Z79.4 TYPE 2 DIABETES MELLITUS WITH BOTH EYES AFFECTED BY MILD NONPROLIFERATIVE RETINOPATHY WITHOUT MACULAR EDEMA, WITH LONG-TERM CURRENT USE OF INSULIN (HCC): ICD-10-CM

## 2023-11-08 DIAGNOSIS — E11.3293 TYPE 2 DIABETES MELLITUS WITH BOTH EYES AFFECTED BY MILD NONPROLIFERATIVE RETINOPATHY WITHOUT MACULAR EDEMA, WITH LONG-TERM CURRENT USE OF INSULIN (HCC): ICD-10-CM

## 2023-11-08 DIAGNOSIS — E13.9 DIABETES 1.5, MANAGED AS TYPE 2 (HCC): ICD-10-CM

## 2023-11-08 DIAGNOSIS — R60.9 EDEMA, UNSPECIFIED TYPE: ICD-10-CM

## 2023-11-08 RX ORDER — METFORMIN HYDROCHLORIDE 500 MG/1
1000 TABLET, EXTENDED RELEASE ORAL 2 TIMES DAILY
Qty: 360 TABLET | Refills: 0 | Status: SHIPPED | OUTPATIENT
Start: 2023-11-08

## 2023-11-08 RX ORDER — PRAVASTATIN SODIUM 40 MG
40 TABLET ORAL DAILY
Qty: 90 TABLET | Refills: 0 | Status: SHIPPED | OUTPATIENT
Start: 2023-11-08

## 2023-11-08 RX ORDER — BLOOD-GLUCOSE SENSOR
EACH MISCELLANEOUS
Qty: 4 EACH | Refills: 3 | Status: SHIPPED | OUTPATIENT
Start: 2023-11-08

## 2023-11-08 RX ORDER — LISINOPRIL 5 MG/1
5 TABLET ORAL DAILY
Qty: 90 TABLET | Refills: 0 | Status: SHIPPED | OUTPATIENT
Start: 2023-11-08

## 2023-11-08 RX ORDER — HYDROCHLOROTHIAZIDE 50 MG/1
50 TABLET ORAL 2 TIMES DAILY
Qty: 180 TABLET | Refills: 0 | Status: SHIPPED | OUTPATIENT
Start: 2023-11-08

## 2023-11-08 RX ORDER — OMEGA-3-ACID ETHYL ESTERS 1 G/1
2 CAPSULE, LIQUID FILLED ORAL 2 TIMES DAILY
Qty: 360 CAPSULE | Refills: 0 | Status: SHIPPED | OUTPATIENT
Start: 2023-11-08

## 2023-11-08 RX ORDER — HYDROXYZINE HYDROCHLORIDE 25 MG/1
25 TABLET, FILM COATED ORAL
Qty: 90 TABLET | Refills: 0 | Status: SHIPPED | OUTPATIENT
Start: 2023-11-08

## 2023-11-10 DIAGNOSIS — E13.9 DIABETES 1.5, MANAGED AS TYPE 2 (HCC): ICD-10-CM

## 2023-11-10 DIAGNOSIS — K59.04 CHRONIC IDIOPATHIC CONSTIPATION: ICD-10-CM

## 2023-11-10 DIAGNOSIS — K21.9 GASTROESOPHAGEAL REFLUX DISEASE WITHOUT ESOPHAGITIS: ICD-10-CM

## 2023-11-10 DIAGNOSIS — M48.061 SPINAL STENOSIS OF LUMBAR REGION, UNSPECIFIED WHETHER NEUROGENIC CLAUDICATION PRESENT: ICD-10-CM

## 2023-11-10 RX ORDER — PANTOPRAZOLE SODIUM 40 MG/1
40 TABLET, DELAYED RELEASE ORAL
Qty: 30 TABLET | Refills: 0 | Status: SHIPPED | OUTPATIENT
Start: 2023-11-10

## 2023-11-10 RX ORDER — OXYCODONE HYDROCHLORIDE AND ACETAMINOPHEN 5; 325 MG/1; MG/1
1 TABLET ORAL EVERY 8 HOURS PRN
Qty: 40 TABLET | Refills: 0 | Status: SHIPPED | OUTPATIENT
Start: 2023-11-10

## 2023-11-10 NOTE — TELEPHONE ENCOUNTER
1 [id number] 09/14/2023 09/14/2023 ACETAMINOPHEN 325 MG / oxyCODONE HYDROCHLORIDE 5 MG ORAL TABLET (Tablet) 40.0 13 5.0 MG/325.0 MG 23.08 [facility] Other 0 / 0 PA    1 [id number] 04/14/2023 04/14/2023 ACETAMINOPHEN 325 MG / HYDROcodone BITARTRATE 5 MG ORAL TABLET (Tablet) 42.0 10 5.0 MG/325.0 MG 21.0 [facility] Other 0 / 0 PA    1 [id number] 02/20/2023 02/20/2023 diazePAM (Tablet) 30.0 30 5 MG NA [facility] Other 0 / 0

## 2023-11-10 NOTE — TELEPHONE ENCOUNTER
1 [id number] 09/14/2023 09/14/2023 ACETAMINOPHEN 325 MG / oxyCODONE HYDROCHLORIDE 5 MG ORAL TABLET (Tablet) 40.0 13 5.0 MG/325.0 MG 23.08 YESY YOLIE [facility] Other 0 / 0 PA    1 [id number] 04/14/2023 04/14/2023 ACETAMINOPHEN 325 MG / HYDROcodone BITARTRATE 5 MG ORAL TABLET (Tablet) 42.0 10 5.0 MG/325.0 MG 21.0 YESYPAUL URBANO [facility] Other 0 / 0 PA    1 [id number] 02/20/2023 02/20/2023 diazePAM (Tablet) 30.0 30 5 MG NA YESY URBANO

## 2023-11-20 ENCOUNTER — TELEPHONE (OUTPATIENT)
Dept: FAMILY MEDICINE CLINIC | Facility: CLINIC | Age: 63
End: 2023-11-20

## 2023-11-20 NOTE — TELEPHONE ENCOUNTER
Patient made an appointment with you for 11/22 for cough and loss of voice. When I called to reschedule, she asked if you would consider sending in meds to the pharmacy     She states that this is still the same sickness lingering from when you saw her on 10/26.  She states she did start to feel better, but then it came back    She is c/o loss of voice, R ear pain and coughing up green mucus    She denies fever, chills, body aches, sob     She is concerned about infection since she had open heart surgery     Please advise

## 2023-11-21 ENCOUNTER — TELEPHONE (OUTPATIENT)
Dept: FAMILY MEDICINE CLINIC | Facility: CLINIC | Age: 63
End: 2023-11-21

## 2023-11-21 DIAGNOSIS — J06.9 UPPER RESPIRATORY TRACT INFECTION, UNSPECIFIED TYPE: Primary | ICD-10-CM

## 2023-11-21 RX ORDER — BENZONATATE 200 MG/1
200 CAPSULE ORAL 3 TIMES DAILY PRN
Qty: 30 CAPSULE | Refills: 0 | Status: SHIPPED | OUTPATIENT
Start: 2023-11-21

## 2023-11-21 RX ORDER — AZITHROMYCIN 250 MG/1
TABLET, FILM COATED ORAL
Qty: 6 TABLET | Refills: 0 | Status: SHIPPED | OUTPATIENT
Start: 2023-11-21 | End: 2023-11-25

## 2023-11-21 NOTE — TELEPHONE ENCOUNTER
Please Initiate Prior Auth,    CoverMyMeds  Oxycodone-Acetaminophen 5-325 MG Tablets    Key; [id number]

## 2023-11-21 NOTE — TELEPHONE ENCOUNTER
Pt called again about this. She said someone called from the office but did not leave a message. I do not see any indication that this was addressed. Please, let pt know if something can be sent.

## 2023-11-21 NOTE — TELEPHONE ENCOUNTER
PA for oxycodone-acetaminophen submitted via [employer]. Clinical questions answered. Office notes sent. Awaiting determination.

## 2023-11-29 ENCOUNTER — TELEPHONE (OUTPATIENT)
Age: 63
End: 2023-11-29

## 2023-11-29 NOTE — TELEPHONE ENCOUNTER
PT WAS CLEARED BY CARDIO FOR 12/06/23. PT IS CHECKING WITH CARDIO TO ASK ABOUT LOCATION AND ALSO IF SHE NEEDS ANTIBIOTICS.  PT IS ALSO CALLING INSURANCE TO CHECK ON ANY AUTHORIZATION NEEDED   PT IS ALSO DIABETIC

## 2023-11-29 NOTE — TELEPHONE ENCOUNTER
11/29/23  Screened by: Kamala Calderon  Referring Provider     Pre- Screening: There is no height or weight on file to calculate BMI. Has patient been referred for a routine screening Colonoscopy? yes  Is the patient between 43-73 years old? yes      Previous Colonoscopy yes   If yes:    Date: 2010    Facility:     Reason:       SCHEDULING STAFF: If the patient is between 39yrs-51yrs, please advise patient to confirm benefits/coverage with their insurance company for a routine screening colonoscopy, some insurance carriers will only cover at [address] [place] [address] or older. If the patient is over 66years old, please schedule an office visit. Does the patient want to see a Gastroenterologist prior to their procedure OR are they having any GI symptoms? yes    Has the patient been hospitalized or had abdominal surgery in the past 6 months? yes    Does the patient use supplemental oxygen? no    Does the patient take Coumadin, Lovenox, Plavix, Elliquis, Xarelto, or other blood thinning medication? no    Has the patient had a stroke, cardiac event, or stent placed in the past year? yes    SCHEDULING STAFF: If patient answers NO to above questions, then schedule procedure. If patient answers YES to above questions, then schedule office appointment. If patient is between 45yrs - 49yrs, please advise patient that we will have to confirm benefits & coverage with their insurance company for a routine screening colonoscopy.

## 2023-11-29 NOTE — TELEPHONE ENCOUNTER
Scheduled date of EGD/colonoscopy (as of today):12/26/23  Physician performing EGD/colonoscopy:Dr. Dawit Newsome  Location of EGD/colonoscopy:[facility]  Desired bowel prep reviewed with patient:SHIRAZ/CARLYLE SENT TO [facility]  Instructions reviewed with patient by:CHU  Clearances:    PT WAS CLEARED BY CARDIOLOGIST FOR AFTER 12/6/23

## 2023-11-30 ENCOUNTER — APPOINTMENT (OUTPATIENT)
Dept: LAB | Facility: MEDICAL CENTER | Age: 63
End: 2023-11-30
Payer: COMMERCIAL

## 2023-11-30 DIAGNOSIS — Z79.4 TYPE 2 DIABETES MELLITUS WITH BOTH EYES AFFECTED BY MILD NONPROLIFERATIVE RETINOPATHY WITHOUT MACULAR EDEMA, WITH LONG-TERM CURRENT USE OF INSULIN (HCC): ICD-10-CM

## 2023-11-30 DIAGNOSIS — E11.3293 TYPE 2 DIABETES MELLITUS WITH BOTH EYES AFFECTED BY MILD NONPROLIFERATIVE RETINOPATHY WITHOUT MACULAR EDEMA, WITH LONG-TERM CURRENT USE OF INSULIN (HCC): ICD-10-CM

## 2023-11-30 DIAGNOSIS — I10 PRIMARY HYPERTENSION: ICD-10-CM

## 2023-11-30 DIAGNOSIS — E78.2 MIXED HYPERLIPIDEMIA: ICD-10-CM

## 2023-11-30 LAB
ALBUMIN SERPL BCP-MCNC: 4.1 G/DL (ref 3.5–5)
ALP SERPL-CCNC: 101 U/L (ref 34–104)
ALT SERPL W P-5'-P-CCNC: 24 U/L (ref 7–52)
ANION GAP SERPL CALCULATED.3IONS-SCNC: 8 MMOL/L
AST SERPL W P-5'-P-CCNC: 23 U/L (ref 13–39)
BILIRUB SERPL-MCNC: 0.25 MG/DL (ref 0.2–1)
BUN SERPL-MCNC: 15 MG/DL (ref 5–25)
CALCIUM SERPL-MCNC: 10 MG/DL (ref 8.4–10.2)
CHLORIDE SERPL-SCNC: 101 MMOL/L (ref 96–108)
CHOLEST SERPL-MCNC: 174 MG/DL
CO2 SERPL-SCNC: 31 MMOL/L (ref 21–32)
CREAT SERPL-MCNC: 0.74 MG/DL (ref 0.6–1.3)
EST. AVERAGE GLUCOSE BLD GHB EST-MCNC: 243 MG/DL
GFR SERPL CREATININE-BSD FRML MDRD: 85 ML/MIN/1.73SQ M
GLUCOSE P FAST SERPL-MCNC: 165 MG/DL (ref 65–99)
HBA1C MFR BLD: 10.1 %
HDLC SERPL-MCNC: 36 MG/DL
LDLC SERPL CALC-MCNC: 102 MG/DL (ref 0–100)
POTASSIUM SERPL-SCNC: 4.2 MMOL/L (ref 3.5–5.3)
PROT SERPL-MCNC: 6.8 G/DL (ref 6.4–8.4)
SODIUM SERPL-SCNC: 140 MMOL/L (ref 135–147)
TRIGL SERPL-MCNC: 178 MG/DL

## 2023-11-30 PROCEDURE — 36415 COLL VENOUS BLD VENIPUNCTURE: CPT

## 2023-11-30 PROCEDURE — 83036 HEMOGLOBIN GLYCOSYLATED A1C: CPT

## 2023-11-30 PROCEDURE — 80053 COMPREHEN METABOLIC PANEL: CPT

## 2023-11-30 PROCEDURE — 3046F HEMOGLOBIN A1C LEVEL >9.0%: CPT | Performed by: FAMILY MEDICINE

## 2023-11-30 PROCEDURE — 80061 LIPID PANEL: CPT

## 2023-12-01 ENCOUNTER — TELEPHONE (OUTPATIENT)
Dept: FAMILY MEDICINE CLINIC | Facility: CLINIC | Age: 63
End: 2023-12-01

## 2023-12-01 DIAGNOSIS — Z79.4 TYPE 2 DIABETES MELLITUS WITH BOTH EYES AFFECTED BY MILD NONPROLIFERATIVE RETINOPATHY WITHOUT MACULAR EDEMA, WITH LONG-TERM CURRENT USE OF INSULIN (HCC): Primary | ICD-10-CM

## 2023-12-01 DIAGNOSIS — E11.3293 TYPE 2 DIABETES MELLITUS WITH BOTH EYES AFFECTED BY MILD NONPROLIFERATIVE RETINOPATHY WITHOUT MACULAR EDEMA, WITH LONG-TERM CURRENT USE OF INSULIN (HCC): Primary | ICD-10-CM

## 2023-12-01 NOTE — TELEPHONE ENCOUNTER
Patient returned call and Rn reviewed provider's question on medication change. Patient is agreeable to [place] [facility] Reports she missed last dose of Ozempic due to stomach pain. Please follow up with patient.

## 2023-12-01 NOTE — TELEPHONE ENCOUNTER
Patient wants you to know that her current cardiologist wants her to start Zetia along with her Pravastatin 40 mg. She said that she is concerned that this medication will cause a reaction. She wants to know why she can't just increase the Pravastatin. She is seeing you on Wed.

## 2023-12-01 NOTE — TELEPHONE ENCOUNTER
----- Message from Sierra Anne DO sent at 12/1/2023  6:59 AM EST -----  His hgba1c is high at 10.1. I'd like to change her to [facility] and stop her ozempic because I have many more dosing options. Is she okay with this:?

## 2023-12-04 ENCOUNTER — NURSE TRIAGE (OUTPATIENT)
Age: 63
End: 2023-12-04

## 2023-12-04 ENCOUNTER — TELEPHONE (OUTPATIENT)
Age: 63
End: 2023-12-04

## 2023-12-04 NOTE — TELEPHONE ENCOUNTER
Pt calling because she has a cold. Advised she should probably reschedule. She wanted to know why.  I transferred her to [facility]

## 2023-12-04 NOTE — TELEPHONE ENCOUNTER
Patient calling in, she has had a cold for a few months and on and off antibx. She reports today is having increase in cold symptoms. Cough with mucus build up, wheezing with exhaling, sounds hoarse on the phone. No fevers. Pt is scheduled for EGD/ colonoscopy tomorrow. I advised her this will need to be rescheduled. She also received a call from her cardiologist that they would not clear her for procedures. I advised pt we should reschedule. Please call pt to reschedule procedures and reach out to her cardiologist for clearance. Thank you. Reason for Disposition   Information only question and nurse able to answer    Answer Assessment - Initial Assessment Questions  1. REASON FOR CALL or QUESTION: "What is your reason for calling today?" or "How can I best help you?" or "What question do you have that I can help answer?"      Patient calling in, she has had a cold for a few months and on and off antibx. She reports today is having increase in cold symptoms. Cough with mucus build up, wheezing with exhaling, sounds hoarse on the phone. No fevers. Protocols used:  Information Only Call - No Triage-ADULT-[place]

## 2023-12-04 NOTE — TELEPHONE ENCOUNTER
Called pt to reschedule, pt said she would prefer to see family dr on Wednesday and then call back to reschedule since this cold is lingering. Will look out for call back.  Pt is diabetic, and clearance needs to be done regarding cardiologist.

## 2023-12-05 ENCOUNTER — TELEPHONE (OUTPATIENT)
Dept: OTHER | Facility: OTHER | Age: 63
End: 2023-12-05

## 2023-12-05 NOTE — TELEPHONE ENCOUNTER
PA for mounjaro submitted via [employer]. Clinical questions answered. Office notes sent. Awaiting determination.

## 2023-12-05 NOTE — TELEPHONE ENCOUNTER
Shamar from [employer] called, stated that [facility] injection Rx requires Coverage Acception due to the Rx is not Formulary. Ph: [phone].

## 2023-12-06 ENCOUNTER — OFFICE VISIT (OUTPATIENT)
Dept: FAMILY MEDICINE CLINIC | Facility: CLINIC | Age: 63
End: 2023-12-06
Payer: COMMERCIAL

## 2023-12-06 VITALS
HEIGHT: 64 IN | DIASTOLIC BLOOD PRESSURE: 78 MMHG | BODY MASS INDEX: 29.5 KG/M2 | HEART RATE: 78 BPM | TEMPERATURE: 97.5 F | OXYGEN SATURATION: 98 % | SYSTOLIC BLOOD PRESSURE: 110 MMHG | WEIGHT: 172.8 LBS

## 2023-12-06 DIAGNOSIS — I50.30 HEART FAILURE WITH PRESERVED EJECTION FRACTION, UNSPECIFIED HF CHRONICITY (HCC): ICD-10-CM

## 2023-12-06 DIAGNOSIS — E78.00 HYPERCHOLESTEROLEMIA: ICD-10-CM

## 2023-12-06 DIAGNOSIS — E11.42 DIABETIC PERIPHERAL NEUROPATHY (HCC): ICD-10-CM

## 2023-12-06 DIAGNOSIS — Z79.4 TYPE 2 DIABETES MELLITUS WITH BOTH EYES AFFECTED BY MILD NONPROLIFERATIVE RETINOPATHY WITHOUT MACULAR EDEMA, WITH LONG-TERM CURRENT USE OF INSULIN (HCC): ICD-10-CM

## 2023-12-06 DIAGNOSIS — J01.90 ACUTE SINUSITIS, RECURRENCE NOT SPECIFIED, UNSPECIFIED LOCATION: Primary | ICD-10-CM

## 2023-12-06 DIAGNOSIS — E11.3293 TYPE 2 DIABETES MELLITUS WITH BOTH EYES AFFECTED BY MILD NONPROLIFERATIVE RETINOPATHY WITHOUT MACULAR EDEMA, WITH LONG-TERM CURRENT USE OF INSULIN (HCC): ICD-10-CM

## 2023-12-06 PROBLEM — D68.9 COAGULOPATHY (HCC): Status: RESOLVED | Noted: 2023-12-06 | Resolved: 2023-12-06

## 2023-12-06 PROBLEM — D68.9 COAGULOPATHY (HCC): Status: ACTIVE | Noted: 2023-12-06

## 2023-12-06 PROCEDURE — 99215 OFFICE O/P EST HI 40 MIN: CPT | Performed by: FAMILY MEDICINE

## 2023-12-06 RX ORDER — INSULIN DETEMIR 100 [IU]/ML
30 INJECTION, SOLUTION SUBCUTANEOUS EVERY 12 HOURS SCHEDULED
Qty: 45 ML | Refills: 3 | Status: SHIPPED | OUTPATIENT
Start: 2023-12-06 | End: 2024-03-05

## 2023-12-06 RX ORDER — ROSUVASTATIN CALCIUM 10 MG/1
10 TABLET, COATED ORAL DAILY
Qty: 30 TABLET | Refills: 5 | Status: SHIPPED | OUTPATIENT
Start: 2023-12-06 | End: 2023-12-07

## 2023-12-06 RX ORDER — CEFUROXIME AXETIL 500 MG/1
500 TABLET ORAL EVERY 12 HOURS SCHEDULED
Qty: 20 TABLET | Refills: 0 | Status: SHIPPED | OUTPATIENT
Start: 2023-12-06 | End: 2023-12-16

## 2023-12-07 DIAGNOSIS — E78.00 HYPERCHOLESTEROLEMIA: Primary | ICD-10-CM

## 2023-12-07 RX ORDER — ROSUVASTATIN CALCIUM 20 MG/1
20 TABLET, COATED ORAL DAILY
Qty: 90 TABLET | Refills: 3 | Status: SHIPPED | OUTPATIENT
Start: 2023-12-07

## 2023-12-08 DIAGNOSIS — E11.3293 TYPE 2 DIABETES MELLITUS WITH BOTH EYES AFFECTED BY MILD NONPROLIFERATIVE RETINOPATHY WITHOUT MACULAR EDEMA, WITH LONG-TERM CURRENT USE OF INSULIN (HCC): Primary | ICD-10-CM

## 2023-12-08 DIAGNOSIS — I10 ESSENTIAL HYPERTENSION: ICD-10-CM

## 2023-12-08 DIAGNOSIS — Z79.4 TYPE 2 DIABETES MELLITUS WITH BOTH EYES AFFECTED BY MILD NONPROLIFERATIVE RETINOPATHY WITHOUT MACULAR EDEMA, WITH LONG-TERM CURRENT USE OF INSULIN (HCC): Primary | ICD-10-CM

## 2023-12-08 NOTE — PROGRESS NOTES
Patient ID: Selwyn Dumont is a 58 y.o. female. HPI: 58 y. o.female presents for follow up of niddm, diabetic retinopathy, diabetic peripheral neuropathy, CHF and hypercholesterolemia. Hgba1c is  10.1  and patient's issues are well controlled. Patient deneis any dyspnea, chest pain or palpitations. We discussed switching her [place] [facility] as it has a bigger range of dosing so we can control her hemoglobin A1c. Her peripheral neuropathy stable on gabapentin and since her lipid panel is not favorable we will change her over to Crestor from pravastatin therapy. Currently for the last 1 week she has had sinus pain pressure nasal congestion postnasal drip and a nonproductive cough. She tested negative for COVID and denies fever chills or myalgia.     SUBJECTIVE    Family History   Adopted: Yes   Family history unknown: Yes     Social History     Socioeconomic History    Marital status: /Civil Union     Spouse name: Not on file    Number of children: Not on file    Years of education: Not on file    Highest education level: Not on file   Occupational History    Occupation: Retired   Tobacco Use    Smoking status: Never     Passive exposure: Never    Smokeless tobacco: Never   Vaping Use    Vaping Use: Never used   Substance and Sexual Activity    Alcohol use: Never    Drug use: No    Sexual activity: Yes     Partners: Male     Birth control/protection: Male Sterilization     Comment: declines std/hiv testing   Other Topics Concern    Not on file   Social History Narrative    Personal Protective Equipment Seatbelts     Social Determinants of Health     Financial Resource Strain: Not on file   Food Insecurity: Not on file   Transportation Needs: Not on file   Physical Activity: Not on file   Stress: Not on file   Social Connections: Not on file   Intimate Partner Violence: Not on file   Housing Stability: Not on file     Past Medical History:   Diagnosis Date    Arthritis     Chronic kidney disease CTS (carpal tunnel syndrome)     had surgery    Diabetes mellitus ([address])     Fibroid     dr Duke Knowles removed     2 para 2      x2 -F, -F    Hyperlipidemia      Past Surgical History:   Procedure Laterality Date    CARDIAC CATHETERIZATION  2023    Procedure: Cardiac catheterization;  Surgeon: Isaac Rouse DO;  Location: [facility]; Service: Cardiology    CARDIAC CATHETERIZATION N/A 2023    Procedure: Cardiac Coronary Angiogram;  Surgeon: Isaac Rouse DO;  Location: [facility]; Service: Cardiology    CARDIAC CATHETERIZATION N/A 2023    Procedure: Cardiac other - DFR for Hemodaynamic Assessment;  Surgeon: Isaac Rouse DO;  Location: [facility];   Service: Cardiology    Javier Knowles removed    TOTAL ABDOMINAL HYSTERECTOMY      2010     Allergies   Allergen Reactions    Atorvastatin Other (See Comments)     Reaction Date: 2011;     Furosemide      Reaction Date: 2011;     Latex        Current Outpatient Medications:     albuterol (Ventolin HFA) 90 mcg/act inhaler, Inhale 2 puffs every 6 (six) hours as needed for wheezing, Disp: 18 g, Rfl: 5    Aspirin Low Dose 81 MG chewable tablet, , Disp: , Rfl:     benzonatate (TESSALON) 200 MG capsule, Take 1 capsule (200 mg total) by mouth 3 (three) times a day as needed for cough, Disp: 30 capsule, Rfl: 0    bumetanide (BUMEX) 1 mg tablet, , Disp: , Rfl:     cefuroxime (CEFTIN) 500 mg tablet, Take 1 tablet (500 mg total) by mouth every 12 (twelve) hours for 10 days, Disp: 20 tablet, Rfl: 0    Continuous Blood Gluc Sensor (FreeStyle Nataly 3 Sensor) MISC, APPLY ONE SENSOR EVERY 14 DAYS, Disp: 4 each, Rfl: 3    diazepam (VALIUM) 5 mg tablet, take 1 tablet by mouth once a day at bedtime as needed for sleep, Disp: 30 tablet, Rfl: 0    Empagliflozin 25 MG TABS, Take 1 tablet (25 mg total) by mouth daily, Disp: 90 tablet, Rfl: 1    famotidine (PEPCID) 20 mg tablet, Take 1 tablet (20 mg total) by mouth daily at bedtime, Disp: 30 tablet, Rfl: 3    gabapentin (NEURONTIN) 100 mg capsule, TAKE ONE CAPSULE BY MOUTH THREE TIMES DAILY, Disp: 270 capsule, Rfl: 0    glucose blood (ONE TOUCH ULTRA TEST) test strip, test 6 times a day, Disp: 600 each, Rfl: 0    hydrochlorothiazide (HYDRODIURIL) 50 mg tablet, take one tablet by mouth two times a day, Disp: 180 tablet, Rfl: 0    HYDROcodone-acetaminophen (Norco) 5-325 mg per tablet, Take 1 tablet by mouth every 6 (six) hours as needed for pain Max Daily Amount: 4 tablets, Disp: 60 tablet, Rfl: 0    hydrOXYzine HCL (ATARAX) 25 mg tablet, TAKE 1 TABLET BY MOUTH AT BEDTIME, Disp: 90 tablet, Rfl: 0    insulin detemir (Levemir FlexTouch) 100 Units/mL injection pen, Inject 30 Units under the skin every 12 (twelve) hours, Disp: 45 mL, Rfl: 3    insulin glargine (Lantus) 100 units/mL subcutaneous injection, Inject 3 Units under the skin daily, Disp: 10 mL, Rfl: 3    ketoconazole (NIZORAL) 2 % shampoo, APPLY TO AFFECTED AREA ON SCALP EVERY OTHER DAY FOR 5 MINUTES THEN RINSE, Disp: 120 mL, Rfl: 0    linaCLOtide 290 MCG CAPS, Take 1 capsule by mouth in the morning, Disp: 90 capsule, Rfl: 0    lisinopril (ZESTRIL) 5 mg tablet, TAKE 1 TABLET BY MOUTH DAILY, Disp: 90 tablet, Rfl: 0    meloxicam (MOBIC) 15 mg tablet, Take 1 tablet (15 mg total) by mouth daily as needed for moderate pain, Disp: 30 tablet, Rfl: 3    metFORMIN (GLUCOPHAGE-XR) 500 mg 24 hr tablet, TAKE 2 TABLETS BY MOUTH TWICE DAILY, Disp: 360 tablet, Rfl: 0    metoprolol tartrate (LOPRESSOR) 25 mg tablet, Take 25 mg by mouth daily, Disp: , Rfl:     NovoLOG FlexPen 100 units/mL injection pen, 8 Units 3 (three) times a day with meals, Disp: , Rfl:     omega-3-acid ethyl esters (LOVAZA) 1 g capsule, TAKE 2 CAPSULES BY MOUTH TWICE DAILY, Disp: 360 capsule, Rfl: 0    oxyCODONE-acetaminophen (Percocet) 5-325 mg per tablet, Take 1 tablet by mouth every 8 (eight) hours as needed for moderate pain Max Daily Amount: 3 tablets, Disp: 40 tablet, Rfl: 0    pantoprazole (PROTONIX) 40 mg tablet, Take 1 tablet (40 mg total) by mouth daily before breakfast, Disp: 30 tablet, Rfl: 0    potassium chloride (MICRO-K) 10 MEQ CR capsule, Take 2 capsules (20 mEq total) by mouth daily, Disp: 60 capsule, Rfl: 3    promethazine-dextromethorphan (PHENERGAN-DM) 6.25-15 mg/5 mL oral syrup, Take 5 mL by mouth 4 (four) times a day as needed for cough, Disp: 180 mL, Rfl: 0    rosuvastatin (CRESTOR) 20 MG tablet, Take 1 tablet (20 mg total) by mouth daily, Disp: 90 tablet, Rfl: 3    sucralfate (CARAFATE) 1 g tablet, Take 1 tablet (1 g total) by mouth 4 (four) times a day, Disp: 360 tablet, Rfl: 0    Review of Systems  Constitutional:     Denies fever, chills ,fatigue ,weakness ,weight loss, weight gain     ENT: Denies earache ,loss of hearing ,nosebleed, + sinus pain and pressure +nasal congestion ,+sore throat ,+hoarseness  Pulmonary: Denies shortness of breath   ,dyspnea on exertion, orthopnea  ,+PND + dry cough without wheezing  Cardiovascular:  Denies bradycardia , tachycardia  ,palpations, lower extremity edema leg, claudication  Breast:  Denies new or changing breast lumps ,nipple discharge ,nipple changes  Abdomen:  Denies abdominal pain , anorexia , indigestion, nausea, vomiting, constipation, diarrhea  Musculoskeletal: Denies myalgias, arthralgias, joint swelling, joint stiffness , limb pain, limb swelling  Gu: denies dysuria, polyuria  Skin: Denies skin rash, skin lesion, skin wound, itching, dry skin  Neuro: Denies headache, numbness,confusion, loss of consciousness, dizziness, vertigo positive stinging and tingling of feet bilaterally  Psychiatric: Denies feelings of depression, suicidal ideation, anxiety, sleep disturbances    OBJECTIVE  /78 (BP Location: Left arm, Patient Position: Sitting, Cuff Size: Adult)   Pulse 78   Temp 97.5 °F (36.4 °C)   Ht 5' 4" (1.626 m)   Wt 78.4 kg (172 lb 12.8 oz) SpO2 98%   BMI 29.66 kg/m²   Constitutional:   NAD, well appearing and well nourished      ENT:   Conjunctiva and lids: no injection, edema, or discharge     Pupils and iris: NIKKI bilaterally    External inspection of ears and nose: normal without deformities or discharge. Otoscopic exam: Canals patent with erythema. of tm bilaterally        Nasal mucosa, septum and turbinates: + turbinate injection without nasal discharge         Oropharynx:  Moist mucosa, normal tongue and tonsils without lesions. + erythema  + pnd      Pulmonary:Respiratory effort normal rate and rhythm, no increased work of breathing. Auscultation of lungs:  Clear bilaterally with no adventitious breath sounds       Cardiovascular: regular rate and rhythm, S1 and S2, no murmur, no edema and/or varicosities of LE      Abdomen: Soft and non-distended     Positive bowel sounds      No heptomegaly or splenomegaly      Gu: no suprapubic tenderness or CVA tenderness, no urethral discharge  Lymphatic:  +anterior cervical lymphadenopathy         Musculoskeletal:  Gait and station: Normal gait      Digits and nails normal without clubbing or cyanosis       Inspection/palpation of joints, bones, and muscles:  No joint tenderness, swelling, full active and passive range of motion       Skin: Normal skin turgor and no rashes      Neuro:    Normal reflexes     Psych:   alert and oriented to person, place and time     normal mood and affect   Patient's shoes and socks removed. Right Foot/Ankle   Right Foot Inspection  Skin Exam: skin normal and skin intact. No dry skin, no warmth, no callus, no erythema, no maceration, no abnormal color, no pre-ulcer, no ulcer and no callus. Toe Exam: ROM and strength within normal limits.  No swelling, no tenderness, erythema and  no right toe deformity    Sensory   Vibration: diminished  Proprioception: diminished  Monofilament testing: diminished    Vascular  Capillary refills: < 3 seconds  The right DP pulse is 2+. The right PT pulse is 2+. Left Foot/Ankle  Left Foot Inspection  Skin Exam: skin normal and skin intact. No dry skin, no warmth, no erythema, no maceration, normal color, no pre-ulcer, no ulcer and no callus. Toe Exam: ROM and strength within normal limits. No swelling, no tenderness, no erythema and no left toe deformity. Sensory   Vibration: diminished  Proprioception: diminished  Monofilament testing: diminished    Vascular  Capillary refills: < 3 seconds  The left DP pulse is 2+. The left PT pulse is 2+. Assign Risk Category  No deformity present  Loss of protective sensation         Assessment/Plan:Diagnoses and all orders for this visit:    Acute sinusitis, recurrence not specified, unspecified location  -     cefuroxime (CEFTIN) 500 mg tablet; Take 1 tablet (500 mg total) by mouth every 12 (twelve) hours for 10 days    Type 2 diabetes mellitus with both eyes affected by mild nonproliferative retinopathy without macular edema, with long-term current use of insulin (HCC)  Comments: Will change to [facility] in an effort to better control her blood sugar. Orders:  -     insulin detemir (Levemir FlexTouch) 100 Units/mL injection pen; Inject 30 Units under the skin every 12 (twelve) hours    Diabetic peripheral neuropathy (HCC)  Comments:  Stable on gabapentin therapy. Hypercholesterolemia  Comments: Will change to Crestor therapy to gain better control over her cholesterol. Orders:  -     Discontinue: rosuvastatin (CRESTOR) 10 MG tablet; Take 1 tablet (10 mg total) by mouth daily    Heart failure with preserved ejection fraction, unspecified HF chronicity ([address])  Comments:  Continue with diuretic therapy, low-sodium diet, and daily weights. Reviewed with patient plan to treat with above plan.   Patient instructed to call in 72 hours if not feeling better or if symptoms worsen

## 2023-12-11 ENCOUNTER — TELEPHONE (OUTPATIENT)
Age: 63
End: 2023-12-11

## 2023-12-11 RX ORDER — SEMAGLUTIDE 1.34 MG/ML
INJECTION, SOLUTION SUBCUTANEOUS
Qty: 9 ML | Refills: 0 | Status: SHIPPED | OUTPATIENT
Start: 2023-12-11

## 2023-12-11 NOTE — TELEPHONE ENCOUNTER
Ozempic 1 MG/DOSE, 4 MG/3ML PA Submitted with clinical documentation via [employer], waiting on determination.

## 2023-12-28 ENCOUNTER — TELEPHONE (OUTPATIENT)
Dept: NEUROSURGERY | Facility: CLINIC | Age: 63
End: 2023-12-28

## 2023-12-28 NOTE — TELEPHONE ENCOUNTER
CALLED PATIENT LEFT VM STATING PER [facility] REFERRAL RESPONSE PATIENT IS TO FIRST BE CLEARED FROM ANY CARDIAC OR PULMONARY ISSUES. UPON LOOKING INTO PATIENTS CHARD I DO NOT SEE ANY CLEARANCES. EXPLAINED WE WILL NEED IT BY TOMORROW 12/19/2023 BECAUSE HOLIDAY IS MONDAY AND APPOINTMENT IS TUESDAY OR WE WOULD NEED TO RESCHEDULE APPOINTMENT ON 01/02/2024 WITH [facility] LEFT DIRECT EXTENSION WAITING FOR CALL BACK

## 2023-12-28 NOTE — TELEPHONE ENCOUNTER
PATIENT CALLED BACK STATES SARAY HAS RECENT IMAGING FROM [facility] WILL BRING DISK TO APPOINTMENT. CHANGED APPOINTMENT TO [facility] ON 12/29/2023 TO BE EVALUATED . PATIENT A1C LEVEL IS HIGH AND SHE HAS NO CLEARANCE IN ORDER FOR US TO OFFER SURGERY . PATIENT AGREED TO SEE MAT FOR EVALUATION INSTEAD OF SURGEON

## 2023-12-29 ENCOUNTER — CONSULT (OUTPATIENT)
Dept: NEUROSURGERY | Facility: CLINIC | Age: 63
End: 2023-12-29
Payer: COMMERCIAL

## 2023-12-29 VITALS
DIASTOLIC BLOOD PRESSURE: 68 MMHG | WEIGHT: 175 LBS | HEIGHT: 64 IN | RESPIRATION RATE: 20 BRPM | TEMPERATURE: 98.4 F | SYSTOLIC BLOOD PRESSURE: 138 MMHG | OXYGEN SATURATION: 99 % | BODY MASS INDEX: 29.88 KG/M2 | HEART RATE: 100 BPM

## 2023-12-29 DIAGNOSIS — M48.062 SPINAL STENOSIS OF LUMBAR REGION WITH NEUROGENIC CLAUDICATION: Primary | ICD-10-CM

## 2023-12-29 DIAGNOSIS — M43.16 ANTEROLISTHESIS OF LUMBAR SPINE: ICD-10-CM

## 2023-12-29 DIAGNOSIS — M48.061 SPINAL STENOSIS OF LUMBAR REGION, UNSPECIFIED WHETHER NEUROGENIC CLAUDICATION PRESENT: ICD-10-CM

## 2023-12-29 PROCEDURE — 99244 OFF/OP CNSLTJ NEW/EST MOD 40: CPT | Performed by: NURSE PRACTITIONER

## 2023-12-29 NOTE — PROGRESS NOTES
Assessment/Plan:    Spinal stenosis of lumbar region with neurogenic claudication  As addressed in HPI  She denies cauda equina symptoms urinary or bowel dysfunction, saddle anesthesia or does not demonstrate motor deficits in her lower extremities during physical examination  She reports taking gabapentin as needed, does not take the meloxicam is concerned about her heart she is status post recent coronary artery bypass surgery.  She reports questioning cardiology about clearance for spine surgery and cardiology advised her that the neurosurgeon should provide written documentation when they are ready to perform surgery and then they will at which time they will provide documented clearance,  She reports she has a problem with her ventricle after further questioning she reports she saw that on a recent EMG report  11/30/2023----       ..  HgA1C 10.1         Imaging/diagnostics  4/18/2023 MRI lumbar spine without -1. Lumbar vertebral body heights are maintained demonstrating no acute fracture or subluxation.  Grade 1 degenerative anterolisthesis is noted at the L4-5 level.. Multilevel lumbar degenerative disc disease with moderate to severe central canal stenosis at the L3-4 and L4-5 levels.  12[facility]14/23  [facility].  There is no evidence for a superimposed polyneuropathy affecting large diameter nerve fibers  3/23/2023-DEXA bone density.    Plan  Reviewed MRI lumbar spine in detail with patient and , identified concerning pathology  Reviewed EMG with patient and   Ordered physical therapy---Evaluate and Treat lumbar and thoracic paraspinal and core muscle strengthening  , lower extremities , TENS , heat and message   Ordered referral to pain management, Dr. Kirby, please initiate multimodal treatment for lumbar stenosis with neurogenic claudication and as per abnormality seen on MRI lumbar spine as per protocol  Reviewed red flag symptoms advised if she develops she  is to report immediately to the closest emergency room for further assessment  Advised if she has additional questions or concerns she should call the neurosurgery department  Schedule follow-up appointment so low with AP/me for reassessment in 3 months after completing multimodal conservative treatment PT, pain management, for reassessment of comorbid conditions for stability DM2, and cardiac and pulmonary status for approval to release for surgery explained in great detail current barriers to proceeding with surgery.  When she returns for follow-up visit will be near April 2024 at which time the patient will be due for an updated MRI of the lumbar spine  And medication review  gabapentin the dose documented as 100 mg 3 times daily she reports taking 1 tablet when she has pain.  Patient is advised to consider regular scheduled dosing of gabapentin 300 mg every night.  As she reports her pain symptoms are very bad at night and she is unable to sleep.  Also recommend she add to her schedule routine medication dosing acetaminophen no more than 3000 mg/day.   reports they have 500 mg tablets at home extra strength therefore recommend she take two 500 mg tablets every 8 hours.  Explained the rationale to add regular scheduled dosing of both gabapentin and acetaminophen to her daily regimen.              Subjective: She presents as a consult referral from her PCP for lumbar spine workup    Patient ID: Jacquie Viveros is a 63 y.o. female PM/SH: CAD, coronary bypass graft x 2 6/7/2023,AVR ascending aortic replacement bioprosthetic, HTN, HLD, chronic CHF with preserved EF, HTN, diabetic peripheral neuropathy, TAJ, chronic diarrhea and nausea, cervical radiculopathic with degenerative joint disease, DM2 with hyperglycemia with nonproliferative retinopathy long-term current insulin use  HPI   This is an initial neurosurgery visit to neurosurgeon Dr. CHARLENE MANZANO, his referral review indicates next available with me if  cleared from her cardiac and pulmonary issues.  She was scheduled for an initial visit with an [facility] today because there is no clearance from a cardiac or pulmonary standpoint.  She.presents with multiple complaints some not related to a lumbar spine assessment such as fatigue and bilateral tinnitus.  She reports bilateral lumbar sacral pain with distribution into the right buttock, distribution into the right lateral thigh right lower leg posterior lateral and anterior aspect into the dorsum of the right foot and all the toes with associated numbness in both feet.  She reports the right-sided symptoms are worse than the left. She reports weakness in her legs and having problems walking because of the pain and the numbness shooting into her legs.  She also reports weakness in the back of her legs mostly on the right side.  Reports the pain is severe at night and it disrupts her sleep, she does not sleep at night She reports a new pain that just started in the lateral distal thigh down lateral aspect into her right lower leg to ankle like shooting ice chips in the area, this started since her cardiac surgery.  She reports the inciting events may be from the multiple fall she had over the years, slipping on ice, a slip and fall in a grocery store, and falling at a wedding reception. She reports the onset of her symptoms as the back pain starting several years ago after she started falling.  The buttocks and leg pain with numbness and tingling and the right hip symptoms started about 2 years.  She also characterizes the pain as paralyzing, shooting, burning, tingling, numbness.  The low back pain is constant and worse with any type of movement.  She reports intermittent right-sided leg pain.  She describes a compression type pain in her tailbone area.  She reports her buttocks feel like there is spreading apart.  In her bilateral lower legs and feet she feels numbness like ice as though there is frostbite of her  toes.  She reports the severity of her symptoms as, the back 8/10, right hip 9/10, down the leg the tingling sensation 8/10, and the intermittent numbness in her feet 5-6/10 which is constant.  She reports aggravating factors as standing and walking causes worsening pain in her lower legs, certain movements she gets paralyzing pain in the low back.  Rising from a sitting to a standing position than walking aggravates her pain.  Stretching and reaching also makes the symptoms worse and lying in bed.  Reports during the hospitalization she was unable to participate in therapy secondary to the pain but once discharged she did complete cardiac rehab.  She reports a relieving factor as leaning on a shopping cart.  She denies ever undergoing spine surgery.    Multimodal Treatments::  PT  none   PM---none  Chiropractor  ---remote past  Neurology --[facility] for dizziness and balance disturbance issues , as per provider note 12/15/2023 diagnoses related to management neurology pain medicine lumbar radiculopathy, cervical radiculopathic due to degenerative joint disease  Medicinal --gabapentin take only as needed when she has pain in her back and legs, NORCO postop pain status post cardiac bypass surgery cardiac , meloxicam as needed pain but barely uses      Social: She is  and unemployed but has significant assets from her  father's estate for which she reported is continuing to manage and selling property.        REVIEW OF SYSTEMS  Review of Systems   Constitutional:  Positive for fatigue.   HENT:  Positive for tinnitus.    Eyes:  Positive for visual disturbance.   Respiratory:  Positive for shortness of breath.    Cardiovascular: Negative.    Gastrointestinal: Negative.    Endocrine: Negative.    Genitourinary: Negative.    Musculoskeletal:  Positive for back pain (Back pain radiate to legs), gait problem (some days hard to walk), neck pain and neck stiffness.   Neurological:  Positive for tremors, weakness (Bi  leg mostly on the right side) and numbness (nunbness Bi legs in to te feet with shooting pain).   Psychiatric/Behavioral:  Positive for sleep disturbance (Due to pain).          Meds/Allergies     Current Outpatient Medications   Medication Sig Dispense Refill    albuterol (Ventolin HFA) 90 mcg/act inhaler Inhale 2 puffs every 6 (six) hours as needed for wheezing 18 g 5    Aspirin Low Dose 81 MG chewable tablet       bumetanide (BUMEX) 1 mg tablet       Continuous Blood Gluc Sensor (FreeStyle Nataly 3 Sensor) MISC APPLY ONE SENSOR EVERY 14 DAYS 4 each 3    diazepam (VALIUM) 5 mg tablet take 1 tablet by mouth once a day at bedtime as needed for sleep 30 tablet 0    Empagliflozin 25 MG TABS Take 1 tablet (25 mg total) by mouth daily 90 tablet 1    famotidine (PEPCID) 20 mg tablet Take 1 tablet (20 mg total) by mouth daily at bedtime 30 tablet 3    gabapentin (NEURONTIN) 100 mg capsule TAKE ONE CAPSULE BY MOUTH THREE TIMES DAILY 270 capsule 0    glucose blood (ONE TOUCH ULTRA TEST) test strip test 6 times a day 600 each 0    hydrochlorothiazide (HYDRODIURIL) 50 mg tablet take one tablet by mouth two times a day 180 tablet 0    HYDROcodone-acetaminophen (Norco) 5-325 mg per tablet Take 1 tablet by mouth every 6 (six) hours as needed for pain Max Daily Amount: 4 tablets 60 tablet 0    hydrOXYzine HCL (ATARAX) 25 mg tablet TAKE 1 TABLET BY MOUTH AT BEDTIME 90 tablet 0    insulin detemir (Levemir FlexTouch) 100 Units/mL injection pen Inject 30 Units under the skin every 12 (twelve) hours 45 mL 3    ketoconazole (NIZORAL) 2 % shampoo APPLY TO AFFECTED AREA ON SCALP EVERY OTHER DAY FOR 5 MINUTES THEN RINSE 120 mL 0    meloxicam (MOBIC) 15 mg tablet Take 1 tablet (15 mg total) by mouth daily as needed for moderate pain 30 tablet 3    metFORMIN (GLUCOPHAGE-XR) 500 mg 24 hr tablet TAKE 2 TABLETS BY MOUTH TWICE DAILY 360 tablet 0    metoprolol tartrate (LOPRESSOR) 25 mg tablet Take 25 mg by mouth daily      NovoLOG FlexPen 100  units/mL injection pen 8 Units 3 (three) times a day with meals      omega-3-acid ethyl esters (LOVAZA) 1 g capsule TAKE 2 CAPSULES BY MOUTH TWICE DAILY 360 capsule 0    oxyCODONE-acetaminophen (Percocet) 5-325 mg per tablet Take 1 tablet by mouth every 8 (eight) hours as needed for moderate pain Max Daily Amount: 3 tablets 40 tablet 0    pantoprazole (PROTONIX) 40 mg tablet Take 1 tablet (40 mg total) by mouth daily before breakfast 30 tablet 0    potassium chloride (MICRO-K) 10 MEQ CR capsule Take 2 capsules (20 mEq total) by mouth daily 60 capsule 3    promethazine-dextromethorphan (PHENERGAN-DM) 6.25-15 mg/5 mL oral syrup Take 5 mL by mouth 4 (four) times a day as needed for cough 180 mL 0    rosuvastatin (CRESTOR) 20 MG tablet Take 1 tablet (20 mg total) by mouth daily 90 tablet 3    sucralfate (CARAFATE) 1 g tablet Take 1 tablet (1 g total) by mouth 4 (four) times a day 360 tablet 0    benzonatate (TESSALON) 200 MG capsule Take 1 capsule (200 mg total) by mouth 3 (three) times a day as needed for cough (Patient not taking: Reported on 12/29/2023) 30 capsule 0    insulin glargine (Lantus) 100 units/mL subcutaneous injection Inject 3 Units under the skin daily (Patient not taking: Reported on 12/29/2023) 10 mL 3    linaCLOtide 290 MCG CAPS Take 1 capsule by mouth in the morning (Patient not taking: Reported on 12/29/2023) 90 capsule 0    lisinopril (ZESTRIL) 5 mg tablet TAKE 1 TABLET BY MOUTH DAILY (Patient not taking: Reported on 12/29/2023) 90 tablet 0    Ozempic, 1 MG/DOSE, 4 MG/3ML injection pen Inject 0.75 mL (1 mg total) under the skin once a week (Patient not taking: Reported on 12/29/2023) 9 mL 0     No current facility-administered medications for this visit.       Allergies   Allergen Reactions    Atorvastatin Other (See Comments)     Reaction Date: 25Apr2011;     Furosemide      Reaction Date: 25Apr2011;     Latex        PAST HISTORY    Past Medical History:   Diagnosis Date    Arthritis     Chronic  "kidney disease     CTS (carpal tunnel syndrome)     had surgery    Diabetes mellitus (HCC)     Fibroid     dr gutierrez removed     2 para 2      x2 -F, -    Hyperlipidemia        Past Surgical History:   Procedure Laterality Date    CARDIAC CATHETERIZATION  2023    Procedure: Cardiac catheterization;  Surgeon: Hilary Mondragon DO;  Location: [facility];  Service: Cardiology    CARDIAC CATHETERIZATION N/A 2023    Procedure: Cardiac Coronary Angiogram;  Surgeon: Hilary Mondragon DO;  Location: [facility];  Service: Cardiology    CARDIAC CATHETERIZATION N/A 2023    Procedure: Cardiac other - DFR for Hemodaynamic Assessment;  Surgeon: Hilary Mondragon DO;  Location: [facility];  Service: Cardiology    CARDIAC SURGERY      HYSTERECTOMY      MYOMECTOMY      dr gutierrez removed    TOTAL ABDOMINAL HYSTERECTOMY      2010       Social History     Tobacco Use    Smoking status: Never     Passive exposure: Never    Smokeless tobacco: Never   Vaping Use    Vaping status: Never Used   Substance Use Topics    Alcohol use: Never    Drug use: No       Family History   Adopted: Yes   Family history unknown: Yes       The following portions of the patient's history were reviewed and updated as appropriate: allergies, current medications, past family history, past medical history, past social history, past surgical history and problem list.      EXAM    Vitals:Blood pressure 138/68, pulse 100, temperature 98.4 °F (36.9 °C), temperature source Temporal, resp. rate 20, height 5' 4\" (1.626 m), weight 79.4 kg (175 lb), SpO2 99%.,Body mass index is 30.04 kg/m².     Physical Exam  Vitals and nursing note reviewed. Exam conducted with a chaperone present ().   Constitutional:       General: She is not in acute distress.     Appearance: Normal appearance. She is obese. She is not ill-appearing or toxic-appearing.   HENT:      Head: Normocephalic and atraumatic.   Pulmonary: "      Effort: Pulmonary effort is normal. No respiratory distress.   Musculoskeletal:      Right lower leg: No edema.      Left lower leg: No edema.      Comments: Lumbar extension and twisting side-to-side aggravates pain, lumbar flexion relieves pain.  While sitting leaning forward for the majority of the visit.   Skin:     General: Skin is warm and dry.   Neurological:      Mental Status: She is alert and oriented to person, place, and time.      Sensory: Sensory deficit present.      Motor: No weakness.      Coordination: Coordination abnormal. Heel to Holguin Test normal.      Gait: Gait abnormal and tandem walk abnormal (Unable to perform secondary to pain in her legs and the numbness and tingling in her feet.).      Deep Tendon Reflexes:      Reflex Scores:       Patellar reflexes are 2+ on the right side and 2+ on the left side.       Achilles reflexes are 1+ on the right side and 1+ on the left side.  Psychiatric:         Mood and Affect: Mood normal.         Behavior: Behavior normal.         Neurologic Exam     Mental Status   Oriented to person, place, and time.   Level of consciousness: alert    Motor Exam   Muscle bulk: decreased    Strength   Right iliopsoas: 5/5  Left iliopsoas: 5/5  Right quadriceps: 5/5  Left quadriceps: 5/5  Right hamstrin/5  Left hamstrin/5  Right glutei: 5/5  Left glutei: 5/5  Right anterior tibial: 5/5  Left anterior tibial: 5/5  Right posterior tibial: 5/5  Left posterior tibial: 5/5  Right peroneal: 5/5  Left peroneal: 5/5  Right gastroc: 5/5  Left gastroc: 5/5    Sensory Exam   Right leg light touch: decreased from ankle  Left leg light touch: decreased from ankle    Gait, Coordination, and Reflexes     Gait  Gait: (Antalgic, unsteady when she is aware of her walking, not observed when walking and Eliud to check out.)    Coordination   Heel to shin coordination: normal  Tandem walking coordination: abnormal (Unable to perform secondary to pain in her legs and the  numbness and tingling in her feet.)    Reflexes   Right patellar: 2+  Left patellar: 2+  Right achilles: 1+  Left achilles: 1+  Right ankle clonus: absent  Left ankle clonus: absent      Imaging Studies  3/23/2023   DXA   Normal Bone density     12/14/23  EMG @ [facility] There is electrophysiologic evidence for moderate chronic radiculopathy at   L5.  There is no evidence for a superimposed polyneuropathy affecting   large diameter nerve fibers.  Of note this study cannot assess for small   fiber neuropathy clinical correlation.  There is no evidence for   radiculopathy on the left.   Clinical correlation recommended.  Reference Values       4/18/23 MRI LUMBAR SPINE WITHOUT CONTRAST     INDICATION: M54.16: Radiculopathy, lumbar region.     COMPARISON:  Lumbar radiographs 3/15/2022     TECHNIQUE:  Multiplanar, multisequence imaging of the lumbar spine was performed. .          IMAGE QUALITY:  Diagnostic     FINDINGS:     VERTEBRAL BODIES:  There are 5 lumbar type vertebral bodies.  Stable grade 1 degenerative anterolisthesis is noted at the L4-5 level .No compression fracture.    Normal marrow signal is identified within the visualized bony structures.  No discrete   marrow lesion.     SACRUM:  Normal signal within the sacrum. No evidence of insufficiency or stress fracture.     DISTAL CORD AND CONUS:  Normal size and signal within the distal cord and conus.     PARASPINAL SOFT TISSUES:  Paraspinal soft tissues are unremarkable.     LOWER THORACIC DISC SPACES:  Normal disc height and signal.  No disc herniation, canal stenosis or foraminal narrowing.     LUMBAR DISC SPACES:     L1-2: Disc bulge without central canal or neural foraminal narrowing.       L2-3: No focal disc herniation.  Bilateral ligamentum flavum and facet hypertrophy.  No central canal or  subarticular/lateral recess narrowing.  No neural foraminal stenosis.     L3-4: Moderate diffuse disc bulge is noted extending into the foraminal regions.  Bilateral  ligamentum flavum and facet hypertrophy.  Moderate to severe central canal and bilateral subarticular/lateral recess narrowing.  Mild bilateral neural foraminal   stenosis.     L4-5: Grade 1 degenerative anterolisthesis with secondary disc bulge.  Bilateral ligamentum flavum and facet hypertrophy.  Moderate to severe central canal and bilateral subarticular/lateral recess narrowing.  Mild bilateral neural foraminal stenosis.     L5-S1: Mild diffuse disc bulge is noted.  No central canal or  subarticular/lateral recess narrowing.  Mild bilateral neural foraminal stenosis.     OTHER FINDINGS:  None.     IMPRESSION:     1. Lumbar vertebral body heights are maintained demonstrating no acute fracture or subluxation.  Grade 1 degenerative anterolisthesis is noted at the L4-5 level.     2. Multilevel lumbar degenerative disc disease with moderate to severe central canal stenosis at the L3-4 and L4-5 levels.           I have personally reviewed pertinent reports.   and I have personally reviewed pertinent films in PACS    I have spent a total time of 60 minutes on 12/30/23 in caring for this patient including Diagnostic results, Risks and benefits of tx options, Instructions for management, Patient and family education, Importance of tx compliance, Risk factor reductions, Impressions, Counseling / Coordination of care, Documenting in the medical record, Reviewing / ordering tests, medicine, procedures  , Obtaining or reviewing history  , and Communicating with other healthcare professionals .

## 2023-12-30 PROBLEM — M43.16 ANTEROLISTHESIS OF LUMBAR SPINE: Status: ACTIVE | Noted: 2023-12-30

## 2023-12-30 PROBLEM — M48.062 SPINAL STENOSIS OF LUMBAR REGION WITH NEUROGENIC CLAUDICATION: Status: ACTIVE | Noted: 2023-12-30

## 2023-12-31 NOTE — ASSESSMENT & PLAN NOTE
As addressed in HPI  She denies cauda equina symptoms urinary or bowel dysfunction, saddle anesthesia or does not demonstrate motor deficits in her lower extremities during physical examination  She reports taking gabapentin as needed, does not take the meloxicam is concerned about her heart she is status post recent coronary artery bypass surgery.  She reports questioning cardiology about clearance for spine surgery and cardiology advised her that the neurosurgeon should provide written documentation when they are ready to perform surgery and then they will at which time they will provide documented clearance,  She reports she has a problem with her ventricle after further questioning she reports she saw that on a recent EMG report  11/30/2023----       ..  HgA1C 10.1         Imaging/diagnostics  4/18/2023 MRI lumbar spine without -1. Lumbar vertebral body heights are maintained demonstrating no acute fracture or subluxation.  Grade 1 degenerative anterolisthesis is noted at the L4-5 level.. Multilevel lumbar degenerative disc disease with moderate to severe central canal stenosis at the L3-4 and L4-5 levels.  [facility]  [facility]  There is no evidence for a superimposed polyneuropathy affecting large diameter nerve fibers  3/23/2023-DEXA bone density.    Plan  Reviewed MRI lumbar spine in detail with patient and , identified concerning pathology  Reviewed EMG with patient and   Ordered physical therapy---Evaluate and Treat lumbar and thoracic paraspinal and core muscle strengthening  , lower extremities , TENS , heat and message   Ordered referral to pain management, Dr. Kirby, please initiate multimodal treatment for lumbar stenosis with neurogenic claudication and as per abnormality seen on MRI lumbar spine as per protocol  Reviewed red flag symptoms advised if she develops she is to report immediately to the closest emergency room for further  assessment  Advised if she has additional questions or concerns she should call the neurosurgery department  Schedule follow-up appointment so low with AP/me for reassessment in 3 months after completing multimodal conservative treatment PT, pain management, for reassessment of comorbid conditions for stability DM2, and cardiac and pulmonary status for approval to release for surgery explained in great detail current barriers to proceeding with surgery.  When she returns for follow-up visit will be near April 2024 at which time the patient will be due for an updated MRI of the lumbar spine  And medication review  gabapentin the dose documented as 100 mg 3 times daily she reports taking 1 tablet when she has pain.  Patient is advised to consider regular scheduled dosing of gabapentin 300 mg every night.  As she reports her pain symptoms are very bad at night and she is unable to sleep.  Also recommend she add to her schedule routine medication dosing acetaminophen no more than 3000 mg/day.   reports they have 500 mg tablets at home extra strength therefore recommend she take two 500 mg tablets every 8 hours.  Explained the rationale to add regular scheduled dosing of both gabapentin and acetaminophen to her daily regimen.

## 2024-01-15 ENCOUNTER — OFFICE VISIT (OUTPATIENT)
Dept: FAMILY MEDICINE CLINIC | Facility: CLINIC | Age: 64
End: 2024-01-15
Payer: MEDICARE

## 2024-01-15 VITALS
OXYGEN SATURATION: 97 % | WEIGHT: 174.6 LBS | SYSTOLIC BLOOD PRESSURE: 126 MMHG | TEMPERATURE: 97.8 F | HEART RATE: 118 BPM | HEIGHT: 64 IN | BODY MASS INDEX: 29.81 KG/M2 | DIASTOLIC BLOOD PRESSURE: 70 MMHG

## 2024-01-15 DIAGNOSIS — Z11.52 ENCOUNTER FOR SCREENING FOR COVID-19: ICD-10-CM

## 2024-01-15 DIAGNOSIS — R68.89 FLU-LIKE SYMPTOMS: ICD-10-CM

## 2024-01-15 DIAGNOSIS — J10.1 INFLUENZA A: Primary | ICD-10-CM

## 2024-01-15 LAB
SARS-COV-2 AG UPPER RESP QL IA: NEGATIVE
SL AMB POCT RAPID FLU A: ABNORMAL
SL AMB POCT RAPID FLU B: NEGATIVE
VALID CONTROL: NORMAL

## 2024-01-15 PROCEDURE — 87804 INFLUENZA ASSAY W/OPTIC: CPT | Performed by: FAMILY MEDICINE

## 2024-01-15 PROCEDURE — 87811 SARS-COV-2 COVID19 W/OPTIC: CPT | Performed by: FAMILY MEDICINE

## 2024-01-15 PROCEDURE — 99213 OFFICE O/P EST LOW 20 MIN: CPT | Performed by: FAMILY MEDICINE

## 2024-01-15 RX ORDER — OSELTAMIVIR PHOSPHATE 75 MG/1
75 CAPSULE ORAL 2 TIMES DAILY
Qty: 10 CAPSULE | Refills: 0 | Status: SHIPPED | OUTPATIENT
Start: 2024-01-15 | End: 2024-01-20

## 2024-01-16 NOTE — PROGRESS NOTES
Patient ID: Jacquie Viveros is a 63 y.o. female.    HPI: 63 y.o.female presenting with 2 day history of sore throat, nasal congestion, ear pain,pnd and cough.  Pt denies any fever, but c/o of chills and  body aches.  She tested positive for influenza A and negative for COVID.    SUBJECTIVE    Family History   Adopted: Yes   Family history unknown: Yes     Social History     Socioeconomic History    Marital status: /Civil Union     Spouse name: Not on file    Number of children: Not on file    Years of education: Not on file    Highest education level: Not on file   Occupational History    Occupation: Retired   Tobacco Use    Smoking status: Never     Passive exposure: Never    Smokeless tobacco: Never   Vaping Use    Vaping status: Never Used   Substance and Sexual Activity    Alcohol use: Never    Drug use: No    Sexual activity: Yes     Partners: Male     Birth control/protection: Male Sterilization     Comment: declines std/hiv testing   Other Topics Concern    Not on file   Social History Narrative    Personal Protective Equipment Seatbelts     Social Determinants of Health     Financial Resource Strain: Low Risk  (6/7/2023)    Received from [facility]    Overall Financial Resource Strain (CARDIA)     Difficulty of Paying Living Expenses: Not hard at all   Food Insecurity: No Food Insecurity (6/7/2023)    Received from [facility]    Hunger Vital Sign     Worried About Running Out of Food in the Last Year: Never true     Ran Out of Food in the Last Year: Never true   Transportation Needs: No Transportation Needs (6/7/2023)    Received from [facility]    PRAPARE - Transportation     Lack of Transportation (Medical): No     Lack of Transportation (Non-Medical): No   Physical Activity: Not on file   Stress: Not on file   Social Connections: Not on file   Intimate Partner Violence: Not At Risk (6/7/2023)    Received from [facility]     Humiliation, Afraid, Rape, and Kick questionnaire     Fear of Current or Ex-Partner: No     Emotionally Abused: No     Physically Abused: No     Sexually Abused: No   Housing Stability: Low Risk  (2023)    Received from [facility]    Housing Stability Vital Sign     Unable to Pay for Housing in the Last Year: No     Number of Places Lived in the Last Year: 1     Unstable Housing in the Last Year: No     Past Medical History:   Diagnosis Date    Arthritis     Chronic kidney disease     CTS (carpal tunnel syndrome)     had surgery    Diabetes mellitus (HCC)     Fibroid     dr gutierrez removed     2 para 2      x2 -F, -    Hyperlipidemia      Past Surgical History:   Procedure Laterality Date    CARDIAC CATHETERIZATION  2023    Procedure: Cardiac catheterization;  Surgeon: Hilary Mondragon DO;  [facility];  Service: Cardiology    CARDIAC CATHETERIZATION N/A 2023    Procedure: Cardiac Coronary Angiogram;  Surgeon: Hilary Mondragon DO;  [facility];  Service: Cardiology    CARDIAC CATHETERIZATION N/A 2023    Procedure: Cardiac other - DFR for Hemodaynamic Assessment;  Surgeon: Hilary Mondragon DO;  [facility];  Service: Cardiology    CARDIAC SURGERY      HYSTERECTOMY      MYOMECTOMY      dr gutierrez removed    TOTAL ABDOMINAL HYSTERECTOMY      2010     Allergies   Allergen Reactions    Atorvastatin Other (See Comments)     Reaction Date: 2011;     Furosemide      Reaction Date: 2011;     Latex        Current Outpatient Medications:     albuterol (Ventolin HFA) 90 mcg/act inhaler, Inhale 2 puffs every 6 (six) hours as needed for wheezing, Disp: 18 g, Rfl: 5    Aspirin Low Dose 81 MG chewable tablet, , Disp: , Rfl:     bumetanide (BUMEX) 1 mg tablet, , Disp: , Rfl:     Continuous Blood Gluc Sensor (FreeStyle Nataly 3 Sensor) MISC, APPLY ONE SENSOR EVERY 14 DAYS, Disp: 4 each, Rfl: 3    diazepam (VALIUM)  5 mg tablet, take 1 tablet by mouth once a day at bedtime as needed for sleep, Disp: 30 tablet, Rfl: 0    Empagliflozin 25 MG TABS, Take 1 tablet (25 mg total) by mouth daily, Disp: 90 tablet, Rfl: 1    famotidine (PEPCID) 20 mg tablet, Take 1 tablet (20 mg total) by mouth daily at bedtime, Disp: 30 tablet, Rfl: 3    gabapentin (NEURONTIN) 100 mg capsule, TAKE ONE CAPSULE BY MOUTH THREE TIMES DAILY, Disp: 270 capsule, Rfl: 0    glucose blood (ONE TOUCH ULTRA TEST) test strip, test 6 times a day, Disp: 600 each, Rfl: 0    hydrochlorothiazide (HYDRODIURIL) 50 mg tablet, take one tablet by mouth two times a day, Disp: 180 tablet, Rfl: 0    HYDROcodone-acetaminophen (Norco) 5-325 mg per tablet, Take 1 tablet by mouth every 6 (six) hours as needed for pain Max Daily Amount: 4 tablets, Disp: 60 tablet, Rfl: 0    hydrOXYzine HCL (ATARAX) 25 mg tablet, TAKE 1 TABLET BY MOUTH AT BEDTIME, Disp: 90 tablet, Rfl: 0    ketoconazole (NIZORAL) 2 % shampoo, APPLY TO AFFECTED AREA ON SCALP EVERY OTHER DAY FOR 5 MINUTES THEN RINSE, Disp: 120 mL, Rfl: 0    meloxicam (MOBIC) 15 mg tablet, Take 1 tablet (15 mg total) by mouth daily as needed for moderate pain, Disp: 30 tablet, Rfl: 3    metFORMIN (GLUCOPHAGE-XR) 500 mg 24 hr tablet, TAKE 2 TABLETS BY MOUTH TWICE DAILY, Disp: 360 tablet, Rfl: 0    metoprolol tartrate (LOPRESSOR) 25 mg tablet, Take 25 mg by mouth daily, Disp: , Rfl:     NovoLOG FlexPen 100 units/mL injection pen, 8 Units 3 (three) times a day with meals, Disp: , Rfl:     omega-3-acid ethyl esters (LOVAZA) 1 g capsule, TAKE 2 CAPSULES BY MOUTH TWICE DAILY, Disp: 360 capsule, Rfl: 0    oseltamivir (TAMIFLU) 75 mg capsule, Take 1 capsule (75 mg total) by mouth 2 (two) times a day for 5 days, Disp: 10 capsule, Rfl: 0    oxyCODONE-acetaminophen (Percocet) 5-325 mg per tablet, Take 1 tablet by mouth every 8 (eight) hours as needed for moderate pain Max Daily Amount: 3 tablets, Disp: 40 tablet, Rfl: 0    pantoprazole (PROTONIX)  40 mg tablet, Take 1 tablet (40 mg total) by mouth daily before breakfast, Disp: 30 tablet, Rfl: 0    potassium chloride (MICRO-K) 10 MEQ CR capsule, Take 2 capsules (20 mEq total) by mouth daily, Disp: 60 capsule, Rfl: 3    rosuvastatin (CRESTOR) 20 MG tablet, Take 1 tablet (20 mg total) by mouth daily, Disp: 90 tablet, Rfl: 3    sucralfate (CARAFATE) 1 g tablet, Take 1 tablet (1 g total) by mouth 4 (four) times a day, Disp: 360 tablet, Rfl: 0    benzonatate (TESSALON) 200 MG capsule, Take 1 capsule (200 mg total) by mouth 3 (three) times a day as needed for cough (Patient not taking: Reported on 12/29/2023), Disp: 30 capsule, Rfl: 0    insulin detemir (Levemir FlexTouch) 100 Units/mL injection pen, Inject 30 Units under the skin every 12 (twelve) hours (Patient not taking: Reported on 1/15/2024), Disp: 45 mL, Rfl: 3    insulin glargine (Lantus) 100 units/mL subcutaneous injection, Inject 3 Units under the skin daily (Patient not taking: Reported on 12/29/2023), Disp: 10 mL, Rfl: 3    linaCLOtide 290 MCG CAPS, Take 1 capsule by mouth in the morning (Patient not taking: Reported on 12/29/2023), Disp: 90 capsule, Rfl: 0    lisinopril (ZESTRIL) 5 mg tablet, TAKE 1 TABLET BY MOUTH DAILY (Patient not taking: Reported on 12/29/2023), Disp: 90 tablet, Rfl: 0    Ozempic, 1 MG/DOSE, 4 MG/3ML injection pen, Inject 0.75 mL (1 mg total) under the skin once a week (Patient not taking: Reported on 12/29/2023), Disp: 9 mL, Rfl: 0    promethazine-dextromethorphan (PHENERGAN-DM) 6.25-15 mg/5 mL oral syrup, Take 5 mL by mouth 4 (four) times a day as needed for cough (Patient not taking: Reported on 1/15/2024), Disp: 180 mL, Rfl: 0    Review of Systems  Constitutional:     Denies fever, chills ,fatigue ,weakness ,weight loss, weight gain     ENT: Denies loss of hearing ,nosebleed, nasal discharge ,hoarseness; but admits to nasal congestion , sore throat and ear pain.    Pulmonary: Denies shortness of breath ,dyspnea on exertion,  "orthopnea ; but admits to cough and pnd  Cardiovascular:  Denies bradycardia , tachycardia  ,palpations, lower extremity edema leg, claudication  Breast:  Denies new or changing breast lumps ,nipple discharge ,nipple changes  Abdomen:  Denies abdominal pain , anorexia , indigestion, nausea, vomiting, constipation, diarrhea  Musculoskeletal: Denies myalgias, arthralgias, joint swelling, joint stiffness , limb pain, limb swelling  Lymph: + swollen glands  Gu: Denies polyuria or dysuria  Skin: Denies skin rash, skin lesion, skin wound, itching, dry skin  Neuro: Denies headache, numbness, tingling, confusion, loss of consciousness, dizziness, vertigo  Psychiatric: Denies feelings of depression, suicidal ideation, anxiety, sleep disturbances    OBJECTIVE  /70   Pulse (!) 118   Temp 97.8 °F (36.6 °C)   Ht 5' 4\" (1.626 m)   Wt 79.2 kg (174 lb 9.6 oz)   SpO2 97%   BMI 29.97 kg/m²   Constitutional:   NAD, well appearing and well nourished     ENT:   Conjunctiva and lids: no injection, edema, or discharge    Pupils and iris: NIKKI bilaterally  External inspection of ears and nose: normal without deformities or discharge.     Otoscopic exam: Canals patent without erythema, tm dull and erythematous, effusions bilaterally   Nasal mucosa, septum and turbinates: + turbinate injection, nasal discharge         Oropharynx:  Moist mucosa, normal tongue and tonsils without lesions.+ erythema and injection of posterior pharynx with pnd    Pulmonary:Respiratory effort normal rate and rhythm, no increased work of breathing. Auscultation of lungs:  Clear bilaterally with no adventitious breath sounds     Cardiovascular: regular rate and rhythm, S1 and S2, no murmur, no edema and/or varicosities of LE     Abdomen: Soft and nontender with + bowel sounds  No heptomegaly or splenomegaly     Gu: no suprapubic tenderness or CVA tenderness  Lymphatic: + anterior  cervical lymphadenopathy      Musculoskeletal:  Gait and station: Normal " gait      Digits and nails normal without clubbing or cyanosis      Inspection/palpation of joints, bones, and muscles:  No joint tenderness, swelling, full active and passive range of motion       Skin: Normal skin turgor and no rashes      Neuro:    Normal reflexes  Pych:   alert and oriented to person, place and time     normal mood and affect      Assessment/Plan:Diagnoses and all orders for this visit:    Influenza A  -     oseltamivir (TAMIFLU) 75 mg capsule; Take 1 capsule (75 mg total) by mouth 2 (two) times a day for 5 days    Flu-like symptoms  -     POCT rapid flu A and B    Encounter for screening for COVID-19  -     POCT Rapid Covid Ag        Reviewed with patient plan to treat with above plan.    Patient instructed to call in 72 hours if not feeling better or if symptoms worsen

## 2024-02-05 ENCOUNTER — TELEPHONE (OUTPATIENT)
Age: 64
End: 2024-02-05

## 2024-02-05 NOTE — TELEPHONE ENCOUNTER
Patient called in stating her daughter just left the office for a swab and she is wondering if she could come in and do the same thing because she has had a sore throat for 2 days now. Please advise.

## 2024-02-06 ENCOUNTER — CLINICAL SUPPORT (OUTPATIENT)
Dept: FAMILY MEDICINE CLINIC | Facility: CLINIC | Age: 64
End: 2024-02-06
Payer: MEDICARE

## 2024-02-06 DIAGNOSIS — Z11.2 SCREENING FOR STREPTOCOCCAL INFECTION: Primary | ICD-10-CM

## 2024-02-06 LAB — S PYO DNA THROAT QL NAA+PROBE: NOT DETECTED

## 2024-02-06 PROCEDURE — 87651 STREP A DNA AMP PROBE: CPT

## 2024-02-28 DIAGNOSIS — E11.3293 TYPE 2 DIABETES MELLITUS WITH BOTH EYES AFFECTED BY MILD NONPROLIFERATIVE RETINOPATHY WITHOUT MACULAR EDEMA, WITH LONG-TERM CURRENT USE OF INSULIN (HCC): Primary | ICD-10-CM

## 2024-02-28 DIAGNOSIS — E78.00 HYPERCHOLESTEROLEMIA: ICD-10-CM

## 2024-02-28 DIAGNOSIS — Z79.4 TYPE 2 DIABETES MELLITUS WITH BOTH EYES AFFECTED BY MILD NONPROLIFERATIVE RETINOPATHY WITHOUT MACULAR EDEMA, WITH LONG-TERM CURRENT USE OF INSULIN (HCC): Primary | ICD-10-CM

## 2024-02-28 DIAGNOSIS — I10 ESSENTIAL HYPERTENSION: ICD-10-CM

## 2024-02-29 ENCOUNTER — RA CDI HCC (OUTPATIENT)
Dept: OTHER | Facility: HOSPITAL | Age: 64
End: 2024-02-29

## 2024-02-29 NOTE — PROGRESS NOTES
HCC coding opportunities          Chart Reviewed number of suggestions sent to Provider: 2     Patients Insurance   [id number] and I11.0  Medicare Insurance: Medicare

## 2024-03-05 ENCOUNTER — APPOINTMENT (OUTPATIENT)
Dept: LAB | Facility: MEDICAL CENTER | Age: 64
End: 2024-03-05
Payer: MEDICARE

## 2024-03-05 DIAGNOSIS — E11.3293 TYPE 2 DIABETES MELLITUS WITH BOTH EYES AFFECTED BY MILD NONPROLIFERATIVE RETINOPATHY WITHOUT MACULAR EDEMA, WITH LONG-TERM CURRENT USE OF INSULIN (HCC): ICD-10-CM

## 2024-03-05 DIAGNOSIS — E78.00 HYPERCHOLESTEROLEMIA: ICD-10-CM

## 2024-03-05 DIAGNOSIS — I10 ESSENTIAL HYPERTENSION: ICD-10-CM

## 2024-03-05 DIAGNOSIS — Z79.4 TYPE 2 DIABETES MELLITUS WITH BOTH EYES AFFECTED BY MILD NONPROLIFERATIVE RETINOPATHY WITHOUT MACULAR EDEMA, WITH LONG-TERM CURRENT USE OF INSULIN (HCC): ICD-10-CM

## 2024-03-05 LAB
ALBUMIN SERPL BCP-MCNC: 4.7 G/DL (ref 3.5–5)
ALP SERPL-CCNC: 72 U/L (ref 34–104)
ALT SERPL W P-5'-P-CCNC: 33 U/L (ref 7–52)
ANION GAP SERPL CALCULATED.3IONS-SCNC: 11 MMOL/L
AST SERPL W P-5'-P-CCNC: 31 U/L (ref 13–39)
BILIRUB SERPL-MCNC: 0.37 MG/DL (ref 0.2–1)
BUN SERPL-MCNC: 27 MG/DL (ref 5–25)
CALCIUM SERPL-MCNC: 11.2 MG/DL (ref 8.4–10.2)
CHLORIDE SERPL-SCNC: 96 MMOL/L (ref 96–108)
CHOLEST SERPL-MCNC: 147 MG/DL
CO2 SERPL-SCNC: 34 MMOL/L (ref 21–32)
CREAT SERPL-MCNC: 0.91 MG/DL (ref 0.6–1.3)
EST. AVERAGE GLUCOSE BLD GHB EST-MCNC: 220 MG/DL
GFR SERPL CREATININE-BSD FRML MDRD: 66 ML/MIN/1.73SQ M
GLUCOSE P FAST SERPL-MCNC: 133 MG/DL (ref 65–99)
HBA1C MFR BLD: 9.3 %
HDLC SERPL-MCNC: 50 MG/DL
LDLC SERPL CALC-MCNC: 72 MG/DL (ref 0–100)
NONHDLC SERPL-MCNC: 97 MG/DL
POTASSIUM SERPL-SCNC: 3.2 MMOL/L (ref 3.5–5.3)
PROT SERPL-MCNC: 7.6 G/DL (ref 6.4–8.4)
SODIUM SERPL-SCNC: 141 MMOL/L (ref 135–147)
TRIGL SERPL-MCNC: 124 MG/DL

## 2024-03-05 PROCEDURE — 80061 LIPID PANEL: CPT

## 2024-03-05 PROCEDURE — 80053 COMPREHEN METABOLIC PANEL: CPT

## 2024-03-05 PROCEDURE — 36415 COLL VENOUS BLD VENIPUNCTURE: CPT

## 2024-03-05 PROCEDURE — 83036 HEMOGLOBIN GLYCOSYLATED A1C: CPT

## 2024-03-06 ENCOUNTER — TELEPHONE (OUTPATIENT)
Dept: FAMILY MEDICINE CLINIC | Facility: CLINIC | Age: 64
End: 2024-03-06

## 2024-03-12 ENCOUNTER — TELEPHONE (OUTPATIENT)
Dept: NEUROSURGERY | Facility: CLINIC | Age: 64
End: 2024-03-12

## 2024-03-12 NOTE — TELEPHONE ENCOUNTER
03/15/2024- CALLED PT TO RESCHEDULE CANCELED APPOINTMENT ON 03/29/2024. PT STATED THAT THEY DID NOT COMPLETE PHYSICAL THERAPY YET AND WOULD CALL TO RESCHEDULE IN 2 WEEKS ONCE A DATE FOR PHYSICAL THERAPY WAS SET.     03/12/2024- CALLED PT TO RESCHEDULE CANCELED APPOINTMENT ON 03/29/2024. PT STATED THAT THEY WERE NOT HOME AT THE MOMENT AND WOULD CALL BACK TO RESCHEDULE AT A LATER DATE.

## 2024-03-18 DIAGNOSIS — L21.9 SEBORRHEA: ICD-10-CM

## 2024-03-18 DIAGNOSIS — I10 ESSENTIAL HYPERTENSION: ICD-10-CM

## 2024-03-18 DIAGNOSIS — E13.9 DIABETES 1.5, MANAGED AS TYPE 2 (HCC): ICD-10-CM

## 2024-03-18 DIAGNOSIS — R60.9 EDEMA, UNSPECIFIED TYPE: ICD-10-CM

## 2024-03-18 DIAGNOSIS — F41.9 ANXIETY: ICD-10-CM

## 2024-03-19 RX ORDER — HYDROCHLOROTHIAZIDE 50 MG/1
50 TABLET ORAL 2 TIMES DAILY
Qty: 180 TABLET | Refills: 0 | Status: SHIPPED | OUTPATIENT
Start: 2024-03-19

## 2024-03-19 RX ORDER — METFORMIN HYDROCHLORIDE 500 MG/1
1000 TABLET, EXTENDED RELEASE ORAL 2 TIMES DAILY
Qty: 360 TABLET | Refills: 0 | Status: SHIPPED | OUTPATIENT
Start: 2024-03-19

## 2024-03-19 RX ORDER — HYDROXYZINE HYDROCHLORIDE 25 MG/1
25 TABLET, FILM COATED ORAL
Qty: 90 TABLET | Refills: 0 | Status: SHIPPED | OUTPATIENT
Start: 2024-03-19

## 2024-03-19 RX ORDER — HYDROCHLOROTHIAZIDE 50 MG/1
50 TABLET ORAL 2 TIMES DAILY
Qty: 180 TABLET | Refills: 0 | OUTPATIENT
Start: 2024-03-19

## 2024-03-19 RX ORDER — LISINOPRIL 5 MG/1
5 TABLET ORAL DAILY
Qty: 90 TABLET | Refills: 0 | Status: SHIPPED | OUTPATIENT
Start: 2024-03-19

## 2024-03-20 RX ORDER — KETOCONAZOLE 20 MG/ML
SHAMPOO TOPICAL
Qty: 120 ML | Refills: 0 | Status: SHIPPED | OUTPATIENT
Start: 2024-03-20

## 2024-03-22 ENCOUNTER — TELEPHONE (OUTPATIENT)
Age: 64
End: 2024-03-22

## 2024-03-22 DIAGNOSIS — E87.6 HYPOKALEMIA: ICD-10-CM

## 2024-03-22 DIAGNOSIS — E83.42 HYPOMAGNESEMIA: Primary | ICD-10-CM

## 2024-03-22 NOTE — TELEPHONE ENCOUNTER
Patient called and is requesting lab order be placed to check her potassium levels again (as suggested by her PCP). Please call patient back to confirm.

## 2024-03-23 ENCOUNTER — APPOINTMENT (OUTPATIENT)
Dept: LAB | Facility: MEDICAL CENTER | Age: 64
End: 2024-03-23
Payer: MEDICARE

## 2024-03-23 DIAGNOSIS — E83.42 HYPOMAGNESEMIA: ICD-10-CM

## 2024-03-23 DIAGNOSIS — E87.6 HYPOKALEMIA: ICD-10-CM

## 2024-03-23 LAB
MAGNESIUM SERPL-MCNC: 1.8 MG/DL (ref 1.9–2.7)
POTASSIUM SERPL-SCNC: 4.3 MMOL/L (ref 3.5–5.3)

## 2024-03-23 PROCEDURE — 36415 COLL VENOUS BLD VENIPUNCTURE: CPT

## 2024-03-23 PROCEDURE — 84132 ASSAY OF SERUM POTASSIUM: CPT

## 2024-03-23 PROCEDURE — 83735 ASSAY OF MAGNESIUM: CPT

## 2024-03-25 ENCOUNTER — OFFICE VISIT (OUTPATIENT)
Dept: FAMILY MEDICINE CLINIC | Facility: CLINIC | Age: 64
End: 2024-03-25
Payer: MEDICARE

## 2024-03-25 VITALS
HEART RATE: 91 BPM | BODY MASS INDEX: 29.43 KG/M2 | OXYGEN SATURATION: 97 % | WEIGHT: 172.4 LBS | HEIGHT: 64 IN | DIASTOLIC BLOOD PRESSURE: 64 MMHG | SYSTOLIC BLOOD PRESSURE: 124 MMHG | TEMPERATURE: 97.4 F

## 2024-03-25 DIAGNOSIS — E11.3293 TYPE 2 DIABETES MELLITUS WITH BOTH EYES AFFECTED BY MILD NONPROLIFERATIVE RETINOPATHY WITHOUT MACULAR EDEMA, WITH LONG-TERM CURRENT USE OF INSULIN (HCC): Primary | ICD-10-CM

## 2024-03-25 DIAGNOSIS — Z12.11 COLON CANCER SCREENING: ICD-10-CM

## 2024-03-25 DIAGNOSIS — Q23.0 AORTIC STENOSIS DUE TO BICUSPID AORTIC VALVE: ICD-10-CM

## 2024-03-25 DIAGNOSIS — Q23.1 AORTIC STENOSIS DUE TO BICUSPID AORTIC VALVE: ICD-10-CM

## 2024-03-25 DIAGNOSIS — I10 PRIMARY HYPERTENSION: ICD-10-CM

## 2024-03-25 DIAGNOSIS — E11.42 DIABETIC PERIPHERAL NEUROPATHY (HCC): ICD-10-CM

## 2024-03-25 DIAGNOSIS — Z12.31 BREAST CANCER SCREENING BY MAMMOGRAM: ICD-10-CM

## 2024-03-25 DIAGNOSIS — Z79.4 TYPE 2 DIABETES MELLITUS WITH BOTH EYES AFFECTED BY MILD NONPROLIFERATIVE RETINOPATHY WITHOUT MACULAR EDEMA, WITH LONG-TERM CURRENT USE OF INSULIN (HCC): Primary | ICD-10-CM

## 2024-03-25 DIAGNOSIS — E78.2 MIXED HYPERLIPIDEMIA: ICD-10-CM

## 2024-03-25 PROCEDURE — G2211 COMPLEX E/M VISIT ADD ON: HCPCS | Performed by: FAMILY MEDICINE

## 2024-03-25 PROCEDURE — 99214 OFFICE O/P EST MOD 30 MIN: CPT | Performed by: FAMILY MEDICINE

## 2024-03-25 RX ORDER — METHOCARBAMOL 500 MG/1
TABLET, FILM COATED ORAL
COMMUNITY
Start: 2024-03-18

## 2024-03-25 RX ORDER — BUMETANIDE 0.5 MG/1
0.5 TABLET ORAL DAILY
COMMUNITY
Start: 2024-03-18

## 2024-04-02 NOTE — PROGRESS NOTES
Patient ID: Jacquie Viveros is a 64 y.o. female.    HPI: 64 y.o.female presents for follow up of niddm, hypertension and hypercholesterolemia.  Hgba1c is   higih at 9.3, but ozempic was increased to 2mg weekly.   Her other issues are well controlled. Patient deneis any dyspnea, chest pain or palpitations.      SUBJECTIVE    Patient    Family History   Adopted: Yes   Family history unknown: Yes     Social History     Socioeconomic History    Marital status: Single     Spouse name: Not on file    Number of children: Not on file    Years of education: Not on file    Highest education level: Not on file   Occupational History    Occupation: Retired   Tobacco Use    Smoking status: Never     Passive exposure: Never    Smokeless tobacco: Never   Vaping Use    Vaping status: Never Used   Substance and Sexual Activity    Alcohol use: Never    Drug use: No    Sexual activity: Yes     Partners: Male     Birth control/protection: Male Sterilization     Comment: declines std/hiv testing   Other Topics Concern    Not on file   Social History Narrative    Personal Protective Equipment Seatbelts     Social Determinants of Health     Financial Resource Strain: Low Risk  (6/7/2023)    Received from [facility]    Overall Financial Resource Strain (CARDIA)     Difficulty of Paying Living Expenses: Not hard at all   Food Insecurity: No Food Insecurity (6/7/2023)    Received from [facility]    Hunger Vital Sign     Worried About Running Out of Food in the Last Year: Never true     Ran Out of Food in the Last Year: Never true   Transportation Needs: No Transportation Needs (6/7/2023)    Received from [facility]    PRAPARE - Transportation     Lack of Transportation (Medical): No     Lack of Transportation (Non-Medical): No   Physical Activity: Not on file   Stress: Not on file   Social Connections: Not on file   Intimate Partner Violence: Not At Risk (6/7/2023)    Received from  [facility]    Humiliation, Afraid, Rape, and Kick questionnaire     Fear of Current or Ex-Partner: No     Emotionally Abused: No     Physically Abused: No     Sexually Abused: No   Housing Stability: Low Risk  (2023)    Received from [facility]    Housing Stability Vital Sign     Unable to Pay for Housing in the Last Year: No     Number of Places Lived in the Last Year: 1     Unstable Housing in the Last Year: No     Past Medical History:   Diagnosis Date    Arthritis     Chronic kidney disease     CTS (carpal tunnel syndrome)     had surgery    Diabetes mellitus (HCC)     Fibroid     dr gutierrez removed     2 para 2      x2 -F, -    Hyperlipidemia      Past Surgical History:   Procedure Laterality Date    CARDIAC CATHETERIZATION  2023    Procedure: Cardiac catheterization;  Surgeon: Hilary Mondragon DO;  [facility];  Service: Cardiology    CARDIAC CATHETERIZATION N/A 2023    Procedure: Cardiac Coronary Angiogram;  Surgeon: Hilary Mondragon DO;  [facility];  Service: Cardiology    CARDIAC CATHETERIZATION N/A 2023    Procedure: Cardiac other - DFR for Hemodaynamic Assessment;  Surgeon: Hilary Mondragon DO;  [facility];  Service: Cardiology    CARDIAC SURGERY      HYSTERECTOMY      MYOMECTOMY      dr gutierrez removed    TOTAL ABDOMINAL HYSTERECTOMY      2010     Allergies   Allergen Reactions    Atorvastatin Other (See Comments)     Reaction Date: 2011;     Furosemide      Reaction Date: 2011;     Latex        Current Outpatient Medications:     albuterol (Ventolin HFA) 90 mcg/act inhaler, Inhale 2 puffs every 6 (six) hours as needed for wheezing, Disp: 18 g, Rfl: 5    Aspirin Low Dose 81 MG chewable tablet, , Disp: , Rfl:     bumetanide (BUMEX) 0.5 MG tablet, Take 0.5 mg by mouth daily, Disp: , Rfl:     Continuous Blood Gluc Sensor (FreeStyle Nataly 3 Sensor) MISC, APPLY ONE  SENSOR EVERY 14 DAYS, Disp: 4 each, Rfl: 3    diazepam (VALIUM) 5 mg tablet, take 1 tablet by mouth once a day at bedtime as needed for sleep, Disp: 30 tablet, Rfl: 0    famotidine (PEPCID) 20 mg tablet, Take 1 tablet (20 mg total) by mouth daily at bedtime, Disp: 30 tablet, Rfl: 3    gabapentin (NEURONTIN) 100 mg capsule, TAKE ONE CAPSULE BY MOUTH THREE TIMES DAILY, Disp: 270 capsule, Rfl: 0    glucose blood (ONE TOUCH ULTRA TEST) test strip, test 6 times a day, Disp: 600 each, Rfl: 0    hydroCHLOROthiazide 50 mg tablet, take one tablet by mouth two times a day, Disp: 180 tablet, Rfl: 0    HYDROcodone-acetaminophen (Norco) 5-325 mg per tablet, Take 1 tablet by mouth every 6 (six) hours as needed for pain Max Daily Amount: 4 tablets, Disp: 60 tablet, Rfl: 0    hydrOXYzine HCL (ATARAX) 25 mg tablet, TAKE 1 TABLET BY MOUTH AT BEDTIME, Disp: 90 tablet, Rfl: 0    insulin detemir (Levemir FlexTouch) 100 Units/mL injection pen, Inject 30 Units under the skin every 12 (twelve) hours, Disp: 45 mL, Rfl: 3    lisinopril (ZESTRIL) 5 mg tablet, TAKE ONE TABLET BY MOUTH ONCE DAILY, Disp: 90 tablet, Rfl: 0    meloxicam (MOBIC) 15 mg tablet, Take 1 tablet (15 mg total) by mouth daily as needed for moderate pain, Disp: 30 tablet, Rfl: 3    metFORMIN (GLUCOPHAGE-XR) 500 mg 24 hr tablet, TAKE 2 TABLETS BY MOUTH TWICE DAILY, Disp: 360 tablet, Rfl: 0    methocarbamol (ROBAXIN) 500 mg tablet, , Disp: , Rfl:     NovoLOG FlexPen 100 units/mL injection pen, 8 Units 3 (three) times a day with meals, Disp: , Rfl:     pantoprazole (PROTONIX) 40 mg tablet, Take 1 tablet (40 mg total) by mouth daily before breakfast, Disp: 30 tablet, Rfl: 0    potassium chloride (MICRO-K) 10 MEQ CR capsule, Take 2 capsules (20 mEq total) by mouth daily, Disp: 60 capsule, Rfl: 3    promethazine-dextromethorphan (PHENERGAN-DM) 6.25-15 mg/5 mL oral syrup, Take 5 mL by mouth 4 (four) times a day as needed for cough, Disp: 180 mL, Rfl: 0    rosuvastatin (CRESTOR) 20  MG tablet, Take 1 tablet (20 mg total) by mouth daily, Disp: 90 tablet, Rfl: 3    Empagliflozin 25 MG TABS, Take 1 tablet (25 mg total) by mouth daily (Patient not taking: Reported on 3/25/2024), Disp: 90 tablet, Rfl: 1    ketoconazole (NIZORAL) 2 % shampoo, Apply to affected area ON SCALP every other day FOR 5 MINUTES THEN RINSE, Disp: 120 mL, Rfl: 0    linaCLOtide 290 MCG CAPS, Take 1 capsule by mouth in the morning (Patient not taking: Reported on 12/29/2023), Disp: 90 capsule, Rfl: 0    metoprolol tartrate (LOPRESSOR) 25 mg tablet, Take 25 mg by mouth daily, Disp: , Rfl:     omega-3-acid ethyl esters (LOVAZA) 1 g capsule, TAKE 2 CAPSULES BY MOUTH TWICE DAILY, Disp: 360 capsule, Rfl: 0    oxyCODONE-acetaminophen (Percocet) 5-325 mg per tablet, Take 1 tablet by mouth every 8 (eight) hours as needed for moderate pain Max Daily Amount: 3 tablets, Disp: 40 tablet, Rfl: 0    Ozempic, 1 MG/DOSE, 4 MG/3ML injection pen, Inject 0.75 mL (1 mg total) under the skin once a week (Patient not taking: Reported on 12/29/2023), Disp: 9 mL, Rfl: 0    sucralfate (CARAFATE) 1 g tablet, Take 1 tablet (1 g total) by mouth 4 (four) times a day, Disp: 360 tablet, Rfl: 0    Review of Systems  Constitutional:     Denies fever, chills ,fatigue ,weakness ,weight loss, weight gain     ENT: Denies earache ,loss of hearing ,nosebleed, nasal discharge,nasal congestion ,sore throat ,hoarseness  Pulmonary: Denies shortness of breath ,cough  ,dyspnea on exertion, orthopnea  ,PND   Cardiovascular:  Denies bradycardia , tachycardia  ,palpations, lower extremity edema leg, claudication  Breast:  Denies new or changing breast lumps ,nipple discharge ,nipple changes  Abdomen:  Denies abdominal pain , anorexia , indigestion, nausea, vomiting, constipation, diarrhea  Musculoskeletal: Denies myalgias, arthralgias, joint swelling, joint stiffness , limb pain, limb swelling  Gu: denies dysuria, polyuria  Skin: Denies skin rash, skin lesion, skin wound,  "itching, dry skin  Neuro: Denies headache, numbness, tingling, confusion, loss of consciousness, dizziness, vertigo  Psychiatric: Denies feelings of depression, suicidal ideation, anxiety, sleep disturbances    OBJECTIVE  /64   Pulse 91   Temp (!) 97.4 °F (36.3 °C)   Ht 5' 4\" (1.626 m)   Wt 78.2 kg (172 lb 6.4 oz)   SpO2 97%   BMI 29.59 kg/m²   Constitutional:   NAD, well appearing and well nourished      ENT:   Conjunctiva and lids: no injection, edema, or discharge     Pupils and iris: NIKKI bilaterally to continue  External inspection of ears and nose: normal without deformities or discharge.      Otoscopic exam: Canals patent without erythema.       Nasal mucosa, septum and turbinates: Normal or edema or discharge         Oropharynx:  Moist mucosa, normal tongue and tonsils without lesions. No erythema        Pulmonary:Respiratory effort normal rate and rhythm, no increased work of breathing. Auscultation of lungs:  Clear bilaterally with no adventitious breath sounds       Cardiovascular: regular rate and rhythm, S1 and S2, no murmur, no edema and/or varicosities of LE      Abdomen: Soft and non-distended     Positive bowel sounds      No heptomegaly or splenomegaly      Gu: no suprapubic tenderness or CVA tenderness, no urethral discharge  Lymphatic:  No anterior or posterior cervical lymphadenopathy        Musculoskeletal:  Gait and station: Normal gait      Digits and nails normal without clubbing or cyanosis       Inspection/palpation of joints, bones, and muscles:  No joint tenderness, swelling, full active and passive range of motion       Skin: Normal skin turgor and no rashes      Neuro:    Normal reflexes     Psych:   alert and oriented to person, place and time     normal mood and affect   Patient's shoes and socks removed.    Right Foot/Ankle   Right Foot Inspection  Skin Exam: skin normal and skin intact. No dry skin, no warmth, no callus, no erythema, no maceration, no abnormal color, " no pre-ulcer, no ulcer and no callus.     Toe Exam: ROM and strength within normal limits. No swelling, no tenderness, erythema and  no right toe deformity    Sensory   Vibration: diminished  Proprioception: diminished  Monofilament testing: diminished    Vascular  Capillary refills: < 3 seconds  The right DP pulse is 2+. The right PT pulse is 2+.     Left Foot/Ankle  Left Foot Inspection  Skin Exam: skin normal and skin intact. No dry skin, no warmth, no erythema, no maceration, normal color, no pre-ulcer, no ulcer and no callus.     Toe Exam: ROM and strength within normal limits. No swelling, no tenderness, no erythema and no left toe deformity.     Sensory   Vibration: diminished  Proprioception: diminished  Monofilament testing: diminished    Vascular  Capillary refills: < 3 seconds  The left DP pulse is 2+. The left PT pulse is 2+.     Assign Risk Category  No deformity present  Loss of protective sensation  No weak pulses  Risk: 1       Assessment/Plan:Diagnoses and all orders for this visit:    Type 2 diabetes mellitus with both eyes affected by mild nonproliferative retinopathy without macular edema, with long-term current use of insulin (HCC)  Comments:  Increase Ozempic to 2 mg daily    Primary hypertension  Comments:  Continue ACE therapy.    Mixed hyperlipidemia  Comments:  Stable on statin therapy.    Diabetic peripheral neuropathy (HCC)  Comments:  Stable on gabapentin therpay.    Aortic stenosis due to bicuspid aortic valve    Colon cancer screening  -     Cologuard    Breast cancer screening by mammogram  -     Mammo screening bilateral w 3d & cad; Future    Other orders  -     bumetanide (BUMEX) 0.5 MG tablet; Take 0.5 mg by mouth daily  -     methocarbamol (ROBAXIN) 500 mg tablet        Reviewed with patient plan to treat with   Patient instructed to call in 72 hours if not feeling better or if symptoms worsen

## 2024-04-12 LAB — COLOGUARD RESULT REPORTABLE: NEGATIVE

## 2024-04-19 DIAGNOSIS — J06.9 UPPER RESPIRATORY TRACT INFECTION, UNSPECIFIED TYPE: ICD-10-CM

## 2024-04-19 DIAGNOSIS — J20.9 ACUTE BRONCHITIS, UNSPECIFIED ORGANISM: Primary | ICD-10-CM

## 2024-04-19 RX ORDER — METHYLPREDNISOLONE 4 MG/1
TABLET ORAL
Qty: 21 EACH | Refills: 0 | Status: SHIPPED | OUTPATIENT
Start: 2024-04-19

## 2024-04-19 RX ORDER — DEXTROMETHORPHAN HYDROBROMIDE AND PROMETHAZINE HYDROCHLORIDE 15; 6.25 MG/5ML; MG/5ML
5 SYRUP ORAL 4 TIMES DAILY PRN
Qty: 180 ML | Refills: 0 | Status: SHIPPED | OUTPATIENT
Start: 2024-04-19

## 2024-04-19 RX ORDER — AZITHROMYCIN 250 MG/1
TABLET, FILM COATED ORAL
Qty: 6 TABLET | Refills: 0 | Status: SHIPPED | OUTPATIENT
Start: 2024-04-19 | End: 2024-04-23

## 2024-05-07 ENCOUNTER — OFFICE VISIT (OUTPATIENT)
Dept: FAMILY MEDICINE CLINIC | Facility: CLINIC | Age: 64
End: 2024-05-07
Payer: MEDICARE

## 2024-05-07 VITALS
TEMPERATURE: 98.6 F | DIASTOLIC BLOOD PRESSURE: 64 MMHG | SYSTOLIC BLOOD PRESSURE: 116 MMHG | OXYGEN SATURATION: 98 % | HEART RATE: 96 BPM | HEIGHT: 64 IN | BODY MASS INDEX: 30.19 KG/M2 | WEIGHT: 176.8 LBS

## 2024-05-07 DIAGNOSIS — I77.810 ASCENDING AORTA DILATION (HCC): ICD-10-CM

## 2024-05-07 DIAGNOSIS — D69.6 PLATELETS DECREASED (HCC): ICD-10-CM

## 2024-05-07 DIAGNOSIS — J06.9 UPPER RESPIRATORY TRACT INFECTION, UNSPECIFIED TYPE: Primary | ICD-10-CM

## 2024-05-07 DIAGNOSIS — I50.30 HEART FAILURE WITH PRESERVED EJECTION FRACTION, UNSPECIFIED HF CHRONICITY (HCC): ICD-10-CM

## 2024-05-07 DIAGNOSIS — I10 PRIMARY HYPERTENSION: ICD-10-CM

## 2024-05-07 DIAGNOSIS — Z79.4 TYPE 2 DIABETES MELLITUS WITH BOTH EYES AFFECTED BY MILD NONPROLIFERATIVE RETINOPATHY WITHOUT MACULAR EDEMA, WITH LONG-TERM CURRENT USE OF INSULIN (HCC): ICD-10-CM

## 2024-05-07 DIAGNOSIS — E78.2 MIXED HYPERLIPIDEMIA: ICD-10-CM

## 2024-05-07 DIAGNOSIS — E11.3293 TYPE 2 DIABETES MELLITUS WITH BOTH EYES AFFECTED BY MILD NONPROLIFERATIVE RETINOPATHY WITHOUT MACULAR EDEMA, WITH LONG-TERM CURRENT USE OF INSULIN (HCC): ICD-10-CM

## 2024-05-07 PROBLEM — I50.33 ACUTE ON CHRONIC DIASTOLIC (CONGESTIVE) HEART FAILURE (HCC): Status: ACTIVE | Noted: 2024-05-07

## 2024-05-07 PROBLEM — R57.9 SHOCK (HCC): Status: ACTIVE | Noted: 2024-05-07

## 2024-05-07 PROCEDURE — 99214 OFFICE O/P EST MOD 30 MIN: CPT | Performed by: FAMILY MEDICINE

## 2024-05-07 PROCEDURE — G2211 COMPLEX E/M VISIT ADD ON: HCPCS | Performed by: FAMILY MEDICINE

## 2024-05-07 RX ORDER — METHYLPREDNISOLONE 4 MG/1
TABLET ORAL
Qty: 21 EACH | Refills: 0 | Status: SHIPPED | OUTPATIENT
Start: 2024-05-07

## 2024-05-07 RX ORDER — AMOXICILLIN AND CLAVULANATE POTASSIUM 875; 125 MG/1; MG/1
1 TABLET, FILM COATED ORAL EVERY 12 HOURS SCHEDULED
Qty: 20 TABLET | Refills: 0 | Status: SHIPPED | OUTPATIENT
Start: 2024-05-07 | End: 2024-05-17

## 2024-05-07 RX ORDER — OXYCODONE HYDROCHLORIDE AND ACETAMINOPHEN 5; 325 MG/1; MG/1
1 TABLET ORAL EVERY 6 HOURS PRN
Qty: 20 TABLET | Refills: 0 | Status: SHIPPED | OUTPATIENT
Start: 2024-05-07

## 2024-05-13 PROBLEM — Q23.0 AORTIC STENOSIS DUE TO BICUSPID AORTIC VALVE: Status: RESOLVED | Noted: 2023-04-19 | Resolved: 2024-05-13

## 2024-05-13 PROBLEM — Q23.1 AORTIC STENOSIS DUE TO BICUSPID AORTIC VALVE: Status: RESOLVED | Noted: 2023-04-19 | Resolved: 2024-05-13

## 2024-05-13 PROBLEM — R57.9 SHOCK (HCC): Status: RESOLVED | Noted: 2024-05-07 | Resolved: 2024-05-13

## 2024-05-13 PROBLEM — I35.0 AORTIC STENOSIS DUE TO BICUSPID AORTIC VALVE: Status: RESOLVED | Noted: 2023-04-19 | Resolved: 2024-05-13

## 2024-05-13 PROBLEM — Q23.81 AORTIC STENOSIS DUE TO BICUSPID AORTIC VALVE: Status: RESOLVED | Noted: 2023-04-19 | Resolved: 2024-05-13

## 2024-05-13 NOTE — PROGRESS NOTES
Patient ID: Jacquie Viveros is a 64 y.o. female.    HPI: 64 y.o.female presents for follow up of niddm, hypertension and hypercholesterolemia.  Her thrombocytopenia and CHF are stable at this time.  She complains of a sore throat postnasal drip and dry nonproductive cough for the past week.  Hgba1c is  9.3 ; she did not start the Ozempic and will now that her A1c is elevated.  Patient deneis any dyspnea, chest pain or palpitations.      SUBJECTIVE    Family History   Adopted: Yes   Family history unknown: Yes     Social History     Socioeconomic History    Marital status: Single     Spouse name: Not on file    Number of children: Not on file    Years of education: Not on file    Highest education level: Not on file   Occupational History    Occupation: Retired   Tobacco Use    Smoking status: Never     Passive exposure: Never    Smokeless tobacco: Never   Vaping Use    Vaping status: Never Used   Substance and Sexual Activity    Alcohol use: Never    Drug use: No    Sexual activity: Yes     Partners: Male     Birth control/protection: Male Sterilization     Comment: declines std/hiv testing   Other Topics Concern    Not on file   Social History Narrative    Personal Protective Equipment Seatbelts     Social Determinants of Health     Financial Resource Strain: Low Risk  (6/7/2023)    Received from [facility]    Overall Financial Resource Strain (CARDIA)     Difficulty of Paying Living Expenses: Not hard at all   Food Insecurity: No Food Insecurity (6/7/2023)    Received from [facility]    Hunger Vital Sign     Worried About Running Out of Food in the Last Year: Never true     Ran Out of Food in the Last Year: Never true   Transportation Needs: No Transportation Needs (6/7/2023)    Received from [facility]    PRAPARE - Transportation     Lack of Transportation (Medical): No     Lack of Transportation (Non-Medical): No   Physical Activity: Not on file   Stress:  Not on file   Social Connections: Not on file   Intimate Partner Violence: Not At Risk (2023)    Received from [facility]    Humiliation, Afraid, Rape, and Kick questionnaire     Fear of Current or Ex-Partner: No     Emotionally Abused: No     Physically Abused: No     Sexually Abused: No   Housing Stability: Low Risk  (2023)    Received from [facility]    Housing Stability Vital Sign     Unable to Pay for Housing in the Last Year: No     Number of Places Lived in the Last Year: 1     Unstable Housing in the Last Year: No     Past Medical History:   Diagnosis Date    Arthritis     Chronic kidney disease     CTS (carpal tunnel syndrome)     had surgery    Diabetes mellitus (HCC)     Fibroid     dr gutierrez removed     2 para 2      x2 -F, -    Hyperlipidemia      Past Surgical History:   Procedure Laterality Date    CARDIAC CATHETERIZATION  2023    Procedure: Cardiac catheterization;  Surgeon: Hilary Mondragon DO;  [facility];  Service: Cardiology    CARDIAC CATHETERIZATION N/A 2023    Procedure: Cardiac Coronary Angiogram;  Surgeon: Hilary Mondragon DO;  [facility];  Service: Cardiology    CARDIAC CATHETERIZATION N/A 2023    Procedure: Cardiac other - DFR for Hemodaynamic Assessment;  Surgeon: Hilary Mondragon DO;  [facility];  Service: Cardiology    CARDIAC SURGERY      HYSTERECTOMY      MYOMECTOMY      dr gutierrez removed    TOTAL ABDOMINAL HYSTERECTOMY      2010     Allergies   Allergen Reactions    Atorvastatin Other (See Comments)     Reaction Date: 2011;     Furosemide Other (See Comments)     Reaction Date: 2011;     Latex Other (See Comments)     Pt doesn't remember reaction        Current Outpatient Medications:     albuterol (Ventolin HFA) 90 mcg/act inhaler, Inhale 2 puffs every 6 (six) hours as needed for wheezing, Disp: 18 g, Rfl: 5    amoxicillin-clavulanate  (AUGMENTIN) 875-125 mg per tablet, Take 1 tablet by mouth every 12 (twelve) hours for 10 days, Disp: 20 tablet, Rfl: 0    Aspirin Low Dose 81 MG chewable tablet, , Disp: , Rfl:     bumetanide (BUMEX) 0.5 MG tablet, Take 0.5 mg by mouth daily, Disp: , Rfl:     Continuous Blood Gluc Sensor (FreeStyle Nataly 3 Sensor) MISC, APPLY ONE SENSOR EVERY 14 DAYS, Disp: 4 each, Rfl: 3    diazepam (VALIUM) 5 mg tablet, take 1 tablet by mouth once a day at bedtime as needed for sleep, Disp: 30 tablet, Rfl: 0    famotidine (PEPCID) 20 mg tablet, Take 1 tablet (20 mg total) by mouth daily at bedtime, Disp: 30 tablet, Rfl: 3    gabapentin (NEURONTIN) 100 mg capsule, TAKE ONE CAPSULE BY MOUTH THREE TIMES DAILY, Disp: 270 capsule, Rfl: 0    glucose blood (ONE TOUCH ULTRA TEST) test strip, test 6 times a day, Disp: 600 each, Rfl: 0    hydroCHLOROthiazide 50 mg tablet, take one tablet by mouth two times a day, Disp: 180 tablet, Rfl: 0    HYDROcodone-acetaminophen (Norco) 5-325 mg per tablet, Take 1 tablet by mouth every 6 (six) hours as needed for pain Max Daily Amount: 4 tablets, Disp: 60 tablet, Rfl: 0    hydrOXYzine HCL (ATARAX) 25 mg tablet, TAKE 1 TABLET BY MOUTH AT BEDTIME, Disp: 90 tablet, Rfl: 0    insulin detemir (Levemir FlexTouch) 100 Units/mL injection pen, Inject 30 Units under the skin every 12 (twelve) hours, Disp: 45 mL, Rfl: 3    lisinopril (ZESTRIL) 5 mg tablet, TAKE ONE TABLET BY MOUTH ONCE DAILY, Disp: 90 tablet, Rfl: 0    meloxicam (MOBIC) 15 mg tablet, Take 1 tablet (15 mg total) by mouth daily as needed for moderate pain, Disp: 30 tablet, Rfl: 3    metFORMIN (GLUCOPHAGE-XR) 500 mg 24 hr tablet, TAKE 2 TABLETS BY MOUTH TWICE DAILY, Disp: 360 tablet, Rfl: 0    methocarbamol (ROBAXIN) 500 mg tablet, , Disp: , Rfl:     methylPREDNISolone 4 MG tablet therapy pack, Use as directed on package, Disp: 21 each, Rfl: 0    methylPREDNISolone 4 MG tablet therapy pack, Use as directed on package, Disp: 21 each, Rfl: 0     metoprolol tartrate (LOPRESSOR) 25 mg tablet, Take 25 mg by mouth daily, Disp: , Rfl:     NovoLOG FlexPen 100 units/mL injection pen, 8 Units 3 (three) times a day with meals, Disp: , Rfl:     omega-3-acid ethyl esters (LOVAZA) 1 g capsule, TAKE 2 CAPSULES BY MOUTH TWICE DAILY, Disp: 360 capsule, Rfl: 0    oxyCODONE-acetaminophen (Percocet) 5-325 mg per tablet, Take 1 tablet by mouth every 8 (eight) hours as needed for moderate pain Max Daily Amount: 3 tablets, Disp: 40 tablet, Rfl: 0    oxyCODONE-acetaminophen (Percocet) 5-325 mg per tablet, Take 1 tablet by mouth every 6 (six) hours as needed for moderate pain Max Daily Amount: 4 tablets, Disp: 20 tablet, Rfl: 0    pantoprazole (PROTONIX) 40 mg tablet, Take 1 tablet (40 mg total) by mouth daily before breakfast, Disp: 30 tablet, Rfl: 0    potassium chloride (MICRO-K) 10 MEQ CR capsule, Take 2 capsules (20 mEq total) by mouth daily, Disp: 60 capsule, Rfl: 3    promethazine-dextromethorphan (PHENERGAN-DM) 6.25-15 mg/5 mL oral syrup, Take 5 mL by mouth 4 (four) times a day as needed for cough, Disp: 180 mL, Rfl: 0    promethazine-dextromethorphan (PHENERGAN-DM) 6.25-15 mg/5 mL oral syrup, Take 5 mL by mouth 4 (four) times a day as needed for cough, Disp: 180 mL, Rfl: 0    rosuvastatin (CRESTOR) 20 MG tablet, Take 1 tablet (20 mg total) by mouth daily, Disp: 90 tablet, Rfl: 3    sucralfate (CARAFATE) 1 g tablet, Take 1 tablet (1 g total) by mouth 4 (four) times a day, Disp: 360 tablet, Rfl: 0    Empagliflozin 25 MG TABS, Take 1 tablet (25 mg total) by mouth daily (Patient not taking: Reported on 3/25/2024), Disp: 90 tablet, Rfl: 1    ketoconazole (NIZORAL) 2 % shampoo, Apply to affected area ON SCALP every other day FOR 5 MINUTES THEN RINSE (Patient not taking: Reported on 5/7/2024), Disp: 120 mL, Rfl: 0    linaCLOtide 290 MCG CAPS, Take 1 capsule by mouth in the morning (Patient not taking: Reported on 12/29/2023), Disp: 90 capsule, Rfl: 0    Ozempic, 1 MG/DOSE, 4  "MG/3ML injection pen, Inject 0.75 mL (1 mg total) under the skin once a week (Patient not taking: Reported on 12/29/2023), Disp: 9 mL, Rfl: 0    Review of Systems  Constitutional:     Denies fever, chills ,fatigue ,weakness ,weight loss, weight gain     ENT: Denies earache ,loss of hearing ,nosebleed, nasal discharge,nasal congestion ,+sore throat  Pulmonary: Denies shortness of breath ,cough  ,dyspnea on exertion, orthopnea  ,+PND +dry, nonproductive cough  Cardiovascular:  Denies bradycardia , tachycardia  ,palpations, lower extremity edema leg, claudication  Breast:  Denies new or changing breast lumps ,nipple discharge ,nipple changes  Abdomen:  Denies abdominal pain , anorexia , indigestion, nausea, vomiting, constipation, diarrhea  Musculoskeletal: Denies myalgias, arthralgias, joint swelling, joint stiffness , limb pain, limb swelling  Gu: denies dysuria, polyuria  Skin: Denies skin rash, skin lesion, skin wound, itching, dry skin  Neuro: Denies headache, numbness, tingling, confusion, loss of consciousness, dizziness, vertigo  Psychiatric: Denies feelings of depression, suicidal ideation, anxiety, sleep disturbances    OBJECTIVE  /64 (BP Location: Right arm, Patient Position: Sitting, Cuff Size: Standard)   Pulse 96   Temp 98.6 °F (37 °C)   Ht 5' 4\" (1.626 m)   Wt 80.2 kg (176 lb 12.8 oz)   SpO2 98%   BMI 30.35 kg/m²   Constitutional:   NAD, well appearing and well nourished      ENT:   Conjunctiva and lids: no injection, edema, or discharge     Pupils and iris: NIKKI bilaterally    External inspection of ears and nose: normal without deformities or discharge.      Otoscopic exam: Canals patent without erythema.       Nasal mucosa, septum and turbinates: Normal or edema or discharge         Oropharynx:  Moist mucosa, normal tongue and tonsils without lesions.+erythema + pnd       Pulmonary:Respiratory effort normal rate and rhythm, no increased work of breathing. Auscultation of lungs:  Clear " bilaterally with no adventitious breath sounds       Cardiovascular: regular rate and rhythm, S1 and S2, no murmur, no edema and/or varicosities of LE      Abdomen: Soft and non-distended     Positive bowel sounds      No heptomegaly or splenomegaly      Gu: no suprapubic tenderness or CVA tenderness, no urethral discharge  Lymphatic: + anterior cervical lymphadenopathy         Musculoskeletal:  Gait and station: Normal gait      Digits and nails normal without clubbing or cyanosis       Inspection/palpation of joints, bones, and muscles:  No joint tenderness, swelling, full active and passive range of motion       Skin: Normal skin turgor and no rashes      Neuro:    Normal reflexes     Psych:   alert and oriented to person, place and time     normal mood and affect       Assessment/Plan:Diagnoses and all orders for this visit:    Upper respiratory tract infection, unspecified type  -     methylPREDNISolone 4 MG tablet therapy pack; Use as directed on package  -     oxyCODONE-acetaminophen (Percocet) 5-325 mg per tablet; Take 1 tablet by mouth every 6 (six) hours as needed for moderate pain Max Daily Amount: 4 tablets  -     amoxicillin-clavulanate (AUGMENTIN) 875-125 mg per tablet; Take 1 tablet by mouth every 12 (twelve) hours for 10 days    Type 2 diabetes mellitus with both eyes affected by mild nonproliferative retinopathy without macular edema, with long-term current use of insulin (HCC)  Comments:  Will initiate therapy with Ozempic.    Primary hypertension  Comments:  Continue ACE therapy.    Mixed hyperlipidemia    Ascending aorta dilation (HCC)  Comments:  Will continue monitoring closely    Platelets decreased ([facility])  Comments:  Continue platelet monitoring    Heart failure with preserved ejection fraction, unspecified HF chronicity ([facility])  Comments:  Continue daily weights and cardiology follow-up.        Reviewed with patient plan to treat with above plan.    Patient instructed to call in 72 hours if not  feeling better or if symptoms worsen

## 2024-06-10 ENCOUNTER — OFFICE VISIT (OUTPATIENT)
Dept: CARDIOLOGY CLINIC | Facility: MEDICAL CENTER | Age: 64
End: 2024-06-10
Payer: MEDICARE

## 2024-06-10 VITALS
WEIGHT: 178 LBS | OXYGEN SATURATION: 96 % | HEART RATE: 80 BPM | HEIGHT: 64 IN | DIASTOLIC BLOOD PRESSURE: 60 MMHG | BODY MASS INDEX: 30.39 KG/M2 | SYSTOLIC BLOOD PRESSURE: 118 MMHG

## 2024-06-10 DIAGNOSIS — Z95.2 S/P AVR: ICD-10-CM

## 2024-06-10 DIAGNOSIS — I50.32 CHRONIC DIASTOLIC CHF (CONGESTIVE HEART FAILURE) (HCC): ICD-10-CM

## 2024-06-10 DIAGNOSIS — E78.00 HYPERCHOLESTEROLEMIA: ICD-10-CM

## 2024-06-10 DIAGNOSIS — I10 PRIMARY HYPERTENSION: ICD-10-CM

## 2024-06-10 DIAGNOSIS — E78.2 MIXED HYPERLIPIDEMIA: ICD-10-CM

## 2024-06-10 DIAGNOSIS — Q23.1 BICUSPID AORTIC VALVE: ICD-10-CM

## 2024-06-10 DIAGNOSIS — I25.10 CORONARY ARTERY DISEASE INVOLVING NATIVE CORONARY ARTERY OF NATIVE HEART WITHOUT ANGINA PECTORIS: Primary | ICD-10-CM

## 2024-06-10 DIAGNOSIS — I77.810 ASCENDING AORTA DILATION (HCC): ICD-10-CM

## 2024-06-10 DIAGNOSIS — I10 ESSENTIAL HYPERTENSION: ICD-10-CM

## 2024-06-10 DIAGNOSIS — R60.9 EDEMA, UNSPECIFIED TYPE: ICD-10-CM

## 2024-06-10 PROBLEM — I50.30 HEART FAILURE WITH PRESERVED EJECTION FRACTION, UNSPECIFIED HF CHRONICITY (HCC): Status: RESOLVED | Noted: 2023-09-14 | Resolved: 2024-06-10

## 2024-06-10 PROCEDURE — 99204 OFFICE O/P NEW MOD 45 MIN: CPT | Performed by: INTERNAL MEDICINE

## 2024-06-10 PROCEDURE — 93000 ELECTROCARDIOGRAM COMPLETE: CPT | Performed by: INTERNAL MEDICINE

## 2024-06-10 RX ORDER — LISINOPRIL 5 MG/1
5 TABLET ORAL DAILY
Qty: 90 TABLET | Refills: 3 | Status: SHIPPED | OUTPATIENT
Start: 2024-06-10

## 2024-06-10 RX ORDER — ROSUVASTATIN CALCIUM 20 MG/1
20 TABLET, COATED ORAL DAILY
Qty: 90 TABLET | Refills: 3 | Status: SHIPPED | OUTPATIENT
Start: 2024-06-10

## 2024-06-10 RX ORDER — HYDROCHLOROTHIAZIDE 50 MG/1
50 TABLET ORAL DAILY
Qty: 90 TABLET | Refills: 3 | Status: SHIPPED | OUTPATIENT
Start: 2024-06-10

## 2024-06-10 RX ORDER — BUMETANIDE 0.5 MG/1
0.5 TABLET ORAL DAILY
Qty: 90 TABLET | Refills: 3 | Status: SHIPPED | OUTPATIENT
Start: 2024-06-10

## 2024-06-10 NOTE — LETTER
Macy 10, 2024     Charito Pereira DO  [address]  [address]  [facility] [place] [address]    Patient: Jacquie Viveros   YOB: 1960   Date of Visit: 6/10/2024       Dear Dr. Markel Pereira:    Thank you for referring Jacquie Viveros to me for evaluation. Below are my notes for this consultation.    If you have questions, please do not hesitate to call me. I look forward to following your patient along with you.         Sincerely,        Ruddy Salas MD        CC: No Recipients    Ruddy Salas MD  6/10/2024 10:55 AM  Incomplete                                             Cardiology Consultation     Jacquie Viveros  [id number]  1960  [facility] [place] [address] [id number]    1. Coronary artery disease involving native coronary artery of native heart without angina pectoris  POCT ECG      2. Acute on chronic diastolic (congestive) heart failure (HCC)          Chief Complaint   Patient presents with   • New Patient Visit     Referred to by PCP to establish with a new cardiologist closer to home   • Chest Pain     Patient states she's been having chest pains for a while now.      HPI: Patient is here for establishment of cardiac care.  Patient feels well, without complaints.  No reported chest pain, shortness of breath, palpitations, lightheadedness, syncope, LE edema, orthopnea, PND, or significant weight changes.  Patient remains active without any increased fatigue out of the ordinary.      Patient Active Problem List   Diagnosis   • Type 2 diabetes mellitus with mild nonproliferative retinopathy of both eyes without macular edema ([facility])   • Hyperlipidemia   • Hypertension   • Arthritis   • Diabetic peripheral neuropathy ([facility])   • Bicuspid aortic valve   • Coronary artery disease involving native coronary artery   • S/P AVR   • Heart failure with preserved ejection fraction, unspecified HF chronicity ([facility])   •  Spinal stenosis of lumbar region with neurogenic claudication   • Anterolisthesis of lumbar spine   • Acute on chronic diastolic (congestive) heart failure (HCC)   • Ascending aorta dilation (HCC)   • Platelets decreased (HCC)     Past Medical History:   Diagnosis Date   • Arthritis    • Chronic kidney disease    • CTS (carpal tunnel syndrome)     had surgery   • Diabetes mellitus (HCC)    • Fibroid     dr gutierrez removed   •  2 para 2      x2 -F, -   • Hyperlipidemia      Social History     Socioeconomic History   • Marital status: Single     Spouse name: Not on file   • Number of children: Not on file   • Years of education: Not on file   • Highest education level: Not on file   Occupational History   • Occupation: Retired   Tobacco Use   • Smoking status: Never     Passive exposure: Never   • Smokeless tobacco: Never   Vaping Use   • Vaping status: Never Used   Substance and Sexual Activity   • Alcohol use: Never   • Drug use: No   • Sexual activity: Yes     Partners: Male     Birth control/protection: Male Sterilization     Comment: declines std/hiv testing   Other Topics Concern   • Not on file   Social History Narrative    Personal Protective Equipment Seatbelts     Social Determinants of Health     Financial Resource Strain: Low Risk  (2023)    [facility]    Overall Financial Resource Strain (CARDIA)    • Difficulty of Paying Living Expenses: Not hard at all   Food Insecurity: No Food Insecurity (2023)    [facility]    Hunger Vital Sign    • Worried About Running Out of Food in the Last Year: Never true    • Ran Out of Food in the Last Year: Never true   Transportation Needs: No Transportation Needs (2023)    [facility]    PRAPARE - Transportation    • Lack of Transportation (Medical): No    •  Lack of Transportation (Non-Medical): No   Physical Activity: Not on file   Stress: Not on file   Social Connections: Not on file   Intimate Partner Violence: Not At Risk (6/7/2023)    [facility]    Humiliation, Afraid, Rape, and Kick questionnaire    • Fear of Current or Ex-Partner: No    • Emotionally Abused: No    • Physically Abused: No    • Sexually Abused: No   Housing Stability: Low Risk  (6/7/2023)    [facility]    Housing Stability Vital Sign    • Unable to Pay for Housing in the Last Year: No    • Number of Places Lived in the Last Year: 1    • Unstable Housing in the Last Year: No      Family History   Adopted: Yes   Family history unknown: Yes     Past Surgical History:   Procedure Laterality Date   • CARDIAC CATHETERIZATION  5/4/2023    Procedure: Cardiac catheterization;  Surgeon: Hilary Mondragon DO;  [facility];  Service: Cardiology   • CARDIAC CATHETERIZATION N/A 5/4/2023    Procedure: Cardiac Coronary Angiogram;  Surgeon: Hilary Mondragon DO;  [facility];  Service: Cardiology   • CARDIAC CATHETERIZATION N/A 5/4/2023    Procedure: Cardiac other - DFR for Hemodaynamic Assessment;  Surgeon: Hilary Mondragon DO;  [facility];  Service: Cardiology   • CARDIAC SURGERY     • HYSTERECTOMY     • MYOMECTOMY      dr gutierrez removed   • TOTAL ABDOMINAL HYSTERECTOMY      4/2010       Current Outpatient Medications:   •  albuterol (Ventolin HFA) 90 mcg/act inhaler, Inhale 2 puffs every 6 (six) hours as needed for wheezing, Disp: 18 g, Rfl: 5  •  Aspirin Low Dose 81 MG chewable tablet, , Disp: , Rfl:   •  bumetanide (BUMEX) 0.5 MG tablet, Take 0.5 mg by mouth daily, Disp: , Rfl:   •  Continuous Blood Gluc Sensor (FreeStyle Nataly 3 Sensor) MISC, APPLY ONE SENSOR EVERY 14 DAYS, Disp: 4 each, Rfl: 3  •  diazepam (VALIUM) 5 mg tablet,  take 1 tablet by mouth once a day at bedtime as needed for sleep, Disp: 30 tablet, Rfl: 0  •  famotidine (PEPCID) 20 mg tablet, Take 1 tablet (20 mg total) by mouth daily at bedtime, Disp: 30 tablet, Rfl: 3  •  gabapentin (NEURONTIN) 100 mg capsule, TAKE ONE CAPSULE BY MOUTH THREE TIMES DAILY, Disp: 270 capsule, Rfl: 0  •  glucose blood (ONE TOUCH ULTRA TEST) test strip, test 6 times a day, Disp: 600 each, Rfl: 0  •  hydroCHLOROthiazide 50 mg tablet, take one tablet by mouth two times a day, Disp: 180 tablet, Rfl: 0  •  HYDROcodone-acetaminophen (Norco) 5-325 mg per tablet, Take 1 tablet by mouth every 6 (six) hours as needed for pain Max Daily Amount: 4 tablets, Disp: 60 tablet, Rfl: 0  •  hydrOXYzine HCL (ATARAX) 25 mg tablet, TAKE 1 TABLET BY MOUTH AT BEDTIME, Disp: 90 tablet, Rfl: 0  •  lisinopril (ZESTRIL) 5 mg tablet, TAKE ONE TABLET BY MOUTH ONCE DAILY, Disp: 90 tablet, Rfl: 0  •  meloxicam (MOBIC) 15 mg tablet, Take 1 tablet (15 mg total) by mouth daily as needed for moderate pain, Disp: 30 tablet, Rfl: 3  •  metFORMIN (GLUCOPHAGE-XR) 500 mg 24 hr tablet, TAKE 2 TABLETS BY MOUTH TWICE DAILY, Disp: 360 tablet, Rfl: 0  •  methocarbamol (ROBAXIN) 500 mg tablet, , Disp: , Rfl:   •  methylPREDNISolone 4 MG tablet therapy pack, Use as directed on package, Disp: 21 each, Rfl: 0  •  methylPREDNISolone 4 MG tablet therapy pack, Use as directed on package, Disp: 21 each, Rfl: 0  •  metoprolol tartrate (LOPRESSOR) 25 mg tablet, Take 25 mg by mouth daily, Disp: , Rfl:   •  NovoLOG FlexPen 100 units/mL injection pen, 8 Units 3 (three) times a day with meals, Disp: , Rfl:   •  omega-3-acid ethyl esters (LOVAZA) 1 g capsule, TAKE 2 CAPSULES BY MOUTH TWICE DAILY, Disp: 360 capsule, Rfl: 0  •  oxyCODONE-acetaminophen (Percocet) 5-325 mg per tablet, Take 1 tablet by mouth every 8 (eight) hours as needed for moderate pain Max Daily Amount: 3 tablets, Disp: 40 tablet, Rfl: 0  •  oxyCODONE-acetaminophen (Percocet) 5-325 mg per  tablet, Take 1 tablet by mouth every 6 (six) hours as needed for moderate pain Max Daily Amount: 4 tablets, Disp: 20 tablet, Rfl: 0  •  pantoprazole (PROTONIX) 40 mg tablet, Take 1 tablet (40 mg total) by mouth daily before breakfast, Disp: 30 tablet, Rfl: 0  •  potassium chloride (MICRO-K) 10 MEQ CR capsule, Take 2 capsules (20 mEq total) by mouth daily, Disp: 60 capsule, Rfl: 3  •  promethazine-dextromethorphan (PHENERGAN-DM) 6.25-15 mg/5 mL oral syrup, Take 5 mL by mouth 4 (four) times a day as needed for cough, Disp: 180 mL, Rfl: 0  •  promethazine-dextromethorphan (PHENERGAN-DM) 6.25-15 mg/5 mL oral syrup, Take 5 mL by mouth 4 (four) times a day as needed for cough, Disp: 180 mL, Rfl: 0  •  rosuvastatin (CRESTOR) 20 MG tablet, Take 1 tablet (20 mg total) by mouth daily, Disp: 90 tablet, Rfl: 3  •  Empagliflozin 25 MG TABS, Take 1 tablet (25 mg total) by mouth daily (Patient not taking: Reported on 3/25/2024), Disp: 90 tablet, Rfl: 1  •  insulin detemir (Levemir FlexTouch) 100 Units/mL injection pen, Inject 30 Units under the skin every 12 (twelve) hours, Disp: 45 mL, Rfl: 3  •  ketoconazole (NIZORAL) 2 % shampoo, Apply to affected area ON SCALP every other day FOR 5 MINUTES THEN RINSE (Patient not taking: Reported on 5/7/2024), Disp: 120 mL, Rfl: 0  •  linaCLOtide 290 MCG CAPS, Take 1 capsule by mouth in the morning (Patient not taking: Reported on 12/29/2023), Disp: 90 capsule, Rfl: 0  •  Ozempic, 1 MG/DOSE, 4 MG/3ML injection pen, Inject 0.75 mL (1 mg total) under the skin once a week (Patient not taking: Reported on 12/29/2023), Disp: 9 mL, Rfl: 0  •  sucralfate (CARAFATE) 1 g tablet, Take 1 tablet (1 g total) by mouth 4 (four) times a day, Disp: 360 tablet, Rfl: 0  Allergies   Allergen Reactions   • Atorvastatin Other (See Comments)     Reaction Date: 25Apr2011;    • Furosemide Other (See Comments)     Reaction Date: 25Apr2011;    • Latex Other (See Comments)     Pt doesn't remember reaction      Vitals:     "06/10/24 1044   BP: 118/60   BP Location: Left arm   Patient Position: Sitting   Cuff Size: Adult   Pulse: 80   SpO2: 96%   Weight: 80.7 kg (178 lb)   Height: 5' 4\" (1.626 m)       Labs:  Office Visit on 03/25/2024   Component Date Value   • Cologuard Result 04/05/2024 Negative    Appointment on 03/23/2024   Component Date Value   • Potassium 03/23/2024 4.3    • Magnesium 03/23/2024 1.8 (L)    Appointment on 03/05/2024   Component Date Value   • Sodium 03/05/2024 141    • Potassium 03/05/2024 3.2 (L)    • Chloride 03/05/2024 96    • CO2 03/05/2024 34 (H)    • ANION GAP 03/05/2024 11    • BUN 03/05/2024 27 (H)    • Creatinine 03/05/2024 0.91    • Glucose, Fasting 03/05/2024 133 (H)    • Calcium 03/05/2024 11.2 (H)    • AST 03/05/2024 31    • ALT 03/05/2024 33    • Alkaline Phosphatase 03/05/2024 72    • Total Protein 03/05/2024 7.6    • Albumin 03/05/2024 4.7    • Total Bilirubin 03/05/2024 0.37    • eGFR 03/05/2024 66    • Cholesterol 03/05/2024 147    • Triglycerides 03/05/2024 124    • HDL, Direct 03/05/2024 50    • LDL Calculated 03/05/2024 72    • Non-HDL-Chol (CHOL-HDL) 03/05/2024 97    • Hemoglobin A1C 03/05/2024 9.3 (H)    • EAG 03/05/2024 220    Clinical Support on 02/06/2024   Component Date Value   • RAPID PCR STREP A 02/06/2024 Not Detected    Office Visit on 01/15/2024   Component Date Value   • RAPID FLU A 01/15/2024 positvie    • RAPID FLU B 01/15/2024 negative    • POCT SARS-CoV-2 Ag 01/15/2024 Negative    • VALID CONTROL 01/15/2024 Valid      Lab Results   Component Value Date    CHOL 178 10/13/2017    TRIG 124 03/05/2024    TRIG 100 03/30/2022    HDL 50 03/05/2024    HDL 50 03/30/2022     Imaging: No results found.    Review of Systems:  Review of Systems    Physical Exam:  Physical Exam  Blood pressure 118/60, pulse 80, height 5' 4\" (1.626 m), weight 80.7 kg (178 lb), SpO2 96%.    EKG:  Normal sinus rhythm  Normal ECG    Discussion/Summary:  Bicuspid aortic valve with ascending aortic dilation: " Status post AVR.  Continues on aspirin and beta-blocker.    CAD: Normal baseline ECG.  Continued on aspirin, beta-blocker, and statin.    Hypertension with chronic diastolic heart failure: Blood pressure well-controlled with euvolemic volume status.  Continues on hydrochlorothiazide, lisinopril, and metoprolol.    Hyperlipidemia: Continued on Crestor 20 mg daily.

## 2024-06-10 NOTE — PROGRESS NOTES
Cardiology Consultation     Jacquie Viveros  [id number]  1960  [facility] [place]  [address] [facility] [place] [address]    1. Coronary artery disease involving native coronary artery of native heart without angina pectoris  POCT ECG    metoprolol tartrate (LOPRESSOR) 25 mg tablet    bumetanide (BUMEX) 0.5 MG tablet      2. Chronic diastolic CHF (congestive heart failure) (HCC)  bumetanide (BUMEX) 0.5 MG tablet      3. Ascending aorta dilation (HCC)        4. Bicuspid aortic valve        5. Primary hypertension        6. S/P AVR  metoprolol tartrate (LOPRESSOR) 25 mg tablet      7. Mixed hyperlipidemia        8. Essential hypertension  lisinopril (ZESTRIL) 5 mg tablet    Continue wiht ACE therapy.        9. Edema, unspecified type  hydroCHLOROthiazide 50 mg tablet      10. Hypercholesterolemia  rosuvastatin (CRESTOR) 20 MG tablet        Chief Complaint   Patient presents with    New Patient Visit     Referred to by PCP to establish with a new cardiologist closer to home    Chest Pain     Patient states she's been having chest pains for a while now.      HPI: Patient is here for establishment of cardiac care.  Patient feels well, without complaints.  No reported chest pain, shortness of breath, palpitations, lightheadedness, syncope, LE edema, orthopnea, PND, or significant weight changes.  Patient remains active without any increased fatigue out of the ordinary.      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 (HCC)    Hyperlipidemia    Hypertension    Arthritis    Diabetic peripheral neuropathy (HCC)    Bicuspid aortic valve    Coronary artery disease involving native coronary artery    S/P AVR    Spinal stenosis of lumbar region with neurogenic claudication    Anterolisthesis of lumbar spine    Chronic diastolic CHF  (congestive heart failure) (HCC)    Ascending aorta dilation (HCC)    Platelets decreased (HCC)     Past Medical History:   Diagnosis Date    Arthritis     Chronic kidney disease     CTS (carpal tunnel syndrome)     had surgery    Diabetes mellitus (HCC)     Fibroid     dr gutierrez removed     2 para 2      x2 -, -    Hyperlipidemia      Social History     Socioeconomic History    Marital status: Single     Spouse name: Not on file    Number of children: Not on file    Years of education: Not on file    Highest education level: Not on file   Occupational History    Occupation: Retired   Tobacco Use    Smoking status: Never     Passive exposure: Never    Smokeless tobacco: Never   Vaping Use    Vaping status: Never Used   Substance and Sexual Activity    Alcohol use: Never    Drug use: No    Sexual activity: Yes     Partners: Male     Birth control/protection: Male Sterilization     Comment: declines std/hiv testing   Other Topics Concern    Not on file   Social History Narrative    Personal Protective Equipment Seatbelts     Social Determinants of Health     Financial Resource Strain: Low Risk  (2023)    [facility]    Overall Financial Resource Strain (CARDIA)     Difficulty of Paying Living Expenses: Not hard at all   Food Insecurity: No Food Insecurity (2023)    [facility]    Hunger Vital Sign     Worried About Running Out of Food in the Last Year: Never true     Ran Out of Food in the Last Year: Never true   Transportation Needs: No Transportation Needs (2023)    [facility]    PRAPARE - Transportation     Lack of Transportation (Medical): No     Lack of Transportation (Non-Medical): No   Physical Activity: Not on file   Stress: Not on file   Social Connections: Not on file   Intimate Partner Violence:  Not At Risk (6/7/2023)    [facility]    Humiliation, Afraid, Rape, and Kick questionnaire     Fear of Current or Ex-Partner: No     Emotionally Abused: No     Physically Abused: No     Sexually Abused: No   Housing Stability: Low Risk  (6/7/2023)    [facility]    Housing Stability Vital Sign     Unable to Pay for Housing in the Last Year: No     Number of Places Lived in the Last Year: 1     Unstable Housing in the Last Year: No      Family History   Adopted: Yes   Family history unknown: Yes     Past Surgical History:   Procedure Laterality Date    CARDIAC CATHETERIZATION  5/4/2023    Procedure: Cardiac catheterization;  Surgeon: Hilary Mondragon DO;  Location: [facility];  Service: Cardiology    CARDIAC CATHETERIZATION N/A 5/4/2023    Procedure: Cardiac Coronary Angiogram;  Surgeon: Hilary Mondragon DO;  Location: [facility];  Service: Cardiology    CARDIAC CATHETERIZATION N/A 5/4/2023    Procedure: Cardiac other - DFR for Hemodaynamic Assessment;  Surgeon: Hilary Mondragon DO;  Location: [facility];  Service: Cardiology    CARDIAC SURGERY      HYSTERECTOMY      MYOMECTOMY      dr gutierrez removed    TOTAL ABDOMINAL HYSTERECTOMY      4/2010       Current Outpatient Medications:     albuterol (Ventolin HFA) 90 mcg/act inhaler, Inhale 2 puffs every 6 (six) hours as needed for wheezing, Disp: 18 g, Rfl: 5    Aspirin Low Dose 81 MG chewable tablet, , Disp: , Rfl:     bumetanide (BUMEX) 0.5 MG tablet, Take 1 tablet (0.5 mg total) by mouth daily, Disp: 90 tablet, Rfl: 3    Continuous Blood Gluc Sensor (FreeStyle Nataly 3 Sensor) MISC, APPLY ONE SENSOR EVERY 14 DAYS, Disp: 4 each, Rfl: 3    diazepam (VALIUM) 5 mg tablet, take 1 tablet by mouth once a day at bedtime as needed for sleep, Disp: 30 tablet, Rfl: 0    famotidine (PEPCID) 20 mg tablet, Take 1 tablet (20 mg  total) by mouth daily at bedtime, Disp: 30 tablet, Rfl: 3    gabapentin (NEURONTIN) 100 mg capsule, TAKE ONE CAPSULE BY MOUTH THREE TIMES DAILY, Disp: 270 capsule, Rfl: 0    glucose blood (ONE TOUCH ULTRA TEST) test strip, test 6 times a day, Disp: 600 each, Rfl: 0    hydroCHLOROthiazide 50 mg tablet, Take 1 tablet (50 mg total) by mouth daily, Disp: 90 tablet, Rfl: 3    HYDROcodone-acetaminophen (Norco) 5-325 mg per tablet, Take 1 tablet by mouth every 6 (six) hours as needed for pain Max Daily Amount: 4 tablets, Disp: 60 tablet, Rfl: 0    hydrOXYzine HCL (ATARAX) 25 mg tablet, TAKE 1 TABLET BY MOUTH AT BEDTIME, Disp: 90 tablet, Rfl: 0    lisinopril (ZESTRIL) 5 mg tablet, Take 1 tablet (5 mg total) by mouth daily, Disp: 90 tablet, Rfl: 3    meloxicam (MOBIC) 15 mg tablet, Take 1 tablet (15 mg total) by mouth daily as needed for moderate pain, Disp: 30 tablet, Rfl: 3    metFORMIN (GLUCOPHAGE-XR) 500 mg 24 hr tablet, TAKE 2 TABLETS BY MOUTH TWICE DAILY, Disp: 360 tablet, Rfl: 0    methocarbamol (ROBAXIN) 500 mg tablet, , Disp: , Rfl:     methylPREDNISolone 4 MG tablet therapy pack, Use as directed on package, Disp: 21 each, Rfl: 0    methylPREDNISolone 4 MG tablet therapy pack, Use as directed on package, Disp: 21 each, Rfl: 0    metoprolol tartrate (LOPRESSOR) 25 mg tablet, Take 1 tablet (25 mg total) by mouth every 12 (twelve) hours, Disp: 180 tablet, Rfl: 3    NovoLOG FlexPen 100 units/mL injection pen, 8 Units 3 (three) times a day with meals, Disp: , Rfl:     omega-3-acid ethyl esters (LOVAZA) 1 g capsule, TAKE 2 CAPSULES BY MOUTH TWICE DAILY, Disp: 360 capsule, Rfl: 0    oxyCODONE-acetaminophen (Percocet) 5-325 mg per tablet, Take 1 tablet by mouth every 8 (eight) hours as needed for moderate pain Max Daily Amount: 3 tablets, Disp: 40 tablet, Rfl: 0    oxyCODONE-acetaminophen (Percocet) 5-325 mg per tablet, Take 1 tablet by mouth every 6 (six) hours as needed for moderate pain Max Daily Amount: 4 tablets, Disp:  20 tablet, Rfl: 0    pantoprazole (PROTONIX) 40 mg tablet, Take 1 tablet (40 mg total) by mouth daily before breakfast, Disp: 30 tablet, Rfl: 0    potassium chloride (MICRO-K) 10 MEQ CR capsule, Take 2 capsules (20 mEq total) by mouth daily, Disp: 60 capsule, Rfl: 3    promethazine-dextromethorphan (PHENERGAN-DM) 6.25-15 mg/5 mL oral syrup, Take 5 mL by mouth 4 (four) times a day as needed for cough, Disp: 180 mL, Rfl: 0    promethazine-dextromethorphan (PHENERGAN-DM) 6.25-15 mg/5 mL oral syrup, Take 5 mL by mouth 4 (four) times a day as needed for cough, Disp: 180 mL, Rfl: 0    rosuvastatin (CRESTOR) 20 MG tablet, Take 1 tablet (20 mg total) by mouth daily, Disp: 90 tablet, Rfl: 3    Empagliflozin 25 MG TABS, Take 1 tablet (25 mg total) by mouth daily (Patient not taking: Reported on 3/25/2024), Disp: 90 tablet, Rfl: 1    insulin detemir (Levemir FlexTouch) 100 Units/mL injection pen, Inject 30 Units under the skin every 12 (twelve) hours, Disp: 45 mL, Rfl: 3    ketoconazole (NIZORAL) 2 % shampoo, Apply to affected area ON SCALP every other day FOR 5 MINUTES THEN RINSE (Patient not taking: Reported on 5/7/2024), Disp: 120 mL, Rfl: 0    linaCLOtide 290 MCG CAPS, Take 1 capsule by mouth in the morning (Patient not taking: Reported on 12/29/2023), Disp: 90 capsule, Rfl: 0    Ozempic, 1 MG/DOSE, 4 MG/3ML injection pen, Inject 0.75 mL (1 mg total) under the skin once a week (Patient not taking: Reported on 12/29/2023), Disp: 9 mL, Rfl: 0    sucralfate (CARAFATE) 1 g tablet, Take 1 tablet (1 g total) by mouth 4 (four) times a day, Disp: 360 tablet, Rfl: 0  Allergies   Allergen Reactions    Atorvastatin Other (See Comments)     Reaction Date: 25Apr2011;     Furosemide Other (See Comments)     Reaction Date: 25Apr2011;     Latex Other (See Comments)     Pt doesn't remember reaction      Vitals:    06/10/24 1044   BP: 118/60   BP Location: Left arm   Patient Position: Sitting   Cuff Size: Adult   Pulse: 80   SpO2: 96%  "  Weight: 80.7 kg (178 lb)   Height: 5' 4\" (1.626 m)       Labs:  Office Visit on 03/25/2024   Component Date Value    Cologuard Result 04/05/2024 Negative    Appointment on 03/23/2024   Component Date Value    Potassium 03/23/2024 4.3     Magnesium 03/23/2024 1.8 (L)    Appointment on 03/05/2024   Component Date Value    Sodium 03/05/2024 141     Potassium 03/05/2024 3.2 (L)     Chloride 03/05/2024 96     CO2 03/05/2024 34 (H)     ANION GAP 03/05/2024 11     BUN 03/05/2024 27 (H)     Creatinine 03/05/2024 0.91     Glucose, Fasting 03/05/2024 133 (H)     Calcium 03/05/2024 11.2 (H)     AST 03/05/2024 31     ALT 03/05/2024 33     Alkaline Phosphatase 03/05/2024 72     Total Protein 03/05/2024 7.6     Albumin 03/05/2024 4.7     Total Bilirubin 03/05/2024 0.37     eGFR 03/05/2024 66     Cholesterol 03/05/2024 147     Triglycerides 03/05/2024 124     HDL, Direct 03/05/2024 50     LDL Calculated 03/05/2024 72     Non-HDL-Chol (CHOL-HDL) 03/05/2024 97     Hemoglobin A1C 03/05/2024 9.3 (H)     EAG 03/05/2024 220    Clinical Support on 02/06/2024   Component Date Value    RAPID PCR STREP A 02/06/2024 Not Detected    Office Visit on 01/15/2024   Component Date Value    RAPID FLU A 01/15/2024 positvie     RAPID FLU B 01/15/2024 negative     POCT SARS-CoV-2 Ag 01/15/2024 Negative     VALID CONTROL 01/15/2024 Valid      Lab Results   Component Value Date    CHOL 178 10/13/2017    TRIG 124 03/05/2024    TRIG 100 03/30/2022    HDL 50 03/05/2024    HDL 50 03/30/2022     Imaging: No results found.    Review of Systems:  Review of Systems   Constitutional:  Negative for activity change, appetite change, chills, diaphoresis, fatigue and unexpected weight change.   HENT:  Negative for hearing loss, nosebleeds and sore throat.    Eyes:  Negative for photophobia and visual disturbance.   Respiratory:  Negative for cough, chest tightness, shortness of breath and wheezing.    Cardiovascular:  Negative for chest pain, palpitations and leg " swelling.   Gastrointestinal:  Negative for abdominal pain, diarrhea, nausea and vomiting.   Endocrine: Negative for polyuria.   Genitourinary:  Negative for dysuria, frequency and hematuria.   Musculoskeletal:  Negative for arthralgias, back pain, gait problem and neck pain.   Skin:  Negative for pallor and rash.   Neurological:  Negative for dizziness, syncope and headaches.   Hematological:  Does not bruise/bleed easily.   Psychiatric/Behavioral:  Negative for behavioral problems and confusion.        Physical Exam:  Physical Exam  Vitals reviewed.   Constitutional:       Appearance: Normal appearance. She is well-developed. She is not ill-appearing or diaphoretic.   HENT:      Head: Normocephalic and atraumatic.      Nose: Nose normal.   Eyes:      General: No scleral icterus.     Extraocular Movements: Extraocular movements intact.      Pupils: Pupils are equal, round, and reactive to light.   Neck:      Vascular: No JVD.   Cardiovascular:      Rate and Rhythm: Normal rate and regular rhythm.      Heart sounds: Murmur heard.      Systolic murmur is present with a grade of 1/6.      No friction rub. No gallop.   Pulmonary:      Effort: Pulmonary effort is normal. No respiratory distress.      Breath sounds: Normal breath sounds. No wheezing or rales.   Abdominal:      General: Bowel sounds are normal. There is no distension.      Palpations: Abdomen is soft.      Tenderness: There is no abdominal tenderness.   Musculoskeletal:         General: No deformity. Normal range of motion.      Cervical back: Normal range of motion and neck supple.      Right lower leg: No edema.      Left lower leg: No edema.   Skin:     General: Skin is warm and dry.      Findings: No rash.   Neurological:      Mental Status: She is alert and oriented to person, place, and time.      Cranial Nerves: No cranial nerve deficit.   Psychiatric:         Mood and Affect: Mood normal.         Behavior: Behavior normal.       Blood pressure  "118/60, pulse 80, height 5' 4\" (1.626 m), weight 80.7 kg (178 lb), SpO2 96%.    EKG:  Normal sinus rhythm  Normal ECG    Discussion/Summary:  Bicuspid aortic valve with ascending aortic dilation: Status post AVR, #21 Inspiris with ascending aortic replacement in June 2023.  Continues on aspirin and beta-blocker.  Echocardiogram in December 2023 revealed normal LV size and function with grade 1 diastolic dysfunction.  Bioprosthetic aortic valve with normal function and no significant regurgitation or stenosis.    CAD: Normal baseline ECG.  Continued on aspirin, beta-blocker, and statin.  Cardiac catheterization in May 2023 revealed very small calibered LAD with diffuse proximal to mid disease, large caliber left circumflex with 90% proximal lesion and nondominant RCA which was small in caliber and with mild disease in the midsegment.  Status post CABG x 2 with LIMA to LAD and SVG to OM 3 in June 2023.    Hypertension with chronic diastolic heart failure: Blood pressure well-controlled with euvolemic volume status.  Continues on hydrochlorothiazide and metoprolol.  Also continues on maintenance diuretic with Bumex.  Patient was on lisinopril, but was taken off.  Will reduce HCTZ to 50 mg daily and restart lisinopril.      Hyperlipidemia: Continued on Crestor 20 mg daily.  LDL well-controlled at 72 in March 2024.  "

## 2024-06-11 DIAGNOSIS — E87.6 HYPOKALEMIA: ICD-10-CM

## 2024-06-11 DIAGNOSIS — F41.9 ANXIETY: ICD-10-CM

## 2024-06-11 DIAGNOSIS — E13.9 DIABETES 1.5, MANAGED AS TYPE 2 (HCC): ICD-10-CM

## 2024-06-11 DIAGNOSIS — E78.00 HYPERCHOLESTEROLEMIA: ICD-10-CM

## 2024-06-12 RX ORDER — METFORMIN HYDROCHLORIDE 500 MG/1
1000 TABLET, EXTENDED RELEASE ORAL 2 TIMES DAILY
Qty: 360 TABLET | Refills: 1 | Status: SHIPPED | OUTPATIENT
Start: 2024-06-12

## 2024-06-12 RX ORDER — POTASSIUM CHLORIDE 750 MG/1
20 CAPSULE, EXTENDED RELEASE ORAL DAILY
Qty: 180 CAPSULE | Refills: 1 | Status: SHIPPED | OUTPATIENT
Start: 2024-06-12

## 2024-06-12 RX ORDER — HYDROXYZINE HYDROCHLORIDE 25 MG/1
25 TABLET, FILM COATED ORAL
Qty: 90 TABLET | Refills: 1 | Status: SHIPPED | OUTPATIENT
Start: 2024-06-12

## 2024-06-12 RX ORDER — OMEGA-3-ACID ETHYL ESTERS 1 G/1
2 CAPSULE, LIQUID FILLED ORAL 2 TIMES DAILY
Qty: 360 CAPSULE | Refills: 1 | Status: SHIPPED | OUTPATIENT
Start: 2024-06-12

## 2024-06-17 ENCOUNTER — OFFICE VISIT (OUTPATIENT)
Dept: FAMILY MEDICINE CLINIC | Facility: CLINIC | Age: 64
End: 2024-06-17
Payer: MEDICARE

## 2024-06-17 VITALS
SYSTOLIC BLOOD PRESSURE: 112 MMHG | HEIGHT: 64 IN | WEIGHT: 174.6 LBS | OXYGEN SATURATION: 97 % | DIASTOLIC BLOOD PRESSURE: 66 MMHG | HEART RATE: 72 BPM | BODY MASS INDEX: 29.81 KG/M2 | TEMPERATURE: 97.5 F

## 2024-06-17 DIAGNOSIS — E13.9 DIABETES 1.5, MANAGED AS TYPE 2 (HCC): ICD-10-CM

## 2024-06-17 DIAGNOSIS — M48.061 SPINAL STENOSIS OF LUMBAR REGION, UNSPECIFIED WHETHER NEUROGENIC CLAUDICATION PRESENT: ICD-10-CM

## 2024-06-17 DIAGNOSIS — E11.3293 TYPE 2 DIABETES MELLITUS WITH BOTH EYES AFFECTED BY MILD NONPROLIFERATIVE RETINOPATHY WITHOUT MACULAR EDEMA, WITH LONG-TERM CURRENT USE OF INSULIN (HCC): Primary | ICD-10-CM

## 2024-06-17 DIAGNOSIS — E78.00 HYPERCHOLESTEROLEMIA: ICD-10-CM

## 2024-06-17 DIAGNOSIS — Z95.2 S/P AVR: ICD-10-CM

## 2024-06-17 DIAGNOSIS — M25.551 PAIN OF RIGHT HIP: ICD-10-CM

## 2024-06-17 DIAGNOSIS — E78.2 MIXED HYPERLIPIDEMIA: ICD-10-CM

## 2024-06-17 DIAGNOSIS — Z95.1 S/P CABG (CORONARY ARTERY BYPASS GRAFT): ICD-10-CM

## 2024-06-17 DIAGNOSIS — I10 PRIMARY HYPERTENSION: ICD-10-CM

## 2024-06-17 DIAGNOSIS — Z79.4 TYPE 2 DIABETES MELLITUS WITH BOTH EYES AFFECTED BY MILD NONPROLIFERATIVE RETINOPATHY WITHOUT MACULAR EDEMA, WITH LONG-TERM CURRENT USE OF INSULIN (HCC): Primary | ICD-10-CM

## 2024-06-17 DIAGNOSIS — I73.9 CLAUDICATION (HCC): ICD-10-CM

## 2024-06-17 PROCEDURE — G2211 COMPLEX E/M VISIT ADD ON: HCPCS | Performed by: FAMILY MEDICINE

## 2024-06-17 PROCEDURE — 99215 OFFICE O/P EST HI 40 MIN: CPT | Performed by: FAMILY MEDICINE

## 2024-06-17 RX ORDER — MELOXICAM 15 MG/1
15 TABLET ORAL DAILY PRN
Qty: 30 TABLET | Refills: 3 | Status: SHIPPED | OUTPATIENT
Start: 2024-06-17

## 2024-06-18 NOTE — PROGRESS NOTES
Patient ID: Jacquie Viveros is a 64 y.o. female.    HPI: 64 y.o.female presents for follow up of niddm, hypertension and hypercholesterolemia.  She is due for some blood work to check her hemoglobin A1c, lipid panel and chemistry.  Patient deneis any dyspnea, chest pain or palpitations.  She complains of claudication and we discussed getting a vascular screening patient is in agreement and she follows closely with cardiology status post aortic valve replacement.  She is progressively been experiencing more right hip pain with weightbearing and we discussed her having an x-ray of the hip.  SUBJECTIVE    Family History   Adopted: Yes   Family history unknown: Yes     Social History     Socioeconomic History    Marital status: Single     Spouse name: Not on file    Number of children: Not on file    Years of education: Not on file    Highest education level: Not on file   Occupational History    Occupation: Retired   Tobacco Use    Smoking status: Never     Passive exposure: Never    Smokeless tobacco: Never   Vaping Use    Vaping status: Never Used   Substance and Sexual Activity    Alcohol use: Never    Drug use: No    Sexual activity: Yes     Partners: Male     Birth control/protection: Male Sterilization     Comment: declines std/hiv testing   Other Topics Concern    Not on file   Social History Narrative    Personal Protective Equipment Seatbelts     Social Determinants of Health     Financial Resource Strain: Low Risk  (6/7/2023)    Received [facility], [facility]    Overall Financial Resource Strain (CARDIA)     Difficulty of Paying Living Expenses: Not hard at all   Food Insecurity: No Food Insecurity (6/7/2023)    Received from [facility], [facility]    Hunger Vital Sign     Worried About Running Out of Food in the Last Year: Never true     Ran Out of Food in the Last Year: Never true   Transportation Needs: No Transportation Needs  (2023)    Received from [facility], [facility]    PRAPARE - Transportation     Lack of Transportation (Medical): No     Lack of Transportation (Non-Medical): No   Physical Activity: Not on file   Stress: Not on file   Social Connections: Not on file   Intimate Partner Violence: Not At Risk (2023)    Received from [facility], [facility]    Humiliation, Afraid, Rape, and Kick questionnaire     Fear of Current or Ex-Partner: No     Emotionally Abused: No     Physically Abused: No     Sexually Abused: No   Housing Stability: Low Risk  (2023)    Received from [facility], [facility]    Housing Stability Vital Sign     Unable to Pay for Housing in the Last Year: No     Number of Places Lived in the Last Year: 1     Unstable Housing in the Last Year: No     Past Medical History:   Diagnosis Date    Arthritis     Chronic kidney disease     CTS (carpal tunnel syndrome)     had surgery    Diabetes mellitus (HCC)     Fibroid     dr gutierrez removed     2 para 2      x2 -F, -F    Hyperlipidemia      Past Surgical History:   Procedure Laterality Date    CARDIAC CATHETERIZATION  2023    Procedure: Cardiac catheterization;  Surgeon: Hilary Mondragon DO;  [facility];  Service: Cardiology    CARDIAC CATHETERIZATION N/A 2023    Procedure: Cardiac Coronary Angiogram;  Surgeon: Hilary Mondragon DO;  [facility];  Service: Cardiology    CARDIAC CATHETERIZATION N/A 2023    Procedure: Cardiac other - DFR for Hemodaynamic Assessment;  Surgeon: Hilary Mondragon DO;  [facility];  Service: Cardiology    CARDIAC SURGERY      HYSTERECTOMY      MYOMECTOMY      dr gutierrez removed    TOTAL ABDOMINAL HYSTERECTOMY      2010     Allergies   Allergen Reactions    Atorvastatin Other (See Comments)     Reaction Date: 2011;     Furosemide  Other (See Comments)     Reaction Date: 25Apr2011;     Latex Other (See Comments)     Pt doesn't remember reaction        Current Outpatient Medications:     albuterol (Ventolin HFA) 90 mcg/act inhaler, Inhale 2 puffs every 6 (six) hours as needed for wheezing, Disp: 18 g, Rfl: 5    Aspirin Low Dose 81 MG chewable tablet, , Disp: , Rfl:     bumetanide (BUMEX) 0.5 MG tablet, Take 1 tablet (0.5 mg total) by mouth daily, Disp: 90 tablet, Rfl: 3    Continuous Blood Gluc Sensor (FreeStyle Nataly 3 Sensor) MISC, APPLY ONE SENSOR EVERY 14 DAYS, Disp: 4 each, Rfl: 3    diazepam (VALIUM) 5 mg tablet, take 1 tablet by mouth once a day at bedtime as needed for sleep, Disp: 30 tablet, Rfl: 0    gabapentin (NEURONTIN) 100 mg capsule, TAKE ONE CAPSULE BY MOUTH THREE TIMES DAILY, Disp: 270 capsule, Rfl: 0    glucose blood (ONE TOUCH ULTRA TEST) test strip, test 6 times a day, Disp: 600 each, Rfl: 0    hydroCHLOROthiazide 50 mg tablet, Take 1 tablet (50 mg total) by mouth daily, Disp: 90 tablet, Rfl: 3    HYDROcodone-acetaminophen (Norco) 5-325 mg per tablet, Take 1 tablet by mouth every 6 (six) hours as needed for pain Max Daily Amount: 4 tablets, Disp: 60 tablet, Rfl: 0    hydrOXYzine HCL (ATARAX) 25 mg tablet, TAKE 1 TABLET BY MOUTH AT BEDTIME, Disp: 90 tablet, Rfl: 1    ketoconazole (NIZORAL) 2 % shampoo, Apply to affected area ON SCALP every other day FOR 5 MINUTES THEN RINSE (Patient taking differently: as needed), Disp: 120 mL, Rfl: 0    lisinopril (ZESTRIL) 5 mg tablet, Take 1 tablet (5 mg total) by mouth daily, Disp: 90 tablet, Rfl: 3    meloxicam (MOBIC) 15 mg tablet, Take 1 tablet (15 mg total) by mouth daily as needed for moderate pain, Disp: 30 tablet, Rfl: 3    metFORMIN (GLUCOPHAGE-XR) 500 mg 24 hr tablet, TAKE 2 TABLETS BY MOUTH TWICE DAILY, Disp: 360 tablet, Rfl: 1    methocarbamol (ROBAXIN) 500 mg tablet, , Disp: , Rfl:     NovoLOG FlexPen 100 units/mL injection pen, 8 Units 3 (three) times a day with meals, Disp:  , Rfl:     omega-3-acid ethyl esters (LOVAZA) 1 g capsule, TAKE 2 CAPSULES BY MOUTH TWICE DAILY, Disp: 360 capsule, Rfl: 1    oxyCODONE-acetaminophen (Percocet) 5-325 mg per tablet, Take 1 tablet by mouth every 8 (eight) hours as needed for moderate pain Max Daily Amount: 3 tablets, Disp: 40 tablet, Rfl: 0    oxyCODONE-acetaminophen (Percocet) 5-325 mg per tablet, Take 1 tablet by mouth every 6 (six) hours as needed for moderate pain Max Daily Amount: 4 tablets, Disp: 20 tablet, Rfl: 0    potassium chloride (MICRO-K) 10 MEQ CR capsule, TAKE TWO CAPSULES BY MOUTH ONCE DAILY, Disp: 180 capsule, Rfl: 1    rosuvastatin (CRESTOR) 20 MG tablet, Take 1 tablet (20 mg total) by mouth daily, Disp: 90 tablet, Rfl: 3    Empagliflozin 25 MG TABS, Take 1 tablet (25 mg total) by mouth daily (Patient not taking: Reported on 3/25/2024), Disp: 90 tablet, Rfl: 1    famotidine (PEPCID) 20 mg tablet, Take 1 tablet (20 mg total) by mouth daily at bedtime (Patient not taking: Reported on 6/17/2024), Disp: 30 tablet, Rfl: 3    insulin detemir (Levemir FlexTouch) 100 Units/mL injection pen, Inject 30 Units under the skin every 12 (twelve) hours (Patient not taking: Reported on 6/17/2024), Disp: 45 mL, Rfl: 3    linaCLOtide 290 MCG CAPS, Take 1 capsule by mouth in the morning (Patient not taking: Reported on 12/29/2023), Disp: 90 capsule, Rfl: 0    methylPREDNISolone 4 MG tablet therapy pack, Use as directed on package (Patient not taking: Reported on 6/17/2024), Disp: 21 each, Rfl: 0    methylPREDNISolone 4 MG tablet therapy pack, Use as directed on package (Patient not taking: Reported on 6/17/2024), Disp: 21 each, Rfl: 0    metoprolol tartrate (LOPRESSOR) 25 mg tablet, Take 1 tablet (25 mg total) by mouth every 12 (twelve) hours (Patient not taking: Reported on 6/17/2024), Disp: 180 tablet, Rfl: 3    Ozempic, 1 MG/DOSE, 4 MG/3ML injection pen, Inject 0.75 mL (1 mg total) under the skin once a week (Patient not taking: Reported on  "12/29/2023), Disp: 9 mL, Rfl: 0    pantoprazole (PROTONIX) 40 mg tablet, Take 1 tablet (40 mg total) by mouth daily before breakfast (Patient not taking: Reported on 6/17/2024), Disp: 30 tablet, Rfl: 0    promethazine-dextromethorphan (PHENERGAN-DM) 6.25-15 mg/5 mL oral syrup, Take 5 mL by mouth 4 (four) times a day as needed for cough (Patient not taking: Reported on 6/17/2024), Disp: 180 mL, Rfl: 0    promethazine-dextromethorphan (PHENERGAN-DM) 6.25-15 mg/5 mL oral syrup, Take 5 mL by mouth 4 (four) times a day as needed for cough (Patient not taking: Reported on 6/17/2024), Disp: 180 mL, Rfl: 0    sucralfate (CARAFATE) 1 g tablet, Take 1 tablet (1 g total) by mouth 4 (four) times a day, Disp: 360 tablet, Rfl: 0    Review of Systems  Constitutional:     Denies fever, chills ,fatigue ,weakness ,weight loss, weight gain     ENT: Denies earache ,loss of hearing ,nosebleed, nasal discharge,nasal congestion ,sore throat ,hoarseness  Pulmonary: Denies shortness of breath ,cough  ,dyspnea on exertion, orthopnea  ,PND   Cardiovascular:  Denies bradycardia , tachycardia  ,palpations, lower extremity edema leg, claudication  Breast:  Denies new or changing breast lumps ,nipple discharge ,nipple changes  Abdomen:  Denies abdominal pain , anorexia , indigestion, nausea, vomiting, constipation, diarrhea  Musculoskeletal: Denies myalgias, arthralgias, joint swelling, joint stiffness , limb pain, limb swelling  Gu: denies dysuria, polyuria  Skin: Denies skin rash, skin lesion, skin wound, itching, dry skin  Neuro: Denies headache, numbness, tingling, confusion, loss of consciousness, dizziness, vertigo+ claudication   Psychiatric: Denies feelings of depression, suicidal ideation, anxiety, sleep disturbances    OBJECTIVE  /66 (BP Location: Right arm, Patient Position: Sitting, Cuff Size: Adult)   Pulse 72   Temp 97.5 °F (36.4 °C)   Ht 5' 4\" (1.626 m)   Wt 79.2 kg (174 lb 9.6 oz)   SpO2 97%   BMI 29.97 kg/m² "   Constitutional:   NAD, well appearing and well nourished      ENT:   Conjunctiva and lids: no injection, edema, or discharge     Pupils and iris: NIKKI bilaterally    External inspection of ears and nose: normal without deformities or discharge.      Otoscopic exam: Canals patent without erythema.       Nasal mucosa, septum and turbinates: Normal or edema or discharge         Oropharynx:  Moist mucosa, normal tongue and tonsils without lesions. No erythema        Pulmonary:Respiratory effort normal rate and rhythm, no increased work of breathing. Auscultation of lungs:  Clear bilaterally with no adventitious breath sounds       Cardiovascular: regular rate and rhythm, S1 and S2, no murmur, no edema and/or varicosities of LE      Abdomen: Soft and non-distended     Positive bowel sounds      No heptomegaly or splenomegaly      Gu: no suprapubic tenderness or CVA tenderness, no urethral discharge  Lymphatic:  No anterior or posterior cervical lymphadenopathy         Musculoskeletal:  Gait and station: Normal gait      Digits and nails normal without clubbing or cyanosis       Inspection/palpation of joints, bones, and muscles:  No joint tenderness, swelling, full active and passive range of motion       Skin: Normal skin turgor and no rashes      Neuro:    Normal reflexes     Psych:   alert and oriented to person, place and time     normal mood and affect       Assessment/Plan:Diagnoses and all orders for this visit:    Type 2 diabetes mellitus with both eyes affected by mild nonproliferative retinopathy without macular edema, with long-term current use of insulin ([facility])  Comments:  Continue current medications.  Orders:  -     Comprehensive metabolic panel; Future  -     Hemoglobin A1C; Future    Diabetes 1.5, managed as type 2 (HCC)  -     POCT hemoglobin A1c  -     POCT urine microalbumin/creatinine  -     VAS screening; Future    Spinal stenosis of lumbar region, unspecified whether neurogenic claudication  present  Comments:  Awaiting MRI results and will make recommendations pending results.  Orders:  -     meloxicam (MOBIC) 15 mg tablet; Take 1 tablet (15 mg total) by mouth daily as needed for moderate pain    Pain of right hip  Comments:  Await x-rays of the hip and meloxicam therapy.  Orders:  -     XR hip/pelv 2-3 vws right if performed; Future    Primary hypertension  Comments:  Continue ACE therapy.    Claudication (HCC)  Comments:  Await vascular screening  Orders:  -     VAS screening; Future    S/P CABG (coronary artery bypass graft)  Comments:  Patient following regularly with cardio  Orders:  -     VAS screening; Future    Hypercholesterolemia  Comments:  Stable on Crestor therapy.  Orders:  -     Lipid panel; Future    Mixed hyperlipidemia    S/P AVR  Comments:  Cardiothoracic surgery as well as cardiology.        Reviewed with patient plan to treat with above plan.    Patient instructed to call in 72 hours if not feeling better or if symptoms worsen

## 2024-06-20 ENCOUNTER — APPOINTMENT (OUTPATIENT)
Dept: RADIOLOGY | Facility: MEDICAL CENTER | Age: 64
End: 2024-06-20
Payer: MEDICARE

## 2024-06-20 DIAGNOSIS — M25.551 PAIN OF RIGHT HIP: ICD-10-CM

## 2024-06-20 PROCEDURE — 73502 X-RAY EXAM HIP UNI 2-3 VIEWS: CPT

## 2024-06-21 DIAGNOSIS — M54.50 LUMBAR BACK PAIN: Primary | ICD-10-CM

## 2024-06-24 ENCOUNTER — HOSPITAL ENCOUNTER (OUTPATIENT)
Dept: NON INVASIVE DIAGNOSTICS | Facility: CLINIC | Age: 64
Discharge: HOME | End: 2024-06-24
Payer: COMMERCIAL

## 2024-06-24 DIAGNOSIS — I73.9 CLAUDICATION (HCC): ICD-10-CM

## 2024-06-24 DIAGNOSIS — E13.9 DIABETES 1.5, MANAGED AS TYPE 2 (HCC): ICD-10-CM

## 2024-06-24 DIAGNOSIS — Z95.1 S/P CABG (CORONARY ARTERY BYPASS GRAFT): ICD-10-CM

## 2024-06-24 PROCEDURE — 93922 UPR/L XTREMITY ART 2 LEVELS: CPT

## 2024-06-24 PROCEDURE — 93880 EXTRACRANIAL BILAT STUDY: CPT | Performed by: SURGERY

## 2024-06-24 PROCEDURE — 93978 VASCULAR STUDY: CPT | Performed by: SURGERY

## 2024-06-24 PROCEDURE — 93922 UPR/L XTREMITY ART 2 LEVELS: CPT | Performed by: SURGERY

## 2024-06-25 ENCOUNTER — APPOINTMENT (OUTPATIENT)
Dept: RADIOLOGY | Facility: MEDICAL CENTER | Age: 64
End: 2024-06-25
Payer: MEDICARE

## 2024-06-25 DIAGNOSIS — M54.50 LUMBAR BACK PAIN: ICD-10-CM

## 2024-06-25 PROCEDURE — 72110 X-RAY EXAM L-2 SPINE 4/>VWS: CPT

## 2024-06-27 DIAGNOSIS — M54.50 LUMBAR BACK PAIN: Primary | ICD-10-CM

## 2024-07-23 ENCOUNTER — RA CDI HCC (OUTPATIENT)
Dept: OTHER | Facility: HOSPITAL | Age: 64
End: 2024-07-23

## 2024-07-23 NOTE — PROGRESS NOTES
HCC coding opportunities          Chart Reviewed number of suggestions sent to Provider: 2     Patients Insurance   I11.0 and E11.36  Medicare Insurance: Medicare

## 2024-08-10 ENCOUNTER — APPOINTMENT (OUTPATIENT)
Dept: LAB | Facility: MEDICAL CENTER | Age: 64
End: 2024-08-10
Payer: MEDICARE

## 2024-08-10 DIAGNOSIS — E78.00 HYPERCHOLESTEROLEMIA: ICD-10-CM

## 2024-08-10 DIAGNOSIS — Z79.4 TYPE 2 DIABETES MELLITUS WITH BOTH EYES AFFECTED BY MILD NONPROLIFERATIVE RETINOPATHY WITHOUT MACULAR EDEMA, WITH LONG-TERM CURRENT USE OF INSULIN (HCC): ICD-10-CM

## 2024-08-10 DIAGNOSIS — E11.3293 TYPE 2 DIABETES MELLITUS WITH BOTH EYES AFFECTED BY MILD NONPROLIFERATIVE RETINOPATHY WITHOUT MACULAR EDEMA, WITH LONG-TERM CURRENT USE OF INSULIN (HCC): ICD-10-CM

## 2024-08-10 LAB
ALBUMIN SERPL BCG-MCNC: 4.3 G/DL (ref 3.5–5)
ALP SERPL-CCNC: 64 U/L (ref 34–104)
ALT SERPL W P-5'-P-CCNC: 25 U/L (ref 7–52)
ANION GAP SERPL CALCULATED.3IONS-SCNC: 9 MMOL/L (ref 4–13)
AST SERPL W P-5'-P-CCNC: 22 U/L (ref 13–39)
BILIRUB SERPL-MCNC: 0.31 MG/DL (ref 0.2–1)
BUN SERPL-MCNC: 24 MG/DL (ref 5–25)
CALCIUM SERPL-MCNC: 11.6 MG/DL (ref 8.4–10.2)
CHLORIDE SERPL-SCNC: 101 MMOL/L (ref 96–108)
CHOLEST SERPL-MCNC: 138 MG/DL
CO2 SERPL-SCNC: 31 MMOL/L (ref 21–32)
CREAT SERPL-MCNC: 0.79 MG/DL (ref 0.6–1.3)
EST. AVERAGE GLUCOSE BLD GHB EST-MCNC: 237 MG/DL
GFR SERPL CREATININE-BSD FRML MDRD: 78 ML/MIN/1.73SQ M
GLUCOSE P FAST SERPL-MCNC: 182 MG/DL (ref 65–99)
HBA1C MFR BLD: 9.9 %
HDLC SERPL-MCNC: 46 MG/DL
LDLC SERPL CALC-MCNC: 66 MG/DL (ref 0–100)
NONHDLC SERPL-MCNC: 92 MG/DL
POTASSIUM SERPL-SCNC: 3.7 MMOL/L (ref 3.5–5.3)
PROT SERPL-MCNC: 7.2 G/DL (ref 6.4–8.4)
SODIUM SERPL-SCNC: 141 MMOL/L (ref 135–147)
TRIGL SERPL-MCNC: 130 MG/DL

## 2024-08-10 PROCEDURE — 36415 COLL VENOUS BLD VENIPUNCTURE: CPT

## 2024-08-10 PROCEDURE — 80053 COMPREHEN METABOLIC PANEL: CPT

## 2024-08-10 PROCEDURE — 80061 LIPID PANEL: CPT

## 2024-08-10 PROCEDURE — 83036 HEMOGLOBIN GLYCOSYLATED A1C: CPT

## 2024-08-12 ENCOUNTER — OFFICE VISIT (OUTPATIENT)
Dept: FAMILY MEDICINE CLINIC | Facility: CLINIC | Age: 64
End: 2024-08-12
Payer: MEDICARE

## 2024-08-12 VITALS
HEART RATE: 84 BPM | TEMPERATURE: 97.6 F | HEIGHT: 64 IN | SYSTOLIC BLOOD PRESSURE: 120 MMHG | OXYGEN SATURATION: 98 % | DIASTOLIC BLOOD PRESSURE: 78 MMHG | BODY MASS INDEX: 30.11 KG/M2 | WEIGHT: 176.4 LBS

## 2024-08-12 DIAGNOSIS — F41.9 ANXIETY: ICD-10-CM

## 2024-08-12 DIAGNOSIS — Z79.4 TYPE 2 DIABETES MELLITUS WITH BOTH EYES AFFECTED BY MILD NONPROLIFERATIVE RETINOPATHY WITHOUT MACULAR EDEMA, WITH LONG-TERM CURRENT USE OF INSULIN (HCC): ICD-10-CM

## 2024-08-12 DIAGNOSIS — E11.3293 TYPE 2 DIABETES MELLITUS WITH BOTH EYES AFFECTED BY MILD NONPROLIFERATIVE RETINOPATHY WITHOUT MACULAR EDEMA, WITH LONG-TERM CURRENT USE OF INSULIN (HCC): Primary | ICD-10-CM

## 2024-08-12 DIAGNOSIS — Z79.4 TYPE 2 DIABETES MELLITUS WITH BOTH EYES AFFECTED BY MILD NONPROLIFERATIVE RETINOPATHY WITHOUT MACULAR EDEMA, WITH LONG-TERM CURRENT USE OF INSULIN (HCC): Primary | ICD-10-CM

## 2024-08-12 DIAGNOSIS — E83.52 HYPERCALCEMIA: ICD-10-CM

## 2024-08-12 DIAGNOSIS — E11.3293 TYPE 2 DIABETES MELLITUS WITH BOTH EYES AFFECTED BY MILD NONPROLIFERATIVE RETINOPATHY WITHOUT MACULAR EDEMA, WITH LONG-TERM CURRENT USE OF INSULIN (HCC): ICD-10-CM

## 2024-08-12 DIAGNOSIS — E03.9 HYPOTHYROIDISM, UNSPECIFIED TYPE: ICD-10-CM

## 2024-08-12 PROCEDURE — G0402 INITIAL PREVENTIVE EXAM: HCPCS | Performed by: FAMILY MEDICINE

## 2024-08-12 PROCEDURE — 99214 OFFICE O/P EST MOD 30 MIN: CPT | Performed by: FAMILY MEDICINE

## 2024-08-12 RX ORDER — DESVENLAFAXINE 50 MG/1
50 TABLET, FILM COATED, EXTENDED RELEASE ORAL DAILY
Qty: 30 TABLET | Refills: 3 | Status: SHIPPED | OUTPATIENT
Start: 2024-08-12

## 2024-08-12 RX ORDER — INSULIN DETEMIR 100 [IU]/ML
30 INJECTION, SOLUTION SUBCUTANEOUS EVERY 12 HOURS SCHEDULED
Qty: 45 ML | Refills: 3 | Status: SHIPPED | OUTPATIENT
Start: 2024-08-12 | End: 2024-11-10

## 2024-08-19 NOTE — PROGRESS NOTES
Patient ID: Jacquie Viveros is a 64 y.o. female.    HPI: 64 y.o.female presents for follow up of niddm,anxiety , hypertension and hypercholesterolemia.  Hgba1c is high at  9.9  butpatient's issues are well controlled. Patient deneis any dyspnea, chest pain or palpitations.      SUBJECTIVE    Family History   Adopted: Yes   Family history unknown: Yes     Social History     Socioeconomic History    Marital status: Single     Spouse name: Not on file    Number of children: Not on file    Years of education: Not on file    Highest education level: Not on file   Occupational History    Occupation: Retired   Tobacco Use    Smoking status: Never     Passive exposure: Never    Smokeless tobacco: Never   Vaping Use    Vaping status: Never Used   Substance and Sexual Activity    Alcohol use: Never    Drug use: No    Sexual activity: Yes     Partners: Male     Birth control/protection: Male Sterilization     Comment: declines std/hiv testing   Other Topics Concern    Not on file   Social History Narrative    Personal Protective Equipment Seatbelts     Social Determinants of Health     Financial Resource Strain: Low Risk  (6/7/2023)    [facility]    Overall Financial Resource Strain (CARDIA)     Difficulty of Paying Living Expenses: Not hard at all   Food Insecurity: No Food Insecurity (6/7/2023)    [facility], [facility]    Hunger Vital Sign     Worried About Running Out of Food in the Last Year: Never true     Ran Out of Food in the Last Year: Never true   Transportation Needs: No Transportation Needs (6/7/2023)    Received from [facility], [facility]    PRAPARE - Transportation     Lack of Transportation (Medical): No     Lack of Transportation (Non-Medical): No   Physical Activity: Not on file   Stress: Not on file   Social Connections: Not on file   Intimate Partner  Violence: Not At Risk (2023)    [facility]    Humiliation, Afraid, Rape, and Kick questionnaire     Fear of Current or Ex-Partner: No     Emotionally Abused: No     Physically Abused: No     Sexually Abused: No   Housing Stability: Low Risk  (2023)    [facility], [facility]    Housing Stability Vital Sign     Unable to Pay for Housing in the Last Year: No     Number of Places Lived in the Last Year: 1     Unstable Housing in the Last Year: No     Past Medical History:   Diagnosis Date    Arthritis     Chronic kidney disease     CTS (carpal tunnel syndrome)     had surgery    Diabetes mellitus (HCC)     Fibroid     dr gutierrez removed     2 para 2      x2 -F, -    Hyperlipidemia      Past Surgical History:   Procedure Laterality Date    CARDIAC CATHETERIZATION  2023    Procedure: Cardiac catheterization;  Surgeon: Hilary Mondragon DO;  [facility];  Service: Cardiology    CARDIAC CATHETERIZATION N/A 2023    Procedure: Cardiac Coronary Angiogram;  Surgeon: Hilary Mondragon DO;  [facility];  Service: Cardiology    CARDIAC CATHETERIZATION N/A 2023    Procedure: Cardiac other - DFR for Hemodaynamic Assessment;  Surgeon: Hilary Mondragon DO;  [facility];  Service: Cardiology    CARDIAC SURGERY      HYSTERECTOMY      MYOMECTOMY      dr gutierrez removed    TOTAL ABDOMINAL HYSTERECTOMY      2010     Allergies   Allergen Reactions    Atorvastatin Other (See Comments)     Reaction Date: 2011;     Furosemide Other (See Comments)     Reaction Date: 2011;     Latex Other (See Comments)     Pt doesn't remember reaction        Current Outpatient Medications:     albuterol (Ventolin HFA) 90 mcg/act inhaler, Inhale 2 puffs every 6 (six) hours as needed for wheezing, Disp: 18 g, Rfl: 5    Aspirin Low Dose 81 MG  chewable tablet, , Disp: , Rfl:     bumetanide (BUMEX) 0.5 MG tablet, Take 1 tablet (0.5 mg total) by mouth daily, Disp: 90 tablet, Rfl: 3    Continuous Blood Gluc Sensor (FreeStyle Nataly 3 Sensor) MISC, APPLY ONE SENSOR EVERY 14 DAYS, Disp: 4 each, Rfl: 3    desvenlafaxine succinate (PRISTIQ) 50 mg 24 hr tablet, Take 1 tablet (50 mg total) by mouth daily, Disp: 30 tablet, Rfl: 3    diazepam (VALIUM) 5 mg tablet, take 1 tablet by mouth once a day at bedtime as needed for sleep, Disp: 30 tablet, Rfl: 0    gabapentin (NEURONTIN) 100 mg capsule, TAKE ONE CAPSULE BY MOUTH THREE TIMES DAILY, Disp: 270 capsule, Rfl: 0    glucose blood (ONE TOUCH ULTRA TEST) test strip, test 6 times a day, Disp: 600 each, Rfl: 0    hydroCHLOROthiazide 50 mg tablet, Take 1 tablet (50 mg total) by mouth daily, Disp: 90 tablet, Rfl: 3    HYDROcodone-acetaminophen (Norco) 5-325 mg per tablet, Take 1 tablet by mouth every 6 (six) hours as needed for pain Max Daily Amount: 4 tablets, Disp: 60 tablet, Rfl: 0    hydrOXYzine HCL (ATARAX) 25 mg tablet, TAKE 1 TABLET BY MOUTH AT BEDTIME, Disp: 90 tablet, Rfl: 1    insulin detemir (Levemir FlexTouch) 100 Units/mL injection pen, Inject 30 Units under the skin every 12 (twelve) hours, Disp: 45 mL, Rfl: 3    ketoconazole (NIZORAL) 2 % shampoo, Apply to affected area ON SCALP every other day FOR 5 MINUTES THEN RINSE (Patient taking differently: as needed), Disp: 120 mL, Rfl: 0    lisinopril (ZESTRIL) 5 mg tablet, Take 1 tablet (5 mg total) by mouth daily, Disp: 90 tablet, Rfl: 3    meloxicam (MOBIC) 15 mg tablet, Take 1 tablet (15 mg total) by mouth daily as needed for moderate pain, Disp: 30 tablet, Rfl: 3    metFORMIN (GLUCOPHAGE-XR) 500 mg 24 hr tablet, TAKE 2 TABLETS BY MOUTH TWICE DAILY, Disp: 360 tablet, Rfl: 1    methocarbamol (ROBAXIN) 500 mg tablet, , Disp: , Rfl:     NovoLOG FlexPen 100 units/mL injection pen, 8 Units 3 (three) times a day with meals, Disp: , Rfl:     omega-3-acid ethyl esters  (LOVAZA) 1 g capsule, TAKE 2 CAPSULES BY MOUTH TWICE DAILY, Disp: 360 capsule, Rfl: 1    oxyCODONE-acetaminophen (Percocet) 5-325 mg per tablet, Take 1 tablet by mouth every 6 (six) hours as needed for moderate pain Max Daily Amount: 4 tablets, Disp: 20 tablet, Rfl: 0    potassium chloride (MICRO-K) 10 MEQ CR capsule, TAKE TWO CAPSULES BY MOUTH ONCE DAILY, Disp: 180 capsule, Rfl: 1    rosuvastatin (CRESTOR) 20 MG tablet, Take 1 tablet (20 mg total) by mouth daily, Disp: 90 tablet, Rfl: 3    tirzepatide (Mounjaro) 2.5 MG/0.5ML, Inject 0.5 mL (2.5 mg total) under the skin every 7 days, Disp: 2 mL, Rfl: 0    metoprolol tartrate (LOPRESSOR) 25 mg tablet, Take 1 tablet (25 mg total) by mouth every 12 (twelve) hours (Patient not taking: Reported on 6/17/2024), Disp: 180 tablet, Rfl: 3    Ozempic, 1 MG/DOSE, 4 MG/3ML injection pen, Inject 0.75 mL (1 mg total) under the skin once a week (Patient not taking: Reported on 12/29/2023), Disp: 9 mL, Rfl: 0    Review of Systems  Constitutional:     Denies fever, chills ,fatigue ,weakness ,weight loss, weight gain     ENT: Denies earache ,loss of hearing ,nosebleed, nasal discharge,nasal congestion ,sore throat ,hoarseness  Pulmonary: Denies shortness of breath ,cough  ,dyspnea on exertion, orthopnea  ,PND   Cardiovascular:  Denies bradycardia , tachycardia  ,palpations, lower extremity edema leg, claudication  Breast:  Denies new or changing breast lumps ,nipple discharge ,nipple changes  Abdomen:  Denies abdominal pain , anorexia , indigestion, nausea, vomiting, constipation, diarrhea  Musculoskeletal: Denies myalgias, arthralgias, joint swelling, joint stiffness , limb pain, limb swelling  Gu: denies dysuria, polyuria  Skin: Denies skin rash, skin lesion, skin wound, itching, dry skin  Neuro: Denies headache, numbness, tingling, confusion, loss of consciousness, dizziness, vertigo  Psychiatric: Denies feelings of depression, suicidal ideation, anxiety, sleep  "disturbances    OBJECTIVE  /78 (BP Location: Left arm, Patient Position: Sitting, Cuff Size: Adult)   Pulse 84   Temp 97.6 °F (36.4 °C)   Ht 5' 4\" (1.626 m)   Wt 80 kg (176 lb 6.4 oz)   SpO2 98%   BMI 30.28 kg/m²   Constitutional:   NAD, well appearing and well nourished      ENT:   Conjunctiva and lids: no injection, edema, or discharge     Pupils and iris: NIKKI bilaterally    External inspection of ears and nose: normal without deformities or discharge.      Otoscopic exam: Canals patent without erythema.       Nasal mucosa, septum and turbinates: Normal or edema or discharge         Oropharynx:  Moist mucosa, normal tongue and tonsils without lesions. No erythema        Pulmonary:Respiratory effort normal rate and rhythm, no increased work of breathing. Auscultation of lungs:  Clear bilaterally with no adventitious breath sounds       Cardiovascular: regular rate and rhythm, S1 and S2, no murmur, no edema and/or varicosities of LE      Abdomen: Soft and non-distended     Positive bowel sounds      No heptomegaly or splenomegaly      Gu: no suprapubic tenderness or CVA tenderness, no urethral discharge  Lymphatic:  No anterior or posterior cervical lymphadenopathy         Musculoskeletal:  Gait and station: Normal gait      Digits and nails normal without clubbing or cyanosis       Inspection/palpation of joints, bones, and muscles:  No joint tenderness, swelling, full active and passive range of motion       Skin: Normal skin turgor and no rashes      Neuro:    Normal reflexes     Psych:   alert and oriented to person, place and time     normal mood and affect       Assessment/Plan:Diagnoses and all orders for this visit:    Type 2 diabetes mellitus with both eyes affected by mild nonproliferative retinopathy without macular edema, with long-term current use of insulin (HCC)  -     tirzepatide (Mounjaro) 2.5 MG/0.5ML; Inject 0.5 mL (2.5 mg total) under the skin every 7 days  -     insulin detemir " (Levemir FlexTouch) 100 Units/mL injection pen; Inject 30 Units under the skin every 12 (twelve) hours    Type 2 diabetes mellitus with both eyes affected by mild nonproliferative retinopathy without macular edema, with long-term current use of insulin (HCC)  Comments:  Will change to Mounjaro in an effort to better control her blood sugar.  Orders:  -     tirzepatide (Mounjaro) 2.5 MG/0.5ML; Inject 0.5 mL (2.5 mg total) under the skin every 7 days  -     insulin detemir (Levemir FlexTouch) 100 Units/mL injection pen; Inject 30 Units under the skin every 12 (twelve) hours    Anxiety  Comments:  Stable on pristiq therapy  Orders:  -     desvenlafaxine succinate (PRISTIQ) 50 mg 24 hr tablet; Take 1 tablet (50 mg total) by mouth daily    Hypothyroidism, unspecified type  Comments:  Continue levothyroxine therapy  Orders:  -     TSH w/Reflex; Future    Hypercalcemia  Comments:  Will recheck calcium level and PTH  Orders:  -     PTH, intact; Future        Reviewed with patient plan to treat with above plan.    Patient instructed to call in 72 hours if not feeling better or if symptoms worsen

## 2024-08-26 ENCOUNTER — PATIENT OUTREACH (OUTPATIENT)
Dept: CASE MANAGEMENT | Facility: OTHER | Age: 64
End: 2024-08-26

## 2024-08-27 ENCOUNTER — PATIENT OUTREACH (OUTPATIENT)
Dept: CASE MANAGEMENT | Facility: OTHER | Age: 64
End: 2024-08-27

## 2024-08-27 ENCOUNTER — TELEPHONE (OUTPATIENT)
Age: 64
End: 2024-08-27

## 2024-08-27 DIAGNOSIS — Z71.89 COMPLEX CARE COORDINATION: Primary | ICD-10-CM

## 2024-08-27 NOTE — PROGRESS NOTES
Jacquie returned my phone call  Her main concern is her heart. She feels she retains water at times Adjusts her Bumex and hydrochlorothiazide by taking extra doses. Weighs daily sometimes up 5 pounds more in a day. Instructed her she should really call cardiology office when she is experiencing symptoms   States she does not use salt and avoids processed foods. Drinks protein shakes 1-2 times a day  Uses Free style yamilka. Blood sugars can be good during the day then up to 300 at night  Would like to do diabetic education classes.  Has not started monjaro or levemir due to cost. [facility]. She said [facility] has 70/30 insulin that would be affordable but not sure how much to take. Only uses Novolin R insulin  Encouraged her to look at prescription assistance programs to see if she would qualify.  Reports back issues , hard for her to walk and falls. Does have a back surgeon but has not seen him in a while.   Can take 8-10 gabapentin at time for back pain.   Feels she has claudication in her legs. Makes it hard to walk. Feels her balance is off.  I will call back next week

## 2024-08-27 NOTE — TELEPHONE ENCOUNTER
Scheduled date of EGD/colonoscopy (as of today): 10/10/2024  Physician performing EGD/colonoscopy: Dr. Antony  Location of EGD/colonoscopy: [facility]  Desired bowel prep reviewed with patient: SHIRAZ/DUL  Prep instructions sent via Magnetic  Instructions reviewed with patient by: ARIANA  Clearances: pt is diabetic, AM apt required  Pt had open heart surgery just over a yr ago

## 2024-08-29 ENCOUNTER — TELEPHONE (OUTPATIENT)
Dept: CARDIOLOGY CLINIC | Facility: CLINIC | Age: 64
End: 2024-08-29

## 2024-08-29 NOTE — TELEPHONE ENCOUNTER
Spoke with pt for about 45 min. She does having many health issues.  She is also under a lot of stress.  Her main concern is her heart.  She does have sob, b/l arm edema, fatigue, abd distention( GI issues), chest tingling and sharp stabbing pain at times. She is confused if she has the diagnosis of CHF.  Her PCP told she did not.  Went over her test results and your ov with her.    She will be having anEGD/ Colon soon for the GI issues. She said she drinks 4 oz of water and can hear it in her stomach.    Presently she is taking 1 mg of Bumex due to the edema and it is helping. She is also taking the HCTZ 50 mg qd/ potassium 20 meq qd.    She has her next f/u in Dec 2024. Advised her to call with any rapid wt gain or worsening of her symptoms. She will see her PCP next week.    She wants to know if you can order her any other testing that will show her she has diagnosis of CHF.  Again went over her past results.  Do you whant there to have an echo prior to the dec appt. Last one was dec 2023?      Please advise

## 2024-08-30 NOTE — TELEPHONE ENCOUNTER
Patient Jacquie [phone] contacting office requesting to speak with Mavis regarding her upcoming colonoscopy and EGD which is scheduled for 10/10/2024 with Dr. Antony.    Patient is inquiring if a pre-op cardiac clearance would be required ?    Would patient need to hold her ASA?

## 2024-09-04 NOTE — TELEPHONE ENCOUNTER
ASA may be held for up to 1 week prior to procedure and resumed when safe from bleeding perspective postprocedure.  She does not require formal evaluation for preop risk stratification prior to the procedure.

## 2024-09-06 ENCOUNTER — PATIENT OUTREACH (OUTPATIENT)
Dept: CASE MANAGEMENT | Facility: OTHER | Age: 64
End: 2024-09-06

## 2024-09-11 DIAGNOSIS — R07.89 STERNAL PAIN: Primary | ICD-10-CM

## 2024-09-11 DIAGNOSIS — R60.9 EDEMA, UNSPECIFIED TYPE: ICD-10-CM

## 2024-09-11 DIAGNOSIS — R60.9 EDEMA, UNSPECIFIED TYPE: Primary | ICD-10-CM

## 2024-09-12 ENCOUNTER — PATIENT OUTREACH (OUTPATIENT)
Dept: CASE MANAGEMENT | Facility: OTHER | Age: 64
End: 2024-09-12

## 2024-09-12 NOTE — PROGRESS NOTES
Spoke with Jacquie She will see PCP on 10/7/24. Encouraged her to discuss insulin options she still has not started monjarou.  Does have slip for chest xray and blood work. Has clicking in chest that she will get xray for.   Levimir too expensive.   Did talk with cardiology but feels questions were not answered. Will try to schedule with new cardiologist.   Looking for diabetic support group not classes on food. I will email her support group information.   Would like call back end of October. She was heading out the door with her daughter

## 2024-09-16 ENCOUNTER — APPOINTMENT (OUTPATIENT)
Dept: LAB | Facility: MEDICAL CENTER | Age: 64
End: 2024-09-16
Payer: MEDICARE

## 2024-09-16 ENCOUNTER — APPOINTMENT (OUTPATIENT)
Dept: RADIOLOGY | Facility: MEDICAL CENTER | Age: 64
End: 2024-09-16
Payer: MEDICARE

## 2024-09-16 DIAGNOSIS — E03.9 HYPOTHYROIDISM, UNSPECIFIED TYPE: ICD-10-CM

## 2024-09-16 DIAGNOSIS — R60.9 EDEMA, UNSPECIFIED TYPE: ICD-10-CM

## 2024-09-16 DIAGNOSIS — E83.52 HYPERCALCEMIA: ICD-10-CM

## 2024-09-16 DIAGNOSIS — R07.89 STERNAL PAIN: ICD-10-CM

## 2024-09-16 LAB
BNP SERPL-MCNC: 78 PG/ML (ref 0–100)
PTH-INTACT SERPL-MCNC: 92.8 PG/ML (ref 12–88)
TSH SERPL DL<=0.05 MIU/L-ACNC: 1.75 UIU/ML (ref 0.45–4.5)

## 2024-09-16 PROCEDURE — 71046 X-RAY EXAM CHEST 2 VIEWS: CPT

## 2024-09-16 PROCEDURE — 83970 ASSAY OF PARATHORMONE: CPT

## 2024-09-16 PROCEDURE — 84443 ASSAY THYROID STIM HORMONE: CPT

## 2024-09-16 PROCEDURE — 36415 COLL VENOUS BLD VENIPUNCTURE: CPT

## 2024-09-16 PROCEDURE — 71120 X-RAY EXAM BREASTBONE 2/>VWS: CPT

## 2024-09-16 PROCEDURE — 83880 ASSAY OF NATRIURETIC PEPTIDE: CPT

## 2024-09-17 ENCOUNTER — TELEPHONE (OUTPATIENT)
Dept: FAMILY MEDICINE CLINIC | Facility: CLINIC | Age: 64
End: 2024-09-17

## 2024-09-17 DIAGNOSIS — E11.3293 TYPE 2 DIABETES MELLITUS WITH BOTH EYES AFFECTED BY MILD NONPROLIFERATIVE RETINOPATHY WITHOUT MACULAR EDEMA, WITH LONG-TERM CURRENT USE OF INSULIN (HCC): Primary | ICD-10-CM

## 2024-09-17 DIAGNOSIS — Z79.4 TYPE 2 DIABETES MELLITUS WITH BOTH EYES AFFECTED BY MILD NONPROLIFERATIVE RETINOPATHY WITHOUT MACULAR EDEMA, WITH LONG-TERM CURRENT USE OF INSULIN (HCC): Primary | ICD-10-CM

## 2024-09-17 NOTE — TELEPHONE ENCOUNTER
Patient states she is having so much pain, stabbing pain in her kidney area, hands, and feet. Is there any other testing you can order? She also said she is having pain in her hip area. She would be amenable to having CT or MRI- she said that if it is not covered by insurance she would pay out of pocket at [facility]. She still has swelling edema in her arms too. She is seeing you on October 7th and can wait until then if you wish

## 2024-09-17 NOTE — TELEPHONE ENCOUNTER
----- Message from Charito Pereira DO sent at 9/16/2024  1:32 PM EDT -----  Sternum xray is normal

## 2024-09-18 ENCOUNTER — PATIENT OUTREACH (OUTPATIENT)
Dept: CASE MANAGEMENT | Facility: OTHER | Age: 64
End: 2024-09-18

## 2024-09-18 ENCOUNTER — APPOINTMENT (EMERGENCY)
Dept: RADIOLOGY | Facility: HOSPITAL | Age: 64
End: 2024-09-18
Payer: MEDICARE

## 2024-09-18 ENCOUNTER — HOSPITAL ENCOUNTER (EMERGENCY)
Facility: HOSPITAL | Age: 64
Discharge: HOME | End: 2024-09-18
Attending: STUDENT IN AN ORGANIZED HEALTH CARE EDUCATION/TRAINING PROGRAM
Payer: MEDICARE

## 2024-09-18 VITALS
RESPIRATION RATE: 18 BRPM | TEMPERATURE: 97.9 F | HEART RATE: 75 BPM | OXYGEN SATURATION: 97 % | SYSTOLIC BLOOD PRESSURE: 135 MMHG | BODY MASS INDEX: 30.58 KG/M2 | DIASTOLIC BLOOD PRESSURE: 68 MMHG | WEIGHT: 178.13 LBS

## 2024-09-18 DIAGNOSIS — R53.1 WEAKNESS GENERALIZED: ICD-10-CM

## 2024-09-18 DIAGNOSIS — R07.9 CHEST PAIN, UNSPECIFIED TYPE: ICD-10-CM

## 2024-09-18 DIAGNOSIS — R10.84 GENERALIZED ABDOMINAL PAIN: ICD-10-CM

## 2024-09-18 DIAGNOSIS — R06.02 SOB (SHORTNESS OF BREATH): Primary | ICD-10-CM

## 2024-09-18 LAB
2HR DELTA HS TROPONIN: -1 NG/L
ALBUMIN SERPL BCG-MCNC: 4.6 G/DL (ref 3.5–5)
ALP SERPL-CCNC: 59 U/L (ref 34–104)
ALT SERPL W P-5'-P-CCNC: 26 U/L (ref 7–52)
ANION GAP SERPL CALCULATED.3IONS-SCNC: 8 MMOL/L (ref 4–13)
AST SERPL W P-5'-P-CCNC: 26 U/L (ref 13–39)
BASOPHILS # BLD AUTO: 0.03 THOUSANDS/ΜL (ref 0–0.1)
BASOPHILS NFR BLD AUTO: 0 % (ref 0–1)
BILIRUB SERPL-MCNC: 0.48 MG/DL (ref 0.2–1)
BUN SERPL-MCNC: 27 MG/DL (ref 5–25)
CALCIUM SERPL-MCNC: 10.8 MG/DL (ref 8.4–10.2)
CARDIAC TROPONIN I PNL SERPL HS: 3 NG/L
CARDIAC TROPONIN I PNL SERPL HS: 4 NG/L
CHLORIDE SERPL-SCNC: 97 MMOL/L (ref 96–108)
CO2 SERPL-SCNC: 30 MMOL/L (ref 21–32)
CREAT SERPL-MCNC: 0.87 MG/DL (ref 0.6–1.3)
D DIMER PPP FEU-MCNC: 0.52 UG/ML FEU
EOSINOPHIL # BLD AUTO: 0.64 THOUSAND/ΜL (ref 0–0.61)
EOSINOPHIL NFR BLD AUTO: 9 % (ref 0–6)
ERYTHROCYTE [DISTWIDTH] IN BLOOD BY AUTOMATED COUNT: 14.1 % (ref 11.6–15.1)
GFR SERPL CREATININE-BSD FRML MDRD: 70 ML/MIN/1.73SQ M
GLUCOSE SERPL-MCNC: 222 MG/DL (ref 65–140)
HCT VFR BLD AUTO: 39.2 % (ref 34.8–46.1)
HGB BLD-MCNC: 12.6 G/DL (ref 11.5–15.4)
IMM GRANULOCYTES # BLD AUTO: 0.02 THOUSAND/UL (ref 0–0.2)
IMM GRANULOCYTES NFR BLD AUTO: 0 % (ref 0–2)
LIPASE SERPL-CCNC: 23 U/L (ref 11–82)
LYMPHOCYTES # BLD AUTO: 1.97 THOUSANDS/ΜL (ref 0.6–4.47)
LYMPHOCYTES NFR BLD AUTO: 27 % (ref 14–44)
MAGNESIUM SERPL-MCNC: 1.5 MG/DL (ref 1.9–2.7)
MCH RBC QN AUTO: 25.8 PG (ref 26.8–34.3)
MCHC RBC AUTO-ENTMCNC: 32.1 G/DL (ref 31.4–37.4)
MCV RBC AUTO: 80 FL (ref 82–98)
MONOCYTES # BLD AUTO: 0.52 THOUSAND/ΜL (ref 0.17–1.22)
MONOCYTES NFR BLD AUTO: 7 % (ref 4–12)
NEUTROPHILS # BLD AUTO: 4.04 THOUSANDS/ΜL (ref 1.85–7.62)
NEUTS SEG NFR BLD AUTO: 57 % (ref 43–75)
NRBC BLD AUTO-RTO: 0 /100 WBCS
PLATELET # BLD AUTO: 172 THOUSANDS/UL (ref 149–390)
PMV BLD AUTO: 10.7 FL (ref 8.9–12.7)
POTASSIUM SERPL-SCNC: 3.9 MMOL/L (ref 3.5–5.3)
PROT SERPL-MCNC: 7.4 G/DL (ref 6.4–8.4)
RBC # BLD AUTO: 4.89 MILLION/UL (ref 3.81–5.12)
SODIUM SERPL-SCNC: 135 MMOL/L (ref 135–147)
TSH SERPL DL<=0.05 MIU/L-ACNC: 2.2 UIU/ML (ref 0.45–4.5)
WBC # BLD AUTO: 7.22 THOUSAND/UL (ref 4.31–10.16)

## 2024-09-18 PROCEDURE — 85025 COMPLETE CBC W/AUTO DIFF WBC: CPT | Performed by: STUDENT IN AN ORGANIZED HEALTH CARE EDUCATION/TRAINING PROGRAM

## 2024-09-18 PROCEDURE — 85379 FIBRIN DEGRADATION QUANT: CPT

## 2024-09-18 PROCEDURE — 84443 ASSAY THYROID STIM HORMONE: CPT

## 2024-09-18 PROCEDURE — 84484 ASSAY OF TROPONIN QUANT: CPT | Performed by: STUDENT IN AN ORGANIZED HEALTH CARE EDUCATION/TRAINING PROGRAM

## 2024-09-18 PROCEDURE — 80053 COMPREHEN METABOLIC PANEL: CPT | Performed by: STUDENT IN AN ORGANIZED HEALTH CARE EDUCATION/TRAINING PROGRAM

## 2024-09-18 PROCEDURE — 71045 X-RAY EXAM CHEST 1 VIEW: CPT

## 2024-09-18 PROCEDURE — 83735 ASSAY OF MAGNESIUM: CPT

## 2024-09-18 PROCEDURE — 99285 EMERGENCY DEPT VISIT HI MDM: CPT

## 2024-09-18 PROCEDURE — 93005 ELECTROCARDIOGRAM TRACING: CPT

## 2024-09-18 PROCEDURE — 36415 COLL VENOUS BLD VENIPUNCTURE: CPT

## 2024-09-18 PROCEDURE — 83690 ASSAY OF LIPASE: CPT

## 2024-09-18 NOTE — TELEPHONE ENCOUNTER
Regarding: stomach pain  ----- Message from Joanna NICK sent at 9/18/2024  8:36 AM EDT -----  Patient called and stated she is heading to the ER ([facility]) for stomach pain and wanted to make Dr Antony aware thank you

## 2024-09-18 NOTE — ED ATTENDING ATTESTATION
09/18/24    I, Laina Dash MD, saw and evaluated the patient. I have discussed the patient with the resident/non-physician practitioner and agree with the resident's/non-physician practitioner's findings, Plan of Care, and MDM as documented in the resident's/non-physician practitioner's note, except where noted. All available labs and Radiology studies were reviewed.  I was present for key portions of any procedure(s) performed by the resident/non-physician practitioner and I was immediately available to provide assistance.       At this point I agree with the current assessment done in the Emergency Department.  I have conducted an independent evaluation of this patient a history and physical is as follows:    Subjective: 64-year-old female with history of CHF, aortic valve repair, diabetes, CKD and anxiety who presents with a number of chronic complaints including worsening of her CHF, bilateral upper extremity swelling, and excessive belching.  She denies symptoms that are suggestive of an acute process and states that nothing is changed in the last few days but that she was feeling more stressed about her health and so decided to present to care.    Objective: Patient's exam is euvolemic without any edema of the bilateral upper or lower extremities on my examination and otherwise with a reassuring cardiopulmonary exam with soft, nontender abdomen.  Her vitals are stable and she is afebrile in no acute distress at rest at this time.    Assessment/Plan: ACS workup negative for troponin elevation or EKG changes concerning for an acute cardiac cause of her complaints.  Age-adjusted D-dimer reassuring.  Patient has no complaints at time of reassessment.  She has close interval GI and PCP follow-up.  Patient discharged with strict return precautions.    ED Course         Critical Care Time  Procedures

## 2024-09-18 NOTE — ED PROCEDURE NOTE
Procedure  POC Cardiac US    Date/Time: 9/18/2024 2:09 PM    Performed by: Bryant FLOYD DO  Authorized by: Bryant FLOYD DO    Patient location:  ED  Other Assisting Provider: Yes (comment) (Jeremias Nova, Medical Student)    Procedure details:     Exam Type:  Educational    Indications: dyspnea      Assessment / Evaluation for: cardiac function and pericardial effusion      Exam Type: initial exam      Image quality: limited diagnostic      Image availability:  Images available in PACS  Patient Details:     Cardiac Rhythm:  Regular    Mechanical ventilation: No    Cardiac findings:     Echo technique: limited 2D      Views obtained: parasternal long axis, parasternal short axis, subcostal and apical      Pericardial effusion: absent      Tamponade physiology: absent      Wall motion: normal      LV systolic function: normal      RV dilation: none    Pulmonary findings:     Left Lung Findings: left lung sliding      Right lung findings: right lung sliding      B-lines: no B-lines present                     Bryant FLOYD DO  09/18/24 1410

## 2024-09-18 NOTE — ED PROVIDER NOTES
"1. SOB (shortness of breath)    2. Chest pain, unspecified type    3. Generalized abdominal pain    4. Weakness generalized      ED Disposition       ED Disposition   Discharge    Condition   Stable    Date/Time   Wed Sep 18, 2024  1:33 PM    Comment   Jacquie Emy Gilesks discharge to home/self care.                   Assessment & Plan       Medical Decision Making  Amount and/or Complexity of Data Reviewed  Labs: ordered.  Radiology: ordered and independent interpretation performed.      \"   Chief Complaint   Patient presents with    Shortness of Breath     Pt reports SOB for a while, hx of open heart surgery a year ago, PCP said to come in for further eval       Initial ED Assessment: 64-year-old female with prior medical history of aortic valve replacement (tissue valve), diabetes mellitus type 2, chronic kidney disease, and lumbar spine stenosis who presents for 1 year history of shortness of breath, abdominal pain, decreased oral intake, upper extremity pitting edema, shortness of breath and chest pain, worsening in the past 1-2 months.     DDX including but not limited to: ACS, MI, PE, PTX, pneumonia, dissection, pleurisy, pericarditis, myocarditis, rhabdomyolysis, hiatal hernia, GERD, Zenker diverticulum, peripheral neuropathy, congestive heart failure.    Initial ED Plan and results: EKG, chest x-ray, CBC, CMP, troponin, magnesium, D-dimer, lipase, TSH    \"Patient's labs notable for: D-dimer within normal limits on age-adjusted.  Hypomagnesemia, Trope within normal limits\", \"Imaging revealed: Increased pulmonary infiltrates bilaterally, increased cardiac silhouette\", and \"EKG: Normal sinus rhythm, no sign of acute ischemia.  interpreted by myself as above.\"    At this time, the patient seems to be showing early signs of congestive heart failure with increased cardiac silhouette, pulmonary edema and pitting edema.  Counseled the patient to discuss these findings with her primary care provider in regards to " "treatment.  Counseled the patient to keep her EGD/colonoscopy appointment for assessment of her GI symptoms.  Ambulatory referral to cardiology given for addressing chest pain, shortness of breath and pitting edema.    At this time, the patient's concerns seem to be chronic and better addressed in an outpatient setting.  The patient seems to be stable and nontoxic at this time.    \"Patient appears well, is nontoxic appearing, Jacquie expresses understanding and agrees with plan of care at this time.  In light of this patient would benefit from outpatient management.\"      HEART Risk Score      Flowsheet Row Most Recent Value   Heart Score Risk Calculator    History 1 Filed at: 09/18/2024 1235   ECG 0 Filed at: 09/18/2024 1235   Age 2 Filed at: 09/18/2024 1235   Risk Factors 2 Filed at: 09/18/2024 1235   Troponin 0 Filed at: 09/18/2024 1235   HEART Score 5 Filed at: 09/18/2024 1235                    Medications - No data to display    History of Present Illness       HPI    64-year-old female with prior medical history of aortic valve replacement (tissue valve), diabetes mellitus type 2, chronic kidney disease, and lumbar spine stenosis who presents for 1 year history of shortness of breath, abdominal pain, decreased oral intake, upper extremity pitting edema, shortness of breath and chest pain, worsening in the past 1-2 months.  Patient states that she had a open heart aortic valve replacement 1 year ago and notes that her current symptoms originated around that time.    Regards to her GI concerns, the patient states that she has severe belching consistently as well as abdominal pain and epigastric pressure.  She admits that she has been having decreased oral intake because she has been gaining weight consistently over the last year.    With her respiratory concerns, the patient states that she had \"fluid on her lungs\" several months ago in which she had the fluid \"drained.\"  Over the last month, she has been " "developing increased shortness of breath where she can barely make it to the restroom.  She notes that her  helps her with ADL.    With her chest pain concerns, she states that she has left sternal chest pain in bilateral arm pain at rest.  She expresses concern about getting a repeat echocardiogram as she has not had 1 since her aortic valve replacement.    With her headache concerns, the patient states that she has bilateral \"ocular migraines\" multiple times a week which involve sharp jagged lines on the outer edges of her vision and a frontal headache pain.    Review of Systems   Constitutional:  Positive for activity change, appetite change, fatigue and unexpected weight change. Negative for chills and fever.   HENT:  Negative for ear pain and sore throat.    Eyes:  Negative for pain and visual disturbance.   Respiratory:  Negative for cough and shortness of breath.    Cardiovascular:  Negative for chest pain and palpitations.   Gastrointestinal:  Negative for abdominal pain and vomiting.   Genitourinary:  Negative for dysuria and hematuria.   Musculoskeletal:  Negative for arthralgias and back pain.   Skin:  Negative for color change and rash.   Neurological:  Negative for seizures and syncope.   All other systems reviewed and are negative.          Objective     ED Triage Vitals [09/18/24 0913]   Temperature Pulse Blood Pressure Respirations SpO2 Patient Position - Orthostatic VS   97.9 °F (36.6 °C) 87 148/70 18 98 % Sitting      Temp Source Heart Rate Source BP Location FiO2 (%) Pain Score    Oral Monitor Right arm -- --        Physical Exam    Labs Reviewed   CBC AND DIFFERENTIAL - Abnormal       Result Value    WBC 7.22      RBC 4.89      Hemoglobin 12.6      Hematocrit 39.2      MCV 80 (*)     MCH 25.8 (*)     MCHC 32.1      RDW 14.1      MPV 10.7      Platelets 172      nRBC 0      Segmented % 57      Immature Grans % 0      Lymphocytes % 27      Monocytes % 7      Eosinophils Relative 9 (*)     " Basophils Relative 0      Absolute Neutrophils 4.04      Absolute Immature Grans 0.02      Absolute Lymphocytes 1.97      Absolute Monocytes 0.52      Eosinophils Absolute 0.64 (*)     Basophils Absolute 0.03     COMPREHENSIVE METABOLIC PANEL - Abnormal    Sodium 135      Potassium 3.9      Chloride 97      CO2 30      ANION GAP 8      BUN 27 (*)     Creatinine 0.87      Glucose 222 (*)     Calcium 10.8 (*)     AST 26      ALT 26      Alkaline Phosphatase 59      Total Protein 7.4      Albumin 4.6      Total Bilirubin 0.48      eGFR 70      Narrative:     National Kidney Disease Foundation guidelines for Chronic Kidney Disease (CKD):     Stage 1 with normal or high GFR (GFR > 90 mL/min/1.73 square meters)    Stage 2 Mild CKD (GFR = 60-89 mL/min/1.73 square meters)    Stage 3A Moderate CKD (GFR = 45-59 mL/min/1.73 square meters)    Stage 3B Moderate CKD (GFR = 30-44 mL/min/1.73 square meters)    Stage 4 Severe CKD (GFR = 15-29 mL/min/1.73 square meters)    Stage 5 End Stage CKD (GFR <15 mL/min/1.73 square meters)  Note: GFR calculation is accurate only with a steady state creatinine   MAGNESIUM - Abnormal    Magnesium 1.5 (*)    D-DIMER, QUANTITATIVE - Abnormal    D-Dimer, Quant 0.52 (*)     Narrative:     In the evaluation for possible pulmonary embolism, in the appropriate (Well's Score of 4 or less) patient, the age adjusted d-dimer cutoff for this patient can be calculated as:    Age x 0.01 (in ug/mL) for Age-adjusted D-dimer exclusion threshold for a patient over 50 years.   HS TROPONIN I 0HR - Normal    hs TnI 0hr 4     HS TROPONIN I 2HR - Normal    hs TnI 2hr 3      Delta 2hr hsTnI -1     LIPASE - Normal    Lipase 23     TSH, 3RD GENERATION WITH FREE T4 REFLEX - Normal    TSH 3RD GENERATON 2.199     HS TROPONIN I 4HR   LIGHT BLUE TOP     XR chest 1 view portable   ED Interpretation by Robert Bravo DO (09/18 2922)   On my wet read compared to previous, portable chest x-ray shows increased bilateral pulmonary  edema especially in the lower lobes.  Cephalization of pulmonary vessels, especially on the left.  Increased cardiac silhouette concerning for cardiomegaly.          ECG 12 Lead Documentation Only    Date/Time: 9/18/2024 2:36 PM    Performed by: Robert Bravo DO  Authorized by: Robert Bravo DO    ECG reviewed by me, the ED Provider: yes    Patient location:  ED  Previous ECG:     Previous ECG:  Unavailable  Interpretation:     Interpretation: normal    Rate:     ECG rate:  74    ECG rate assessment: normal    Rhythm:     Rhythm: sinus rhythm    Ectopy:     Ectopy: none    QRS:     QRS axis:  Normal    QRS intervals:  Normal  Conduction:     Conduction: normal    ST segments:     ST segments:  Normal  T waves:     T waves: normal             Robert Bravo DO  09/18/24 9556

## 2024-09-19 ENCOUNTER — PATIENT OUTREACH (OUTPATIENT)
Dept: CASE MANAGEMENT | Facility: OTHER | Age: 64
End: 2024-09-19

## 2024-09-19 NOTE — PROGRESS NOTES
Spoke with Jacquie waiting to hear back from Dr Pereira about a cardiologist.   Would be interested in diabetic education classes. Did lose 4 pounds since yesterday. Did not have any medication changes.   The emergency room doctor advised her to see ENT and rheumatology. I will check back after PCP appointment

## 2024-09-20 LAB
ATRIAL RATE: 74 BPM
P AXIS: 80 DEGREES
PR INTERVAL: 130 MS
QRS AXIS: 85 DEGREES
QRSD INTERVAL: 76 MS
QT INTERVAL: 366 MS
QTC INTERVAL: 406 MS
T WAVE AXIS: 23 DEGREES
VENTRICULAR RATE: 74 BPM

## 2024-09-20 PROCEDURE — 93010 ELECTROCARDIOGRAM REPORT: CPT | Performed by: INTERNAL MEDICINE

## 2024-09-23 DIAGNOSIS — Z79.4 TYPE 2 DIABETES MELLITUS WITH BOTH EYES AFFECTED BY MILD NONPROLIFERATIVE RETINOPATHY WITHOUT MACULAR EDEMA, WITH LONG-TERM CURRENT USE OF INSULIN (HCC): Primary | ICD-10-CM

## 2024-09-23 DIAGNOSIS — E11.3293 TYPE 2 DIABETES MELLITUS WITH BOTH EYES AFFECTED BY MILD NONPROLIFERATIVE RETINOPATHY WITHOUT MACULAR EDEMA, WITH LONG-TERM CURRENT USE OF INSULIN (HCC): Primary | ICD-10-CM

## 2024-09-26 ENCOUNTER — ANESTHESIA EVENT (OUTPATIENT)
Dept: ANESTHESIOLOGY | Facility: HOSPITAL | Age: 64
End: 2024-09-26

## 2024-09-26 ENCOUNTER — ANESTHESIA (OUTPATIENT)
Dept: ANESTHESIOLOGY | Facility: HOSPITAL | Age: 64
End: 2024-09-26

## 2024-10-03 ENCOUNTER — TELEPHONE (OUTPATIENT)
Dept: GASTROENTEROLOGY | Facility: AMBULARY SURGERY CENTER | Age: 64
End: 2024-10-03

## 2024-10-08 ENCOUNTER — OFFICE VISIT (OUTPATIENT)
Dept: FAMILY MEDICINE CLINIC | Facility: CLINIC | Age: 64
End: 2024-10-08
Payer: MEDICARE

## 2024-10-08 VITALS
OXYGEN SATURATION: 99 % | HEART RATE: 125 BPM | HEIGHT: 64 IN | TEMPERATURE: 97.5 F | SYSTOLIC BLOOD PRESSURE: 126 MMHG | WEIGHT: 179 LBS | BODY MASS INDEX: 30.56 KG/M2 | DIASTOLIC BLOOD PRESSURE: 70 MMHG

## 2024-10-08 DIAGNOSIS — Z79.4 TYPE 2 DIABETES MELLITUS WITH BOTH EYES AFFECTED BY MILD NONPROLIFERATIVE RETINOPATHY WITHOUT MACULAR EDEMA, WITH LONG-TERM CURRENT USE OF INSULIN (HCC): ICD-10-CM

## 2024-10-08 DIAGNOSIS — R06.02 SHORTNESS OF BREATH: Primary | ICD-10-CM

## 2024-10-08 DIAGNOSIS — M48.061 SPINAL STENOSIS OF LUMBAR REGION, UNSPECIFIED WHETHER NEUROGENIC CLAUDICATION PRESENT: ICD-10-CM

## 2024-10-08 DIAGNOSIS — E11.3293 TYPE 2 DIABETES MELLITUS WITH BOTH EYES AFFECTED BY MILD NONPROLIFERATIVE RETINOPATHY WITHOUT MACULAR EDEMA, WITH LONG-TERM CURRENT USE OF INSULIN (HCC): ICD-10-CM

## 2024-10-08 DIAGNOSIS — E21.3 HYPERPARATHYROIDISM (HCC): ICD-10-CM

## 2024-10-08 DIAGNOSIS — R40.0 DAYTIME SOMNOLENCE: ICD-10-CM

## 2024-10-08 DIAGNOSIS — E83.52 HYPERCALCEMIA: ICD-10-CM

## 2024-10-08 PROBLEM — I50.32 CHRONIC DIASTOLIC CHF (CONGESTIVE HEART FAILURE) (HCC): Status: RESOLVED | Noted: 2024-05-07 | Resolved: 2024-10-08

## 2024-10-08 PROCEDURE — G2211 COMPLEX E/M VISIT ADD ON: HCPCS | Performed by: FAMILY MEDICINE

## 2024-10-08 PROCEDURE — 99215 OFFICE O/P EST HI 40 MIN: CPT | Performed by: FAMILY MEDICINE

## 2024-10-08 RX ORDER — BLOOD-GLUCOSE SENSOR
EACH MISCELLANEOUS
Qty: 4 EACH | Refills: 3 | Status: SHIPPED | OUTPATIENT
Start: 2024-10-08 | End: 2024-10-12

## 2024-10-08 RX ORDER — MELOXICAM 15 MG/1
15 TABLET ORAL DAILY PRN
Qty: 30 TABLET | Refills: 3 | Status: SHIPPED | OUTPATIENT
Start: 2024-10-08

## 2024-10-09 NOTE — PROGRESS NOTES
Patient ID: Jacquie Viveros is a 64 y.o. female.    HPI: 64 y.o.female presents for evaluaation s/p ER visit for shortness of breath.  All diagnostic testing including EKG, cardiac enzymes, echocardiogram, chest x-ray, D-dimer were all negative for pathology.  She states she has persistent shortness of breath with exertion and at rest.  She also complains of daytime somnolence and needs follow-up for elevated PTH and calcium.  Her diabetes has been labile but the patient has not been taking her Mounjaro and we discussed her starting this in an attempt to better control her sugars and cut back on Levemir therapy.  She also has continued symptoms of lumbar radiculopathy with claudication and needs to go for an MRI of the lumbar spine.  She denies any weakness of the legs.  She does have diabetic peripheral neuropathy, however.    SUBJECTIVE    Family History   Adopted: Yes   Family history unknown: Yes     Social History     Socioeconomic History    Marital status: Single     Spouse name: Not on file    Number of children: Not on file    Years of education: Not on file    Highest education level: Not on file   Occupational History    Occupation: Retired   Tobacco Use    Smoking status: Never     Passive exposure: Never    Smokeless tobacco: Never   Vaping Use    Vaping status: Never Used   Substance and Sexual Activity    Alcohol use: Never    Drug use: No    Sexual activity: Yes     Partners: Male     Birth control/protection: Male Sterilization     Comment: declines std/hiv testing   Other Topics Concern    Not on file   Social History Narrative    Personal Protective Equipment Seatbelts     Social Determinants of Health     Financial Resource Strain: Low Risk  (6/7/2023)    [facility]    Overall Financial Resource Strain (CARDIA)     Difficulty of Paying Living Expenses: Not hard at all   Food Insecurity: No Food Insecurity (6/7/2023)    [facility] from  [facility], [facility]    Hunger Vital Sign     Worried About Running Out of Food in the Last Year: Never true     Ran Out of Food in the Last Year: Never true   Transportation Needs: No Transportation Needs (2023)    Received from [facility], [facility]    PRAPARE - Transportation     Lack of Transportation (Medical): No     Lack of Transportation (Non-Medical): No   Physical Activity: Not on file   Stress: Not on file   Social Connections: Not on file   Intimate Partner Violence: Not At Risk (2023)    Received from [facility], [facility]    Humiliation, Afraid, Rape, and Kick questionnaire     Fear of Current or Ex-Partner: No     Emotionally Abused: No     Physically Abused: No     Sexually Abused: No   Housing Stability: Low Risk  (2023)    Received from [facility], [facility]    Housing Stability Vital Sign     Unable to Pay for Housing in the Last Year: No     Number of Places Lived in the Last Year: 1     Unstable Housing in the Last Year: No     Past Medical History:   Diagnosis Date    Arthritis     Chronic diastolic CHF (congestive heart failure) (HCC) 2024    Chronic kidney disease     CTS (carpal tunnel syndrome)     had surgery    Diabetes mellitus (HCC)     Fibroid     dr gutierrez removed     2 para 2      x2 -F, -F    Hyperlipidemia      Past Surgical History:   Procedure Laterality Date    CARDIAC CATHETERIZATION  2023    Procedure: Cardiac catheterization;  Surgeon: Hilary Mondragon DO;  [facility];  Service: Cardiology    CARDIAC CATHETERIZATION N/A 2023    Procedure: Cardiac Coronary Angiogram;  Surgeon: Hilary Mondragon DO;  [facility];  Service: Cardiology    CARDIAC CATHETERIZATION N/A 2023    Procedure: Cardiac other - DFR for Hemodaynamic Assessment;  Surgeon:  Hilary Mondragon, ;  [facility];  Service: Cardiology    CARDIAC SURGERY      HYSTERECTOMY      MYOMECTOMY      dr gutierrez removed    TOTAL ABDOMINAL HYSTERECTOMY      4/2010     Allergies   Allergen Reactions    Atorvastatin Other (See Comments)     Reaction Date: 25Apr2011;     Furosemide Other (See Comments)     Reaction Date: 25Apr2011;     Latex Other (See Comments)     Pt doesn't remember reaction        Current Outpatient Medications:     albuterol (Ventolin HFA) 90 mcg/act inhaler, Inhale 2 puffs every 6 (six) hours as needed for wheezing, Disp: 18 g, Rfl: 5    amoxicillin (AMOXIL) 500 MG tablet, Take 500 mg by mouth 2 (two) times a day predental, Disp: , Rfl:     Aspirin Low Dose 81 MG chewable tablet, , Disp: , Rfl:     bumetanide (BUMEX) 0.5 MG tablet, Take 1 tablet (0.5 mg total) by mouth daily, Disp: 90 tablet, Rfl: 3    Continuous Glucose Sensor (FreeStyle Nataly 3 Sensor) MISC, Apply one sensor every 14 days, Disp: 4 each, Rfl: 3    diazepam (VALIUM) 5 mg tablet, take 1 tablet by mouth once a day at bedtime as needed for sleep, Disp: 30 tablet, Rfl: 0    gabapentin (NEURONTIN) 100 mg capsule, TAKE ONE CAPSULE BY MOUTH THREE TIMES DAILY, Disp: 270 capsule, Rfl: 0    glucose blood (ONE TOUCH ULTRA TEST) test strip, test 6 times a day, Disp: 600 each, Rfl: 0    hydroCHLOROthiazide 50 mg tablet, Take 1 tablet (50 mg total) by mouth daily, Disp: 90 tablet, Rfl: 3    insulin NPH (HumuLIN N,NovoLIN N) 100 Units/mL subcutaneous injection, Inject 30 Units under the skin 2 (two) times a day before meals, Disp: , Rfl:     ketoconazole (NIZORAL) 2 % shampoo, Apply to affected area ON SCALP every other day FOR 5 MINUTES THEN RINSE (Patient taking differently: as needed), Disp: 120 mL, Rfl: 0    lisinopril (ZESTRIL) 5 mg tablet, Take 1 tablet (5 mg total) by mouth daily, Disp: 90 tablet, Rfl: 3    meloxicam (MOBIC) 15 mg tablet, Take 1 tablet (15 mg total) by mouth daily as needed for moderate  pain, Disp: 30 tablet, Rfl: 3    metFORMIN (GLUCOPHAGE-XR) 500 mg 24 hr tablet, TAKE 2 TABLETS BY MOUTH TWICE DAILY, Disp: 360 tablet, Rfl: 1    NovoLOG FlexPen 100 units/mL injection pen, 8 Units 3 (three) times a day with meals, Disp: , Rfl:     omega-3-acid ethyl esters (LOVAZA) 1 g capsule, TAKE 2 CAPSULES BY MOUTH TWICE DAILY, Disp: 360 capsule, Rfl: 1    oxyCODONE-acetaminophen (Percocet) 5-325 mg per tablet, Take 1 tablet by mouth every 6 (six) hours as needed for moderate pain Max Daily Amount: 4 tablets, Disp: 20 tablet, Rfl: 0    potassium chloride (MICRO-K) 10 MEQ CR capsule, TAKE TWO CAPSULES BY MOUTH ONCE DAILY, Disp: 180 capsule, Rfl: 1    rosuvastatin (CRESTOR) 20 MG tablet, Take 1 tablet (20 mg total) by mouth daily, Disp: 90 tablet, Rfl: 3    tirzepatide (Mounjaro) 5 MG/0.5ML, Inject 0.5 mL (5 mg total) under the skin every 7 days (Patient not taking: Reported on 10/8/2024), Disp: 2 mL, Rfl: 0    desvenlafaxine succinate (PRISTIQ) 50 mg 24 hr tablet, Take 1 tablet (50 mg total) by mouth daily (Patient not taking: Reported on 10/8/2024), Disp: 30 tablet, Rfl: 3    HYDROcodone-acetaminophen (Norco) 5-325 mg per tablet, Take 1 tablet by mouth every 6 (six) hours as needed for pain Max Daily Amount: 4 tablets, Disp: 60 tablet, Rfl: 0    hydrOXYzine HCL (ATARAX) 25 mg tablet, TAKE 1 TABLET BY MOUTH AT BEDTIME, Disp: 90 tablet, Rfl: 1    insulin detemir (Levemir FlexTouch) 100 Units/mL injection pen, Inject 30 Units under the skin every 12 (twelve) hours (Patient not taking: Reported on 10/8/2024), Disp: 45 mL, Rfl: 3    methocarbamol (ROBAXIN) 500 mg tablet, , Disp: , Rfl:     metoprolol tartrate (LOPRESSOR) 25 mg tablet, Take 1 tablet (25 mg total) by mouth every 12 (twelve) hours, Disp: 180 tablet, Rfl: 3    tirzepatide (Mounjaro) 2.5 MG/0.5ML, Inject 0.5 mL (2.5 mg total) under the skin every 7 days (Patient not taking: Reported on 10/8/2024), Disp: 2 mL, Rfl: 0    Review of Systems  Constitutional:    "  Denies fever, chills  ,weakness ,weight loss, weight gain positive daytime somnolence  ENT: Denies earache ,loss of hearing ,nosebleed, nasal discharge,nasal congestion ,sore throat ,hoarseness  Pulmonary: + shortness of breath at rest and with exertion denies any ,cough, orthopnea  , or PND   Cardiovascular:  Denies bradycardia , tachycardia  ,palpations, lower extremity edema leg, claudication  Breast:  Denies new or changing breast lumps ,nipple discharge ,nipple changes  Abdomen:  Denies abdominal pain , anorexia , indigestion, nausea, vomiting, constipation, diarrhea  Musculoskeletal: Denies myalgias, positive lumbar arthralgias with radiation down both legs bilaterally and neurologic claudication  Gu: denies dysuria, polyuria  Skin: Denies skin rash, skin lesion, skin wound, itching, dry skin  Neuro: Denies headache, numbness, tingling, confusion, loss of consciousness, dizziness, vertigo  Psychiatric: Denies feelings of depression, suicidal ideation, anxiety, sleep disturbances    OBJECTIVE  /70   Pulse (!) 125   Temp 97.5 °F (36.4 °C)   Ht 5' 4\" (1.626 m)   Wt 81.2 kg (179 lb)   SpO2 99%   BMI 30.73 kg/m²   Constitutional:   NAD, well appearing and well nourished      ENT:   Conjunctiva and lids: no injection, edema, or discharge     Pupils and iris: NIKKI bilaterally    External inspection of ears and nose: normal without deformities or discharge.      Otoscopic exam: Canals patent without erythema.       Nasal mucosa, septum and turbinates: Normal or edema or discharge         Oropharynx:  Moist mucosa, normal tongue and tonsils without lesions. No erythema        Pulmonary:Respiratory effort normal rate and rhythm, no increased work of breathing. Auscultation of lungs:  Clear bilaterally with no adventitious breath sounds       Cardiovascular: regular rate and rhythm, S1 and S2, no murmur, no edema and/or varicosities of LE      Abdomen: Soft and non-distended     Positive bowel sounds    "   No heptomegaly or splenomegaly      Gu: no suprapubic tenderness or CVA tenderness, no urethral discharge  Lymphatic:  No anterior or posterior cervical lymphadenopathy         Musculoskeletal:  Gait and station: Normal gait      Digits and nails normal without clubbing or cyanosis       Inspection/palpation of joints, bones, and muscles: Positive increased paravertebral lumbar muscle tension of the paraspinal musculature and full range of motion of lumbar spine with pain  Skin: Normal skin turgor and no rashes      Neuro:    Normal reflexes      Psych:   alert and oriented to person, place and time     normal mood and affect       Assessment/Plan:Diagnoses and all orders for this visit:    Shortness of breath  Comments:  Will check PFTs  Orders:  -     Complete PFT with post bronchodilator; Future    Daytime somnolence  Comments:  Await home sleep study  Orders:  -     Ambulatory Referral to Sleep Medicine; Future    Hypercalcemia  Comments:  Will recheck calcium level  Orders:  -     Calcium; Future    Hyperparathyroidism (HCC)  Comments:  If PTH remains elevated, will refer to endocrinology  Orders:  -     PTH, intact; Future    Type 2 diabetes mellitus with both eyes affected by mild nonproliferative retinopathy without macular edema, with long-term current use of insulin (HCC)  Comments:  Patient is to start Mounjaro and report her blood sugars if they approach 120 or below so I can decrease Levemir dosing.  Orders:  -     Continuous Glucose Sensor (FreeStyle Nataly 3 Sensor) MISC; Apply one sensor every 14 days    Spinal stenosis of lumbar region, unspecified whether neurogenic claudication present  Comments:  Awaiting MRI results and will make recommendations pending results.  Orders:  -     meloxicam (MOBIC) 15 mg tablet; Take 1 tablet (15 mg total) by mouth daily as needed for moderate pain    Other orders  -     insulin NPH (HumuLIN N,NovoLIN N) 100 Units/mL subcutaneous injection; Inject 30 Units under  the skin 2 (two) times a day before meals        Reviewed with patient plan to treat with above plan.  Patient instructed to call in 72 hours if not feeling better or if symptoms worsen

## 2024-10-10 ENCOUNTER — ANESTHESIA EVENT (OUTPATIENT)
Dept: GASTROENTEROLOGY | Facility: AMBULARY SURGERY CENTER | Age: 64
End: 2024-10-10
Payer: MEDICARE

## 2024-10-10 ENCOUNTER — HOSPITAL ENCOUNTER (OUTPATIENT)
Dept: GASTROENTEROLOGY | Facility: AMBULARY SURGERY CENTER | Age: 64
Setting detail: OUTPATIENT SURGERY
End: 2024-10-10
Attending: INTERNAL MEDICINE
Payer: MEDICARE

## 2024-10-10 VITALS
SYSTOLIC BLOOD PRESSURE: 118 MMHG | HEART RATE: 78 BPM | RESPIRATION RATE: 20 BRPM | DIASTOLIC BLOOD PRESSURE: 65 MMHG | TEMPERATURE: 97.3 F | OXYGEN SATURATION: 98 %

## 2024-10-10 DIAGNOSIS — K21.9 GASTROESOPHAGEAL REFLUX DISEASE WITHOUT ESOPHAGITIS: ICD-10-CM

## 2024-10-10 DIAGNOSIS — R14.0 ABDOMINAL BLOATING: ICD-10-CM

## 2024-10-10 DIAGNOSIS — Z12.11 SCREENING FOR COLON CANCER: ICD-10-CM

## 2024-10-10 LAB — GLUCOSE SERPL-MCNC: 309 MG/DL (ref 65–140)

## 2024-10-10 PROCEDURE — G0121 COLON CA SCRN NOT HI RSK IND: HCPCS | Performed by: INTERNAL MEDICINE

## 2024-10-10 PROCEDURE — 88305 TISSUE EXAM BY PATHOLOGIST: CPT | Performed by: STUDENT IN AN ORGANIZED HEALTH CARE EDUCATION/TRAINING PROGRAM

## 2024-10-10 PROCEDURE — 43239 EGD BIOPSY SINGLE/MULTIPLE: CPT | Performed by: INTERNAL MEDICINE

## 2024-10-10 PROCEDURE — 88341 IMHCHEM/IMCYTCHM EA ADD ANTB: CPT | Performed by: STUDENT IN AN ORGANIZED HEALTH CARE EDUCATION/TRAINING PROGRAM

## 2024-10-10 PROCEDURE — 82948 REAGENT STRIP/BLOOD GLUCOSE: CPT

## 2024-10-10 PROCEDURE — 88342 IMHCHEM/IMCYTCHM 1ST ANTB: CPT | Performed by: STUDENT IN AN ORGANIZED HEALTH CARE EDUCATION/TRAINING PROGRAM

## 2024-10-10 RX ORDER — ONDANSETRON 2 MG/ML
4 INJECTION INTRAMUSCULAR; INTRAVENOUS ONCE AS NEEDED
Status: CANCELLED | OUTPATIENT
Start: 2024-10-10

## 2024-10-10 RX ORDER — PROPOFOL 10 MG/ML
INJECTION, EMULSION INTRAVENOUS CONTINUOUS PRN
Status: DISCONTINUED | OUTPATIENT
Start: 2024-10-10 | End: 2024-10-10

## 2024-10-10 RX ORDER — SODIUM CHLORIDE, SODIUM LACTATE, POTASSIUM CHLORIDE, CALCIUM CHLORIDE 600; 310; 30; 20 MG/100ML; MG/100ML; MG/100ML; MG/100ML
INJECTION, SOLUTION INTRAVENOUS CONTINUOUS PRN
Status: DISCONTINUED | OUTPATIENT
Start: 2024-10-10 | End: 2024-10-10

## 2024-10-10 RX ORDER — LIDOCAINE HYDROCHLORIDE 10 MG/ML
INJECTION, SOLUTION EPIDURAL; INFILTRATION; INTRACAUDAL; PERINEURAL AS NEEDED
Status: DISCONTINUED | OUTPATIENT
Start: 2024-10-10 | End: 2024-10-10

## 2024-10-10 RX ORDER — OMEPRAZOLE 40 MG/1
40 CAPSULE, DELAYED RELEASE ORAL DAILY
Qty: 90 CAPSULE | Refills: 3 | Status: SHIPPED | OUTPATIENT
Start: 2024-10-10

## 2024-10-10 RX ORDER — SODIUM CHLORIDE, SODIUM LACTATE, POTASSIUM CHLORIDE, CALCIUM CHLORIDE 600; 310; 30; 20 MG/100ML; MG/100ML; MG/100ML; MG/100ML
125 INJECTION, SOLUTION INTRAVENOUS CONTINUOUS
Status: DISCONTINUED | OUTPATIENT
Start: 2024-10-10 | End: 2024-10-14 | Stop reason: HOSPADM

## 2024-10-10 RX ORDER — PROPOFOL 10 MG/ML
INJECTION, EMULSION INTRAVENOUS AS NEEDED
Status: DISCONTINUED | OUTPATIENT
Start: 2024-10-10 | End: 2024-10-10

## 2024-10-10 RX ADMIN — PROPOFOL 150 MG: 10 INJECTION, EMULSION INTRAVENOUS at 08:05

## 2024-10-10 RX ADMIN — PROPOFOL 100 MCG/KG/MIN: 10 INJECTION, EMULSION INTRAVENOUS at 08:13

## 2024-10-10 RX ADMIN — LIDOCAINE HYDROCHLORIDE 5 ML: 10 INJECTION, SOLUTION EPIDURAL; INFILTRATION; INTRACAUDAL; PERINEURAL at 08:05

## 2024-10-10 RX ADMIN — SODIUM CHLORIDE, SODIUM LACTATE, POTASSIUM CHLORIDE, AND CALCIUM CHLORIDE: .6; .31; .03; .02 INJECTION, SOLUTION INTRAVENOUS at 07:15

## 2024-10-10 NOTE — ANESTHESIA PREPROCEDURE EVALUATION
Procedure:  COLONOSCOPY  EGD    Relevant Problems   ANESTHESIA (within normal limits)      CARDIO   (+) Ascending aorta dilation (HCC)   (+) Coronary artery disease involving native coronary artery   (+) Hyperlipidemia   (+) Hypertension      ENDO   (+) Type 2 diabetes mellitus with mild nonproliferative retinopathy of both eyes without macular edema (HCC)      MUSCULOSKELETAL   (+) Arthritis      NEURO/PSYCH   (+) Diabetic peripheral neuropathy (HCC)        Physical Exam    Airway    Mallampati score: II  TM Distance: >3 FB  Neck ROM: full     Dental   No notable dental hx     Cardiovascular  Cardiovascular exam normal    Pulmonary  Pulmonary exam normal     Other Findings  post-pubertal.      Anesthesia Plan  ASA Score- 3     Anesthesia Type- IV sedation with anesthesia with ASA Monitors.         Additional Monitors:     Airway Plan:            Plan Factors-Exercise tolerance (METS): >4 METS.    Chart reviewed.   Existing labs reviewed. Patient summary reviewed.    Patient is not a current smoker.              Induction-     Postoperative Plan-         Informed Consent- Anesthetic plan and risks discussed with patient.  I personally reviewed this patient with the CRNA. Discussed and agreed on the Anesthesia Plan with the CRNA..

## 2024-10-10 NOTE — ANESTHESIA POSTPROCEDURE EVALUATION
Post-Op Assessment Note    CV Status:  Stable  Pain Score: 0    Pain management: adequate       Mental Status:  Alert and awake   Hydration Status:  Euvolemic   PONV Controlled:  Controlled   Airway Patency:  Patent     Post Op Vitals Reviewed: Yes    No anethesia notable event occurred.    Staff: Anesthesiologist           Last Filed PACU Vitals:  Vitals Value Taken Time   Temp     Pulse 75 10/10/24 0849   /66 10/10/24 0849   Resp 21 10/10/24 0849   SpO2 95 % 10/10/24 0849       Modified Hilary:  Activity: 2 (10/10/2024  8:49 AM)  Respiration: 2 (10/10/2024  8:49 AM)  Circulation: 2 (10/10/2024  8:49 AM)  Consciousness: 2 (10/10/2024  8:49 AM)  Oxygen Saturation: 2 (10/10/2024  8:49 AM)  Modified Hilary Score: 10 (10/10/2024  8:49 AM)

## 2024-10-10 NOTE — ANESTHESIA POSTPROCEDURE EVALUATION
Post-Op Assessment Note    CV Status:  Stable         Mental Status:  Alert   PONV Controlled:  Controlled   Airway Patency:  Patent     Post Op Vitals Reviewed: Yes    No anethesia notable event occurred.    Staff: CRNA           Last Filed PACU Vitals:  Vitals Value Taken Time   Temp     Pulse 76    /64    Resp 20    SpO2 100        Modified Hilary:  No data recorded

## 2024-10-10 NOTE — H&P
History and Physical - [facility]  Jacqiue Viveros 64 y.o. female MRN: [id number]                  HPI: Jacquie Viveros is a 64 y.o. year old female who presents for GERD, abdominal pain.      REVIEW OF SYSTEMS: Per the HPI, and otherwise unremarkable.    Historical Information   Past Medical History:   Diagnosis Date    Arthritis     Chronic diastolic CHF (congestive heart failure) (HCC) 2024    Chronic kidney disease     CTS (carpal tunnel syndrome)     had surgery    Diabetes mellitus ([facility])     Fibroid     dr gutierrez removed     2 para 2      x2 -, -    Hyperlipidemia      Past Surgical History:   Procedure Laterality Date    CARDIAC CATHETERIZATION  2023    Procedure: Cardiac catheterization;  Surgeon: Hilary Mondragon DO;  [facility];  Service: Cardiology    CARDIAC CATHETERIZATION N/A 2023    Procedure: Cardiac Coronary Angiogram;  Surgeon: Hilary Mondragon DO;  [facility];  Service: Cardiology    CARDIAC CATHETERIZATION N/A 2023    Procedure: Cardiac other - DFR for Hemodaynamic Assessment;  Surgeon: Hilary Mondragon DO;  [facility];  Service: Cardiology    CARDIAC SURGERY      HYSTERECTOMY      MYOMECTOMY      dr gutierrez removed    TOTAL ABDOMINAL HYSTERECTOMY      2010     Social History   Social History     Substance and Sexual Activity   Alcohol Use Never     Social History     Substance and Sexual Activity   Drug Use No     Social History     Tobacco Use   Smoking Status Never    Passive exposure: Never   Smokeless Tobacco Never     Family History   Adopted: Yes   Family history unknown: Yes       Meds/Allergies       Current Outpatient Medications:     amoxicillin (AMOXIL) 500 MG tablet    Aspirin Low Dose 81 MG chewable tablet    bumetanide (BUMEX) 0.5 MG tablet    gabapentin (NEURONTIN) 100 mg capsule    hydroCHLOROthiazide 50 mg tablet    hydrOXYzine HCL (ATARAX) 25 mg  tablet    insulin NPH (HumuLIN N,NovoLIN N) 100 Units/mL subcutaneous injection    lisinopril (ZESTRIL) 5 mg tablet    meloxicam (MOBIC) 15 mg tablet    metFORMIN (GLUCOPHAGE-XR) 500 mg 24 hr tablet    methocarbamol (ROBAXIN) 500 mg tablet    metoprolol tartrate (LOPRESSOR) 25 mg tablet    NovoLOG FlexPen 100 units/mL injection pen    potassium chloride (MICRO-K) 10 MEQ CR capsule    rosuvastatin (CRESTOR) 20 MG tablet    tirzepatide (Mounjaro) 5 MG/0.5ML    albuterol (Ventolin HFA) 90 mcg/act inhaler    Continuous Glucose Sensor (FreeStyle Nataly 3 Sensor) MISC    desvenlafaxine succinate (PRISTIQ) 50 mg 24 hr tablet    diazepam (VALIUM) 5 mg tablet    glucose blood (ONE TOUCH ULTRA TEST) test strip    HYDROcodone-acetaminophen (Norco) 5-325 mg per tablet    insulin detemir (Levemir FlexTouch) 100 Units/mL injection pen    ketoconazole (NIZORAL) 2 % shampoo    omega-3-acid ethyl esters (LOVAZA) 1 g capsule    oxyCODONE-acetaminophen (Percocet) 5-325 mg per tablet    tirzepatide (Mounjaro) 2.5 MG/0.5ML    Current Facility-Administered Medications:     lactated ringers infusion, 125 mL/hr, Intravenous, Continuous    Facility-Administered Medications Ordered in Other Encounters:     lactated ringers infusion, , Intravenous, Continuous PRN, New Bag at 10/10/24 0715    Allergies   Allergen Reactions    Atorvastatin Other (See Comments)     Reaction Date: 25Apr2011;     Furosemide Other (See Comments)     Reaction Date: 25Apr2011;     Latex Other (See Comments)     Pt doesn't remember reaction        Objective     Pulse 94   Temp (!) 97.3 °F (36.3 °C) (Temporal)   Resp 18   SpO2 98%       PHYSICAL EXAM    Gen: NAD  Head: NCAT  CV: RRR  CHEST: Clear  ABD: soft, NT/ND  EXT: no edema      ASSESSMENT/PLAN:  This is a 64 y.o. year old female here for EGD & colonoscopy, and she is stable and optimized for her procedure.

## 2024-10-11 DIAGNOSIS — E11.3293 TYPE 2 DIABETES MELLITUS WITH BOTH EYES AFFECTED BY MILD NONPROLIFERATIVE RETINOPATHY WITHOUT MACULAR EDEMA, WITH LONG-TERM CURRENT USE OF INSULIN (HCC): ICD-10-CM

## 2024-10-11 DIAGNOSIS — Z79.4 TYPE 2 DIABETES MELLITUS WITH BOTH EYES AFFECTED BY MILD NONPROLIFERATIVE RETINOPATHY WITHOUT MACULAR EDEMA, WITH LONG-TERM CURRENT USE OF INSULIN (HCC): ICD-10-CM

## 2024-10-12 ENCOUNTER — TRANSCRIBE ORDERS (OUTPATIENT)
Dept: SLEEP CENTER | Facility: CLINIC | Age: 64
End: 2024-10-12

## 2024-10-12 DIAGNOSIS — R40.0 DAYTIME SOMNOLENCE: ICD-10-CM

## 2024-10-12 DIAGNOSIS — G47.8 OTHER SLEEP DISORDERS: Primary | ICD-10-CM

## 2024-10-12 RX ORDER — BLOOD-GLUCOSE SENSOR
EACH MISCELLANEOUS
Qty: 2 EACH | Refills: 0 | Status: SHIPPED | OUTPATIENT
Start: 2024-10-12

## 2024-10-17 ENCOUNTER — RA CDI HCC (OUTPATIENT)
Dept: OTHER | Facility: HOSPITAL | Age: 64
End: 2024-10-17

## 2024-10-17 ENCOUNTER — HOSPITAL ENCOUNTER (OUTPATIENT)
Dept: PULMONOLOGY | Facility: HOSPITAL | Age: 64
End: 2024-10-17
Attending: FAMILY MEDICINE
Payer: MEDICARE

## 2024-10-17 ENCOUNTER — APPOINTMENT (OUTPATIENT)
Dept: LAB | Facility: CLINIC | Age: 64
End: 2024-10-17
Payer: MEDICARE

## 2024-10-17 DIAGNOSIS — E21.3 HYPERPARATHYROIDISM (HCC): ICD-10-CM

## 2024-10-17 DIAGNOSIS — R06.02 SHORTNESS OF BREATH: ICD-10-CM

## 2024-10-17 DIAGNOSIS — E83.52 HYPERCALCEMIA: ICD-10-CM

## 2024-10-17 LAB
CALCIUM SERPL-MCNC: 10.6 MG/DL (ref 8.4–10.2)
PTH-INTACT SERPL-MCNC: 131.3 PG/ML (ref 12–88)

## 2024-10-17 PROCEDURE — 94729 DIFFUSING CAPACITY: CPT | Performed by: STUDENT IN AN ORGANIZED HEALTH CARE EDUCATION/TRAINING PROGRAM

## 2024-10-17 PROCEDURE — 94726 PLETHYSMOGRAPHY LUNG VOLUMES: CPT | Performed by: STUDENT IN AN ORGANIZED HEALTH CARE EDUCATION/TRAINING PROGRAM

## 2024-10-17 PROCEDURE — 94760 N-INVAS EAR/PLS OXIMETRY 1: CPT

## 2024-10-17 PROCEDURE — 94729 DIFFUSING CAPACITY: CPT

## 2024-10-17 PROCEDURE — 94060 EVALUATION OF WHEEZING: CPT | Performed by: STUDENT IN AN ORGANIZED HEALTH CARE EDUCATION/TRAINING PROGRAM

## 2024-10-17 PROCEDURE — 83970 ASSAY OF PARATHORMONE: CPT

## 2024-10-17 PROCEDURE — 88305 TISSUE EXAM BY PATHOLOGIST: CPT | Performed by: STUDENT IN AN ORGANIZED HEALTH CARE EDUCATION/TRAINING PROGRAM

## 2024-10-17 PROCEDURE — 94060 EVALUATION OF WHEEZING: CPT

## 2024-10-17 PROCEDURE — 88342 IMHCHEM/IMCYTCHM 1ST ANTB: CPT | Performed by: STUDENT IN AN ORGANIZED HEALTH CARE EDUCATION/TRAINING PROGRAM

## 2024-10-17 PROCEDURE — 88341 IMHCHEM/IMCYTCHM EA ADD ANTB: CPT | Performed by: STUDENT IN AN ORGANIZED HEALTH CARE EDUCATION/TRAINING PROGRAM

## 2024-10-17 PROCEDURE — 36415 COLL VENOUS BLD VENIPUNCTURE: CPT

## 2024-10-17 PROCEDURE — 94726 PLETHYSMOGRAPHY LUNG VOLUMES: CPT

## 2024-10-17 RX ORDER — ALBUTEROL SULFATE 0.83 MG/ML
2.5 SOLUTION RESPIRATORY (INHALATION) ONCE
Status: COMPLETED | OUTPATIENT
Start: 2024-10-17 | End: 2024-10-17

## 2024-10-17 RX ADMIN — ALBUTEROL SULFATE 2.5 MG: 2.5 SOLUTION RESPIRATORY (INHALATION) at 07:49

## 2024-10-22 ENCOUNTER — PATIENT OUTREACH (OUTPATIENT)
Dept: CASE MANAGEMENT | Facility: OTHER | Age: 64
End: 2024-10-22

## 2024-10-22 NOTE — PROGRESS NOTES
Spoke with Jacquie she did establish with endocrinology thru [facility]. She went for her parathyroid but doctor ended up adjusting her insulin  Uses CGM blood sugars run 300-400 the past two days. Encouraged her to call endocrinologist if those reading continue to stay elevated.  To see PCP tomorrow.   Jacquie will continue to follow [facility] PCP and endocrinology Will close from care management at this time

## 2024-10-23 ENCOUNTER — OFFICE VISIT (OUTPATIENT)
Dept: FAMILY MEDICINE CLINIC | Facility: CLINIC | Age: 64
End: 2024-10-23

## 2024-10-23 VITALS
TEMPERATURE: 97.5 F | OXYGEN SATURATION: 98 % | SYSTOLIC BLOOD PRESSURE: 116 MMHG | BODY MASS INDEX: 31.58 KG/M2 | WEIGHT: 185 LBS | HEART RATE: 74 BPM | HEIGHT: 64 IN | DIASTOLIC BLOOD PRESSURE: 58 MMHG

## 2024-10-23 DIAGNOSIS — Z12.31 BREAST CANCER SCREENING BY MAMMOGRAM: ICD-10-CM

## 2024-10-23 DIAGNOSIS — Z79.4 TYPE 2 DIABETES MELLITUS WITH BOTH EYES AFFECTED BY MILD NONPROLIFERATIVE RETINOPATHY WITHOUT MACULAR EDEMA, WITH LONG-TERM CURRENT USE OF INSULIN (HCC): ICD-10-CM

## 2024-10-23 DIAGNOSIS — I10 PRIMARY HYPERTENSION: ICD-10-CM

## 2024-10-23 DIAGNOSIS — E78.00 HYPERCHOLESTEROLEMIA: ICD-10-CM

## 2024-10-23 DIAGNOSIS — E11.3293 TYPE 2 DIABETES MELLITUS WITH BOTH EYES AFFECTED BY MILD NONPROLIFERATIVE RETINOPATHY WITHOUT MACULAR EDEMA, WITH LONG-TERM CURRENT USE OF INSULIN (HCC): ICD-10-CM

## 2024-10-23 DIAGNOSIS — Z23 ENCOUNTER FOR IMMUNIZATION: ICD-10-CM

## 2024-10-23 DIAGNOSIS — Z00.00 WELCOME TO MEDICARE PREVENTIVE VISIT: Primary | ICD-10-CM

## 2024-10-23 DIAGNOSIS — Z82.49 FAMILY HISTORY OF ABDOMINAL AORTIC ANEURYSM (AAA): ICD-10-CM

## 2024-10-23 DIAGNOSIS — E21.3 HYPERPARATHYROIDISM (HCC): ICD-10-CM

## 2024-10-23 RX ORDER — HUMAN INSULIN 100 [IU]/ML
INJECTION, SUSPENSION SUBCUTANEOUS
COMMUNITY
Start: 2024-10-21

## 2024-10-23 NOTE — PATIENT INSTRUCTIONS
Medicare Preventive Visit Patient Instructions  Thank you for completing your Welcome to Medicare Visit or Medicare Annual Wellness Visit today. Your next wellness visit will be due in one year (10/24/2025).  The screening/preventive services that you may require over the next 5-10 years are detailed below. Some tests may not apply to you based off risk factors and/or age. Screening tests ordered at today's visit but not completed yet may show as past due. Also, please note that scanned in results may not display below.  Preventive Screenings:  Service Recommendations Previous Testing/Comments   Colorectal Cancer Screening  * Colonoscopy    * Fecal Occult Blood Test (FOBT)/Fecal Immunochemical Test (FIT)  * Fecal DNA/Cologuard Test  * Flexible Sigmoidoscopy Age: 45-75 years old   Colonoscopy: every 10 years (may be performed more frequently if at higher risk)  OR  FOBT/FIT: every 1 year  OR  Cologuard: every 3 years  OR  Sigmoidoscopy: every 5 years  Screening may be recommended earlier than age 45 if at higher risk for colorectal cancer. Also, an individualized decision between you and your healthcare provider will decide whether screening between the ages of 76-85 would be appropriate. Colonoscopy: 10/10/2024  FOBT/FIT: Not on file  Cologuard: 04/05/2024  Sigmoidoscopy: Not on file          Breast Cancer Screening Age: 40+ years old  Frequency: every 1-2 years  Not required if history of left and right mastectomy Mammogram: 06/28/2022        Cervical Cancer Screening Between the ages of 21-29, pap smear recommended once every 3 years.   Between the ages of 30-65, can perform pap smear with HPV co-testing every 5 years.   Recommendations may differ for women with a history of total hysterectomy, cervical cancer, or abnormal pap smears in past. Pap Smear: 01/25/2018        Hepatitis C Screening Once for adults born between 1945 and 1965  More frequently in patients at high risk for Hepatitis C Hep C Antibody: Not on  file        Diabetes Screening 1-2 times per year if you're at risk for diabetes or have pre-diabetes Fasting glucose: 182 mg/dL (8/10/2024)  A1C: 9.9 % (8/10/2024)      Cholesterol Screening Once every 5 years if you don't have a lipid disorder. May order more often based on risk factors. Lipid panel: 08/10/2024          Other Preventive Screenings Covered by Medicare:  Abdominal Aortic Aneurysm (AAA) Screening: covered once if your at risk. You're considered to be at risk if you have a family history of AAA.  Lung Cancer Screening: covers low dose CT scan once per year if you meet all of the following conditions: (1) Age 55-77; (2) No signs or symptoms of lung cancer; (3) Current smoker or have quit smoking within the last 15 years; (4) You have a tobacco smoking history of at least 20 pack years (packs per day multiplied by number of years you smoked); (5) You get a written order from a healthcare provider.  Glaucoma Screening: covered annually if you're considered high risk: (1) You have diabetes OR (2) Family history of glaucoma OR (3)  aged 50 and older OR (4)  American aged 65 and older  Osteoporosis Screening: covered every 2 years if you meet one of the following conditions: (1) You're estrogen deficient and at risk for osteoporosis based off medical history and other findings; (2) Have a vertebral abnormality; (3) On glucocorticoid therapy for more than 3 months; (4) Have primary hyperparathyroidism; (5) On osteoporosis medications and need to assess response to drug therapy.   Last bone density test (DXA Scan): 03/23/2023.  HIV Screening: covered annually if you're between the age of 15-65. Also covered annually if you are younger than 15 and older than 65 with risk factors for HIV infection. For pregnant patients, it is covered up to 3 times per pregnancy.    Immunizations:  Immunization Recommendations   Influenza Vaccine Annual influenza vaccination during flu season is  recommended for all persons aged >= 6 months who do not have contraindications   Pneumococcal Vaccine   * Pneumococcal conjugate vaccine = PCV13 (Prevnar 13), PCV15 (Vaxneuvance), PCV20 (Prevnar 20)  * Pneumococcal polysaccharide vaccine = PPSV23 (Pneumovax) Adults 19-65 yo with certain risk factors or if 65+ yo  If never received any pneumonia vaccine: recommend Prevnar 20 (PCV20)  Give PCV20 if previously received 1 dose of PCV13 or PPSV23   Hepatitis B Vaccine 3 dose series if at intermediate or high risk (ex: diabetes, end stage renal disease, liver disease)   Respiratory syncytial virus (RSV) Vaccine - COVERED BY MEDICARE PART D  * RSVPreF3 (Arexvy) CDC recommends that adults 60 years of age and older may receive a single dose of RSV vaccine using shared clinical decision-making (SCDM)   Tetanus (Td) Vaccine - COST NOT COVERED BY MEDICARE PART B Following completion of primary series, a booster dose should be given every 10 years to maintain immunity against tetanus. Td may also be given as tetanus wound prophylaxis.   Tdap Vaccine - COST NOT COVERED BY MEDICARE PART B Recommended at least once for all adults. For pregnant patients, recommended with each pregnancy.   Shingles Vaccine (Shingrix) - COST NOT COVERED BY MEDICARE PART B  2 shot series recommended in those 19 years and older who have or will have weakened immune systems or those 50 years and older     Health Maintenance Due:      Topic Date Due   • Hepatitis C Screening  Never done   • HIV Screening  Never done   • Breast Cancer Screening: Mammogram  06/28/2023   • Colorectal Cancer Screening  10/09/2029   • Cervical Cancer Screening  Discontinued     Immunizations Due:      Topic Date Due   • Pneumococcal Vaccine: 65+ Years (1 of 2 - PCV) Never done   • Influenza Vaccine (1) Never done   • COVID-19 Vaccine (1 - 2023-24 season) Never done     Advance Directives   What are advance directives?  Advance directives are legal documents that state your  wishes and plans for medical care. These plans are made ahead of time in case you lose your ability to make decisions for yourself. Advance directives can apply to any medical decision, such as the treatments you want, and if you want to donate organs.   What are the types of advance directives?  There are many types of advance directives, and each state has rules about how to use them. You may choose a combination of any of the following:  Living will:  This is a written record of the treatment you want. You can also choose which treatments you do not want, which to limit, and which to stop at a certain time. This includes surgery, medicine, IV fluid, and tube feedings.   Durable power of  for healthcare (DPAHC):  This is a written record that states who you want to make healthcare choices for you when you are unable to make them for yourself. This person, called a proxy, is usually a family member or a friend. You may choose more than 1 proxy.  Do not resuscitate (DNR) order:  A DNR order is used in case your heart stops beating or you stop breathing. It is a request not to have certain forms of treatment, such as CPR. A DNR order may be included in other types of advance directives.  Medical directive:  This covers the care that you want if you are in a coma, near death, or unable to make decisions for yourself. You can list the treatments you want for each condition. Treatment may include pain medicine, surgery, blood transfusions, dialysis, IV or tube feedings, and a ventilator (breathing machine).  Values history:  This document has questions about your views, beliefs, and how you feel and think about life. This information can help others choose the care that you would choose.  Why are advance directives important?  An advance directive helps you control your care. Although spoken wishes may be used, it is better to have your wishes written down. Spoken wishes can be misunderstood, or not followed.  Treatments may be given even if you do not want them. An advance directive may make it easier for your family to make difficult choices about your care.   Weight Management   Why it is important to manage your weight:  Being overweight increases your risk of health conditions such as heart disease, high blood pressure, type 2 diabetes, and certain types of cancer. It can also increase your risk for osteoarthritis, sleep apnea, and other respiratory problems. Aim for a slow, steady weight loss. Even a small amount of weight loss can lower your risk of health problems.  How to lose weight safely:  A safe and healthy way to lose weight is to eat fewer calories and get regular exercise. You can lose up about 1 pound a week by decreasing the number of calories you eat by 500 calories each day.   Healthy meal plan for weight management:  A healthy meal plan includes a variety of foods, contains fewer calories, and helps you stay healthy. A healthy meal plan includes the following:  Eat whole-grain foods more often.  A healthy meal plan should contain fiber. Fiber is the part of grains, fruits, and vegetables that is not broken down by your body. Whole-grain foods are healthy and provide extra fiber in your diet. Some examples of whole-grain foods are whole-wheat breads and pastas, oatmeal, brown rice, and bulgur.  Eat a variety of vegetables every day.  Include dark, leafy greens such as spinach, kale, nam greens, and mustard greens. Eat yellow and orange vegetables such as carrots, sweet potatoes, and winter squash.   Eat a variety of fruits every day.  Choose fresh or canned fruit (canned in its own juice or light syrup) instead of juice. Fruit juice has very little or no fiber.  Eat low-fat dairy foods.  Drink fat-free (skim) milk or 1% milk. Eat fat-free yogurt and low-fat cottage cheese. Try low-fat cheeses such as mozzarella and other reduced-fat cheeses.  Choose meat and other protein foods that are low in fat.   Choose beans or other legumes such as split peas or lentils. Choose fish, skinless poultry (chicken or turkey), or lean cuts of red meat (beef or pork). Before you cook meat or poultry, cut off any visible fat.   Use less fat and oil.  Try baking foods instead of frying them. Add less fat, such as margarine, sour cream, regular salad dressing and mayonnaise to foods. Eat fewer high-fat foods. Some examples of high-fat foods include french fries, doughnuts, ice cream, and cakes.  Eat fewer sweets.  Limit foods and drinks that are high in sugar. This includes candy, cookies, regular soda, and sweetened drinks.  Exercise:  Exercise at least 30 minutes per day on most days of the week. Some examples of exercise include walking, biking, dancing, and swimming. You can also fit in more physical activity by taking the stairs instead of the elevator or parking farther away from stores. Ask your healthcare provider about the best exercise plan for you.      © Copyright [employer] 2018 Information is for End User's use only and may not be sold, redistributed or otherwise used for commercial purposes. All illustrations and images included in CareNotes® are the copyrighted property of A.D.A.M., Inc. or .com

## 2024-10-23 NOTE — PROGRESS NOTES
Ambulatory Visit  Name: Jacquie Viveros      : 1960      MRN: [id number]  Encounter Provider: Charito Pereira DO  Encounter Date: 10/23/2024   Encounter department: [facility]    Assessment & Plan  Welcome to Medicare preventive visit    Orders:    POCT ECG    Type 2 diabetes mellitus with both eyes affected by mild nonproliferative retinopathy without macular edema, with long-term current use of insulin (HCC)    Lab Results   Component Value Date    HGBA1C 9.9 (H) 08/10/2024       Orders:    Albumin / creatinine urine ratio; Future    Hemoglobin A1c (w/out EAG) ([facility] ONLY); Future    Hemoglobin A1c (w/out EAG) ([facility] ONLY)    Primary hypertension    Orders:    Comprehensive metabolic panel; Future    Hypercholesterolemia    Orders:    Lipid Panel with Direct LDL reflex; Future    Hyperparathyroidism (HCC)    Orders:    NM parathyroid scan w spect; Future    Family history of abdominal aortic aneurysm (AAA)    Orders:    US abdominal aorta screening aaa; Future    Breast cancer screening by mammogram    Orders:    Mammo screening bilateral w 3d and cad; Future    Encounter for immunization    Orders:    Pneumococcal Conjugate Vaccine 20-valent (Pcv20)       Preventive health issues were discussed with patient, and age appropriate screening tests were ordered as noted in patient's After Visit Summary. Personalized health advice and appropriate referrals for health education or preventive services given if needed, as noted in patient's After Visit Summary.    History of Present Illness     HPI   Patient Care Team:  Charito Pereira DO as PCP - General  Siri Jacobsen MD    Review of Systems   Constitutional:  Negative for appetite change, chills and fever.   HENT:  Negative for ear pain, facial swelling, rhinorrhea, sinus pain, sore throat and trouble swallowing.    Eyes:  Negative for discharge and redness.   Respiratory:  Negative for chest tightness, shortness of  breath and wheezing.    Cardiovascular:  Negative for chest pain and palpitations.   Gastrointestinal:  Negative for abdominal pain, diarrhea, nausea and vomiting.   Endocrine: Negative for polyuria.   Genitourinary:  Negative for dysuria and urgency.   Musculoskeletal:  Negative for arthralgias and back pain.   Skin:  Negative for rash.   Neurological:  Negative for dizziness, weakness and headaches.   Hematological:  Negative for adenopathy.   Psychiatric/Behavioral:  Negative for behavioral problems, confusion and sleep disturbance.    All other systems reviewed and are negative.    Medical History Reviewed by provider this encounter:       Annual Wellness Visit Questionnaire   Jacquie is here for her Welcome to Medicare visit. Last Medicare Wellness visit information reviewed, patient interviewed, no change since last AWV.     Health Risk Assessment:   Patient rates overall health as fair. Patient feels that their physical health rating is same. Patient is satisfied with their life. Eyesight was rated as same. Hearing was rated as same. Patient feels that their emotional and mental health rating is same. Patients states they are never, rarely angry. Patient states they are sometimes unusually tired/fatigued. Pain experienced in the last 7 days has been some. Patient's pain rating has been 5/10. Patient states that she has experienced weight loss or gain in last 6 months.     Depression Screening:   PHQ-2 Score: 0      Fall Risk Screening:   In the past year, patient has experienced: history of falling in past year      Urinary Incontinence Screening:   Patient has not leaked urine accidently in the last six months.     Home Safety:  Patient does not have trouble with stairs inside or outside of their home. Patient has working smoke alarms and has working carbon monoxide detector. Home safety hazards include: none.     Nutrition:   Current diet is Diabetic.     Medications:   Patient is not currently taking any  over-the-counter supplements. Patient is able to manage medications.     Activities of Daily Living (ADLs)/Instrumental Activities of Daily Living (IADLs):   Walk and transfer into and out of bed and chair?: Yes  Dress and groom yourself?: Yes    Bathe or shower yourself?: Yes    Feed yourself? Yes  Do your laundry/housekeeping?: Yes  Manage your money, pay your bills and track your expenses?: Yes  Make your own meals?: Yes    Do your own shopping?: Yes    Previous Hospitalizations:   Any hospitalizations or ED visits within the last 12 months?: Yes    How many hospitalizations have you had in the last year?: 1-2    Advance Care Planning:   Living will: Yes    Durable POA for healthcare: Yes    Advanced directive: Yes    Advanced directive counseling given: Yes    ACP document given: Yes    Patient declined ACP directive: No    End of Life Decisions reviewed with patient: Yes    Provider agrees with end of life decisions: Yes      Cognitive Screening:   Provider or family/friend/caregiver concerned regarding cognition?: No    PREVENTIVE SCREENINGS      Cardiovascular Screening:    General: Screening Not Indicated and History Lipid Disorder      Diabetes Screening:     General: Screening Not Indicated and History Diabetes      Colorectal Cancer Screening:     General: Screening Current      Breast Cancer Screening:     General: Risks and Benefits Discussed    Due for: Mammogram        Cervical Cancer Screening:    General: Screening Not Indicated      Osteoporosis Screening:    General: Screening Current      Abdominal Aortic Aneurysm (AAA) Screening:    Risk factors include: family history of AAA        General: Screening Current and Screening Not Indicated      Lung Cancer Screening:     General: Screening Not Indicated      Hepatitis C Screening:    General: Screening Current    Screening, Brief Intervention, and Referral to Treatment (SBIRT)    Screening  Typical number of drinks in a day: 0  Typical number of  "drinks in a week: 0  Interpretation: Low risk drinking behavior.    AUDIT-C Screenin) How often did you have a drink containing alcohol in the past year? never  2) How many drinks did you have on a typical day when you were drinking in the past year? 0  3) How often did you have 6 or more drinks on one occasion in the past year? never    AUDIT-C Score: 0  Interpretation: Score 0-2 (female): Negative screen for alcohol misuse    Single Item Drug Screening:  How often have you used an illegal drug (including marijuana) or a prescription medication for non-medical reasons in the past year? never    Single Item Drug Screen Score: 0  Interpretation: Negative screen for possible drug use disorder    Social Determinants of Health     Financial Resource Strain: Low Risk  (2023)    [facility]    Overall Financial Resource Strain (CARDIA)     Difficulty of Paying Living Expenses: Not hard at all   Food Insecurity: Patient Declined (10/21/2024)    Hunger Vital Sign     Worried About Running Out of Food in the Last Year: Patient declined     Ran Out of Food in the Last Year: Patient declined   Transportation Needs: Patient Declined (10/21/2024)    PRAPARE - Transportation     Lack of Transportation (Medical): Patient declined     Lack of Transportation (Non-Medical): Patient declined   Housing Stability: Patient Declined (10/21/2024)    Housing Stability Vital Sign     Unable to Pay for Housing in the Last Year: Patient declined     Homeless in the Last Year: Patient declined   Utilities: Patient Declined (10/21/2024)    [facility] Utilities     Threatened with loss of utilities: Patient declined     No results found.    Objective     Ht 5' 4\" (1.626 m)   BMI 30.73 kg/m²     Physical Exam  Vitals and nursing note reviewed.   Constitutional:       General: She is not in acute distress.     Appearance: Normal appearance. She is well-developed. She is not " ill-appearing or diaphoretic.   HENT:      Head: Normocephalic and atraumatic.      Right Ear: Tympanic membrane, ear canal and external ear normal.      Left Ear: Tympanic membrane, ear canal and external ear normal.      Nose: Nose normal. No congestion or rhinorrhea.      Mouth/Throat:      Mouth: Mucous membranes are moist.      Pharynx: Oropharynx is clear. No oropharyngeal exudate or posterior oropharyngeal erythema.   Eyes:      General: No scleral icterus.        Right eye: No discharge.         Left eye: No discharge.      Extraocular Movements: Extraocular movements intact.      Conjunctiva/sclera: Conjunctivae normal.      Pupils: Pupils are equal, round, and reactive to light.   Neck:      Thyroid: No thyromegaly.      Vascular: No carotid bruit or JVD.      Trachea: No tracheal deviation.   Cardiovascular:      Rate and Rhythm: Normal rate and regular rhythm.      Pulses: Normal pulses.      Heart sounds: Normal heart sounds. No murmur heard.  Pulmonary:      Effort: Pulmonary effort is normal. No respiratory distress.      Breath sounds: Normal breath sounds. No stridor. No wheezing, rhonchi or rales.   Abdominal:      General: Abdomen is flat. Bowel sounds are normal. There is no distension.      Palpations: Abdomen is soft. There is no mass.      Tenderness: There is no abdominal tenderness. There is no guarding or rebound.   Musculoskeletal:         General: No swelling, tenderness or deformity. Normal range of motion.      Cervical back: Normal range of motion and neck supple. No rigidity.      Right lower leg: No edema.      Left lower leg: No edema.   Lymphadenopathy:      Cervical: No cervical adenopathy.   Skin:     General: Skin is warm and dry.      Capillary Refill: Capillary refill takes less than 2 seconds.      Coloration: Skin is not jaundiced.      Findings: No bruising, erythema or rash.   Neurological:      General: No focal deficit present.      Mental Status: She is alert and  oriented to person, place, and time.      Cranial Nerves: No cranial nerve deficit.      Sensory: No sensory deficit.      Motor: No abnormal muscle tone.      Coordination: Coordination normal.      Gait: Gait normal.      Deep Tendon Reflexes: Reflexes are normal and symmetric. Reflexes normal.   Psychiatric:         Mood and Affect: Mood normal.         Behavior: Behavior normal.         Thought Content: Thought content normal.         Judgment: Judgment normal.

## 2024-10-29 LAB
CALCIUM 24H UR-MRATE: 125 MG/24 HR (ref 100–300)
COLLECT DURATION TIME UR: 24 HR
CREAT 24H UR-MRATE: 0.02 G/KG/24 HR (ref 0.01–0.02)
PRESERVED URINE: NORMAL
SPECIMEN VOL 24H UR: 2500 ML

## 2024-10-30 DIAGNOSIS — E11.3293 TYPE 2 DIABETES MELLITUS WITH BOTH EYES AFFECTED BY MILD NONPROLIFERATIVE RETINOPATHY WITHOUT MACULAR EDEMA, WITH LONG-TERM CURRENT USE OF INSULIN (HCC): ICD-10-CM

## 2024-10-30 DIAGNOSIS — Z79.4 TYPE 2 DIABETES MELLITUS WITH BOTH EYES AFFECTED BY MILD NONPROLIFERATIVE RETINOPATHY WITHOUT MACULAR EDEMA, WITH LONG-TERM CURRENT USE OF INSULIN (HCC): ICD-10-CM

## 2024-10-30 RX ORDER — TIRZEPATIDE 5 MG/.5ML
INJECTION, SOLUTION SUBCUTANEOUS
Qty: 2 ML | Refills: 5 | Status: SHIPPED | OUTPATIENT
Start: 2024-10-30

## 2024-10-30 NOTE — ASSESSMENT & PLAN NOTE
Lab Results   Component Value Date    HGBA1C 9.9 (H) 08/10/2024       Orders:    Albumin / creatinine urine ratio; Future    Hemoglobin A1c (w/out EAG) ([facility] ONLY); Future    Hemoglobin A1c (w/out EAG) ([facility] ONLY)

## 2024-11-05 ENCOUNTER — HOSPITAL ENCOUNTER (OUTPATIENT)
Dept: RADIOLOGY | Age: 64
Discharge: HOME | End: 2024-11-05
Payer: MEDICARE

## 2024-11-05 DIAGNOSIS — Z82.49 FAMILY HISTORY OF ABDOMINAL AORTIC ANEURYSM (AAA): ICD-10-CM

## 2024-11-05 PROCEDURE — 76706 US ABDL AORTA SCREEN AAA: CPT

## 2024-11-12 ENCOUNTER — HOSPITAL ENCOUNTER (OUTPATIENT)
Dept: NUCLEAR MEDICINE | Facility: HOSPITAL | Age: 64
Discharge: HOME | End: 2024-11-12
Attending: FAMILY MEDICINE
Payer: MEDICARE

## 2024-11-12 DIAGNOSIS — E21.3 HYPERPARATHYROIDISM (HCC): ICD-10-CM

## 2024-11-12 PROCEDURE — A9500 TC99M SESTAMIBI: HCPCS

## 2024-11-12 PROCEDURE — 78072 PARATHYRD PLANAR W/SPECT&CT: CPT

## 2024-11-13 ENCOUNTER — RESULTS FOLLOW-UP (OUTPATIENT)
Dept: FAMILY MEDICINE CLINIC | Facility: CLINIC | Age: 64
End: 2024-11-13

## 2024-12-16 ENCOUNTER — TELEPHONE (OUTPATIENT)
Dept: CARDIOLOGY CLINIC | Facility: MEDICAL CENTER | Age: 64
End: 2024-12-16

## 2024-12-16 ENCOUNTER — TELEPHONE (OUTPATIENT)
Age: 64
End: 2024-12-16

## 2024-12-16 ENCOUNTER — APPOINTMENT (OUTPATIENT)
Dept: LAB | Facility: MEDICAL CENTER | Age: 64
End: 2024-12-16
Payer: MEDICARE

## 2024-12-16 DIAGNOSIS — Z79.4 TYPE 2 DIABETES MELLITUS WITH BOTH EYES AFFECTED BY MILD NONPROLIFERATIVE RETINOPATHY WITHOUT MACULAR EDEMA, WITH LONG-TERM CURRENT USE OF INSULIN (HCC): ICD-10-CM

## 2024-12-16 DIAGNOSIS — E11.3293 TYPE 2 DIABETES MELLITUS WITH BOTH EYES AFFECTED BY MILD NONPROLIFERATIVE RETINOPATHY WITHOUT MACULAR EDEMA, WITH LONG-TERM CURRENT USE OF INSULIN (HCC): ICD-10-CM

## 2024-12-16 DIAGNOSIS — Z79.4 ENCOUNTER FOR LONG-TERM (CURRENT) USE OF INSULIN (HCC): ICD-10-CM

## 2024-12-16 DIAGNOSIS — I10 PRIMARY HYPERTENSION: ICD-10-CM

## 2024-12-16 DIAGNOSIS — E78.00 HYPERCHOLESTEROLEMIA: ICD-10-CM

## 2024-12-16 DIAGNOSIS — E11.3293 NONPROLIFERATIVE DIABETIC RETINOPATHY OF BOTH EYES (HCC): ICD-10-CM

## 2024-12-16 LAB
ALBUMIN SERPL BCG-MCNC: 4.5 G/DL (ref 3.5–5)
ALP SERPL-CCNC: 52 U/L (ref 34–104)
ALT SERPL W P-5'-P-CCNC: 27 U/L (ref 7–52)
ANION GAP SERPL CALCULATED.3IONS-SCNC: 7 MMOL/L (ref 4–13)
AST SERPL W P-5'-P-CCNC: 27 U/L (ref 13–39)
BILIRUB SERPL-MCNC: 0.29 MG/DL (ref 0.2–1)
BUN SERPL-MCNC: 22 MG/DL (ref 5–25)
CALCIUM SERPL-MCNC: 10.8 MG/DL (ref 8.4–10.2)
CHLORIDE SERPL-SCNC: 103 MMOL/L (ref 96–108)
CHOLEST SERPL-MCNC: 144 MG/DL (ref ?–200)
CO2 SERPL-SCNC: 30 MMOL/L (ref 21–32)
CREAT SERPL-MCNC: 0.79 MG/DL (ref 0.6–1.3)
CREAT UR-MCNC: 106.5 MG/DL
GFR SERPL CREATININE-BSD FRML MDRD: 78 ML/MIN/1.73SQ M
GLUCOSE P FAST SERPL-MCNC: 57 MG/DL (ref 65–99)
HDLC SERPL-MCNC: 40 MG/DL
LDLC SERPL CALC-MCNC: 81 MG/DL (ref 0–100)
MICROALBUMIN UR-MCNC: 10.4 MG/L
MICROALBUMIN/CREAT 24H UR: 10 MG/G CREATININE (ref 0–30)
POTASSIUM SERPL-SCNC: 4.2 MMOL/L (ref 3.5–5.3)
PROT SERPL-MCNC: 7 G/DL (ref 6.4–8.4)
SODIUM SERPL-SCNC: 140 MMOL/L (ref 135–147)
TRIGL SERPL-MCNC: 116 MG/DL (ref ?–150)

## 2024-12-16 PROCEDURE — 83036 HEMOGLOBIN GLYCOSYLATED A1C: CPT

## 2024-12-16 PROCEDURE — 36415 COLL VENOUS BLD VENIPUNCTURE: CPT

## 2024-12-16 PROCEDURE — 80053 COMPREHEN METABOLIC PANEL: CPT

## 2024-12-16 PROCEDURE — 80061 LIPID PANEL: CPT

## 2024-12-16 PROCEDURE — 82043 UR ALBUMIN QUANTITATIVE: CPT

## 2024-12-16 PROCEDURE — 82570 ASSAY OF URINE CREATININE: CPT

## 2024-12-16 NOTE — TELEPHONE ENCOUNTER
Caller: Patient     Doctor: Dr. Ruddy Salas MD      Reason for call:  Pt called & stated that she has an appt with Dr. Salas tomorrow & would like to know if she will be having an EKG tomorrow. Please call pt & advise.     Call back#: [phone]

## 2024-12-16 NOTE — TELEPHONE ENCOUNTER
Pt calling RX line stating she had a few questions before her appt on Tuesday and is requesting a call back from office

## 2024-12-17 ENCOUNTER — OFFICE VISIT (OUTPATIENT)
Dept: CARDIOLOGY CLINIC | Facility: MEDICAL CENTER | Age: 64
End: 2024-12-17
Payer: MEDICARE

## 2024-12-17 VITALS
HEART RATE: 110 BPM | DIASTOLIC BLOOD PRESSURE: 74 MMHG | SYSTOLIC BLOOD PRESSURE: 150 MMHG | OXYGEN SATURATION: 97 % | WEIGHT: 181 LBS | BODY MASS INDEX: 30.9 KG/M2 | HEIGHT: 64 IN

## 2024-12-17 DIAGNOSIS — I10 PRIMARY HYPERTENSION: ICD-10-CM

## 2024-12-17 DIAGNOSIS — E78.2 MIXED HYPERLIPIDEMIA: ICD-10-CM

## 2024-12-17 DIAGNOSIS — E11.3293 TYPE 2 DIABETES MELLITUS WITH BOTH EYES AFFECTED BY MILD NONPROLIFERATIVE RETINOPATHY WITHOUT MACULAR EDEMA, WITHOUT LONG-TERM CURRENT USE OF INSULIN (HCC): ICD-10-CM

## 2024-12-17 DIAGNOSIS — Q23.81 BICUSPID AORTIC VALVE: Primary | ICD-10-CM

## 2024-12-17 DIAGNOSIS — I50.32 CHRONIC DIASTOLIC HEART FAILURE (HCC): ICD-10-CM

## 2024-12-17 DIAGNOSIS — I25.10 CORONARY ARTERY DISEASE INVOLVING NATIVE CORONARY ARTERY OF NATIVE HEART WITHOUT ANGINA PECTORIS: ICD-10-CM

## 2024-12-17 DIAGNOSIS — Z95.2 S/P AVR: ICD-10-CM

## 2024-12-17 DIAGNOSIS — I77.810 ASCENDING AORTA DILATION (HCC): ICD-10-CM

## 2024-12-17 DIAGNOSIS — R06.02 SOB (SHORTNESS OF BREATH): ICD-10-CM

## 2024-12-17 LAB
EST. AVERAGE GLUCOSE BLD GHB EST-MCNC: 174 MG/DL
HBA1C MFR BLD: 7.7 %

## 2024-12-17 PROCEDURE — 99214 OFFICE O/P EST MOD 30 MIN: CPT | Performed by: INTERNAL MEDICINE

## 2024-12-17 NOTE — ASSESSMENT & PLAN NOTE
Continued on Crestor 20 mg daily.    LDL well-controlled at 72 in March 2024.  Repeat levels next year

## 2024-12-17 NOTE — ASSESSMENT & PLAN NOTE
Blood pressure well-controlled with euvolemic volume status.    Continues on hydrochlorothiazide and metoprolol.    Also continues on maintenance diuretic with Bumex.    Patient was on lisinopril, but was taken off.    We reduced HCTZ to 50 mg daily and restarted lisinopril  Tolerating current regiment well

## 2024-12-17 NOTE — PROGRESS NOTES
Cardiology Follow Up    Jacquie Viveros  1960 [id number]  [facility] [place]  [address]  [address]  [facility] [address] [phone]      Assessment & Plan  Bicuspid aortic valve  Status post AVR, #21 Inspiris with ascending aortic replacement in June 2023.    Continues on aspirin and beta-blocker.    Echocardiogram in December 2023 revealed normal LV size and function with grade 1 diastolic dysfunction.  Bioprosthetic aortic valve with normal function and no significant regurgitation or stenosis.  Will repeat echo next year for routine surveillance  Ascending aorta dilation (HCC)  Status post ascending aortic replacement  S/P AVR  Status post AVR, #21 Inspiris  Coronary artery disease involving native coronary artery of native heart without angina pectoris  Normal baseline ECG.    Continued on aspirin, beta-blocker, and statin.    Cardiac catheterization in May 2023 revealed very small calibered LAD with diffuse proximal to mid disease, large caliber left circumflex with 90% proximal lesion and nondominant RCA which was small in caliber and with mild disease in the midsegment.    Status post CABG x 2 with LIMA to LAD and SVG to OM 3 in June 2023.  Doing well and asymptomatic at the current time  Primary hypertension  Blood pressure well-controlled with euvolemic volume status.    Continues on hydrochlorothiazide and metoprolol.    Also continues on maintenance diuretic with Bumex.    Patient was on lisinopril, but was taken off.    We reduced HCTZ to 50 mg daily and restarted lisinopril  Tolerating current regiment well  Mixed hyperlipidemia  Continued on Crestor 20 mg daily.    LDL well-controlled at 72 in March 2024.  Repeat levels next year  Chronic diastolic heart failure (HCC)  Wt Readings from Last 3 Encounters:   12/17/24 82.1 kg (181 lb)   10/23/24 83.9 kg (185 lb)   10/08/24 81.2 kg (179 lb)   Stable and euvolemic  Continued on maintenance  "diuretic  Continue blood pressure control  With elevated HgbA1c as well, will try adding Jardiance for management as well  Type 2 diabetes mellitus with both eyes affected by mild nonproliferative retinopathy without macular edema, without long-term current use of insulin ([facility])    Lab Results   Component Value Date    HGBA1C 7.7 (H) 12/16/2024       Chief Complaint   Patient presents with    Follow-up     6 month f/up       Interval History: Patient feels well, without complaints.  No reported chest pain, shortness of breath, palpitations, lightheadedness, syncope, LE edema, orthopnea, PND, or significant weight changes.  Patient remains active without any increased fatigue out of the ordinary.        Blood pressure 150/74, pulse (!) 110, height 5' 4\" (1.626 m), weight 82.1 kg (181 lb), SpO2 97%.      Review of Systems:  Review of Systems   Constitutional:  Negative for activity change, appetite change, chills, diaphoresis, fatigue and unexpected weight change.   HENT:  Negative for hearing loss, nosebleeds and sore throat.    Eyes:  Negative for photophobia and visual disturbance.   Respiratory:  Negative for cough, chest tightness, shortness of breath and wheezing.    Cardiovascular:  Negative for chest pain, palpitations and leg swelling.   Gastrointestinal:  Negative for abdominal pain, diarrhea, nausea and vomiting.   Endocrine: Negative for polyuria.   Genitourinary:  Negative for dysuria, frequency and hematuria.   Musculoskeletal:  Negative for arthralgias, back pain, gait problem and neck pain.   Skin:  Negative for pallor and rash.   Neurological:  Negative for dizziness, syncope and headaches.   Hematological:  Does not bruise/bleed easily.   Psychiatric/Behavioral:  Negative for behavioral problems and confusion.        Physical Exam:  Physical Exam  Vitals reviewed.   Constitutional:       Appearance: Normal appearance. She is well-developed. She is not ill-appearing or diaphoretic.   HENT:      Head: " Normocephalic and atraumatic.      Nose: Nose normal.   Eyes:      General: No scleral icterus.     Extraocular Movements: Extraocular movements intact.      Pupils: Pupils are equal, round, and reactive to light.   Neck:      Vascular: No JVD.   Cardiovascular:      Rate and Rhythm: Normal rate and regular rhythm.      Heart sounds: Murmur heard.      Systolic murmur is present with a grade of 2/6.      No friction rub. No gallop.   Pulmonary:      Effort: Pulmonary effort is normal. No respiratory distress.      Breath sounds: Normal breath sounds. No wheezing or rales.   Abdominal:      General: Bowel sounds are normal. There is no distension.      Palpations: Abdomen is soft.      Tenderness: There is no abdominal tenderness.   Musculoskeletal:         General: No deformity. Normal range of motion.      Cervical back: Normal range of motion and neck supple.      Right lower leg: No edema.      Left lower leg: No edema.   Skin:     General: Skin is warm and dry.      Findings: No rash.   Neurological:      Mental Status: She is alert and oriented to person, place, and time.      Cranial Nerves: No cranial nerve deficit.   Psychiatric:         Mood and Affect: Mood normal.         Behavior: Behavior normal.         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 ([facility])    Hyperlipidemia    Hypertension    Arthritis    Diabetic peripheral neuropathy ([facility])    Bicuspid aortic valve    Coronary artery disease involving native coronary artery    S/P AVR    Spinal stenosis of lumbar region with neurogenic claudication    Anterolisthesis of lumbar spine    Chronic diastolic heart failure ([facility])    Ascending aorta dilation ([facility])    Platelets decreased ([facility])     Past Medical History:   Diagnosis Date    Arthritis     Chronic diastolic CHF (congestive heart failure) ([facility]) 05/07/2024    Chronic kidney disease     CTS (carpal tunnel  syndrome)     had surgery    Diabetes mellitus (HCC)     Fibroid     dr gutierrez removed     2 para 2      x2 -F, -    Hyperlipidemia      Social History     Socioeconomic History    Marital status: Single     Spouse name: Not on file    Number of children: Not on file    Years of education: Not on file    Highest education level: Not on file   Occupational History    Occupation: Retired   Tobacco Use    Smoking status: Never     Passive exposure: Never    Smokeless tobacco: Never   Vaping Use    Vaping status: Never Used   Substance and Sexual Activity    Alcohol use: Never    Drug use: No    Sexual activity: Yes     Partners: Male     Birth control/protection: Male Sterilization     Comment: declines std/hiv testing   Other Topics Concern    Not on file   Social History Narrative    Personal Protective Equipment Seatbelts     Social Drivers of Health     Financial Resource Strain: Low Risk  (2023)    [facility]    Overall Financial Resource Strain (CARDIA)     Difficulty of Paying Living Expenses: Not hard at all   Food Insecurity: Patient Declined (10/21/2024)    Hunger Vital Sign     Worried About Running Out of Food in the Last Year: Patient declined     Ran Out of Food in the Last Year: Patient declined   Transportation Needs: Patient Declined (10/21/2024)    PRAPARE - Transportation     Lack of Transportation (Medical): Patient declined     Lack of Transportation (Non-Medical): Patient declined   Physical Activity: Not on file   Stress: Not on file   Social Connections: Not on file   Intimate Partner Violence: Not At Risk (2023)    [facility] from [facility], [facility]    Humiliation, Afraid, Rape, and Kick questionnaire     Fear of Current or Ex-Partner: No     Emotionally Abused: No     Physically Abused: No     Sexually Abused: No   Housing Stability: Patient Declined (10/21/2024)     Housing Stability Vital Sign     Unable to Pay for Housing in the Last Year: Patient declined     Number of Times Moved in the Last Year: Not on file     Homeless in the Last Year: Patient declined      Family History   Adopted: Yes   Family history unknown: Yes     Past Surgical History:   Procedure Laterality Date    CARDIAC CATHETERIZATION  5/4/2023    Procedure: Cardiac catheterization;  Surgeon: Hilary Mondragon DO;  Location: [facility];  Service: Cardiology    CARDIAC CATHETERIZATION N/A 5/4/2023    Procedure: Cardiac Coronary Angiogram;  Surgeon: Hilary Mondragon DO;  Location: [facility];  Service: Cardiology    CARDIAC CATHETERIZATION N/A 5/4/2023    Procedure: Cardiac other - DFR for Hemodaynamic Assessment;  Surgeon: Hilary Mondragon DO;  Location: [facility];  Service: Cardiology    CARDIAC SURGERY      HYSTERECTOMY      MYOMECTOMY      dr gutierrez removed    TOTAL ABDOMINAL HYSTERECTOMY      4/2010       Current Outpatient Medications:     albuterol (Ventolin HFA) 90 mcg/act inhaler, Inhale 2 puffs every 6 (six) hours as needed for wheezing, Disp: 18 g, Rfl: 5    Aspirin Low Dose 81 MG chewable tablet, , Disp: , Rfl:     bumetanide (BUMEX) 0.5 MG tablet, Take 1 tablet (0.5 mg total) by mouth daily, Disp: 90 tablet, Rfl: 3    Continuous Glucose Sensor (FreeStyle Nataly 3 Sensor) MISC, APPLY ONE SENSOR EVERY 14 DAYS, Disp: 2 each, Rfl: 0    diazepam (VALIUM) 5 mg tablet, take 1 tablet by mouth once a day at bedtime as needed for sleep, Disp: 30 tablet, Rfl: 0    Empagliflozin (Jardiance) 10 MG TABS tablet, Take 1 tablet (10 mg total) by mouth every morning, Disp: 30 tablet, Rfl: 3    gabapentin (NEURONTIN) 100 mg capsule, TAKE ONE CAPSULE BY MOUTH THREE TIMES DAILY, Disp: 270 capsule, Rfl: 0    glucose blood (ONE TOUCH ULTRA TEST) test strip, test 6 times a day, Disp: 600 each, Rfl: 0    hydroCHLOROthiazide 50 mg tablet, Take 1 tablet (50 mg total) by mouth daily, Disp: 90  tablet, Rfl: 3    HYDROcodone-acetaminophen (Norco) 5-325 mg per tablet, Take 1 tablet by mouth every 6 (six) hours as needed for pain Max Daily Amount: 4 tablets, Disp: 60 tablet, Rfl: 0    hydrOXYzine HCL (ATARAX) 25 mg tablet, TAKE 1 TABLET BY MOUTH AT BEDTIME, Disp: 90 tablet, Rfl: 1    insulin NPH (HumuLIN N,NovoLIN N) 100 Units/mL subcutaneous injection, Inject 30 Units under the skin 2 (two) times a day before meals, Disp: , Rfl:     lisinopril (ZESTRIL) 5 mg tablet, Take 1 tablet (5 mg total) by mouth daily, Disp: 90 tablet, Rfl: 3    meloxicam (MOBIC) 15 mg tablet, Take 1 tablet (15 mg total) by mouth daily as needed for moderate pain, Disp: 30 tablet, Rfl: 3    metFORMIN (GLUCOPHAGE-XR) 500 mg 24 hr tablet, TAKE 2 TABLETS BY MOUTH TWICE DAILY, Disp: 360 tablet, Rfl: 1    methocarbamol (ROBAXIN) 500 mg tablet, , Disp: , Rfl:     metoprolol tartrate (LOPRESSOR) 25 mg tablet, Take 1 tablet (25 mg total) by mouth every 12 (twelve) hours, Disp: 180 tablet, Rfl: 3    NovoLOG FlexPen 100 units/mL injection pen, 8 Units 3 (three) times a day with meals, Disp: , Rfl:     omega-3-acid ethyl esters (LOVAZA) 1 g capsule, TAKE 2 CAPSULES BY MOUTH TWICE DAILY, Disp: 360 capsule, Rfl: 1    omeprazole (PriLOSEC) 40 MG capsule, Take 1 capsule (40 mg total) by mouth daily, Disp: 90 capsule, Rfl: 3    oxyCODONE-acetaminophen (Percocet) 5-325 mg per tablet, Take 1 tablet by mouth every 6 (six) hours as needed for moderate pain Max Daily Amount: 4 tablets, Disp: 20 tablet, Rfl: 0    potassium chloride (MICRO-K) 10 MEQ CR capsule, TAKE TWO CAPSULES BY MOUTH ONCE DAILY, Disp: 180 capsule, Rfl: 1    rosuvastatin (CRESTOR) 20 MG tablet, Take 1 tablet (20 mg total) by mouth daily, Disp: 90 tablet, Rfl: 3    Tirzepatide (Mounjaro) 5 MG/0.5ML SOAJ, Inject 0.5 mL (5 mg total) under the skin every 7 days, Disp: 2 mL, Rfl: 5    amoxicillin (AMOXIL) 500 MG tablet, Take 500 mg by mouth 2 (two) times a day predental (Patient not taking:  Reported on 12/17/2024), Disp: , Rfl:     desvenlafaxine succinate (PRISTIQ) 50 mg 24 hr tablet, Take 1 tablet (50 mg total) by mouth daily (Patient not taking: Reported on 10/8/2024), Disp: 30 tablet, Rfl: 3    insulin detemir (Levemir FlexTouch) 100 Units/mL injection pen, Inject 30 Units under the skin every 12 (twelve) hours (Patient not taking: Reported on 10/8/2024), Disp: 45 mL, Rfl: 3    insulin isophane-insulin regular (NovoLIN 70/30 FlexPen) 100 units/mL injection pen, Inject under the skin (Patient not taking: Reported on 10/23/2024), Disp: , Rfl:     ketoconazole (NIZORAL) 2 % shampoo, Apply to affected area ON SCALP every other day FOR 5 MINUTES THEN RINSE (Patient not taking: No sig reported), Disp: 120 mL, Rfl: 0    tirzepatide (Mounjaro) 2.5 MG/0.5ML, Inject 0.5 mL (2.5 mg total) under the skin every 7 days (Patient not taking: Reported on 10/8/2024), Disp: 2 mL, Rfl: 0  Allergies   Allergen Reactions    Atorvastatin Other (See Comments)     Reaction Date: 25Apr2011;     Furosemide Other (See Comments)     Reaction Date: 25Apr2011;     Latex Other (See Comments)     Pt doesn't remember reaction        Labs:  Appointment on 12/16/2024   Component Date Value    Creatinine, Ur 12/16/2024 106.5     Albumin,U,Random 12/16/2024 10.4     Albumin Creat Ratio 12/16/2024 10     Sodium 12/16/2024 140     Potassium 12/16/2024 4.2     Chloride 12/16/2024 103     CO2 12/16/2024 30     ANION GAP 12/16/2024 7     BUN 12/16/2024 22     Creatinine 12/16/2024 0.79     Glucose, Fasting 12/16/2024 57 (L)     Calcium 12/16/2024 10.8 (H)     AST 12/16/2024 27     ALT 12/16/2024 27     Alkaline Phosphatase 12/16/2024 52     Total Protein 12/16/2024 7.0     Albumin 12/16/2024 4.5     Total Bilirubin 12/16/2024 0.29     eGFR 12/16/2024 78     Cholesterol 12/16/2024 144     Triglycerides 12/16/2024 116     HDL, Direct 12/16/2024 40 (L)     LDL Calculated 12/16/2024 81     Hemoglobin A1C 12/16/2024 7.7 (H)     EAG 12/16/2024  174    Orders Only on 10/29/2024   Component Date Value    Urine Volume 10/29/2024 2,500     Timing Interval 10/29/2024 24     Creatinine, 24H Ur 10/29/2024 0.017     Preserved Urine Volume 10/29/2024 No preservative added     Calcium 24HR Ur 10/29/2024 125    Appointment on 10/17/2024   Component Date Value    PTH 10/17/2024 131.3 (H)     Calcium 10/17/2024 10.6 (H)    Hospital Outpatient Visit on 10/10/2024   Component Date Value    POC Glucose 10/10/2024 309 (H)     Case Report 10/10/2024                      Value:Surgical Pathology Report                         Case: [id number]                                  Authorizing Provider:  Vero Antony MD          Collected:           10/10/2024 0808              [facility]ing Location[facility]     [facility]   Received:            10/10/2024 1327                                     Center                                                                       Pathologist:           Roldan Yoo DO                                                          Specimens:   A) - Duodenum, Duodenal bxs- r/o celiac's                                                           B) - Stomach, Gastric bxs- r/o H-Pylori                                                             C) - Esophagus, Bxs GE junction                                                            Final Diagnosis 10/10/2024                      Value:A. Duodenum, Biopsy:  - Benign duodenal mucosa within normal limits.  - No intraepithelial lymphocytosis and no villous blunting.  - Negative for dysplasia and malignancy.     B. Stomach, Biopsy:  - Gastric antral mucosa with reactive gastropathy.  - No Helicobacter organisms identified on H&E and immunostain.  - An AE1/AE3 immunostain highlights benign gastric epithelium.  - Negative for intestinal metaplasia, dysplasia, and malignancy.       C. Esophagus, GE junction, Biopsy:  - Squamocolumnar junctional mucosa with mild chronic inflammation.  - No squamous  "intraepithelial eosinophils are noted.   - Negative for intestinal metaplasia, dysplasia, and malignancy.        Additional Information 10/10/2024                      Value:All reported additional testing was performed with appropriately reactive controls.  These tests were developed and their performance characteristics determined by [facility] or appropriate performing facility, though some tests may be performed on tissues which have not been validated for performance characteristics (such as staining performed on alcohol exposed cell blocks and decalcified tissues).  Results should be interpreted with caution and in the context of the patients’ clinical condition. These tests may not be cleared or approved by the U.S. Food and Drug Administration, though the FDA has determined that such clearance or approval is not necessary. These tests are used for clinical purposes and they should not be regarded as investigational or for research. This laboratory has been approved by CLIA 88, designated as a high-complexity laboratory and is qualified to perform these tests.    Interpretation performed at [facility], [facility] [facility] [address].      Gross Description 10/10/2024                      Value:A. The specimen is received in formalin, labeled with the patient's name and hospital number, and is designated \" duodenal biopsies-rule out celiac's.\"  The specimen consistent 3 tan soft tissue fragments that measure 0.1 to 0.3 cm.  Totally submitted in 1 screen cassette.  B. The specimen is received in formalin, labeled with the patient's name and hospital number, and is designated \" gastric biopsies rule out H. pylori.\"  Specimen consistent 2 tan soft tissue fragments measure 0.3 to 0.8 cm.  Totally submitted in 1 screen cassette.  C. The specimen is received in formalin, labeled with the patient's name and hospital number, and is designated \" biopsies GE " "junction.\"  The specimen consists of 3 tan to gray soft tissue fragments measuring 0.1 to 0.2 cm.  Due to the size and consistency, the entire specimen may not survive processing.  Totally submitted in 1 screen cassette.  Note: The estimated total formalin fixation time based upon information provided by the submitting clinician                           and the standard processing schedule is under 72 hours.  TStevens     Admission on 09/18/2024, Discharged on 09/18/2024   Component Date Value    WBC 09/18/2024 7.22     RBC 09/18/2024 4.89     Hemoglobin 09/18/2024 12.6     Hematocrit 09/18/2024 39.2     MCV 09/18/2024 80 (L)     MCH 09/18/2024 25.8 (L)     MCHC 09/18/2024 32.1     RDW 09/18/2024 14.1     MPV 09/18/2024 10.7     Platelets 09/18/2024 172     nRBC 09/18/2024 0     Segmented % 09/18/2024 57     Immature Grans % 09/18/2024 0     Lymphocytes % 09/18/2024 27     Monocytes % 09/18/2024 7     Eosinophils Relative 09/18/2024 9 (H)     Basophils Relative 09/18/2024 0     Absolute Neutrophils 09/18/2024 4.04     Absolute Immature Grans 09/18/2024 0.02     Absolute Lymphocytes 09/18/2024 1.97     Absolute Monocytes 09/18/2024 0.52     Eosinophils Absolute 09/18/2024 0.64 (H)     Basophils Absolute 09/18/2024 0.03     Sodium 09/18/2024 135     Potassium 09/18/2024 3.9     Chloride 09/18/2024 97     CO2 09/18/2024 30     ANION GAP 09/18/2024 8     BUN 09/18/2024 27 (H)     Creatinine 09/18/2024 0.87     Glucose 09/18/2024 222 (H)     Calcium 09/18/2024 10.8 (H)     AST 09/18/2024 26     ALT 09/18/2024 26     Alkaline Phosphatase 09/18/2024 59     Total Protein 09/18/2024 7.4     Albumin 09/18/2024 4.6     Total Bilirubin 09/18/2024 0.48     eGFR 09/18/2024 70     hs TnI 0hr 09/18/2024 4     hs TnI 2hr 09/18/2024 3     Delta 2hr hsTnI 09/18/2024 -1     Lipase 09/18/2024 23     Magnesium 09/18/2024 1.5 (L)     TSH 3RD GENERATON 09/18/2024 2.199     D-Dimer, Quant 09/18/2024 0.52 (H)     Ventricular Rate 09/18/2024 " 74     Atrial Rate 09/18/2024 74     AR Interval 09/18/2024 130     QRSD Interval 09/18/2024 76     QT Interval 09/18/2024 366     QTC Interval 09/18/2024 406     P Axis 09/18/2024 80     QRS Axis 09/18/2024 85     T Wave [place] 09/18/2024 23    Appointment on 09/16/2024   Component Date Value    PTH 09/16/2024 92.8 (H)     BNP 09/16/2024 78     TSH 3RD GENERATON 09/16/2024 1.748    Appointment on 08/10/2024   Component Date Value    Sodium 08/10/2024 141     Potassium 08/10/2024 3.7     Chloride 08/10/2024 101     CO2 08/10/2024 31     ANION GAP 08/10/2024 9     BUN 08/10/2024 24     Creatinine 08/10/2024 0.79     Glucose, Fasting 08/10/2024 182 (H)     Calcium 08/10/2024 11.6 (H)     AST 08/10/2024 22     ALT 08/10/2024 25     Alkaline Phosphatase 08/10/2024 64     Total Protein 08/10/2024 7.2     Albumin 08/10/2024 4.3     Total Bilirubin 08/10/2024 0.31     eGFR 08/10/2024 78     Hemoglobin A1C 08/10/2024 9.9 (H)     EAG 08/10/2024 237     Cholesterol 08/10/2024 138     Triglycerides 08/10/2024 130     HDL, Direct 08/10/2024 46 (L)     LDL Calculated 08/10/2024 66     Non-HDL-Chol (CHOL-HDL) 08/10/2024 92      Lab Results   Component Value Date    CHOL 178 10/13/2017    TRIG 116 12/16/2024    TRIG 100 03/30/2022    HDL 40 (L) 12/16/2024    HDL 50 03/30/2022     Imaging: No results found.

## 2024-12-17 NOTE — ASSESSMENT & PLAN NOTE
Normal baseline ECG.    Continued on aspirin, beta-blocker, and statin.    Cardiac catheterization in May 2023 revealed very small calibered LAD with diffuse proximal to mid disease, large caliber left circumflex with 90% proximal lesion and nondominant RCA which was small in caliber and with mild disease in the midsegment.    Status post CABG x 2 with LIMA to LAD and SVG to OM 3 in June 2023.  Doing well and asymptomatic at the current time

## 2024-12-17 NOTE — ASSESSMENT & PLAN NOTE
Status post AVR, #21 Inspiris with ascending aortic replacement in June 2023.    Continues on aspirin and beta-blocker.    Echocardiogram in December 2023 revealed normal LV size and function with grade 1 diastolic dysfunction.  Bioprosthetic aortic valve with normal function and no significant regurgitation or stenosis.  Will repeat echo next year for routine surveillance

## 2024-12-17 NOTE — ASSESSMENT & PLAN NOTE
Wt Readings from Last 3 Encounters:   12/17/24 82.1 kg (181 lb)   10/23/24 83.9 kg (185 lb)   10/08/24 81.2 kg (179 lb)   Stable and euvolemic  Continued on maintenance diuretic  Continue blood pressure control  With elevated HgbA1c as well, will try adding Jardiance for management as well

## 2024-12-19 ENCOUNTER — TELEPHONE (OUTPATIENT)
Dept: FAMILY MEDICINE CLINIC | Facility: CLINIC | Age: 64
End: 2024-12-19

## 2024-12-19 DIAGNOSIS — F41.9 ANXIETY: ICD-10-CM

## 2024-12-19 DIAGNOSIS — E13.9 DIABETES 1.5, MANAGED AS TYPE 2 (HCC): ICD-10-CM

## 2024-12-19 DIAGNOSIS — E87.6 HYPOKALEMIA: ICD-10-CM

## 2024-12-19 DIAGNOSIS — E78.00 HYPERCHOLESTEROLEMIA: ICD-10-CM

## 2024-12-19 NOTE — TELEPHONE ENCOUNTER
Pt had an appointment today that needs to be rescheduled due to PCP out of office    Pt is requesting an appointment as soon as possible, due to concerns from her cardiology appointmement\\    May we use a same day on 1/2 to get her in to discuss concerns?

## 2024-12-20 RX ORDER — HYDROXYZINE HYDROCHLORIDE 25 MG/1
25 TABLET, FILM COATED ORAL
Qty: 90 TABLET | Refills: 1 | Status: SHIPPED | OUTPATIENT
Start: 2024-12-20

## 2024-12-20 RX ORDER — METFORMIN HYDROCHLORIDE 500 MG/1
1000 TABLET, EXTENDED RELEASE ORAL 2 TIMES DAILY
Qty: 360 TABLET | Refills: 1 | Status: SHIPPED | OUTPATIENT
Start: 2024-12-20

## 2024-12-20 RX ORDER — POTASSIUM CHLORIDE 750 MG/1
20 CAPSULE, EXTENDED RELEASE ORAL DAILY
Qty: 180 CAPSULE | Refills: 1 | Status: SHIPPED | OUTPATIENT
Start: 2024-12-20

## 2024-12-20 RX ORDER — OMEGA-3-ACID ETHYL ESTERS 1 G/1
2 CAPSULE, LIQUID FILLED ORAL 2 TIMES DAILY
Qty: 360 CAPSULE | Refills: 1 | Status: SHIPPED | OUTPATIENT
Start: 2024-12-20

## 2024-12-23 DIAGNOSIS — L21.9 SEBORRHEA: ICD-10-CM

## 2024-12-24 RX ORDER — KETOCONAZOLE 20 MG/ML
SHAMPOO, SUSPENSION TOPICAL
Qty: 120 ML | Refills: 0 | Status: SHIPPED | OUTPATIENT
Start: 2024-12-24

## 2024-12-28 DIAGNOSIS — Z79.4 TYPE 2 DIABETES MELLITUS WITH BOTH EYES AFFECTED BY MILD NONPROLIFERATIVE RETINOPATHY WITHOUT MACULAR EDEMA, WITH LONG-TERM CURRENT USE OF INSULIN (HCC): ICD-10-CM

## 2024-12-28 DIAGNOSIS — E11.3293 TYPE 2 DIABETES MELLITUS WITH BOTH EYES AFFECTED BY MILD NONPROLIFERATIVE RETINOPATHY WITHOUT MACULAR EDEMA, WITH LONG-TERM CURRENT USE OF INSULIN (HCC): ICD-10-CM

## 2024-12-30 RX ORDER — ACYCLOVIR 800 MG/1
TABLET ORAL
Qty: 2 EACH | Refills: 0 | Status: SHIPPED | OUTPATIENT
Start: 2024-12-30

## 2024-12-30 NOTE — TELEPHONE ENCOUNTER
Pt called in stating she needs this before the New Year or she will be charged. She's requesting it go to [facility].

## 2025-01-07 ENCOUNTER — OFFICE VISIT (OUTPATIENT)
Dept: FAMILY MEDICINE CLINIC | Facility: CLINIC | Age: 65
End: 2025-01-07
Payer: MEDICARE

## 2025-01-07 VITALS
DIASTOLIC BLOOD PRESSURE: 62 MMHG | WEIGHT: 186 LBS | HEIGHT: 64 IN | OXYGEN SATURATION: 98 % | HEART RATE: 135 BPM | SYSTOLIC BLOOD PRESSURE: 130 MMHG | BODY MASS INDEX: 31.76 KG/M2 | TEMPERATURE: 97.3 F

## 2025-01-07 DIAGNOSIS — E21.3 HYPERPARATHYROIDISM (HCC): ICD-10-CM

## 2025-01-07 DIAGNOSIS — M54.16 LUMBAR RADICULOPATHY: Primary | ICD-10-CM

## 2025-01-07 DIAGNOSIS — M79.642 BILATERAL HAND PAIN: ICD-10-CM

## 2025-01-07 DIAGNOSIS — M79.641 BILATERAL HAND PAIN: ICD-10-CM

## 2025-01-07 DIAGNOSIS — I25.10 CORONARY ARTERY DISEASE DUE TO LIPID RICH PLAQUE: ICD-10-CM

## 2025-01-07 DIAGNOSIS — I50.32 CHRONIC DIASTOLIC HEART FAILURE (HCC): ICD-10-CM

## 2025-01-07 DIAGNOSIS — I77.810 ASCENDING AORTA DILATION (HCC): ICD-10-CM

## 2025-01-07 DIAGNOSIS — D69.6 PLATELETS DECREASED (HCC): ICD-10-CM

## 2025-01-07 DIAGNOSIS — I25.83 CORONARY ARTERY DISEASE DUE TO LIPID RICH PLAQUE: ICD-10-CM

## 2025-01-07 DIAGNOSIS — E11.3293 TYPE 2 DIABETES MELLITUS WITH BOTH EYES AFFECTED BY MILD NONPROLIFERATIVE RETINOPATHY WITHOUT MACULAR EDEMA, WITHOUT LONG-TERM CURRENT USE OF INSULIN (HCC): ICD-10-CM

## 2025-01-07 PROCEDURE — 99215 OFFICE O/P EST HI 40 MIN: CPT | Performed by: FAMILY MEDICINE

## 2025-01-08 NOTE — PROGRESS NOTES
Name: Jacquie Viveros      : 1960      MRN: [id number]  Encounter Provider: Charito Pereira DO  Encounter Date: 2025   Encounter department: [facility]    Assessment & Plan  Lumbar radiculopathy  Will await pain management evaluation.    Orders:    Ambulatory referral to Spine & Pain Management; Future    Coronary artery disease due to lipid rich plaque  Pt would like to change cardiology providers.  Orders:    Ambulatory Referral to Cardiology; Future    Hyperparathyroidism (HCC)  Patient's nuclear parathyroid scan was normal.                     Chronic diastolic heart failure (HCC)  Wt Readings from Last 3 Encounters:   25 84.4 kg (186 lb)   24 82.1 kg (181 lb)   10/23/24 83.9 kg (185 lb)   Continue with diuretic therapy, daily weights,low sodium diet and cardiology follow up..                 Ascending aorta dilation (HCC)  Stable at present time.         Platelets decreased (HCC)  Continue monitoring platelet count.         Type 2 diabetes mellitus with both eyes affected by mild nonproliferative retinopathy without macular edema, without long-term current use of insulin (HCC)  Stable on current therapy.    Lab Results   Component Value Date    HGBA1C 7.7 (H) 2024            Bilateral hand pain    Orders:    Ambulatory Referral to Orthopedic Surgery; Future         History of Present Illness     The patient presents for follow up of NIDDM, lumbar radiculopathy, CAD, decreased platelet count, bilateral hand pain and stiffness, CAD and CHF.  She is looking to get established with a different Cardiologist, would like to see pain management for lumbar radiculopathy, and see orthopedic surgery for bilateral hand pain and restricted rom.      bilart  Review of Systems   Constitutional:  Negative for appetite change, chills and fever.   HENT:  Negative for ear pain, facial swelling, rhinorrhea, sinus pain, sore throat and trouble swallowing.    Eyes:   Negative for discharge and redness.   Respiratory:  Negative for chest tightness, shortness of breath and wheezing.    Cardiovascular:  Negative for chest pain and palpitations.   Gastrointestinal:  Negative for abdominal pain, diarrhea, nausea and vomiting.   Endocrine: Negative for polyuria.   Genitourinary:  Negative for dysuria and urgency.   Musculoskeletal:  Positive for arthralgias, back pain and gait problem.        + nodules distally on fingers, + wasting of tendons of hands on palmar surface bilaterally  + lumbar radiculopathy down into lateral hips and anterior thighs bilaterally.   Skin:  Negative for rash.   Neurological:  Negative for dizziness, weakness and headaches.   Hematological:  Negative for adenopathy.   Psychiatric/Behavioral:  Negative for behavioral problems, confusion and sleep disturbance.    All other systems reviewed and are negative.    Past Medical History:   Diagnosis Date    Arthritis     Chronic diastolic CHF (congestive heart failure) (HCC) 2024    Chronic kidney disease     CTS (carpal tunnel syndrome)     had surgery    Diabetes mellitus ([facility])     Fibroid     dr gutierrez removed     2 para 2      x2 -F, -    Hyperlipidemia      Past Surgical History:   Procedure Laterality Date    CARDIAC CATHETERIZATION  2023    Procedure: Cardiac catheterization;  Surgeon: Hilary Mondragon DO;  Location: [facility];  Service: Cardiology    CARDIAC CATHETERIZATION N/A 2023    Procedure: Cardiac Coronary Angiogram;  Surgeon: Hilary Mondragon DO;  Location: [facility];  Service: Cardiology    CARDIAC CATHETERIZATION N/A 2023    Procedure: Cardiac other - DFR for Hemodaynamic Assessment;  Surgeon: Hilary Mondragon DO;  Location: [facility];  Service: Cardiology    CARDIAC SURGERY      HYSTERECTOMY      MYOMECTOMY      dr gutierrez removed    TOTAL ABDOMINAL HYSTERECTOMY      2010     Family History   Adopted: Yes   Family history  unknown: Yes     Social History     Tobacco Use    Smoking status: Never     Passive exposure: Never    Smokeless tobacco: Never   Vaping Use    Vaping status: Never Used   Substance and Sexual Activity    Alcohol use: Never    Drug use: No    Sexual activity: Yes     Partners: Male     Birth control/protection: Male Sterilization     Comment: declines std/hiv testing     Current Outpatient Medications on File Prior to Visit   Medication Sig    albuterol (Ventolin HFA) 90 mcg/act inhaler Inhale 2 puffs every 6 (six) hours as needed for wheezing    amoxicillin (AMOXIL) 500 MG tablet Take 500 mg by mouth 2 (two) times a day predental    Aspirin Low Dose 81 MG chewable tablet     bumetanide (BUMEX) 0.5 MG tablet Take 1 tablet (0.5 mg total) by mouth daily    Continuous Glucose Sensor (FreeStyle Natayl 3 Sensor) MISC APPLY ONE SENSOR EVERY 14 DAYS    desvenlafaxine succinate (PRISTIQ) 50 mg 24 hr tablet Take 1 tablet (50 mg total) by mouth daily    diazepam (VALIUM) 5 mg tablet take 1 tablet by mouth once a day at bedtime as needed for sleep (Patient taking differently: as needed)    Empagliflozin (Jardiance) 10 MG TABS tablet Take 1 tablet (10 mg total) by mouth every morning    gabapentin (NEURONTIN) 100 mg capsule TAKE ONE CAPSULE BY MOUTH THREE TIMES DAILY    glucose blood (ONE TOUCH ULTRA TEST) test strip test 6 times a day    hydroCHLOROthiazide 50 mg tablet Take 1 tablet (50 mg total) by mouth daily    HYDROcodone-acetaminophen (Norco) 5-325 mg per tablet Take 1 tablet by mouth every 6 (six) hours as needed for pain Max Daily Amount: 4 tablets    hydrOXYzine HCL (ATARAX) 25 mg tablet TAKE 1 TABLET BY MOUTH AT BEDTIME    insulin isophane-insulin regular (NovoLIN 70/30 FlexPen) 100 units/mL injection pen Inject under the skin    ketoconazole (NIZORAL) 2 % shampoo Apply to affected area ON SCALP every other day FOR 5 MINUTES THEN RINSE    lisinopril (ZESTRIL) 5 mg tablet Take 1 tablet (5 mg total) by mouth daily     "meloxicam (MOBIC) 15 mg tablet Take 1 tablet (15 mg total) by mouth daily as needed for moderate pain    metFORMIN (GLUCOPHAGE-XR) 500 mg 24 hr tablet TAKE 2 TABLETS BY MOUTH TWICE DAILY    methocarbamol (ROBAXIN) 500 mg tablet     metoprolol tartrate (LOPRESSOR) 25 mg tablet Take 1 tablet (25 mg total) by mouth every 12 (twelve) hours    omeprazole (PriLOSEC) 40 MG capsule Take 1 capsule (40 mg total) by mouth daily    oxyCODONE-acetaminophen (Percocet) 5-325 mg per tablet Take 1 tablet by mouth every 6 (six) hours as needed for moderate pain Max Daily Amount: 4 tablets    potassium chloride (MICRO-K) 10 MEQ CR capsule TAKE 2 CAPSULES BY MOUTH DAILY    rosuvastatin (CRESTOR) 20 MG tablet Take 1 tablet (20 mg total) by mouth daily    Tirzepatide (Mounjaro) 5 MG/0.5ML SOAJ Inject 0.5 mL (5 mg total) under the skin every 7 days    insulin NPH (HumuLIN N,NovoLIN N) 100 Units/mL subcutaneous injection Inject 30 Units under the skin 2 (two) times a day before meals (Patient not taking: Reported on 1/7/2025)    NovoLOG FlexPen 100 units/mL injection pen 8 Units 3 (three) times a day with meals (Patient not taking: Reported on 1/7/2025)    omega-3-acid ethyl esters (LOVAZA) 1 g capsule TAKE 2 CAPSULES BY MOUTH TWICE DAILY (Patient not taking: Reported on 1/7/2025)     Allergies   Allergen Reactions    Atorvastatin Other (See Comments)     Reaction Date: 25Apr2011;     Furosemide Other (See Comments)     Reaction Date: 25Apr2011;     Latex Other (See Comments)     Pt doesn't remember reaction      Immunization History   Administered Date(s) Administered    Pneumococcal Conjugate Vaccine 20-valent (Pcv20), Polysace 10/23/2024    Tdap 07/06/2012, 12/06/2019     Objective   /62   Pulse (!) 135   Temp (!) 97.3 °F (36.3 °C)   Ht 5' 4\" (1.626 m)   Wt 84.4 kg (186 lb)   SpO2 98%   BMI 31.93 kg/m²     Physical Exam  Vitals and nursing note reviewed.   Constitutional:       General: She is not in acute distress.     " Appearance: Normal appearance. She is well-developed. She is not ill-appearing or diaphoretic.   HENT:      Head: Normocephalic and atraumatic.      Right Ear: Tympanic membrane, ear canal and external ear normal.      Left Ear: Tympanic membrane, ear canal and external ear normal.      Nose: Nose normal. No congestion or rhinorrhea.      Mouth/Throat:      Mouth: Mucous membranes are moist.      Pharynx: Oropharynx is clear. No oropharyngeal exudate or posterior oropharyngeal erythema.   Eyes:      General: No scleral icterus.        Right eye: No discharge.         Left eye: No discharge.      Extraocular Movements: Extraocular movements intact.      Conjunctiva/sclera: Conjunctivae normal.      Pupils: Pupils are equal, round, and reactive to light.   Neck:      Thyroid: No thyromegaly.      Vascular: No carotid bruit or JVD.      Trachea: No tracheal deviation.   Cardiovascular:      Rate and Rhythm: Normal rate and regular rhythm.      Pulses: Normal pulses.      Heart sounds: Normal heart sounds. No murmur heard.  Pulmonary:      Effort: Pulmonary effort is normal. No respiratory distress.      Breath sounds: Normal breath sounds. No stridor. No wheezing, rhonchi or rales.   Abdominal:      General: Abdomen is flat. Bowel sounds are normal. There is no distension.      Palpations: Abdomen is soft. There is no mass.      Tenderness: There is no abdominal tenderness. There is no guarding or rebound.   Musculoskeletal:         General: Tenderness present. No swelling or deformity. Normal range of motion.      Cervical back: Normal range of motion and neck supple. No rigidity.      Right lower leg: No edema.      Left lower leg: No edema.      Comments: + stiffness and achiness of both hands bilaterally  + SLR bilaterally at 30 degrees   Lymphadenopathy:      Cervical: No cervical adenopathy.   Skin:     General: Skin is warm and dry.      Capillary Refill: Capillary refill takes less than 2 seconds.       Coloration: Skin is not jaundiced.      Findings: No bruising, erythema or rash.   Neurological:      General: No focal deficit present.      Mental Status: She is alert and oriented to person, place, and time.      Cranial Nerves: No cranial nerve deficit.      Sensory: No sensory deficit.      Motor: No abnormal muscle tone.      Coordination: Coordination normal.      Gait: Gait normal.      Deep Tendon Reflexes: Reflexes are normal and symmetric. Reflexes normal.   Psychiatric:         Mood and Affect: Mood normal.         Behavior: Behavior normal.         Thought Content: Thought content normal.         Judgment: Judgment normal.       Administrative Statements   I have spent a total time of 20 minutes in caring for this patient on the day of the visit/encounter including Diagnostic results, Prognosis, Risks and benefits of tx options, Instructions for management, and Patient and family education. Topics discussed with the patient / family include symptom assessment and management, medication review, medication adjustment, psychosocial support, and advanced directives.

## 2025-01-08 NOTE — ASSESSMENT & PLAN NOTE
Stable on current therapy.    Lab Results   Component Value Date    HGBA1C 7.7 (H) 12/16/2024

## 2025-01-08 NOTE — ASSESSMENT & PLAN NOTE
Wt Readings from Last 3 Encounters:   01/07/25 84.4 kg (186 lb)   12/17/24 82.1 kg (181 lb)   10/23/24 83.9 kg (185 lb)   Continue with diuretic therapy, daily weights,low sodium diet and cardiology follow up..

## 2025-01-09 ENCOUNTER — TELEPHONE (OUTPATIENT)
Age: 65
End: 2025-01-09

## 2025-01-09 NOTE — TELEPHONE ENCOUNTER
Caller: Patient     Doctor:      Reason for call: Please mail NP packet for upcoming appointment.     Confirmed address on file     Call back#: [phone]

## 2025-01-23 ENCOUNTER — TELEPHONE (OUTPATIENT)
Age: 65
End: 2025-01-23

## 2025-01-23 NOTE — TELEPHONE ENCOUNTER
Patient wanted to see if Dr can call something in for her, her daughter/granddaughters who are patients of Dr Pereira were seen yesterday and have the flu. She is experiencing symptoms of sore throat, swollen glands, vomiting, headache, achy, sneezing and cough. No fever as of now. She wanted to see what can be given. Please review and advise patient.     If approve for Rx , send to   [id number] - [place] - [address]  P: [phone]  F: [phone]

## 2025-01-24 ENCOUNTER — OFFICE VISIT (OUTPATIENT)
Dept: FAMILY MEDICINE CLINIC | Facility: CLINIC | Age: 65
End: 2025-01-24
Payer: MEDICARE

## 2025-01-24 VITALS
SYSTOLIC BLOOD PRESSURE: 120 MMHG | HEART RATE: 92 BPM | TEMPERATURE: 97.5 F | BODY MASS INDEX: 31.67 KG/M2 | OXYGEN SATURATION: 99 % | DIASTOLIC BLOOD PRESSURE: 68 MMHG | HEIGHT: 64 IN | WEIGHT: 185.5 LBS

## 2025-01-24 DIAGNOSIS — J10.1 INFLUENZA A: Primary | ICD-10-CM

## 2025-01-24 LAB
SL AMB POCT RAPID FLU A: ABNORMAL
SL AMB POCT RAPID FLU B: ABNORMAL

## 2025-01-24 PROCEDURE — G2211 COMPLEX E/M VISIT ADD ON: HCPCS | Performed by: FAMILY MEDICINE

## 2025-01-24 PROCEDURE — 99213 OFFICE O/P EST LOW 20 MIN: CPT | Performed by: FAMILY MEDICINE

## 2025-01-24 PROCEDURE — 87804 INFLUENZA ASSAY W/OPTIC: CPT | Performed by: FAMILY MEDICINE

## 2025-01-24 NOTE — PROGRESS NOTES
"Name: Jacquie Viveros      : 1960      MRN: [id number]  Encounter Provider: Gail Mccray MD  Encounter Date: 2025   Encounter department: [facility]  :  Assessment & Plan  Influenza A  Recommend Tylenol, adequate hydration and nutrition and Mucinex if needed  Call if not improving by Monday   Orders:  •  POCT rapid flu A and B           History of Present Illness   Chief Complaint   Patient presents with   • Cold Like Symptoms     Granddaughters tested positive for flu recently. Ear soreness, sore throat, cough, congestion, sinus pressure, started five days ago.       URI   This is a new problem. Episode onset: 5 days. There has been no fever. Associated symptoms include congestion, a plugged ear sensation, rhinorrhea, sinus pain and vomiting (last 2 nights ago). Pertinent negatives include no abdominal pain, chest pain, coughing, dysuria, headaches, nausea, rash or sore throat. She has tried acetaminophen for the symptoms. The treatment provided no relief.     Review of Systems   Constitutional:  Negative for chills and fever.   HENT:  Positive for congestion, rhinorrhea and sinus pain. Negative for sore throat.    Eyes:  Negative for pain and visual disturbance.   Respiratory:  Negative for cough and shortness of breath.    Cardiovascular:  Negative for chest pain, palpitations and leg swelling.   Gastrointestinal:  Positive for vomiting (last 2 nights ago). Negative for abdominal pain and nausea.   Genitourinary:  Negative for dysuria.   Musculoskeletal:  Negative for arthralgias and myalgias.   Skin:  Negative for rash and wound.   Neurological:  Negative for dizziness and headaches.   All other systems reviewed and are negative.      Objective   /68   Pulse 92   Temp 97.5 °F (36.4 °C)   Ht 5' 4\" (1.626 m)   Wt 84.1 kg (185 lb 8 oz)   SpO2 99%   BMI 31.84 kg/m²      Physical Exam  Vitals and nursing note reviewed.   Constitutional:       General: " She is not in acute distress.     Appearance: She is well-developed. She is ill-appearing.   HENT:      Head: Normocephalic and atraumatic.      Right Ear: Tympanic membrane, ear canal and external ear normal.      Left Ear: Tympanic membrane, ear canal and external ear normal.      Nose: Nose normal.      Mouth/Throat:      Mouth: Mucous membranes are moist.      Pharynx: Posterior oropharyngeal erythema present. No oropharyngeal exudate.   Eyes:      Conjunctiva/sclera: Conjunctivae normal.   Neck:      Trachea: No tracheal deviation.   Cardiovascular:      Rate and Rhythm: Normal rate and regular rhythm.      Pulses: Normal pulses.      Heart sounds: Normal heart sounds.   Pulmonary:      Effort: Pulmonary effort is normal.      Breath sounds: Normal breath sounds. No wheezing, rhonchi or rales.   Abdominal:      Tenderness: There is no abdominal tenderness.   Lymphadenopathy:      Cervical: Cervical adenopathy present.   Skin:     General: Skin is warm and dry.      Capillary Refill: Capillary refill takes less than 2 seconds.      Findings: No rash.   Neurological:      Mental Status: She is alert.      Cranial Nerves: No cranial nerve deficit.        negative...

## 2025-01-27 DIAGNOSIS — E11.3293 TYPE 2 DIABETES MELLITUS WITH BOTH EYES AFFECTED BY MILD NONPROLIFERATIVE RETINOPATHY WITHOUT MACULAR EDEMA, WITH LONG-TERM CURRENT USE OF INSULIN (HCC): ICD-10-CM

## 2025-01-27 DIAGNOSIS — Z79.4 TYPE 2 DIABETES MELLITUS WITH BOTH EYES AFFECTED BY MILD NONPROLIFERATIVE RETINOPATHY WITHOUT MACULAR EDEMA, WITH LONG-TERM CURRENT USE OF INSULIN (HCC): ICD-10-CM

## 2025-01-27 RX ORDER — ACYCLOVIR 800 MG/1
TABLET ORAL
Qty: 2 EACH | Refills: 5 | Status: SHIPPED | OUTPATIENT
Start: 2025-01-27

## 2025-02-05 ENCOUNTER — OFFICE VISIT (OUTPATIENT)
Dept: OBGYN CLINIC | Facility: CLINIC | Age: 65
End: 2025-02-05
Payer: MEDICARE

## 2025-02-05 ENCOUNTER — NURSE TRIAGE (OUTPATIENT)
Age: 65
End: 2025-02-05

## 2025-02-05 ENCOUNTER — APPOINTMENT (OUTPATIENT)
Dept: RADIOLOGY | Facility: AMBULARY SURGERY CENTER | Age: 65
End: 2025-02-05
Attending: SURGERY
Payer: MEDICARE

## 2025-02-05 VITALS — WEIGHT: 185 LBS | BODY MASS INDEX: 31.58 KG/M2 | HEIGHT: 64 IN

## 2025-02-05 DIAGNOSIS — R29.898 WEAKNESS OF BOTH HANDS: ICD-10-CM

## 2025-02-05 DIAGNOSIS — G56.21 CUBITAL TUNNEL SYNDROME ON RIGHT: ICD-10-CM

## 2025-02-05 DIAGNOSIS — M65.321 TRIGGER FINGER, RIGHT INDEX FINGER: ICD-10-CM

## 2025-02-05 DIAGNOSIS — E11.3293 TYPE 2 DIABETES MELLITUS WITH BOTH EYES AFFECTED BY MILD NONPROLIFERATIVE RETINOPATHY WITHOUT MACULAR EDEMA, WITHOUT LONG-TERM CURRENT USE OF INSULIN (HCC): ICD-10-CM

## 2025-02-05 DIAGNOSIS — Z01.89 ENCOUNTER FOR UPPER EXTREMITY COMPARISON IMAGING STUDY: ICD-10-CM

## 2025-02-05 DIAGNOSIS — M72.0 DUPUYTREN'S CONTRACTURE OF BOTH HANDS: ICD-10-CM

## 2025-02-05 DIAGNOSIS — M18.11 ARTHRITIS OF CARPOMETACARPAL (CMC) JOINT OF RIGHT THUMB: ICD-10-CM

## 2025-02-05 DIAGNOSIS — M79.641 BILATERAL HAND PAIN: Primary | ICD-10-CM

## 2025-02-05 DIAGNOSIS — M79.641 BILATERAL HAND PAIN: ICD-10-CM

## 2025-02-05 DIAGNOSIS — M65.311 TRIGGER FINGER OF RIGHT THUMB: ICD-10-CM

## 2025-02-05 DIAGNOSIS — M79.642 BILATERAL HAND PAIN: ICD-10-CM

## 2025-02-05 DIAGNOSIS — R25.2 CRAMPING OF HANDS: ICD-10-CM

## 2025-02-05 DIAGNOSIS — M79.642 BILATERAL HAND PAIN: Primary | ICD-10-CM

## 2025-02-05 PROCEDURE — 73130 X-RAY EXAM OF HAND: CPT

## 2025-02-05 PROCEDURE — 99204 OFFICE O/P NEW MOD 45 MIN: CPT | Performed by: SURGERY

## 2025-02-05 NOTE — PATIENT INSTRUCTIONS
Recommendation for arthritis:     1) heat in the morning and evening for 20 mins  2) Topical agent over the base of the thumb   3) Use of thumb comfort cool    US of the right elbow to evaluate for cubital tunnel syndrome with the left being comparison

## 2025-02-05 NOTE — ASSESSMENT & PLAN NOTE
Discussed with the patient that her type 2 diabetes although controlled at this point in time, she may be experiencing the start of peripheral neuropathy diabetes.      Lab Results   Component Value Date    HGBA1C 7.7 (H) 12/16/2024

## 2025-02-05 NOTE — TELEPHONE ENCOUNTER
"Reason for Conversation: pt calling to reschedule her cancelled appointment for tomorrow morning due to weather conditions.  PT states she really needs to be seen because she has been dealing with ongoing chest pains since her open heart surgery in June 2023.    Pt rescheduled for tomorrow at 11:40 with Dr. Garcia       Reason for Disposition   Requesting regular office appointment and adult stable (no new symptoms, not getting worse)    Answer Assessment - Initial Assessment Questions  1. REASON FOR CALL: \"What is the main reason for your call?\" or \"How can I best help you?\"      Pt's 8am appointment had been cancelled due to inclement weather, pt calling to see when she could reschedule as she had been looking forward to this appointment for some time  2. SYMPTOMS : \"Do you have any symptoms?\"       Denies    Protocols used: Information Only Call - No Triage-Adult-[place]    "

## 2025-02-05 NOTE — PROGRESS NOTES
Anshu Rios M.D.  Attending, Orthopaedic Surgery  Hand, Wrist, and Elbow Surgery  [facility]      ORTHOPAEDIC HAND, WRIST, AND ELBOW OFFICE  VISIT       ASSESSMENT/PLAN:        Assessment & Plan  Bilateral hand pain    Orders:    Ambulatory Referral to Orthopedic Surgery    XR hand 3+ vw right; Future    XR hand 3+ vw left; Future    Cubital tunnel syndrome on right  Patient has right hand and has a history of cubital tunnel noted on EMG in the past, but did not surgically have it released with the carpal tunnel.  Explained that with compression of the ulnar nerve, patients can experience cramping in the hand.  Recommended to start occupational therapy to work on intrinsic strengthening which can decrease her hand cramping.  Incorporate ulnar nerve glides.  Orders:    US Cubital Tunnel; Future    Ambulatory Referral to PT/OT Hand Therapy; Future    Encounter for upper extremity comparison imaging study    Orders:    US Cubital Tunnel; Future    Type 2 diabetes mellitus with both eyes affected by mild nonproliferative retinopathy without macular edema, without long-term current use of insulin ([facility])  Discussed with the patient that her type 2 diabetes although controlled at this point in time, she may be experiencing the start of peripheral neuropathy diabetes.      Lab Results   Component Value Date    HGBA1C 7.7 (H) 12/16/2024            Weakness of both hands  Refer to Occupational Therapy to work on strength  Orders:    Ambulatory Referral to PT/OT Hand Therapy; Future    Cramping of hands  Refer to Occupational Therapy to work on strength  Orders:    Ambulatory Referral to PT/OT Hand Therapy; Future    Trigger finger of right thumb  The anatomy and physiology of trigger finger was discussed with the patient today in the office.  Edema and increased contact pressure within the flexor tendons at the A1 pulley can cause pain, crepitation, and limitation of function.  Treatment options  include resting MP blocking splints to decrease edema, oral anti-inflammatory medications, home or formal therapy exercises, up to 2 steroid injections within the tendon sheath, or surgical release.  While majority of patients do respond to conservative treatment, up to 20% may require surgical release.         Trigger finger, right index finger  Patient does not feel she needs an injection at this time       Arthritis of carpometacarpal (CMC) joint of right thumb  Recommendation for arthritis; heat in the morning and evening for 20 mins, Topical agent over the base of the thumb, use of thumb comfort cool.  Localized injection into the thumb CMC.  Patient wished to hold on an injection today.       Dupuytren's contracture of both hands  Negative tabletop test, no contracture of the right ring finger or left thumb         Patient shows understanding and agrees with this plan of care.  Will follow-up with the patient after her ultrasound of the right elbow.    The patient verbalized understanding of exam findings and treatment plan. We engaged in the shared decision-making process and treatment options were discussed at length with the patient. Surgical and conservative management discussed today along with risks and benefits.    Diagnoses and all orders for this visit:    Bilateral hand pain  -     Ambulatory Referral to Orthopedic Surgery  -     XR hand 3+ vw right; Future  -     XR hand 3+ vw left; Future    Cubital tunnel syndrome on right  -     US Cubital Tunnel; Future  -     Ambulatory Referral to PT/OT Hand Therapy; Future    Encounter for upper extremity comparison imaging study  -     US Cubital Tunnel; Future    Type 2 diabetes mellitus with both eyes affected by mild nonproliferative retinopathy without macular edema, without long-term current use of insulin (HCC)    Weakness of both hands  -     Ambulatory Referral to PT/OT Hand Therapy; Future    Cramping of hands  -     Ambulatory Referral to PT/OT  Hand Therapy; Future    Trigger finger of right thumb    Trigger finger, right index finger    Arthritis of carpometacarpal (CMC) joint of right thumb    Dupuytren's contracture of both hands        Follow Up:  Return for Follow up after Diagnostic testing.    To Do Next Visit:  Re-evaluation of current issue      General Discussions:  Carpal Tunnel Syndrome: The anatomy and physiology of carpal tunnel syndrome was discussed with the patient today.  Increase pressure localized under the transverse carpal ligament can cause pain, numbness, tingling, or dysesthesias within the median nerve distribution as well as feelings of fatigue, clumsiness, or awkwardness.  These symptoms typically occur at night and worse in the morning upon waking.  Eventually, untreated carpal tunnel syndrome can result in weakness and permanent loss of muscle within the thenar compartment of the hand.  Treatment options were discussed with the patient.  Conservative treatment includes nocturnal resting splints to keep the nerve in a neutral position, ergonomic changes within the work or home environment, activity modification, and tendon gliding exercises. Vitamin B6 one tablet daily over the counter may helpful to reduce symptoms.   Steroid injections within the carpal canal can help a majority of patients, however this is often self-limited in a majority of patients.  Surgical intervention to divide the transverse carpal ligament typically results in a long-lasting relief of the patient's complaints, with the recurrence rate of less than 1%.                                                                                                                                                                                 Cubital Tunnel Syndrome: The anatomy and physiology of cubital tunnel syndrome were discussed with the patient today in the office.  Typically, increased elbow flexion activities decrease blood flow within the intraneural spaces,  resulting in a feeling of numbness, tingling, weakness, or clumsiness within the hand and fingers.  Occasionally, anatomic structures such as medial elbow osteophytes, the medial head of the triceps, were subluxing ulnar nerve may result in increased pressure or aggravation at the cubital tunnel.  Typical signs and symptoms usually include numbness and tingling within the ring and small finger, weakness with , and weakness with pinch.  Conservative treatment and includes nocturnal bracing to keep the elbow in a semi-extended position, activity modification, therapy, and avoiding excessive elbow flexion activities.  Vitamin B6 one tablet daily over the counter may helpful to reduce symptoms.  A majority of patients typically respond to conservative treatment over a period of approximately 3-6 months.  EMG/NCV testing of the ulnar nerve at the elbow is not as reliable as carpal tunnel syndrome.  Surgical intervention in the form of in situ release of the ulnar nerve at the elbow or ulnar nerve transposition may be required in up to 20% of patients.   Diabetes:   The risks of diabetes were discussed with the patient. These risks include increased risk of infection, delayed wound healing, increased swelling, increased stiffness, and increased scar formation.  While these risks are generally increased in all diabetics, keeping one's blood sugar under strict control can help decrease problems after surgery. If blood sugar levels are too high, or hemoglobin A1c levels are too high, surgery may be delayed or canceled.      ____________________________________________________________________________________________________________________________________________      CHIEF COMPLAINT:  No chief complaint on file.      SUBJECTIVE:  Jacquie Viveros is a 65 y.o. year old RHD female who presents today for a consultation for bilateral hand pain referred by her PCP, Dr. Charito Pereira. She reports stabbing sensation in the  tips of the fingers in the index, long and ring fingers. She has pain at the A1 pulley of the thumb and index finger right. She does have swelling in the hands, usually in the morning.   She has a hx of a bilateral carpal tunnel syndrome, 10 years ago. She states that she had ulnar findings but never got it fixed.   Patient is type II diabetic last hemoglobin A1c of 7.7 on 2024.      Pain/symptom timing:  Worse during the day when active  Pain/symptom context:  Worse with activites and work  Pain/symptom modifying factors:  Rest makes better, activities make worse  Pain/symptom associated signs/symptoms: none    Prior treatment   NSAIDsNo   Injections No   Bracing/Orthotics Yes    Physical Therapy No     I have personally reviewed all the relevant PMH, PSH, SH, FH, Medications and allergies      PAST MEDICAL HISTORY:  Past Medical History:   Diagnosis Date    Arthritis     Chronic diastolic CHF (congestive heart failure) (HCC) 2024    Chronic kidney disease     CTS (carpal tunnel syndrome)     had surgery    Diabetes mellitus (HCC)     Fibroid     dr gutierrez removed     2 para 2      x2 -F, -F    Hyperlipidemia        PAST SURGICAL HISTORY:  Past Surgical History:   Procedure Laterality Date    CARDIAC CATHETERIZATION  2023    Procedure: Cardiac catheterization;  Surgeon: Hilary Mondragon DO;  Location: [facility];  Service: Cardiology    CARDIAC CATHETERIZATION N/A 2023    Procedure: Cardiac Coronary Angiogram;  Surgeon: Hilary Mondragon DO;  Location: [facility];  Service: Cardiology    CARDIAC CATHETERIZATION N/A 2023    Procedure: Cardiac other - DFR for Hemodaynamic Assessment;  Surgeon: Hilary Mondragon DO;  Location: [facility];  Service: Cardiology    CARDIAC SURGERY      HYSTERECTOMY      MYOMECTOMY      dr gutierrez removed    TOTAL ABDOMINAL HYSTERECTOMY      2010       FAMILY HISTORY:  Family History   Adopted: Yes   Family history  unknown: Yes       SOCIAL HISTORY:  Social History     Tobacco Use    Smoking status: Never     Passive exposure: Never    Smokeless tobacco: Never   Vaping Use    Vaping status: Never Used   Substance Use Topics    Alcohol use: Never    Drug use: No       MEDICATIONS:    Current Outpatient Medications:     albuterol (Ventolin HFA) 90 mcg/act inhaler, Inhale 2 puffs every 6 (six) hours as needed for wheezing, Disp: 18 g, Rfl: 5    amoxicillin (AMOXIL) 500 MG tablet, Take 500 mg by mouth 2 (two) times a day predental, Disp: , Rfl:     Aspirin Low Dose 81 MG chewable tablet, , Disp: , Rfl:     bumetanide (BUMEX) 0.5 MG tablet, Take 1 tablet (0.5 mg total) by mouth daily, Disp: 90 tablet, Rfl: 3    Continuous Glucose Sensor (FreeStyle Nataly 3 Sensor) MISC, APPLY 1 SENSOR EVERY 14 DAYS, Disp: 2 each, Rfl: 5    desvenlafaxine succinate (PRISTIQ) 50 mg 24 hr tablet, Take 1 tablet (50 mg total) by mouth daily, Disp: 30 tablet, Rfl: 3    diazepam (VALIUM) 5 mg tablet, take 1 tablet by mouth once a day at bedtime as needed for sleep, Disp: 30 tablet, Rfl: 0    Empagliflozin (Jardiance) 10 MG TABS tablet, Take 1 tablet (10 mg total) by mouth every morning, Disp: 30 tablet, Rfl: 3    gabapentin (NEURONTIN) 100 mg capsule, TAKE ONE CAPSULE BY MOUTH THREE TIMES DAILY, Disp: 270 capsule, Rfl: 0    glucose blood (ONE TOUCH ULTRA TEST) test strip, test 6 times a day, Disp: 600 each, Rfl: 0    hydroCHLOROthiazide 50 mg tablet, Take 1 tablet (50 mg total) by mouth daily, Disp: 90 tablet, Rfl: 3    HYDROcodone-acetaminophen (Norco) 5-325 mg per tablet, Take 1 tablet by mouth every 6 (six) hours as needed for pain Max Daily Amount: 4 tablets, Disp: 60 tablet, Rfl: 0    hydrOXYzine HCL (ATARAX) 25 mg tablet, TAKE 1 TABLET BY MOUTH AT BEDTIME, Disp: 90 tablet, Rfl: 1    insulin isophane-insulin regular (NovoLIN 70/30 FlexPen) 100 units/mL injection pen, Inject under the skin, Disp: , Rfl:     ketoconazole (NIZORAL) 2 % shampoo, Apply to  affected area ON SCALP every other day FOR 5 MINUTES THEN RINSE, Disp: 120 mL, Rfl: 0    lisinopril (ZESTRIL) 5 mg tablet, Take 1 tablet (5 mg total) by mouth daily, Disp: 90 tablet, Rfl: 3    meloxicam (MOBIC) 15 mg tablet, Take 1 tablet (15 mg total) by mouth daily as needed for moderate pain, Disp: 30 tablet, Rfl: 3    metFORMIN (GLUCOPHAGE-XR) 500 mg 24 hr tablet, TAKE 2 TABLETS BY MOUTH TWICE DAILY, Disp: 360 tablet, Rfl: 1    methocarbamol (ROBAXIN) 500 mg tablet, , Disp: , Rfl:     metoprolol tartrate (LOPRESSOR) 25 mg tablet, Take 1 tablet (25 mg total) by mouth every 12 (twelve) hours, Disp: 180 tablet, Rfl: 3    omeprazole (PriLOSEC) 40 MG capsule, Take 1 capsule (40 mg total) by mouth daily, Disp: 90 capsule, Rfl: 3    oxyCODONE-acetaminophen (Percocet) 5-325 mg per tablet, Take 1 tablet by mouth every 6 (six) hours as needed for moderate pain Max Daily Amount: 4 tablets, Disp: 20 tablet, Rfl: 0    potassium chloride (MICRO-K) 10 MEQ CR capsule, TAKE 2 CAPSULES BY MOUTH DAILY, Disp: 180 capsule, Rfl: 1    rosuvastatin (CRESTOR) 20 MG tablet, Take 1 tablet (20 mg total) by mouth daily, Disp: 90 tablet, Rfl: 3    Tirzepatide (Mounjaro) 5 MG/0.5ML SOAJ, Inject 0.5 mL (5 mg total) under the skin every 7 days, Disp: 2 mL, Rfl: 5    insulin NPH (HumuLIN N,NovoLIN N) 100 Units/mL subcutaneous injection, Inject 30 Units under the skin 2 (two) times a day before meals (Patient not taking: Reported on 1/24/2025), Disp: , Rfl:     NovoLOG FlexPen 100 units/mL injection pen, 8 Units 3 (three) times a day with meals (Patient not taking: Reported on 1/7/2025), Disp: , Rfl:     omega-3-acid ethyl esters (LOVAZA) 1 g capsule, TAKE 2 CAPSULES BY MOUTH TWICE DAILY (Patient not taking: Reported on 1/7/2025), Disp: 360 capsule, Rfl: 1    ALLERGIES:  Allergies   Allergen Reactions    Atorvastatin Other (See Comments)     Reaction Date: 25Apr2011;     Furosemide Other (See Comments)     Reaction Date: 25Apr2011;     Latex  Other (See Comments)     Pt doesn't remember reaction            REVIEW OF SYSTEMS:  Review of Systems   Constitutional:  Negative for chills, fever and unexpected weight change.   HENT:  Negative for hearing loss, nosebleeds and sore throat.    Eyes:  Negative for pain, redness and visual disturbance.   Respiratory:  Negative for cough, shortness of breath and wheezing.    Cardiovascular:  Negative for chest pain, palpitations and leg swelling.   Gastrointestinal:  Negative for abdominal pain and nausea.   Genitourinary:  Negative for dyspareunia, dysuria and frequency.   Skin:  Negative for rash and wound.   Neurological:  Positive for tremors and numbness. Negative for dizziness and headaches.   Psychiatric/Behavioral:  Negative for decreased concentration and suicidal ideas. The patient is not nervous/anxious.        VITALS:  There were no vitals filed for this visit.    LABS:      _____________________________________________________  PHYSICAL EXAMINATION:  General: well developed and well nourished, alert, oriented times 3, and appears comfortable  Psychiatric: Normal  HEENT: Normocephalic, Atraumatic Trachea Midline, No torticollis  Pulmonary: No audible wheezing or respiratory distress   Abdomen/GI: Non tender, non distended   Cardiovascular: No pitting edema, 2+ radial pulse   Skin: No masses, erythema, lacerations, fluctation, ulcerations  Neurovascular: Sensation intact to the Median Nerve, Sensation Intact to the Radial Nerve, Decreased Sensation to  the Ulnar Nerve, Motor Intact to the Median, Ulnar, Radial Nerve, and Pulses Intact  Musculoskeletal: Normal, except as noted in detailed exam and in HPI.      MUSCULOSKELETAL EXAMINATION:  right index finger:  Positive palpable nodule over the A1 pulley.  Positive tenderness to palpation over A1 pulley. Negative catching. Positive clicking.       right thumb finger:  Positive palpable nodule over the A1 pulley.  Positive tenderness to palpation over A1  pulley. Negative catching. Positive clicking.       right CMC Exam:  No adduction contracture  No hyperextension deformity of MCP joint  Positive localized tenderness over radial and dorsal aspect of thumb (CMC joint)  Grind test is Positive for pain and Negative for crepitus  Metacarpal load shift test positive  No triggering or tenderness over the A1 pulley  negative pain with Finkelstein’s maneuver     Dupuytren's cords noted at the right ring finger and left 1st webspace   Negative tabletop test  ___________________________________________________  STUDIES REVIEWED:  I have personally reviewed and interpreted  AP lateral and oblique radiographs of x-rays right hand 3 views   which demonstrate        LABS REVIEWED:    HgA1c:   Lab Results   Component Value Date    HGBA1C 7.7 (H) 12/16/2024     BMP:   Lab Results   Component Value Date    CALCIUM 10.8 (H) 12/16/2024     10/13/2017    K 4.2 12/16/2024    CO2 30 12/16/2024     12/16/2024    BUN 22 12/16/2024    CREATININE 0.79 12/16/2024               PROCEDURES PERFORMED:  Procedures  No Procedures performed today    _____________________________________________________      Scribe Attestation      I,:  Daniela Garcia am acting as a scribe while in the presence of the attending physician.:       I,:  Anshu Rios MD personally performed the services described in this documentation    as scribed in my presence.:

## 2025-02-06 NOTE — PROGRESS NOTES
OT Evaluation     Today's date: 2025  Patient name: Jacquie Viveros  : 1960  MRN: [id number]  Referring provider: Anshu Rios MD  Dx:   Encounter Diagnosis     ICD-10-CM    1. Cubital tunnel syndrome on right  G56.21       2. Weakness of both hands  R29.898       3. Cramping of hands  R25.2                      Assessment  Impairments: abnormal coordination, abnormal or restricted ROM, abnormal movement, activity intolerance, impaired physical strength, lacks appropriate home exercise program, pain with function, weight-bearing intolerance, fine motor delay, unable to perform ADL and activity limitations    Assessment details: Pt presents for OT eval on 25 with R cubital tunnel syndrome and weakness/cramping in both of her hands. Subjectively, pt reports that a majority of her pain is located in her b/l thenar eminences, as well as on the dorsal/radial aspects of her thumbs. She reports max pain (~9/10) at worst during inc function with her BUE. She reports having difficulty with a majority of tasks involving her BUE, including holding/grasping objects, opening lids off jars, writing, tying shoes, meal prep and more. Objectively, pt displays min-mod tenderness/inc pain in b/l thenar eminences. She also has a Dupertryn's cord present by her R RF. She displayed min loss of light touch in her R thumb via [place]mond monofilament test. She displayed weakness in her BUE intrinsics during MMTs, and also tested + in Wartenberg's & crossed finger tests, indicating potential ulnar nerve/intrinsic muscle deficits. She displayed slight dec BUE wrist ROM compared to typical ROM averages, as well as min dec L IF ROM. She also displayed limited b/l thumb ROM, and significant dec in b/l  strength. Significant deficits were also found in BUE pinch strength (see objective below). Pt EDU on HEP that includes UNGs/flosses, wrist/digit/thumb AROM & upper trap/wrist/intrinsic stretches. Plan on  introducing intrinsic/thenar strengthening exercises next session. OT recommending 1-2 times week/ 8-10 weeks in order to dec pain & swelling and inc ROM & strength in BUE which attempt to inc pt's overall activity tolerance/performance in affected daily activities and work tasks. Pt understands/agrees with current POC.   Understanding of Dx/Px/POC: good     Prognosis: good    Goals  Short term (within 3-4 visits of IE):  Pt will be indep with recc HEP focusing on digit/thumb/wrist AROM, UNGs/flosses, and upper trap/wrist/intrinsic stretches  Pt will be EDU on appropriate activity modifications, modalities and manual techniques to dec current symptoms of pain and inc overall function.  Pt will display indep in the management and wearing schedule of their custom orthosis if necessary  Pt will display indep in appropriate edema/scar mob techniques   Pt will display tolerance to begin intrinsic strengthening exercises  Long term (by time of d/c):  Pt will report dec pain (<4/10) during affected ADL & IADL tasks compared to IE to improve overall activity tolerance.  Pt will display inc AROM (>5 degree inc) of affected UEs compared to IE in order to inc ability to participate in ADL & IADL tasks   Pt will display inc  & pinch strength of affected UE compared to IE in order to return to full participation in affected ADL & IADL tasks  Pt will display inc of 10 or more points in FOTO score compared to IE.  Pt will report that she is able to maintain grasp on everyday objects for extended period of time (ex. Pen) with BUEs.  Pt will be agreeable to d/c from OT.     Plan  Patient would benefit from: skilled occupational therapy and custom splinting  Planned modality interventions: thermotherapy: hydrocollator packs, ultrasound and thermotherapy: paraffin bath    Planned therapy interventions: manual therapy, massage, joint mobilization, patient/caregiver education, therapeutic exercise, strengthening, stretching, graded  exercise, IASTM, nerve gliding, flexibility, therapeutic activities, graded activity, functional ROM exercises, fine motor coordination training, activity modification, home exercise program, orthotic fitting/training, orthotic management and training, kinesiology taping, IADL retraining and ADL retraining    Frequency: 1-2x week  Therapy duration (weeks): 6-8.  Plan of Care beginning date: 2025  Plan of Care expiration date: 2025  Treatment plan discussed with: patient        Subjective Evaluation    History of Present Illness  Mechanism of injury: Jacquie is a 64 y/o LHD female who presents for an OT eval with R cubital tunnel syndrome and weakness/cramping in both of her hands. Pt has been experiencing symptoms for the past few month, with the biggest symptom being pain in her b/l thenar eminences and dorsal/volar aspects of thumb. She also reports inc swelling in all digits at night and in the morning.      Occupational Profile  ADLs: difficulty holding/grasping objects, opening lids off jars, writing, she reports that she drops objects frequently, min difficulty tying shoes    IADLs: difficulty lifting heavy dishes, gallons of milk with meal prep    School: n/a    Driving: n/a    Sport/Leisure: difficulty holding younger grandchildren    Work: n/a     Quality of life: good    Patient Goals  Patient goals for therapy: increased strength, decreased edema, decreased pain, increased motion and independence with ADLs/IADLs    Pain  Current pain ratin  At best pain rating: 3  At worst pain ratin  Location: BUE thenar eminences; thumb; numbness at night  Quality: needle-like, sharp and throbbing    Treatments  Current treatment: occupational therapy        Objective    Tissue Integrity: Min-mod tenderness/inc pain in b/l thenar eminences; R RF 4th digit inc tightness (Dupertryn's chord)    Sensation (Ten-Test )  Right Hand (thumb to small finger): 10/10, 10/10, 9/10, 10/10, 10/10  Left Hand (thumb to  small finger): 10/10, 10/10, 9/10, 10/10, 10/10    Sensation ([place]-Desmond)  R thumb: 3.61 mm (diminished light touch)  All other digits: 2.83 mm (normal)    Special Tests:   Elbow flexion test (R): -; L: -  Tinel's cubital tunnel (R): -; L: -  Wartenberg's (R): +; L: +  Crossed finger test (R): +; L: +    MMTs  Intrinsics: R: 4/5; L: 4-/5    Edema (circumferential) (cm):    Right Left   Wrist crease 16.3 16.4   Palm 19.3 20   MCPs 19 19.3       AROM     Elbow/Forearm   Right Left   Extension/Flexion WFL WFL   Supination WFL WFL   Pronation WFL WFL     Wrist   Right Left   Flexion 59 55   Extension 46 39   Radial Dev. 10 5   Ulnar Dev. 29 30     Right Hand (ext/flex)   D2 D3 D4 D5   MCP 0/35 0/40 0/35 0/28   PIP 0/81 0/81 0/85 0/85   DIP 0/50 0/60 0/48 0/40   Kapandji Score (Opposition) 6/10        Left Hand (ext/flex)   D2 D3 D4 D5   MCP 0/20 0/15 0/15 0/2   PIP 0/75 0/75 0/75 0/73   DIP 0/55 0/60 0/55 0/60   Kapandji Score (opposition) 7/10          Thumb   Right Left   MP  30 39   IP 51 48   Palmar Abd. 35 45   Radial Abd. 39 48     /Pinch Strength  Dynamometer R UE  L UE comments   Position #2 (lbs) 5.1 6.9     Pinch Meter          Lateral 6 4      3 JAW KITTY 2 2      2-point 1.5  1.5             Precautions: R Cubital tunnel syndrome, b/l hand weakness, cramping of both hands  2 times a week for 6 weeks for intrinsic strengthening, due to patient experiencing cramping in the hands, thenar strengthening and tendon glides.  All modalities as seen fit by therapist, emphasis on home exercise program  Ulnar nerve glides    Manuals  2/11 IE                 edema mobs                  STM/IASTM              digisleeves / compression gloves                                                        Ther Ex      HEP: Wrist/digit/ thumb AROM; UNGs/flosses, upper trap stretch, wrist stretches, intrinsic stretches                 scap squeezes                  Upper trap stretch             Elbow/FA AROM               Wrist stretches             Wrist AROM              TGEs              Digit abd/add              UNGs/flosses             Intrinsic stretches             Thumb AROM / strengthening Next session            Intrinsic stretches             Graded strengthening             Theraputty HEP Next session            Theraband HEP                                                   Ther Activity                   FMC                  Gripper & pegs                Activity modification EDU                                                                                   Neuro Re-Ed                                                               Ortho Fit                  Towel splint                                             Modalities                  heat  5'

## 2025-02-07 NOTE — PROGRESS NOTES
Cardiology Follow Up    Jacquie Viveros  1960 [id number]  [facility] [place]  [address]  [place] [id number] [phone]    1. Primary hypertension  Echo complete w/ contrast if indicated    POCT ECG      2. Mixed hyperlipidemia  Echo complete w/ contrast if indicated    POCT ECG      3. Ascending aorta dilation (HCC)  Echo complete w/ contrast if indicated    POCT ECG      4. S/P AVR  Echo complete w/ contrast if indicated    POCT ECG      5. Coronary artery disease involving native coronary artery of native heart without angina pectoris  Echo complete w/ contrast if indicated    POCT ECG        Interval History: Patient is here for a FU.  Patient had BAV with AVR and ascending aortic replacement 6/2023.  Patient has CAD with cardiac catheterization 5/2023 revealing very small caliber LAD with diffuse proximal to mid disease and large caliber LCX with 90% proximal lesion.  There was a nondominant RCA.  Patient had CABG x 2 with AVR (LIMA to LAD and SVG to OMB #3).  Patient has HTN, HLD, chronic diastolic CHF and DM.  Echocardiogram 12/28/2023 demonstrated LVEF of 65% with mild LVH.  Appropriately functioning bio AVR #21 Inspiris was noted.  Lipid profile 12/16/2024 demonstrated TC of 144 with an HDL of 40 and a calculated LDL of 81.  Patient is on rosuvastatin.  Abdominal ultrasound 11/5/2024 showed no evidence of AAA.  EKG demonstrates NSR with nonspecific T wave changes with no change compared to 9/18/2024.  Patient has intermittent issues with fluid retention.  She is on 2 diuretics.    Patient Active Problem List   Diagnosis   • Type 2 diabetes mellitus with mild nonproliferative retinopathy of both eyes without macular edema (HCC)   • Hyperlipidemia   • Hypertension   • Arthritis   • Diabetic peripheral neuropathy (HCC)   • Bicuspid aortic valve   • Coronary artery disease involving native coronary artery   • S/P  AVR   • Spinal stenosis of lumbar region with neurogenic claudication   • Anterolisthesis of lumbar spine   • Chronic diastolic heart failure (HCC)   • Ascending aorta dilation (HCC)   • Platelets decreased (HCC)   • Hyperparathyroidism (HCC)     Past Medical History:   Diagnosis Date   • Arthritis    • Chronic diastolic CHF (congestive heart failure) (HCC) 2024   • Chronic kidney disease    • CTS (carpal tunnel syndrome)     had surgery   • Diabetes mellitus (HCC)    • Fibroid     dr gutierrez removed   •  2 para 2      x2 -F, -   • Hyperlipidemia      Social History     Socioeconomic History   • Marital status: Single     Spouse name: Not on file   • Number of children: Not on file   • Years of education: Not on file   • Highest education level: Not on file   Occupational History   • Occupation: Retired   Tobacco Use   • Smoking status: Never     Passive exposure: Never   • Smokeless tobacco: Never   Vaping Use   • Vaping status: Never Used   Substance and Sexual Activity   • Alcohol use: Never   • Drug use: No   • Sexual activity: Yes     Partners: Male     Birth control/protection: Male Sterilization     Comment: declines std/hiv testing   Other Topics Concern   • Not on file   Social History Narrative    Personal Protective Equipment Seatbelts     Social Drivers of Health     Financial Resource Strain: Low Risk  (2023)    [facility]    Overall Financial Resource Strain (CARDIA)    • Difficulty of Paying Living Expenses: Not hard at all   Food Insecurity: Patient Declined (10/21/2024)    Hunger Vital Sign    • Worried About Running Out of Food in the Last Year: Patient declined    • Ran Out of Food in the Last Year: Patient declined   Transportation Needs: Patient Declined (10/21/2024)    PRAPARE - Transportation    • Lack of Transportation (Medical): Patient declined    • Lack of Transportation (Non-Medical): Patient  declined   Physical Activity: Not on file   Stress: Not on file   Social Connections: Not on file   Intimate Partner Violence: Not At Risk (6/7/2023)    [facility]    Humiliation, Afraid, Rape, and Kick questionnaire    • Fear of Current or Ex-Partner: No    • Emotionally Abused: No    • Physically Abused: No    • Sexually Abused: No   Housing Stability: Patient Declined (10/21/2024)    Housing Stability Vital Sign    • Unable to Pay for Housing in the Last Year: Patient declined    • Number of Times Moved in the Last Year: Not on file    • Homeless in the Last Year: Patient declined      Family History   Adopted: Yes   Family history unknown: Yes     Past Surgical History:   Procedure Laterality Date   • CARDIAC CATHETERIZATION  5/4/2023    Procedure: Cardiac catheterization;  Surgeon: Hilary Mondragon DO;  Location: [facility];  Service: Cardiology   • CARDIAC CATHETERIZATION N/A 5/4/2023    Procedure: Cardiac Coronary Angiogram;  Surgeon: Hilary Mondragon DO;  Location: [facility];  Service: Cardiology   • CARDIAC CATHETERIZATION N/A 5/4/2023    Procedure: Cardiac other - DFR for Hemodaynamic Assessment;  Surgeon: Hilary Mondragon DO;  Location: [facility];  Service: Cardiology   • CARDIAC SURGERY     • HYSTERECTOMY     • MYOMECTOMY      dr gutierrez removed   • TOTAL ABDOMINAL HYSTERECTOMY      4/2010       Current Outpatient Medications:   •  albuterol (Ventolin HFA) 90 mcg/act inhaler, Inhale 2 puffs every 6 (six) hours as needed for wheezing, Disp: 18 g, Rfl: 5  •  amoxicillin (AMOXIL) 500 MG tablet, Take 500 mg by mouth 2 (two) times a day predental, Disp: , Rfl:   •  Aspirin Low Dose 81 MG chewable tablet, , Disp: , Rfl:   •  bumetanide (BUMEX) 0.5 MG tablet, Take 1 tablet (0.5 mg total) by mouth daily, Disp: 90 tablet, Rfl: 3  •  Continuous Glucose Sensor (FreeStyle Nataly 3 Sensor) MISC, APPLY 1 SENSOR EVERY 14  DAYS, Disp: 2 each, Rfl: 5  •  desvenlafaxine succinate (PRISTIQ) 50 mg 24 hr tablet, Take 1 tablet (50 mg total) by mouth daily, Disp: 30 tablet, Rfl: 3  •  diazepam (VALIUM) 5 mg tablet, take 1 tablet by mouth once a day at bedtime as needed for sleep, Disp: 30 tablet, Rfl: 0  •  Empagliflozin (Jardiance) 10 MG TABS tablet, Take 1 tablet (10 mg total) by mouth every morning, Disp: 30 tablet, Rfl: 3  •  gabapentin (NEURONTIN) 100 mg capsule, TAKE ONE CAPSULE BY MOUTH THREE TIMES DAILY, Disp: 270 capsule, Rfl: 0  •  glucose blood (ONE TOUCH ULTRA TEST) test strip, test 6 times a day, Disp: 600 each, Rfl: 0  •  hydroCHLOROthiazide 50 mg tablet, Take 1 tablet (50 mg total) by mouth daily, Disp: 90 tablet, Rfl: 3  •  HYDROcodone-acetaminophen (Norco) 5-325 mg per tablet, Take 1 tablet by mouth every 6 (six) hours as needed for pain Max Daily Amount: 4 tablets, Disp: 60 tablet, Rfl: 0  •  hydrOXYzine HCL (ATARAX) 25 mg tablet, TAKE 1 TABLET BY MOUTH AT BEDTIME, Disp: 90 tablet, Rfl: 1  •  insulin isophane-insulin regular (NovoLIN 70/30 FlexPen) 100 units/mL injection pen, Inject under the skin, Disp: , Rfl:   •  ketoconazole (NIZORAL) 2 % shampoo, Apply to affected area ON SCALP every other day FOR 5 MINUTES THEN RINSE, Disp: 120 mL, Rfl: 0  •  lisinopril (ZESTRIL) 5 mg tablet, Take 1 tablet (5 mg total) by mouth daily, Disp: 90 tablet, Rfl: 3  •  meloxicam (MOBIC) 15 mg tablet, Take 1 tablet (15 mg total) by mouth daily as needed for moderate pain, Disp: 30 tablet, Rfl: 3  •  metFORMIN (GLUCOPHAGE-XR) 500 mg 24 hr tablet, TAKE 2 TABLETS BY MOUTH TWICE DAILY, Disp: 360 tablet, Rfl: 1  •  methocarbamol (ROBAXIN) 500 mg tablet, , Disp: , Rfl:   •  metoprolol tartrate (LOPRESSOR) 25 mg tablet, Take 1 tablet (25 mg total) by mouth every 12 (twelve) hours, Disp: 180 tablet, Rfl: 3  •  omeprazole (PriLOSEC) 40 MG capsule, Take 1 capsule (40 mg total) by mouth daily, Disp: 90 capsule, Rfl: 3  •  oxyCODONE-acetaminophen  "(Percocet) 5-325 mg per tablet, Take 1 tablet by mouth every 6 (six) hours as needed for moderate pain Max Daily Amount: 4 tablets, Disp: 20 tablet, Rfl: 0  •  potassium chloride (MICRO-K) 10 MEQ CR capsule, TAKE 2 CAPSULES BY MOUTH DAILY, Disp: 180 capsule, Rfl: 1  •  rosuvastatin (CRESTOR) 20 MG tablet, Take 1 tablet (20 mg total) by mouth daily, Disp: 90 tablet, Rfl: 3  •  Tirzepatide (Mounjaro) 5 MG/0.5ML SOAJ, Inject 0.5 mL (5 mg total) under the skin every 7 days, Disp: 2 mL, Rfl: 5  •  insulin NPH (HumuLIN N,NovoLIN N) 100 Units/mL subcutaneous injection, Inject 30 Units under the skin 2 (two) times a day before meals (Patient not taking: Reported on 1/24/2025), Disp: , Rfl:   •  NovoLOG FlexPen 100 units/mL injection pen, 8 Units 3 (three) times a day with meals (Patient not taking: Reported on 1/7/2025), Disp: , Rfl:   •  omega-3-acid ethyl esters (LOVAZA) 1 g capsule, TAKE 2 CAPSULES BY MOUTH TWICE DAILY (Patient not taking: Reported on 1/7/2025), Disp: 360 capsule, Rfl: 1  Allergies   Allergen Reactions   • Atorvastatin Other (See Comments)     Reaction Date: 25Apr2011;    • Furosemide Other (See Comments)     Reaction Date: 25Apr2011;    • Latex Other (See Comments)     Pt doesn't remember reaction        Labs:not applicable  Imaging: No results found.    Review of Systems:  Review of Systems   All other systems reviewed and are negative.      Physical Exam:  /58 (BP Location: Right arm, Patient Position: Sitting, Cuff Size: Standard)   Pulse 71   Ht 5' 4\" (1.626 m)   Wt 83.7 kg (184 lb 9.6 oz)   SpO2 97%   BMI 31.69 kg/m²   Physical Exam  Vitals reviewed.   Constitutional:       Appearance: She is well-developed.   HENT:      Head: Normocephalic and atraumatic.   Eyes:      Conjunctiva/sclera: Conjunctivae normal.      Pupils: Pupils are equal, round, and reactive to light.   Cardiovascular:      Rate and Rhythm: Normal rate.      Heart sounds: Normal heart sounds.   Pulmonary:      Effort: " Pulmonary effort is normal.      Breath sounds: Normal breath sounds.   Musculoskeletal:      Cervical back: Normal range of motion and neck supple.   Skin:     General: Skin is warm and dry.   Neurological:      Mental Status: She is alert and oriented to person, place, and time.         Discussion/Summary:I will continue the patient's current medical regimen.  The patient appears well compensated.  I have asked the patient to call if there is a problem in the interim otherwise I will see the patient in six months time. The patient is due for an echo prior to the next visit to assess LV wall thickness and systolic function.

## 2025-02-10 ENCOUNTER — OFFICE VISIT (OUTPATIENT)
Dept: CARDIOLOGY CLINIC | Facility: CLINIC | Age: 65
End: 2025-02-10
Payer: MEDICARE

## 2025-02-10 VITALS
BODY MASS INDEX: 31.51 KG/M2 | HEART RATE: 71 BPM | OXYGEN SATURATION: 97 % | DIASTOLIC BLOOD PRESSURE: 58 MMHG | HEIGHT: 64 IN | WEIGHT: 184.6 LBS | SYSTOLIC BLOOD PRESSURE: 108 MMHG

## 2025-02-10 DIAGNOSIS — I10 PRIMARY HYPERTENSION: Primary | ICD-10-CM

## 2025-02-10 DIAGNOSIS — I77.810 ASCENDING AORTA DILATION (HCC): ICD-10-CM

## 2025-02-10 DIAGNOSIS — Z95.2 S/P AVR: ICD-10-CM

## 2025-02-10 DIAGNOSIS — E78.2 MIXED HYPERLIPIDEMIA: ICD-10-CM

## 2025-02-10 DIAGNOSIS — I25.10 CORONARY ARTERY DISEASE INVOLVING NATIVE CORONARY ARTERY OF NATIVE HEART WITHOUT ANGINA PECTORIS: ICD-10-CM

## 2025-02-10 PROCEDURE — 93000 ELECTROCARDIOGRAM COMPLETE: CPT | Performed by: INTERNAL MEDICINE

## 2025-02-10 PROCEDURE — 99214 OFFICE O/P EST MOD 30 MIN: CPT | Performed by: INTERNAL MEDICINE

## 2025-02-11 ENCOUNTER — TELEPHONE (OUTPATIENT)
Age: 65
End: 2025-02-11

## 2025-02-11 ENCOUNTER — EVALUATION (OUTPATIENT)
Dept: OCCUPATIONAL THERAPY | Age: 65
End: 2025-02-11
Payer: MEDICARE

## 2025-02-11 DIAGNOSIS — R25.2 CRAMPING OF HANDS: ICD-10-CM

## 2025-02-11 DIAGNOSIS — G56.21 CUBITAL TUNNEL SYNDROME ON RIGHT: Primary | ICD-10-CM

## 2025-02-11 DIAGNOSIS — R29.898 WEAKNESS OF BOTH HANDS: ICD-10-CM

## 2025-02-11 PROCEDURE — 97110 THERAPEUTIC EXERCISES: CPT

## 2025-02-11 PROCEDURE — 97166 OT EVAL MOD COMPLEX 45 MIN: CPT

## 2025-02-11 NOTE — TELEPHONE ENCOUNTER
Pt called just wanting to confirm that she is taking Crestor 20mg not 40mg. She states that she mixed up the mg when talking with Dr. Garcia yesterday. Advised that per active med list it states that she is taking 20mg.    show

## 2025-02-12 ENCOUNTER — TELEPHONE (OUTPATIENT)
Age: 65
End: 2025-02-12

## 2025-02-12 NOTE — TELEPHONE ENCOUNTER
Called an spoke to patient, patient misunderstood that she can get a home program when she goes to OT. Dr. Rios did not order Home OT. This is not something that warrants this.

## 2025-02-12 NOTE — PROGRESS NOTES
"Daily Note     Today's date: 2025  Patient name: Jacquie Viveros  : 1960  MRN: [id number]  Referring provider: Anshu Rios MD  Dx:   Encounter Diagnosis     ICD-10-CM    1. Cubital tunnel syndrome on right  G56.21       2. Weakness of both hands  R29.898       3. Cramping of hands  R25.2                      Subjective: \"***.\"      Objective: See treatment diary below      Assessment: Tolerated treatment well. ***. Patient would benefit from continued OT      Plan: Continue per plan of care.  Progress treatment as tolerated.       Precautions: R Cubital tunnel syndrome, b/l hand weakness, cramping of both hands  2 times a week for 6 weeks for intrinsic strengthening, due to patient experiencing cramping in the hands, thenar strengthening and tendon glides.  All modalities as seen fit by therapist, emphasis on home exercise program  Ulnar nerve glides    Manuals   IE                 edema mobs                  STM/IASTM              digisleeves / compression gloves                                                        Ther Ex      HEP: Wrist/digit/ thumb AROM; UNGs/flosses, upper trap stretch, wrist stretches, intrinsic stretches                 scap squeezes                  Upper trap stretch             Elbow/FA AROM              Wrist stretches             Wrist AROM              TGEs              Digit abd/add              UNGs/flosses             Intrinsic stretches             Thumb AROM / strengthening Next session            Intrinsic stretches             Graded strengthening             Theraputty HEP Next session            Theraband HEP                                                   Ther Activity                   FMC                  Gripper & pegs                Activity modification EDU                                                                                   Neuro Re-Ed                                                               Ortho Fit                "   Towel splint                                             Modalities                  heat  5'

## 2025-02-12 NOTE — TELEPHONE ENCOUNTER
Caller: Patient     Doctor: Dr. Rios    Reason for call: Patient called has questions regarding at home physical therapy, stated she has appointment with physical therapy in office but prefers at home therapy. Please advise.    Call back#: [phone]

## 2025-02-13 ENCOUNTER — APPOINTMENT (OUTPATIENT)
Dept: OCCUPATIONAL THERAPY | Age: 65
End: 2025-02-13
Payer: MEDICARE

## 2025-02-13 DIAGNOSIS — R25.2 CRAMPING OF HANDS: ICD-10-CM

## 2025-02-13 DIAGNOSIS — R29.898 WEAKNESS OF BOTH HANDS: ICD-10-CM

## 2025-02-13 DIAGNOSIS — G56.21 CUBITAL TUNNEL SYNDROME ON RIGHT: Primary | ICD-10-CM

## 2025-02-14 ENCOUNTER — CONSULT (OUTPATIENT)
Dept: PAIN MEDICINE | Facility: CLINIC | Age: 65
End: 2025-02-14
Payer: MEDICARE

## 2025-02-14 ENCOUNTER — TELEPHONE (OUTPATIENT)
Dept: PAIN MEDICINE | Facility: CLINIC | Age: 65
End: 2025-02-14

## 2025-02-14 ENCOUNTER — HOSPITAL ENCOUNTER (OUTPATIENT)
Dept: ULTRASOUND IMAGING | Facility: HOSPITAL | Age: 65
Discharge: HOME | End: 2025-02-14
Attending: SURGERY
Payer: MEDICARE

## 2025-02-14 ENCOUNTER — TELEPHONE (OUTPATIENT)
Age: 65
End: 2025-02-14

## 2025-02-14 VITALS
RESPIRATION RATE: 14 BRPM | SYSTOLIC BLOOD PRESSURE: 132 MMHG | DIASTOLIC BLOOD PRESSURE: 76 MMHG | WEIGHT: 188 LBS | HEIGHT: 64 IN | BODY MASS INDEX: 32.1 KG/M2 | HEART RATE: 80 BPM

## 2025-02-14 DIAGNOSIS — M48.02 CERVICAL SPINAL STENOSIS: ICD-10-CM

## 2025-02-14 DIAGNOSIS — G89.4 CHRONIC PAIN SYNDROME: Primary | ICD-10-CM

## 2025-02-14 DIAGNOSIS — G56.21 CUBITAL TUNNEL SYNDROME ON RIGHT: ICD-10-CM

## 2025-02-14 DIAGNOSIS — Z01.89 ENCOUNTER FOR UPPER EXTREMITY COMPARISON IMAGING STUDY: ICD-10-CM

## 2025-02-14 DIAGNOSIS — M54.12 CERVICAL RADICULOPATHY: ICD-10-CM

## 2025-02-14 DIAGNOSIS — M48.062 SPINAL STENOSIS OF LUMBAR REGION WITH NEUROGENIC CLAUDICATION: ICD-10-CM

## 2025-02-14 DIAGNOSIS — M54.16 LUMBAR RADICULOPATHY: ICD-10-CM

## 2025-02-14 PROCEDURE — G2211 COMPLEX E/M VISIT ADD ON: HCPCS | Performed by: ANESTHESIOLOGY

## 2025-02-14 PROCEDURE — 76882 US LMTD JT/FCL EVL NVASC XTR: CPT

## 2025-02-14 PROCEDURE — 99204 OFFICE O/P NEW MOD 45 MIN: CPT | Performed by: ANESTHESIOLOGY

## 2025-02-14 NOTE — TELEPHONE ENCOUNTER
----- Message from Maxine IGNACIO sent at 2/14/2025  7:48 AM EST -----  Regarding: FQ pt-Glucose monitor  Patient has been scheduled with Dr. Kirby on 2/25/25 for TFESI and 3/11/25 for WILNER, and wears a glucose monitor on their arm.  Patient advised someone from the clinical staff would reach out  with further instructions regarding this.    Thank you,  Javier

## 2025-02-14 NOTE — TELEPHONE ENCOUNTER
S/W patient and advised that she will not be able to wear her GCM on her arm for her 3/11 WILNER, however it is ok to wear on her arm for 2/25 TFESI.  Verbalized understanding.

## 2025-02-14 NOTE — PROGRESS NOTES
Assessment  1. Chronic pain syndrome    2. Lumbar radiculopathy    3. Spinal stenosis of lumbar region with neurogenic claudication    4. Cervical spinal stenosis    5. Cervical radiculopathy        Plan    The patient's symptoms, history/physical are consistent with pain that is multifactorial in origin but secondary to her spinal stenosis in both the lumbar spine and the cervical spine.  At this time, her lumbar symptoms are worse for her so I discussed proceeding with bilateral L3 transforaminal epidural injection to help reduce swelling/inflammation from the stenosis.  She would like to proceed and will be scheduled under fluoroscopic guidance.    I did also discuss proceeding with a cervical epidural steroid injection to help with the pain emanating from the cervical stenosis that is causing her hand symptoms.  She will be scheduled but advised her that if symptoms do improve from the upcoming injection she can cancel.    Complete risks and benefits including bleeding, infection, tissue reaction, nerve injury and allergic reaction were discussed. The approach was demonstrated using models and literature was provided. Verbal and written consent was obtained.    My impressions and treatment recommendations were discussed in detail with the patient who verbalized understanding and had no further questions.  Discharge instructions were provided. I personally saw and examined the patient and I agree with the above discussed plan of care.    Orders Placed This Encounter   Procedures   • FL spine and pain procedure     Standing Status:   Future     Expected Date:   2/14/2025     Expiration Date:   2/14/2029     Reason for Exam::   Bilateral L3 TF YAEL     Anticoagulant hold needed?:   No   • FL spine and pain procedure     Standing Status:   Future     Expected Date:   2/14/2025     Expiration Date:   2/14/2029     Reason for Exam::   WILNER     Anticoagulant hold needed?:   No (Ok to stay on Aspirin)       No orders of  the defined types were placed in this encounter.      History of Present Illness    Jacquie Viveros is a 65 y.o. female referred by Dr. Pereira for chronic lower back and bilateral lower extremity pain has been present for over 2 years.   She also experiences neck pain and bilateral hand pain numbness/weakness.  Symptoms can be severe rated 7-8/10 on numeric rating scale that is felt constantly described to be at times sharp, shooting, cramping, burning, dull, aching, throbbing, pressure-like with numbness that radiates down both legs.  She feels weakness in the legs but does try and do 5-10,000 steps per day.   Pain is aggravated standing, bending, walking.  Treatment history has included use of anti-inflammatories and Tylenol which mildly helps.    I have personally reviewed and/or updated the patient's past medical history, past surgical history, family history, social history, current medications, allergies, and vital signs today.     Review of Systems   Constitutional:  Negative for fever and unexpected weight change.   HENT:  Negative for trouble swallowing.    Eyes:  Negative for visual disturbance.   Respiratory:  Negative for shortness of breath and wheezing.    Cardiovascular:  Negative for chest pain and palpitations.   Gastrointestinal:  Negative for constipation, diarrhea, nausea and vomiting.   Endocrine: Negative for cold intolerance, heat intolerance and polydipsia.   Genitourinary:  Negative for difficulty urinating and frequency.   Musculoskeletal:  Positive for back pain, gait problem and joint swelling. Negative for arthralgias and myalgias.        BLE Pain   Skin:  Negative for rash.   Neurological:  Negative for dizziness, seizures, syncope, weakness and headaches.   Hematological:  Does not bruise/bleed easily.   Psychiatric/Behavioral:  Negative for dysphoric mood.    All other systems reviewed and are negative.      Patient Active Problem List   Diagnosis   • Type 2 diabetes mellitus with  mild nonproliferative retinopathy of both eyes without macular edema ([facility])   • Hyperlipidemia   • Hypertension   • Arthritis   • Diabetic peripheral neuropathy (HCC)   • Bicuspid aortic valve   • Coronary artery disease involving native coronary artery   • S/P AVR   • Spinal stenosis of lumbar region with neurogenic claudication   • Anterolisthesis of lumbar spine   • Chronic diastolic heart failure (HCC)   • Ascending aorta dilation ([facility])   • Platelets decreased ([facility])   • Hyperparathyroidism ([facility])       Past Medical History:   Diagnosis Date   • Arthritis    • Chronic diastolic CHF (congestive heart failure) ([facility]) 2024   • Chronic kidney disease    • CTS (carpal tunnel syndrome)     had surgery   • Diabetes mellitus ([facility])    • Fibroid     dr gutierrez removed   •  2 para 2      x2 -, -   • Hyperlipidemia        Past Surgical History:   Procedure Laterality Date   • CARDIAC CATHETERIZATION  2023    Procedure: Cardiac catheterization;  Surgeon: Hilary Mondragon DO;  Location: [facility];  Service: Cardiology   • CARDIAC CATHETERIZATION N/A 2023    Procedure: Cardiac Coronary Angiogram;  Surgeon: Hilary Mondragon DO;  Location: [facility];  Service: Cardiology   • CARDIAC CATHETERIZATION N/A 2023    Procedure: Cardiac other - DFR for Hemodaynamic Assessment;  Surgeon: Hilary Mondragon DO;  Location: [facility];  Service: Cardiology   • CARDIAC SURGERY     • HYSTERECTOMY     • MYOMECTOMY      dr gutierrez removed   • TOTAL ABDOMINAL HYSTERECTOMY      2010       Family History   Adopted: Yes   Family history unknown: Yes       Social History     Occupational History   • Occupation: Retired   Tobacco Use   • Smoking status: Never     Passive exposure: Never   • Smokeless tobacco: Never   Vaping Use   • Vaping status: Never Used   Substance and Sexual Activity   • Alcohol use: Never   • Drug use: No   • Sexual activity: Yes     Partners: Male     Birth  control/protection: Male Sterilization     Comment: declines std/hiv testing       Current Outpatient Medications on File Prior to Visit   Medication Sig   • albuterol (Ventolin HFA) 90 mcg/act inhaler Inhale 2 puffs every 6 (six) hours as needed for wheezing   • amoxicillin (AMOXIL) 500 MG tablet Take 500 mg by mouth 2 (two) times a day predental   • Aspirin Low Dose 81 MG chewable tablet    • bumetanide (BUMEX) 0.5 MG tablet Take 1 tablet (0.5 mg total) by mouth daily   • Continuous Glucose Sensor (FreeStyle Nataly 3 Sensor) MISC APPLY 1 SENSOR EVERY 14 DAYS   • desvenlafaxine succinate (PRISTIQ) 50 mg 24 hr tablet Take 1 tablet (50 mg total) by mouth daily   • diazepam (VALIUM) 5 mg tablet take 1 tablet by mouth once a day at bedtime as needed for sleep   • Empagliflozin (Jardiance) 10 MG TABS tablet Take 1 tablet (10 mg total) by mouth every morning   • gabapentin (NEURONTIN) 100 mg capsule TAKE ONE CAPSULE BY MOUTH THREE TIMES DAILY   • glucose blood (ONE TOUCH ULTRA TEST) test strip test 6 times a day   • hydroCHLOROthiazide 50 mg tablet Take 1 tablet (50 mg total) by mouth daily   • HYDROcodone-acetaminophen (Norco) 5-325 mg per tablet Take 1 tablet by mouth every 6 (six) hours as needed for pain Max Daily Amount: 4 tablets   • hydrOXYzine HCL (ATARAX) 25 mg tablet TAKE 1 TABLET BY MOUTH AT BEDTIME   • insulin isophane-insulin regular (NovoLIN 70/30 FlexPen) 100 units/mL injection pen Inject under the skin   • ketoconazole (NIZORAL) 2 % shampoo Apply to affected area ON SCALP every other day FOR 5 MINUTES THEN RINSE   • lisinopril (ZESTRIL) 5 mg tablet Take 1 tablet (5 mg total) by mouth daily   • meloxicam (MOBIC) 15 mg tablet Take 1 tablet (15 mg total) by mouth daily as needed for moderate pain   • metFORMIN (GLUCOPHAGE-XR) 500 mg 24 hr tablet TAKE 2 TABLETS BY MOUTH TWICE DAILY   • methocarbamol (ROBAXIN) 500 mg tablet    • metoprolol tartrate (LOPRESSOR) 25 mg tablet Take 1 tablet (25 mg total) by mouth  "every 12 (twelve) hours   • omeprazole (PriLOSEC) 40 MG capsule Take 1 capsule (40 mg total) by mouth daily   • oxyCODONE-acetaminophen (Percocet) 5-325 mg per tablet Take 1 tablet by mouth every 6 (six) hours as needed for moderate pain Max Daily Amount: 4 tablets   • potassium chloride (MICRO-K) 10 MEQ CR capsule TAKE 2 CAPSULES BY MOUTH DAILY   • rosuvastatin (CRESTOR) 20 MG tablet Take 1 tablet (20 mg total) by mouth daily   • Tirzepatide (Mounjaro) 5 MG/0.5ML SOAJ Inject 0.5 mL (5 mg total) under the skin every 7 days   • insulin NPH (HumuLIN N,NovoLIN N) 100 Units/mL subcutaneous injection Inject 30 Units under the skin 2 (two) times a day before meals (Patient not taking: Reported on 1/24/2025)   • NovoLOG FlexPen 100 units/mL injection pen 8 Units 3 (three) times a day with meals (Patient not taking: Reported on 1/7/2025)   • omega-3-acid ethyl esters (LOVAZA) 1 g capsule TAKE 2 CAPSULES BY MOUTH TWICE DAILY (Patient not taking: Reported on 1/7/2025)     No current facility-administered medications on file prior to visit.       Allergies   Allergen Reactions   • Atorvastatin Other (See Comments)     Reaction Date: 25Apr2011;    • Furosemide Other (See Comments)     Reaction Date: 25Apr2011;    • Latex Other (See Comments)     Pt doesn't remember reaction        Physical Exam    /76   Pulse 80   Resp 14   Ht 5' 4\" (1.626 m)   Wt 85.3 kg (188 lb)   BMI 32.27 kg/m²     Constitutional: normal, well developed, well nourished, alert, in no distress and non-toxic and no overt pain behavior.  Eyes: anicteric  HEENT: grossly intact  Neck: supple, symmetric, trachea midline and no masses   Pulmonary:even and unlabored  Cardiovascular:No edema or pitting edema present  Skin:Normal without rashes or lesions and well hydrated  Psychiatric:Mood and affect appropriate  Neurologic:Cranial Nerves II-XII grossly intact  Musculoskeletal:normal    Cervical Spine Exam  Appearance:  Normal " lordosis  Palpation/Tenderness:  left cervical paraspinal tenderness  right cervical paraspinal tenderness  Range of Motion:  Flexion:  Minimally limited  with pain  Extension:  Minimally limited  with pain  Rotation - Left:  Minimally limited  with pain  Rotation - Right:  Minimally limited  with pain  Motor Strength:  Left Arm Flexion  4/5  Left Arm Extension  4/5  Right Arm Flexion  4/5  Right Arm Extension  4/5  Left Wrist Flexion  5/5  Left Wrist Extension  5/5  Left Finger Abduction  5/5  Right Finger Abduction  5/5  Left Pincer Grasp  5/5  Right Pincer Grasp  5/5  Right Wrist Flexion 5/5  Right Wrist Extension 5/5    Lumbar Spine Exam  Appearance:  Normal lordosis  Palpation/Tenderness:  left lumbar paraspinal tenderness  right lumbar paraspinal tenderness  Range of Motion:  Flexion:  Minimally limited  with pain  Extension:  Moderately limited  with pain  Motor Strength:  Left hip flexion:  5/5  Left hip extension:  5/5  Right hip flexion:  4/5  Right hip extension:  5/5  Left knee flexion:  5/5  Left knee extension:  5/5  Right knee flexion:  5/5  Right knee extension:  4/5  Left foot dorsiflexion:  5/5  Left foot plantar flexion:  5/5  Right foot dorsiflexion:  4/5  Right foot plantar flexion:  5/5    Imaging    MRI LUMBAR SPINE WITHOUT CONTRAST (4/18/2023)     INDICATION: M54.16: Radiculopathy, lumbar region.     COMPARISON:  Lumbar radiographs 3/15/2022     TECHNIQUE:  Multiplanar, multisequence imaging of the lumbar spine was performed. .          IMAGE QUALITY:  Diagnostic     FINDINGS:     VERTEBRAL BODIES:  There are 5 lumbar type vertebral bodies.  Stable grade 1 degenerative anterolisthesis is noted at the L4-5 level .No compression fracture.    Normal marrow signal is identified within the visualized bony structures.  No discrete   marrow lesion.     SACRUM:  Normal signal within the sacrum. No evidence of insufficiency or stress fracture.     DISTAL CORD AND CONUS:  Normal size and signal within  the distal cord and conus.     PARASPINAL SOFT TISSUES:  Paraspinal soft tissues are unremarkable.     LOWER THORACIC DISC SPACES:  Normal disc height and signal.  No disc herniation, canal stenosis or foraminal narrowing.     LUMBAR DISC SPACES:     L1-2: Disc bulge without central canal or neural foraminal narrowing.       L2-3: No focal disc herniation.  Bilateral ligamentum flavum and facet hypertrophy.  No central canal or  subarticular/lateral recess narrowing.  No neural foraminal stenosis.     L3-4: Moderate diffuse disc bulge is noted extending into the foraminal regions.  Bilateral ligamentum flavum and facet hypertrophy.  Moderate to severe central canal and bilateral subarticular/lateral recess narrowing.  Mild bilateral neural foraminal   stenosis.     L4-5: Grade 1 degenerative anterolisthesis with secondary disc bulge.  Bilateral ligamentum flavum and facet hypertrophy.  Moderate to severe central canal and bilateral subarticular/lateral recess narrowing.  Mild bilateral neural foraminal stenosis.     L5-S1: Mild diffuse disc bulge is noted.  No central canal or  subarticular/lateral recess narrowing.  Mild bilateral neural foraminal stenosis.     OTHER FINDINGS:  None.     IMPRESSION:     1. Lumbar vertebral body heights are maintained demonstrating no acute fracture or subluxation.  Grade 1 degenerative anterolisthesis is noted at the L4-5 level.     2. Multilevel lumbar degenerative disc disease with moderate to severe central canal stenosis at the L3-4 and L4-5 levels.           Workstation performed: [id number]

## 2025-02-14 NOTE — TELEPHONE ENCOUNTER
Pt called asking if she may take pain medications from a cardiac perspective?  She is taking gabapentin, methocarbamol, meloxicam. She stated Spine and Pain and PCP said its ok, but wanted to check with cardiology

## 2025-02-18 NOTE — PROGRESS NOTES
"Daily Note     Today's date: 2025  Patient name: Jacquie Viveros  : 1960  MRN: [id number]  Referring provider: Anshu Rios MD  Dx:   Encounter Diagnosis     ICD-10-CM    1. Cubital tunnel syndrome on right  G56.21       2. Weakness of both hands  R29.898       3. Cramping of hands  R25.2                      Subjective: \"***.\"      Objective: See treatment diary below      Assessment: Tolerated treatment well. ***. Patient would benefit from continued OT      Plan: Continue per plan of care.  Progress treatment as tolerated.       Precautions: R Cubital tunnel syndrome, b/l hand weakness, cramping of both hands  2 times a week for 6 weeks for intrinsic strengthening, due to patient experiencing cramping in the hands, thenar strengthening and tendon glides.  All modalities as seen fit by therapist, emphasis on home exercise program  Ulnar nerve glides    Manuals   IE                 edema mobs                  STM/IASTM              digisleeves / compression gloves                                                        Ther Ex      HEP: Wrist/digit/ thumb AROM; UNGs/flosses, upper trap stretch, wrist stretches, intrinsic stretches                 scap squeezes                  Upper trap stretch             Elbow/FA AROM              Wrist stretches             Wrist AROM              TGEs              Digit abd/add              UNGs/flosses             Intrinsic stretches             Thumb AROM / strengthening Next session            Intrinsic stretches             Graded strengthening             Theraputty HEP Next session            Theraband HEP                                                   Ther Activity                   FMC                  Gripper & pegs                Activity modification EDU                                                                                   Neuro Re-Ed                                                               Ortho Fit                "   Towel splint                                             Modalities                  heat  5'

## 2025-02-20 ENCOUNTER — TELEPHONE (OUTPATIENT)
Age: 65
End: 2025-02-20

## 2025-02-20 ENCOUNTER — APPOINTMENT (OUTPATIENT)
Dept: OCCUPATIONAL THERAPY | Age: 65
End: 2025-02-20
Payer: MEDICARE

## 2025-02-20 ENCOUNTER — OFFICE VISIT (OUTPATIENT)
Dept: OBGYN CLINIC | Facility: CLINIC | Age: 65
End: 2025-02-20
Payer: MEDICARE

## 2025-02-20 VITALS — HEIGHT: 64 IN | WEIGHT: 188 LBS | BODY MASS INDEX: 32.1 KG/M2

## 2025-02-20 DIAGNOSIS — G56.03 CARPAL TUNNEL SYNDROME ON BOTH SIDES: ICD-10-CM

## 2025-02-20 DIAGNOSIS — R25.2 CRAMPING OF HANDS: ICD-10-CM

## 2025-02-20 DIAGNOSIS — M19.041 OSTEOARTHRITIS OF FINGERS OF BOTH HANDS: ICD-10-CM

## 2025-02-20 DIAGNOSIS — R29.898 WEAKNESS OF BOTH HANDS: ICD-10-CM

## 2025-02-20 DIAGNOSIS — E11.3293 TYPE 2 DIABETES MELLITUS WITH BOTH EYES AFFECTED BY MILD NONPROLIFERATIVE RETINOPATHY WITHOUT MACULAR EDEMA, WITHOUT LONG-TERM CURRENT USE OF INSULIN (HCC): Primary | ICD-10-CM

## 2025-02-20 DIAGNOSIS — M19.042 OSTEOARTHRITIS OF FINGERS OF BOTH HANDS: ICD-10-CM

## 2025-02-20 DIAGNOSIS — G56.21 CUBITAL TUNNEL SYNDROME ON RIGHT: Primary | ICD-10-CM

## 2025-02-20 DIAGNOSIS — M65.321 TRIGGER FINGER, RIGHT INDEX FINGER: ICD-10-CM

## 2025-02-20 PROCEDURE — 99214 OFFICE O/P EST MOD 30 MIN: CPT | Performed by: SURGERY

## 2025-02-20 NOTE — PATIENT INSTRUCTIONS
Treatments to manage the arthritis is to start heating in the morning and evenings 20mins each.   Use of topical voltaren gel 1% or Diclofenac sodium 1%  Localized injection to a specific painful joint every 3-4 months as needed  OT can help with the motion of the arthritic joints.     Discussed that there can be recurrent carpal tunnel syndrome and updated US of bilateral wrists can be obtained.   She states she had cock up wrist braces at home and advised to wear them at night consistently for 6 weeks to see if the numbness int he thumb, index and long fingers get better.

## 2025-02-20 NOTE — PROGRESS NOTES
"Daily Note     Today's date: 2025  Patient name: Jacquie Viveros  : 1960  MRN: [id number]  Referring provider: Anshu Rios MD  Dx:   Encounter Diagnosis     ICD-10-CM    1. Cubital tunnel syndrome on right  G56.21       2. Weakness of both hands  R29.898       3. Cramping of hands  R25.2           Start Time: 1145  Stop Time: 1235  Total time in clinic (min): 50 minutes    Subjective: \"The exercises have gone pretty well so far.\"      Objective: See treatment diary below      Assessment: Tolerated treatment well. Pt reports that she has cont to have some symptoms of BUE CuTS, and has had pain in her L webspace and in her R index finger by her MCP joint at this time. Pt also displayed inc swelling around bases of BUE index fingers as well as throughout BUE FA. EDU pt on edema mob techniques, as well as STM techniques for her proximal FA and intrinsic muscles. Cont EDU on current HEP, as well as EDU on theraputty HEP to begin intrinsic and thenar strengthening. Pt req min-mod rest breaks to manage fatigue throughout session. Patient would benefit from continued OT      Plan: Continue per plan of care.  Progress treatment as tolerated.       Precautions: R Cubital tunnel syndrome, b/l hand weakness, cramping of both hands  2 times a week for 6 weeks for intrinsic strengthening, due to patient experiencing cramping in the hands, thenar strengthening and tendon glides.  All modalities as seen fit by therapist, emphasis on home exercise program  Ulnar nerve glides    Manuals   IE                 edema mobs    5'              STM/IASTM  10' (BUE cubital tunnel, intrinsics)            digisleeves / compression gloves                                                        Ther Ex      HEP: Wrist/digit/ thumb AROM; UNGs/flosses, upper trap stretch, wrist stretches, intrinsic stretches                 scap squeezes    x15              Upper trap stretch  2x15 secs           Elbow/FA AROM  X15 FA "            Wrist stretches  2x15s BUE           Wrist AROM  X15 flex/ext  X15 rad/uln            TGEs  X10 BUE            Digit abd/add  X15 BUE            UNGs/flosses  X15 BUE           Intrinsic stretches  2x20 secs BUE each digit           Thumb AROM / strengthening Next session                         Graded strengthening             Theraputty HEP Next session Provided Y theraputty HEP           Theraband HEP                                                   Ther Activity                   FMC                  Gripper & pegs                Activity modification EDU                                                                                   Neuro Re-Ed                                                               Ortho Fit                  Towel splint                                             Modalities                  heat  5'  5'

## 2025-02-20 NOTE — ASSESSMENT & PLAN NOTE
The stabbing sensation to the tips of the fingers, could be the start of diabetic neuropathy.   We wouldn't provide an oral steroid to help with the multiple aliments which would raise your glucose levels to high.   Lab Results   Component Value Date    HGBA1C 7.7 (H) 12/16/2024            
Pt unable to state reason for admission/impaired

## 2025-02-20 NOTE — PROGRESS NOTES
ASSESSMENT/PLAN:      Assessment & Plan  Type 2 diabetes mellitus with both eyes affected by mild nonproliferative retinopathy without macular edema, without long-term current use of insulin (HCC)  The stabbing sensation to the tips of the fingers, could be the start of diabetic neuropathy.   We wouldn't provide an oral steroid to help with the multiple aliments which would raise your glucose levels to high.   Lab Results   Component Value Date    HGBA1C 7.7 (H) 12/16/2024            Trigger finger, right index finger  Treatment options of the discussed with the patient that includes a localized cortisone injection to the A1 pulley. Patient declined injection again today.        Osteoarthritis of fingers of both hands  Reviewed xrays of the hands noting multiple joints that are arthritic  Treatments to manage the arthritis is to start heating in the morning and evenings 20mins each.   Use of topical voltaren gel 1% or Diclofenac sodium 1%  Localized injection to a specific painful joint every 3-4 months as needed  OT can help with the motion of the arthritic joints.        Carpal tunnel syndrome on both sides  At this time since she is having numbness into the median nerve distribution today that is more prominent than the ulnar sided symptoms.   Discussed that there can be recurrent carpal tunnel syndrome and updated US of bilateral wrists can be obtained.   She states she had cock up wrist braces at home and advised to wear them at night consistently for 6 weeks to see if the numbness int he thumb, index and long fingers get better.   Orders:    US Carpal Tunnel; Future      The patient verbalized understanding of exam findings and treatment plan. We engaged in the shared decision-making process and treatment options were discussed at length with the patient. Surgical and conservative management discussed today along with risks and benefits.    Diagnoses and all orders for this visit:    Type 2 diabetes mellitus  with both eyes affected by mild nonproliferative retinopathy without macular edema, without long-term current use of insulin (HCC)    Trigger finger, right index finger    Osteoarthritis of fingers of both hands    Carpal tunnel syndrome on both sides  Comments:  Recurrent  Orders:  -     US Carpal Tunnel; Future      Follow Up:  Return for Follow up after Diagnostic testing.      To Do Next Visit:  Re-evaluation of current issue    ____________________________________________________________________________________________________________________________________________      CHIEF COMPLAINT:  Chief Complaint   Patient presents with    Follow-up     MSK review       SUBJECTIVE:  Jacquie Viveros is a 65 y.o. year old RHD female who presents today to review ultrasounds of bilateral elbows due to patient having a history of cubital tunnel syndrome on the right and hand cramping. Today she is having triggering in the right index finger. She is doing a PT guided HEP with about 15 different exercises and is sore after her visits.  She has a hx of a bilateral carpal tunnel syndrome, 10 years ago. She states that she had ulnar findings but never got it fixed.   Patient is type II diabetic last hemoglobin A1c of 7.7 on 2024.     I have personally reviewed all the relevant PMH, PSH, SH, FH, Medications and allergies.     PAST MEDICAL HISTORY:  Past Medical History:   Diagnosis Date    Arthritis     Chronic diastolic CHF (congestive heart failure) (HCC) 2024    Chronic kidney disease     CTS (carpal tunnel syndrome)     had surgery    Diabetes mellitus (HCC)     Fibroid     dr gutierrez removed     2 para 2      x2 -F, -    Hyperlipidemia        PAST SURGICAL HISTORY:  Past Surgical History:   Procedure Laterality Date    CARDIAC CATHETERIZATION  2023    Procedure: Cardiac catheterization;  Surgeon: Hilary Mondragon DO;  Location: [facility];  Service: Cardiology    CARDIAC  CATHETERIZATION N/A 5/4/2023    Procedure: Cardiac Coronary Angiogram;  Surgeon: Hilary Mondragon DO;  Location: [facility];  Service: Cardiology    CARDIAC CATHETERIZATION N/A 5/4/2023    Procedure: Cardiac other - DFR for Hemodaynamic Assessment;  Surgeon: Hilary Mondragon DO;  Location: [facility];  Service: Cardiology    CARDIAC SURGERY      HYSTERECTOMY      MYOMECTOMY      dr gutierrez removed    TOTAL ABDOMINAL HYSTERECTOMY      4/2010       FAMILY HISTORY:  Family History   Adopted: Yes   Family history unknown: Yes       SOCIAL HISTORY:  Social History     Tobacco Use    Smoking status: Never     Passive exposure: Never    Smokeless tobacco: Never   Vaping Use    Vaping status: Never Used   Substance Use Topics    Alcohol use: Never    Drug use: No       MEDICATIONS:    Current Outpatient Medications:     albuterol (Ventolin HFA) 90 mcg/act inhaler, Inhale 2 puffs every 6 (six) hours as needed for wheezing, Disp: 18 g, Rfl: 5    amoxicillin (AMOXIL) 500 MG tablet, Take 500 mg by mouth 2 (two) times a day predental, Disp: , Rfl:     Aspirin Low Dose 81 MG chewable tablet, , Disp: , Rfl:     bumetanide (BUMEX) 0.5 MG tablet, Take 1 tablet (0.5 mg total) by mouth daily, Disp: 90 tablet, Rfl: 3    Continuous Glucose Sensor (FreeStyle Nataly 3 Sensor) MISC, APPLY 1 SENSOR EVERY 14 DAYS, Disp: 2 each, Rfl: 5    desvenlafaxine succinate (PRISTIQ) 50 mg 24 hr tablet, Take 1 tablet (50 mg total) by mouth daily, Disp: 30 tablet, Rfl: 3    diazepam (VALIUM) 5 mg tablet, take 1 tablet by mouth once a day at bedtime as needed for sleep, Disp: 30 tablet, Rfl: 0    Empagliflozin (Jardiance) 10 MG TABS tablet, Take 1 tablet (10 mg total) by mouth every morning, Disp: 30 tablet, Rfl: 3    gabapentin (NEURONTIN) 100 mg capsule, TAKE ONE CAPSULE BY MOUTH THREE TIMES DAILY, Disp: 270 capsule, Rfl: 0    glucose blood (ONE TOUCH ULTRA TEST) test strip, test 6 times a day, Disp: 600 each, Rfl: 0     hydroCHLOROthiazide 50 mg tablet, Take 1 tablet (50 mg total) by mouth daily, Disp: 90 tablet, Rfl: 3    HYDROcodone-acetaminophen (Norco) 5-325 mg per tablet, Take 1 tablet by mouth every 6 (six) hours as needed for pain Max Daily Amount: 4 tablets, Disp: 60 tablet, Rfl: 0    hydrOXYzine HCL (ATARAX) 25 mg tablet, TAKE 1 TABLET BY MOUTH AT BEDTIME, Disp: 90 tablet, Rfl: 1    insulin isophane-insulin regular (NovoLIN 70/30 FlexPen) 100 units/mL injection pen, Inject under the skin, Disp: , Rfl:     ketoconazole (NIZORAL) 2 % shampoo, Apply to affected area ON SCALP every other day FOR 5 MINUTES THEN RINSE, Disp: 120 mL, Rfl: 0    lisinopril (ZESTRIL) 5 mg tablet, Take 1 tablet (5 mg total) by mouth daily, Disp: 90 tablet, Rfl: 3    meloxicam (MOBIC) 15 mg tablet, Take 1 tablet (15 mg total) by mouth daily as needed for moderate pain, Disp: 30 tablet, Rfl: 3    metFORMIN (GLUCOPHAGE-XR) 500 mg 24 hr tablet, TAKE 2 TABLETS BY MOUTH TWICE DAILY, Disp: 360 tablet, Rfl: 1    methocarbamol (ROBAXIN) 500 mg tablet, , Disp: , Rfl:     metoprolol tartrate (LOPRESSOR) 25 mg tablet, Take 1 tablet (25 mg total) by mouth every 12 (twelve) hours, Disp: 180 tablet, Rfl: 3    omeprazole (PriLOSEC) 40 MG capsule, Take 1 capsule (40 mg total) by mouth daily, Disp: 90 capsule, Rfl: 3    oxyCODONE-acetaminophen (Percocet) 5-325 mg per tablet, Take 1 tablet by mouth every 6 (six) hours as needed for moderate pain Max Daily Amount: 4 tablets, Disp: 20 tablet, Rfl: 0    potassium chloride (MICRO-K) 10 MEQ CR capsule, TAKE 2 CAPSULES BY MOUTH DAILY, Disp: 180 capsule, Rfl: 1    rosuvastatin (CRESTOR) 20 MG tablet, Take 1 tablet (20 mg total) by mouth daily, Disp: 90 tablet, Rfl: 3    Tirzepatide (Mounjaro) 5 MG/0.5ML SOAJ, Inject 0.5 mL (5 mg total) under the skin every 7 days, Disp: 2 mL, Rfl: 5    insulin NPH (HumuLIN N,NovoLIN N) 100 Units/mL subcutaneous injection, Inject 30 Units under the skin 2 (two) times a day before meals  (Patient not taking: Reported on 1/24/2025), Disp: , Rfl:     NovoLOG FlexPen 100 units/mL injection pen, 8 Units 3 (three) times a day with meals (Patient not taking: Reported on 1/7/2025), Disp: , Rfl:     omega-3-acid ethyl esters (LOVAZA) 1 g capsule, TAKE 2 CAPSULES BY MOUTH TWICE DAILY (Patient not taking: Reported on 1/7/2025), Disp: 360 capsule, Rfl: 1    ALLERGIES:  Allergies   Allergen Reactions    Atorvastatin Other (See Comments)     Reaction Date: 25Apr2011;     Furosemide Other (See Comments)     Reaction Date: 25Apr2011;     Latex Other (See Comments)     Pt doesn't remember reaction        REVIEW OF SYSTEMS:  Review of Systems   Constitutional:  Negative for chills, fever and unexpected weight change.   HENT:  Negative for hearing loss, nosebleeds and sore throat.    Eyes:  Negative for pain, redness and visual disturbance.   Respiratory:  Negative for cough, shortness of breath and wheezing.    Cardiovascular:  Negative for chest pain, palpitations and leg swelling.   Gastrointestinal:  Negative for abdominal pain and nausea.   Genitourinary:  Negative for dyspareunia, dysuria and frequency.   Skin:  Negative for rash and wound.   Neurological:  Negative for dizziness, numbness and headaches.   Psychiatric/Behavioral:  Negative for decreased concentration and suicidal ideas. The patient is not nervous/anxious.        VITALS:  There were no vitals filed for this visit.      _____________________________________________________  PHYSICAL EXAMINATION:  General: well developed and well nourished, alert, oriented times 3, and appears comfortable  Psychiatric: Normal  HEENT: Normocephalic, Atraumatic Trachea Midline, No torticollis  Pulmonary: No audible wheezing or respiratory distress   Cardiovascular: No pitting edema, 2+ radial pulse   Abdominal/GI: abdomen non tender, non distended   Skin: No masses, erythema, lacerations, fluctation, ulcerations  Neurovascular: Sensation Intact to the Median, Ulnar,  Radial Nerve, Motor Intact to the Median, Ulnar, Radial Nerve, and Pulses Intact  Musculoskeletal: Normal, except as noted in detailed exam and in HPI.      MUSCULOSKELETAL EXAMINATION:    right index finger:  Positive palpable nodule over the A1 pulley.  Positive tenderness to palpation over A1 pulley. Positive catching. Positive clicking.       Right index finger lacking 10 degrees of MP joint flexion     Bilateral Carpal Tunnel Exam:    Negative thenar atrophy. Negative phalen's test. Negative carpal tunnel compression. Positive tinels over median nerve at the wrist.  Opposition strength 5/5.  Abduction strength 5/5.      right CMC Exam:  No adduction contracture  No hyperextension deformity of MCP joint  Positive localized tenderness over radial and dorsal aspect of thumb (CMC joint)  Grind test is Positive for pain and Negative for crepitus  Metacarpal load shift test positive  No triggering or tenderness over the A1 pulley  negative pain with Finkelstein’s maneuver    Dupuytren's cords noted at the right ring finger and left 1st webspace   Negative tabletop test  ___________________________________________________    STUDIES REVIEWED:  I have personally reviewed and interpreted  AP lateral and oblique radiographs of x-rays right hand 3 views  which demonstrate Mild arthritis change in multiple joints.     I have personally reviewed and interpreted  US of Bilateral elbows reviewed from 2/14/2025 which demonstrate :     RIGHT CUBITAL TUNNEL: Enlarged ulnar nerve near/within cubital tunnel suspicious for ulnar neuropathy/cubital tunnel syndrome, as well as ulnar nerve instability on dynamic imaging.   Maximum ulnar nerve cross sectional area within or near the cubital tunnel: 17.7 sq mm.   LEFT CUBITAL TUNNEL: Enlarged ulnar nerve near/within cubital tunnel, suggestive of ulnar neuropathy/cubital tunnel syndrome, as well as ulnar nerve instability on dynamic imaging.   Maximum ulnar nerve cross sectional area  within or near the cubital tunnel: 15.6 sq mm.   LABS REVIEWED:    HgA1c:   Lab Results   Component Value Date    HGBA1C 7.7 (H) 12/16/2024     BMP:   Lab Results   Component Value Date    CALCIUM 10.8 (H) 12/16/2024     10/13/2017    K 4.2 12/16/2024    CO2 30 12/16/2024     12/16/2024    BUN 22 12/16/2024    CREATININE 0.79 12/16/2024           PROCEDURES PERFORMED:  Procedures  No Procedures performed today    _____________________________________________________      Scribe Attestation      I,:  Daniela Garcia am acting as a scribe while in the presence of the attending physician.:       I,:  Anshu Rios MD personally performed the services described in this documentation    as scribed in my presence.:

## 2025-02-25 ENCOUNTER — OFFICE VISIT (OUTPATIENT)
Dept: OCCUPATIONAL THERAPY | Age: 65
End: 2025-02-25
Payer: MEDICARE

## 2025-02-25 DIAGNOSIS — R29.898 WEAKNESS OF BOTH HANDS: ICD-10-CM

## 2025-02-25 DIAGNOSIS — R25.2 CRAMPING OF HANDS: ICD-10-CM

## 2025-02-25 DIAGNOSIS — G56.21 CUBITAL TUNNEL SYNDROME ON RIGHT: Primary | ICD-10-CM

## 2025-02-25 PROCEDURE — 97140 MANUAL THERAPY 1/> REGIONS: CPT

## 2025-02-25 PROCEDURE — 97110 THERAPEUTIC EXERCISES: CPT

## 2025-02-26 ENCOUNTER — HOSPITAL ENCOUNTER (OUTPATIENT)
Dept: ULTRASOUND IMAGING | Facility: HOSPITAL | Age: 65
Discharge: HOME | End: 2025-02-26
Attending: SURGERY
Payer: MEDICARE

## 2025-02-26 DIAGNOSIS — G56.03 CARPAL TUNNEL SYNDROME ON BOTH SIDES: ICD-10-CM

## 2025-02-26 PROCEDURE — 76882 US LMTD JT/FCL EVL NVASC XTR: CPT

## 2025-02-26 NOTE — PROGRESS NOTES
"Daily Note     Today's date: 2025  Patient name: Jacquie Viveros  : 1960  MRN: [id number]  Referring provider: Anshu Rios MD  Dx:   Encounter Diagnosis     ICD-10-CM    1. Cubital tunnel syndrome on right  G56.21       2. Weakness of both hands  R29.898       3. Cramping of hands  R25.2           Start Time: 0800  Stop Time: 0845  Total time in clinic (min): 45 minutes    Subjective: \"My hands have been pretty stiff today.\"      Objective: See treatment diary below      Assessment: Tolerated treatment well. Pt displayed min-mod tenderness in her b/l volar aspect of palm and medial elbows. Pt participated in all exercises well, req min rest breaks req throughout session. Cont EDU on HEP. Provided pt with pink foam to work on intrinsic strengthening. Patient would benefit from continued OT      Plan: Continue per plan of care.  Progress treatment as tolerated.       Precautions: R Cubital tunnel syndrome, b/l hand weakness, cramping of both hands  2 times a week for 6 weeks for intrinsic strengthening, due to patient experiencing cramping in the hands, thenar strengthening and tendon glides.  All modalities as seen fit by therapist, emphasis on home exercise program  Ulnar nerve glides    Manuals   IE               edema mobs    5'              STM/IASTM  10' (BUE cubital tunnel, intrinsics) 15' (BUE cubital tunnel, intrinsics)           digisleeves / compression gloves                                                        Ther Ex      HEP: Wrist/digit/ thumb AROM; UNGs/flosses, upper trap stretch, wrist stretches, intrinsic stretches                 scap squeezes    x15  x15            Upper trap stretch  2x15 secs 2x15 secs          Elbow/FA AROM  X15 FA X15 FA           Wrist stretches  2x15s BUE 2x15s BUE          Wrist AROM  X15 flex/ext  X15 rad/uln X15 flex/ext  X15 rad/uln           TGEs  X10 BUE X10 BUE           Digit abd/add  X15 BUE X15 BUE           UNGs/flosses  " X15 BUE X15 BUE          Intrinsic stretches  2x20 secs BUE each digit 4x20 secs b/l index fingers    2x20 secs other digits          Thumb AROM / strengthening Next session                         Graded strengthening   Pink foam between each set of fingers BUE x10 each          Theraputty HEP Next session Provided Y theraputty HEP           Theraband HEP                                                   Ther Activity                   FMC                  Gripper & pegs                Activity modification EDU                                                                                   Neuro Re-Ed                                                               Ortho Fit                  Towel splint                                             Modalities                  heat  5'  5'  5'

## 2025-02-27 ENCOUNTER — OFFICE VISIT (OUTPATIENT)
Dept: OCCUPATIONAL THERAPY | Age: 65
End: 2025-02-27
Payer: MEDICARE

## 2025-02-27 DIAGNOSIS — R25.2 CRAMPING OF HANDS: ICD-10-CM

## 2025-02-27 DIAGNOSIS — G56.21 CUBITAL TUNNEL SYNDROME ON RIGHT: Primary | ICD-10-CM

## 2025-02-27 DIAGNOSIS — R29.898 WEAKNESS OF BOTH HANDS: ICD-10-CM

## 2025-02-27 PROCEDURE — 97110 THERAPEUTIC EXERCISES: CPT

## 2025-02-27 PROCEDURE — 97140 MANUAL THERAPY 1/> REGIONS: CPT

## 2025-02-27 NOTE — PROGRESS NOTES
"Daily Note     Today's date: 3/4/2025  Patient name: Jacquie Viveros  : 1960  MRN: [id number]  Referring provider: Anshu Rios MD  Dx:   Encounter Diagnosis     ICD-10-CM    1. Cubital tunnel syndrome on right  G56.21       2. Weakness of both hands  R29.898       3. Cramping of hands  R25.2           Start Time: 1132  Stop Time: 1230  Total time in clinic (min): 58 minutes    Subjective: \"It's not too bad right now.\"      Objective: See treatment diary below      Assessment: Tolerated treatment well. Pt just had ultrasound done on b/l wrists, revealing median nerve compression in both wrists. Pt reports that majority of numbness/inc pain symptoms are occurring in b/l index fingers. Encouraged pt to focus on intrinsic stretches and /pinch strengthening with theraputty at home at this time, as well as nerve glides and WILLIAM/MNGs. Pt participated in all exercises fair, req mod rest breaks to manage fatigue/min inc in pain in b/l index fingers during intrinsic stretches. Patient would benefit from continued OT      Plan: Continue per plan of care.  Progress treatment as tolerated.       Precautions: R Cubital tunnel syndrome, b/l hand weakness, cramping of both hands  2 times a week for 6 weeks for intrinsic strengthening, due to patient experiencing cramping in the hands, thenar strengthening and tendon glides.  All modalities as seen fit by therapist, emphasis on home exercise program  Ulnar nerve glides    Manuals   IE    3/4           edema mobs    5'              STM/IASTM  10' (BUE cubital tunnel, intrinsics) 15' (BUE cubital tunnel, intrinsics) 15' (BUE cubital & carpal tunnels, intrinsics)          digisleeves / compression gloves                                                        Ther Ex      HEP: Wrist/digit/ thumb AROM; UNGs/flosses, upper trap stretch, wrist stretches, intrinsic stretches                 scap squeezes    x15  x15  x15          Upper trap stretch  2x15 " secs 2x15 secs 2x15 secs         Elbow/FA AROM  X15 FA X15 FA X15 FA          Wrist stretches  2x15s BUE 2x15s BUE 2x15 secs BUE         Wrist AROM  X15 flex/ext  X15 rad/uln X15 flex/ext  X15 rad/uln X15 flex/ext  X15 rad/uln          TGEs  X10 BUE X10 BUE X10 BUE          Digit abd/add  X15 BUE X15 BUE X10 BUE          UNGs/flosses  X15 BUE X15 BUE X15 BUE         Intrinsic stretches  2x20 secs BUE each digit 4x20 secs b/l index fingers    2x20 secs other digits 4x20 secs b/l index fingers    2x20 secs other digits         Thumb AROM / strengthening Next session                         Graded strengthening   Pink foam between each set of fingers BUE x10 each Pink foam between each set of fingers BUE x10 each    Pinch clips (R,G) 3 jaw manuel x1 lap RUE; (G,G) LUE             Theraputty HEP Next session Provided Y theraputty HEP  Cont EDU         Theraband HEP                                                   Ther Activity                   FMC                  Gripper & pegs                Activity modification EDU                                                                                   Neuro Re-Ed                                                               Ortho Fit                  Towel splint                                             Modalities                  heat  5'  5'  5'  5'

## 2025-03-03 ENCOUNTER — TELEPHONE (OUTPATIENT)
Age: 65
End: 2025-03-03

## 2025-03-03 NOTE — TELEPHONE ENCOUNTER
Caller: pt    Doctor: Dr. longoria    Reason for call: pt needs to cx her procedure for now. She just had open heart surgery.    Call back#: [phone]

## 2025-03-04 ENCOUNTER — OFFICE VISIT (OUTPATIENT)
Dept: OCCUPATIONAL THERAPY | Age: 65
End: 2025-03-04
Payer: MEDICARE

## 2025-03-04 DIAGNOSIS — G56.21 CUBITAL TUNNEL SYNDROME ON RIGHT: Primary | ICD-10-CM

## 2025-03-04 DIAGNOSIS — R29.898 WEAKNESS OF BOTH HANDS: ICD-10-CM

## 2025-03-04 DIAGNOSIS — R25.2 CRAMPING OF HANDS: ICD-10-CM

## 2025-03-04 PROCEDURE — 97110 THERAPEUTIC EXERCISES: CPT

## 2025-03-04 PROCEDURE — 97140 MANUAL THERAPY 1/> REGIONS: CPT

## 2025-03-05 ENCOUNTER — OFFICE VISIT (OUTPATIENT)
Dept: OBGYN CLINIC | Facility: CLINIC | Age: 65
End: 2025-03-05
Payer: MEDICARE

## 2025-03-05 VITALS — HEIGHT: 64 IN | BODY MASS INDEX: 32.1 KG/M2 | WEIGHT: 188 LBS

## 2025-03-05 DIAGNOSIS — M19.042 OSTEOARTHRITIS OF FINGERS OF BOTH HANDS: ICD-10-CM

## 2025-03-05 DIAGNOSIS — G56.03 CARPAL TUNNEL SYNDROME ON BOTH SIDES: ICD-10-CM

## 2025-03-05 DIAGNOSIS — G56.23 CUBITAL TUNNEL SYNDROME OF BOTH UPPER EXTREMITIES: ICD-10-CM

## 2025-03-05 DIAGNOSIS — M65.321 TRIGGER FINGER, RIGHT INDEX FINGER: Primary | ICD-10-CM

## 2025-03-05 DIAGNOSIS — M19.041 OSTEOARTHRITIS OF FINGERS OF BOTH HANDS: ICD-10-CM

## 2025-03-05 PROCEDURE — 99213 OFFICE O/P EST LOW 20 MIN: CPT | Performed by: SURGERY

## 2025-03-05 NOTE — PROGRESS NOTES
"Daily Note     Today's date: 3/11/2025  Patient name: Jacquie Viveros  : 1960  MRN: [id number]  Referring provider: Anshu Rios MD  Dx:   Encounter Diagnosis     ICD-10-CM    1. Cubital tunnel syndrome on right  G56.21       2. Weakness of both hands  R29.898       3. Cramping of hands  R25.2           Start Time: 1145  Stop Time: 1235  Total time in clinic (min): 50 minutes    Subjective: \"It still gets tingly ever.\"      Objective: See treatment diary below      Assessment: Tolerated treatment well. Pt recently saw ortho , who diagnosed pt with R IF TF & osteoarthritis in addition to current diagnoses. EDU pt on new therapeutic approaches to arthritis & TF in addition to CUTs & CTS. Pt participated in all exercises fair, req min rest breaks to manage fatigue and min inc in pain in b/l IF. Encouraged pt to cont nerve glides, stretching, ROM & strengthening exercises regularly. Patient would benefit from continued OT      Plan: Continue per plan of care.  Progress treatment as tolerated.       Precautions: R Cubital tunnel syndrome, b/l hand weakness, cramping of both hands (Osteoarthritis in BUE & R IF Trigger finger)  2 times a week for 6 weeks for intrinsic strengthening, due to patient experiencing cramping in the hands, thenar strengthening and tendon glides.  All modalities as seen fit by therapist, emphasis on home exercise program  Ulnar nerve glides    Manuals   IE  2/25  2/27  3/4  3/11         edema mobs    5'              STM/IASTM  10' (BUE cubital tunnel, intrinsics) 15' (BUE cubital tunnel, intrinsics) 15' (BUE cubital & carpal tunnels, intrinsics) 15' (BUE cubital & carpal tunnel, intrinsics)         digisleeves / compression gloves                                                        Ther Ex      HEP: Wrist/digit/ thumb AROM; UNGs/flosses, upper trap stretch, wrist stretches, intrinsic stretches                 scap squeezes    x15  x15  x15  x15        Upper trap stretch  " 2x15 secs 2x15 secs 2x15 secs 2x15 secs        Elbow/FA AROM  X15 FA X15 FA X15 FA  x15 FA        Wrist stretches  2x15s BUE 2x15s BUE 2x15 secs BUE 2x15 secs BUE        Wrist AROM  X15 flex/ext  X15 rad/uln X15 flex/ext  X15 rad/uln X15 flex/ext  X15 rad/uln  x15 flex/ext  X15 rad/uln        TGEs  X10 BUE X10 BUE X10 BUE  x10 BUE        Digit abd/add  X15 BUE X15 BUE X10 BUE  x10 BUE        UNGs/flosses  X15 BUE X15 BUE X15 BUE X15 BUE        Intrinsic stretches  2x20 secs BUE each digit 4x20 secs b/l index fingers    2x20 secs other digits 4x20 secs b/l index fingers    2x20 secs other digits 4x20 secs b/l index fingers    2x20 secs other digits        Thumb AROM / strengthening Next session                         Graded strengthening   Pink foam between each set of fingers BUE x10 each Pink foam between each set of fingers BUE x10 each    Pinch clips (R,G) 3 jaw manuel x1 lap RUE; (G,G) LUE     Pink foam between each set of fingers BUE x10 each    Pinch clips (G,G) 3 jaw cuck x1 lap RUE; (B,G) LUE        Theraputty HEP Next session Provided Y theraputty HEP  Cont EDU         Theraband HEP                                                   Ther Activity                   FMC                  Gripper & pegs                Activity modification EDU                                                                                   Neuro Re-Ed                                                               Ortho Fit                  Towel splint                                             Modalities                  heat  5'  5'  5'  5'  5'         paraffin          next session

## 2025-03-05 NOTE — PROGRESS NOTES
ASSESSMENT/PLAN:      Assessment & Plan  Trigger finger, right index finger  She will monitor the trigger finger at this time.   Orders:    Ambulatory Referral to PT/OT Hand Therapy; Future    Osteoarthritis of fingers of both hands  We discussed warm moist heat can be beneficial.   We discussed the possibility of localized joint CSI's in the future.   Continue OT.   Orders:    Ambulatory Referral to PT/OT Hand Therapy; Future    Carpal tunnel syndrome on both sides  US results were reviewed.   Numbness and tingling has improved.   Continue OT.   We discussed a revision carpal tunnel release will not improve index finger tightness.   Orders:    Ambulatory Referral to PT/OT Hand Therapy; Future    Cubital tunnel syndrome of both upper extremities  US results were reviewed.   Numbness and tingling has improved.   We discussed a cubital tunnel release will not improve index finger tightness.   Continue OT.   Orders:    Ambulatory Referral to PT/OT Hand Therapy; Future      The patient verbalized understanding of exam findings and treatment plan. We engaged in the shared decision-making process and treatment options were discussed at length with the patient. Surgical and conservative management discussed today along with risks and benefits.    Diagnoses and all orders for this visit:    Trigger finger, right index finger  -     Ambulatory Referral to PT/OT Hand Therapy; Future    Osteoarthritis of fingers of both hands  -     Ambulatory Referral to PT/OT Hand Therapy; Future    Carpal tunnel syndrome on both sides  -     Ambulatory Referral to PT/OT Hand Therapy; Future    Cubital tunnel syndrome of both upper extremities  -     Ambulatory Referral to PT/OT Hand Therapy; Future      Follow Up:  Return if symptoms worsen or fail to improve.      To Do Next Visit:  Re-evaluation of current  issue    ____________________________________________________________________________________________________________________________________________      CHIEF COMPLAINT:  Chief Complaint   Patient presents with    Right Wrist - Follow-up    Left Wrist - Follow-up       SUBJECTIVE:  Jacquie Viveros is a 65 y.o. year old RHD female who presents to the office for a follow up evaluation. She is here today to review US results. She feels numbness and tingling has improved. She notes her thumbs and index fingers bilaterally give her the most issue. She is attending OT with overall improvement. She notes pain to the dorsal aspect of her index fingers as well as tightness. She is not currently bracing at night. She has a hx of a bilateral carpal tunnel syndrome, 10 years ago.        I have personally reviewed all the relevant PMH, PSH, SH, FH, Medications and allergies.     PAST MEDICAL HISTORY:  Past Medical History:   Diagnosis Date    Arthritis     Chronic diastolic CHF (congestive heart failure) (HCC) 2024    Chronic kidney disease     CTS (carpal tunnel syndrome)     had surgery    Diabetes mellitus (HCC)     Fibroid     dr gutierrez removed     2 para 2      x2 -F, -    Hyperlipidemia        PAST SURGICAL HISTORY:  Past Surgical History:   Procedure Laterality Date    CARDIAC CATHETERIZATION  2023    Procedure: Cardiac catheterization;  Surgeon: Hilary Mondragon DO;  [facility];  Service: Cardiology    CARDIAC CATHETERIZATION N/A 2023    Procedure: Cardiac Coronary Angiogram;  Surgeon: Hilary Mondragon DO;  [facility];  Service: Cardiology    CARDIAC CATHETERIZATION N/A 2023    Procedure: Cardiac other - DFR for Hemodaynamic Assessment;  Surgeon: Hilary Mondragon DO;  Location: [facility];  Service: Cardiology    CARDIAC SURGERY      HYSTERECTOMY      MYOMECTOMY      dr gutierrez removed    TOTAL ABDOMINAL HYSTERECTOMY      2010        FAMILY HISTORY:  Family History   Adopted: Yes   Family history unknown: Yes       SOCIAL HISTORY:  Social History     Tobacco Use    Smoking status: Never     Passive exposure: Never    Smokeless tobacco: Never   Vaping Use    Vaping status: Never Used   Substance Use Topics    Alcohol use: Never    Drug use: No       MEDICATIONS:    Current Outpatient Medications:     albuterol (Ventolin HFA) 90 mcg/act inhaler, Inhale 2 puffs every 6 (six) hours as needed for wheezing, Disp: 18 g, Rfl: 5    amoxicillin (AMOXIL) 500 MG tablet, Take 500 mg by mouth 2 (two) times a day predental, Disp: , Rfl:     Aspirin Low Dose 81 MG chewable tablet, , Disp: , Rfl:     bumetanide (BUMEX) 0.5 MG tablet, Take 1 tablet (0.5 mg total) by mouth daily, Disp: 90 tablet, Rfl: 3    Continuous Glucose Sensor (FreeStyle Nataly 3 Sensor) MISC, APPLY 1 SENSOR EVERY 14 DAYS, Disp: 2 each, Rfl: 5    desvenlafaxine succinate (PRISTIQ) 50 mg 24 hr tablet, Take 1 tablet (50 mg total) by mouth daily, Disp: 30 tablet, Rfl: 3    diazepam (VALIUM) 5 mg tablet, take 1 tablet by mouth once a day at bedtime as needed for sleep, Disp: 30 tablet, Rfl: 0    Empagliflozin (Jardiance) 10 MG TABS tablet, Take 1 tablet (10 mg total) by mouth every morning, Disp: 30 tablet, Rfl: 3    gabapentin (NEURONTIN) 100 mg capsule, TAKE ONE CAPSULE BY MOUTH THREE TIMES DAILY, Disp: 270 capsule, Rfl: 0    glucose blood (ONE TOUCH ULTRA TEST) test strip, test 6 times a day, Disp: 600 each, Rfl: 0    hydroCHLOROthiazide 50 mg tablet, Take 1 tablet (50 mg total) by mouth daily, Disp: 90 tablet, Rfl: 3    HYDROcodone-acetaminophen (Norco) 5-325 mg per tablet, Take 1 tablet by mouth every 6 (six) hours as needed for pain Max Daily Amount: 4 tablets, Disp: 60 tablet, Rfl: 0    hydrOXYzine HCL (ATARAX) 25 mg tablet, TAKE 1 TABLET BY MOUTH AT BEDTIME, Disp: 90 tablet, Rfl: 1    insulin isophane-insulin regular (NovoLIN 70/30 FlexPen) 100 units/mL injection pen, Inject under  the skin, Disp: , Rfl:     ketoconazole (NIZORAL) 2 % shampoo, Apply to affected area ON SCALP every other day FOR 5 MINUTES THEN RINSE, Disp: 120 mL, Rfl: 0    lisinopril (ZESTRIL) 5 mg tablet, Take 1 tablet (5 mg total) by mouth daily, Disp: 90 tablet, Rfl: 3    meloxicam (MOBIC) 15 mg tablet, Take 1 tablet (15 mg total) by mouth daily as needed for moderate pain, Disp: 30 tablet, Rfl: 3    metFORMIN (GLUCOPHAGE-XR) 500 mg 24 hr tablet, TAKE 2 TABLETS BY MOUTH TWICE DAILY, Disp: 360 tablet, Rfl: 1    methocarbamol (ROBAXIN) 500 mg tablet, , Disp: , Rfl:     metoprolol tartrate (LOPRESSOR) 25 mg tablet, Take 1 tablet (25 mg total) by mouth every 12 (twelve) hours, Disp: 180 tablet, Rfl: 3    omeprazole (PriLOSEC) 40 MG capsule, Take 1 capsule (40 mg total) by mouth daily, Disp: 90 capsule, Rfl: 3    oxyCODONE-acetaminophen (Percocet) 5-325 mg per tablet, Take 1 tablet by mouth every 6 (six) hours as needed for moderate pain Max Daily Amount: 4 tablets, Disp: 20 tablet, Rfl: 0    potassium chloride (MICRO-K) 10 MEQ CR capsule, TAKE 2 CAPSULES BY MOUTH DAILY, Disp: 180 capsule, Rfl: 1    rosuvastatin (CRESTOR) 20 MG tablet, Take 1 tablet (20 mg total) by mouth daily, Disp: 90 tablet, Rfl: 3    Tirzepatide (Mounjaro) 5 MG/0.5ML SOAJ, Inject 0.5 mL (5 mg total) under the skin every 7 days, Disp: 2 mL, Rfl: 5    insulin NPH (HumuLIN N,NovoLIN N) 100 Units/mL subcutaneous injection, Inject 30 Units under the skin 2 (two) times a day before meals (Patient not taking: Reported on 1/24/2025), Disp: , Rfl:     NovoLOG FlexPen 100 units/mL injection pen, 8 Units 3 (three) times a day with meals (Patient not taking: Reported on 1/7/2025), Disp: , Rfl:     omega-3-acid ethyl esters (LOVAZA) 1 g capsule, TAKE 2 CAPSULES BY MOUTH TWICE DAILY (Patient not taking: Reported on 1/7/2025), Disp: 360 capsule, Rfl: 1    ALLERGIES:  Allergies   Allergen Reactions    Atorvastatin Other (See Comments)     Reaction Date: 25Apr2011;      Furosemide Other (See Comments)     Reaction Date: 25Apr2011;     Latex Other (See Comments)     Pt doesn't remember reaction        REVIEW OF SYSTEMS:  Review of Systems   Constitutional:  Negative for chills, fever and unexpected weight change.   HENT:  Negative for hearing loss, nosebleeds and sore throat.    Eyes:  Negative for pain, redness and visual disturbance.   Respiratory:  Negative for cough, shortness of breath and wheezing.    Cardiovascular:  Negative for chest pain, palpitations and leg swelling.   Gastrointestinal:  Negative for abdominal pain, nausea and vomiting.   Endocrine: Negative for polydipsia and polyuria.   Genitourinary:  Negative for difficulty urinating and hematuria.   Musculoskeletal:  Negative for arthralgias, joint swelling and myalgias.   Skin:  Negative for rash and wound.   Neurological:  Negative for dizziness, numbness and headaches.   Psychiatric/Behavioral:  Negative for decreased concentration, dysphoric mood and suicidal ideas. The patient is not nervous/anxious.        VITALS:  There were no vitals filed for this visit.      _____________________________________________________  PHYSICAL EXAMINATION:  General: well developed and well nourished, alert, oriented times 3, and appears comfortable  Psychiatric: Normal  HEENT: Normocephalic, Atraumatic Trachea Midline, No torticollis  Pulmonary: No audible wheezing or respiratory distress   Cardiovascular: No pitting edema, 2+ radial pulse   Abdominal/GI: abdomen non tender, non distended   Skin: No masses, erythema, lacerations, fluctation, ulcerations  Neurovascular: Sensation Intact to the Median, Ulnar, Radial Nerve, Motor Intact to the Median, Ulnar, Radial Nerve, and Pulses Intact  Musculoskeletal: Normal, except as noted in detailed exam and in HPI.      MUSCULOSKELETAL EXAMINATION:    Bilateral hands:     No erythema, ecchymosis or significant edema  A1 pulley tenderness of the right index finger   Full digital extension    Near full composite fist with some tightness to the index fingers bilaterally     ___________________________________________________    STUDIES REVIEWED:      I have personally reviewed and interpreted  US of bilateral elbows which demonstrate enlarged ulnar nerve near/within cubital tunnel, suggestive of ulnar neuropathy/cubital tunnel syndrome, as well as ulnar nerve instability on dynamic imaging.     I have personally reviewed and interpreted  US of bilateral wrists which demonstrate  RIGHT SIDE: Carpal tunnel syndrome ruled in based on marked abnormal CSA >/= 14 sq mm. LEFT SIDE: Findings suspicious for carpal tunnel syndrome based on delta CSA >/= 2 sq mm (for single median nerve.)    LABS REVIEWED:    HgA1c:   Lab Results   Component Value Date    HGBA1C 7.7 (H) 12/16/2024     BMP:   Lab Results   Component Value Date    CALCIUM 10.8 (H) 12/16/2024     10/13/2017    K 4.2 12/16/2024    CO2 30 12/16/2024     12/16/2024    BUN 22 12/16/2024    CREATININE 0.79 12/16/2024             PROCEDURES PERFORMED:  Procedures  No Procedures performed today    _____________________________________________________      Scribe Attestation      I,:  Zakiya Kathleen MA am acting as a scribe while in the presence of the attending physician.:       I,:  Anshu Rios MD personally performed the services described in this documentation    as scribed in my presence.:

## 2025-03-06 ENCOUNTER — HOSPITAL ENCOUNTER (OUTPATIENT)
Dept: NON INVASIVE DIAGNOSTICS | Facility: HOSPITAL | Age: 65
Discharge: HOME | End: 2025-03-06
Payer: MEDICARE

## 2025-03-06 VITALS
BODY MASS INDEX: 32.1 KG/M2 | WEIGHT: 188 LBS | SYSTOLIC BLOOD PRESSURE: 137 MMHG | HEART RATE: 70 BPM | HEIGHT: 64 IN | DIASTOLIC BLOOD PRESSURE: 74 MMHG

## 2025-03-06 DIAGNOSIS — Z95.2 S/P AVR: ICD-10-CM

## 2025-03-06 DIAGNOSIS — I77.810 ASCENDING AORTA DILATION (HCC): ICD-10-CM

## 2025-03-06 DIAGNOSIS — I10 PRIMARY HYPERTENSION: ICD-10-CM

## 2025-03-06 DIAGNOSIS — E78.2 MIXED HYPERLIPIDEMIA: ICD-10-CM

## 2025-03-06 DIAGNOSIS — I25.10 CORONARY ARTERY DISEASE INVOLVING NATIVE CORONARY ARTERY OF NATIVE HEART WITHOUT ANGINA PECTORIS: ICD-10-CM

## 2025-03-06 LAB
AORTIC ROOT: 3.1 CM
AORTIC VALVE MEAN VELOCITY: 11.4 M/S
AV AREA BY CONTINUOUS VTI: 1.9 CM2
AV AREA PEAK VELOCITY: 1.4 CM2
AV LVOT MEAN GRADIENT: 1 MMHG
AV LVOT PEAK GRADIENT: 3 MMHG
AV MEAN PRESS GRAD SYS DOP V1V2: 6 MMHG
AV ORIFICE AREA US: 1.95 CM2
AV PEAK GRADIENT: 14 MMHG
AV VELOCITY RATIO: 0.62
AV VMAX SYS DOP: 1.87 M/S
BSA FOR ECHO PROCEDURE: 1.91 M2
DOP CALC AO VTI: 30.73 CM
DOP CALC LVOT AREA: 3.14 CM2
DOP CALC LVOT CARDIAC INDEX: 2.35 L/MIN/M2
DOP CALC LVOT CARDIAC OUTPUT: 4.49 L/MIN
DOP CALC LVOT DIAMETER: 2 CM
DOP CALC LVOT PEAK VEL VTI: 19.06 CM
DOP CALC LVOT PEAK VEL: 0.86 M/S
DOP CALC LVOT STROKE INDEX: 30.9 ML/M2
DOP CALC LVOT STROKE VOLUME: 59.85
E WAVE DECELERATION TIME: 215 MS
E/A RATIO: 0.75
FRACTIONAL SHORTENING: 36 (ref 28–44)
GLOBAL LONGITUIDAL STRAIN: -16 %
INTERVENTRICULAR SEPTUM IN DIASTOLE (PARASTERNAL SHORT AXIS VIEW): 1.1 CM
INTERVENTRICULAR SEPTUM: 1.1 CM (ref 0.6–1.1)
LAAS-AP2: 20.9 CM2
LAAS-AP4: 15.5 CM2
LEFT ATRIUM AREA SYSTOLE SINGLE PLANE A4C: 14.2 CM2
LEFT ATRIUM SIZE: 3.4 CM
LEFT ATRIUM VOLUME (MOD BIPLANE): 54 ML
LEFT ATRIUM VOLUME INDEX (MOD BIPLANE): 28.3 ML/M2
LEFT INTERNAL DIMENSION IN SYSTOLE: 2.5 CM (ref 2.1–4)
LEFT VENTRICULAR INTERNAL DIMENSION IN DIASTOLE: 3.9 CM (ref 3.5–6)
LEFT VENTRICULAR POSTERIOR WALL IN END DIASTOLE: 1.1 CM
LEFT VENTRICULAR STROKE VOLUME: 44 ML
LVSV (TEICH): 44 ML
MV E'TISSUE VEL-LAT: 6 CM/S
MV E'TISSUE VEL-SEP: 5 CM/S
MV PEAK A VEL: 0.99 M/S
MV PEAK E VEL: 74 CM/S
MV STENOSIS PRESSURE HALF TIME: 62 MS
MV VALVE AREA P 1/2 METHOD: 3.55
PV PEAK GRADIENT: 4 MMHG
RIGHT ATRIUM AREA SYSTOLE A4C: 14 CM2
RIGHT VENTRICLE ID DIMENSION: 2.9 CM
SL CV LEFT ATRIUM LENGTH A2C: 5.5 CM
SL CV LV EF: 60
SL CV PED ECHO LEFT VENTRICLE DIASTOLIC VOLUME (MOD BIPLANE) 2D: 67 ML
SL CV PED ECHO LEFT VENTRICLE SYSTOLIC VOLUME (MOD BIPLANE) 2D: 23 ML
TRICUSPID ANNULAR PLANE SYSTOLIC EXCURSION: 1.2 CM

## 2025-03-06 PROCEDURE — 93306 TTE W/DOPPLER COMPLETE: CPT | Performed by: INTERNAL MEDICINE

## 2025-03-06 PROCEDURE — 93306 TTE W/DOPPLER COMPLETE: CPT

## 2025-03-10 ENCOUNTER — OFFICE VISIT (OUTPATIENT)
Dept: FAMILY MEDICINE CLINIC | Facility: CLINIC | Age: 65
End: 2025-03-10
Payer: MEDICARE

## 2025-03-10 VITALS
BODY MASS INDEX: 31 KG/M2 | HEART RATE: 82 BPM | DIASTOLIC BLOOD PRESSURE: 64 MMHG | HEIGHT: 64 IN | WEIGHT: 181.6 LBS | TEMPERATURE: 97.2 F | OXYGEN SATURATION: 97 % | SYSTOLIC BLOOD PRESSURE: 120 MMHG

## 2025-03-10 DIAGNOSIS — I73.9 CLAUDICATION (HCC): ICD-10-CM

## 2025-03-10 DIAGNOSIS — Z79.4 TYPE 2 DIABETES MELLITUS WITH BOTH EYES AFFECTED BY MILD NONPROLIFERATIVE RETINOPATHY WITHOUT MACULAR EDEMA, WITH LONG-TERM CURRENT USE OF INSULIN (HCC): Primary | ICD-10-CM

## 2025-03-10 DIAGNOSIS — I50.32 CHRONIC DIASTOLIC HEART FAILURE (HCC): ICD-10-CM

## 2025-03-10 DIAGNOSIS — I10 PRIMARY HYPERTENSION: ICD-10-CM

## 2025-03-10 DIAGNOSIS — E11.3293 TYPE 2 DIABETES MELLITUS WITH BOTH EYES AFFECTED BY MILD NONPROLIFERATIVE RETINOPATHY WITHOUT MACULAR EDEMA, WITH LONG-TERM CURRENT USE OF INSULIN (HCC): Primary | ICD-10-CM

## 2025-03-10 DIAGNOSIS — E11.42 DIABETIC PERIPHERAL NEUROPATHY (HCC): ICD-10-CM

## 2025-03-10 PROCEDURE — G2211 COMPLEX E/M VISIT ADD ON: HCPCS | Performed by: FAMILY MEDICINE

## 2025-03-10 PROCEDURE — 99214 OFFICE O/P EST MOD 30 MIN: CPT | Performed by: FAMILY MEDICINE

## 2025-03-10 RX ORDER — GABAPENTIN 400 MG/1
400 CAPSULE ORAL 3 TIMES DAILY
Start: 2025-03-10 | End: 2025-03-13 | Stop reason: SDUPTHER

## 2025-03-11 ENCOUNTER — OFFICE VISIT (OUTPATIENT)
Dept: OCCUPATIONAL THERAPY | Age: 65
End: 2025-03-11
Payer: MEDICARE

## 2025-03-11 DIAGNOSIS — R25.2 CRAMPING OF HANDS: ICD-10-CM

## 2025-03-11 DIAGNOSIS — R29.898 WEAKNESS OF BOTH HANDS: ICD-10-CM

## 2025-03-11 DIAGNOSIS — G56.21 CUBITAL TUNNEL SYNDROME ON RIGHT: Primary | ICD-10-CM

## 2025-03-11 PROCEDURE — 97140 MANUAL THERAPY 1/> REGIONS: CPT

## 2025-03-11 PROCEDURE — 97110 THERAPEUTIC EXERCISES: CPT

## 2025-03-11 NOTE — PROGRESS NOTES
Name: Jacquie Viveros      : 1960      MRN: [id number]  Encounter Provider: Charito Pereira DO  Encounter Date: 3/10/2025   Encounter department: [facility]    Assessment & Plan  Type 2 diabetes mellitus with both eyes affected by mild nonproliferative retinopathy without macular edema, with long-term current use of insulin (HCC)  Continue current therapy.    Lab Results   Component Value Date    HGBA1C 7.7 (H) 2024       Orders:    Ambulatory Referral to Podiatry; Future    Claudication (HCC)  Await pending studies.  Orders:    VAS ARTERIAL DUPLEX- LOWER LIMB BILATERAL; Future    Diabetic peripheral neuropathy (HCC)  Continue gabapentin therapy.    Lab Results   Component Value Date    HGBA1C 7.7 (H) 2024       Orders:    gabapentin (NEURONTIN) 400 mg capsule; Take 1 capsule (400 mg total) by mouth 3 (three) times a day    Chronic diastolic heart failure (HCC)  Wt Readings from Last 3 Encounters:   03/10/25 82.4 kg (181 lb 9.6 oz)   25 85.3 kg (188 lb)   25 85.3 kg (188 lb)   Continue bumex, diuretic therapy and low sodium therapy.       Primary hypertension  Continue beta blocker therapy.            History of Present Illness     The patient presents for follow up of niddm, diabetic peripheral neuropathy, and complains of claudication.        Review of Systems   Constitutional:  Negative for appetite change, chills and fever.   HENT:  Negative for ear pain, facial swelling, rhinorrhea, sinus pain, sore throat and trouble swallowing.    Eyes:  Negative for discharge and redness.   Respiratory:  Negative for chest tightness, shortness of breath and wheezing.    Cardiovascular:  Negative for chest pain and palpitations.        + claudication   Gastrointestinal:  Negative for abdominal pain, diarrhea, nausea and vomiting.   Endocrine: Negative for polyuria.   Genitourinary:  Negative for dysuria and urgency.   Musculoskeletal:  Negative for  Reason for Call:  Form, our goal is to have forms completed with 72 hours, however, some forms may require a visit or additional information.    Type of letter, form or note:  Home Health Certification    Who is the form from?: Home care    Where did the form come from: form was faxed in    What clinic location was the form placed at?: [facility]     Where the form was placed: Given to LEVY Givens    What number is listed as a contact on the form?: [phone]       Additional comments:     Call taken on 8/13/2021 at 2:49 PM by Danielle Solis         arthralgias and back pain.   Skin:  Negative for rash.   Neurological:  Negative for dizziness, weakness and headaches.   Hematological:  Negative for adenopathy.   Psychiatric/Behavioral:  Negative for behavioral problems, confusion and sleep disturbance.    All other systems reviewed and are negative.    Past Medical History:   Diagnosis Date    Arthritis     Chronic diastolic CHF (congestive heart failure) (HCC) 2024    Chronic kidney disease     CTS (carpal tunnel syndrome)     had surgery    Diabetes mellitus (HCC)     Fibroid     dr gutierrez removed     2 para 2      x2 -, -    Hyperlipidemia      Past Surgical History:   Procedure Laterality Date    CARDIAC CATHETERIZATION  2023    Procedure: Cardiac catheterization;  Surgeon: Hilary Mondragon DO;  Location: [facility];  Service: Cardiology    CARDIAC CATHETERIZATION N/A 2023    Procedure: Cardiac Coronary Angiogram;  Surgeon: Hilary Mondragon DO;  [facility];  Service: Cardiology    CARDIAC CATHETERIZATION N/A 2023    Procedure: Cardiac other - DFR for Hemodaynamic Assessment;  Surgeon: Hilary Mondragon DO;  [facility]: [facility];  Service: Cardiology    CARDIAC SURGERY      HYSTERECTOMY      MYOMECTOMY      dr gutierrez removed    TOTAL ABDOMINAL HYSTERECTOMY      2010     Family History   Adopted: Yes   Family history unknown: Yes     Social History     Tobacco Use    Smoking status: Never     Passive exposure: Never    Smokeless tobacco: Never   Vaping Use    Vaping status: Never Used   Substance and Sexual Activity    Alcohol use: Never    Drug use: No    Sexual activity: Yes     Partners: Male     Birth control/protection: Male Sterilization     Comment: declines std/hiv testing     Current Outpatient Medications on File Prior to Visit   Medication Sig    albuterol (Ventolin HFA) 90 mcg/act inhaler Inhale 2 puffs every 6 (six) hours as needed for wheezing    amoxicillin  (AMOXIL) 500 MG tablet Take 500 mg by mouth 2 (two) times a day predental    Aspirin Low Dose 81 MG chewable tablet     bumetanide (BUMEX) 0.5 MG tablet Take 1 tablet (0.5 mg total) by mouth daily    Continuous Glucose Sensor (FreeStyle Nataly 3 Sensor) MISC APPLY 1 SENSOR EVERY 14 DAYS    desvenlafaxine succinate (PRISTIQ) 50 mg 24 hr tablet Take 1 tablet (50 mg total) by mouth daily    diazepam (VALIUM) 5 mg tablet take 1 tablet by mouth once a day at bedtime as needed for sleep    Empagliflozin (Jardiance) 10 MG TABS tablet Take 1 tablet (10 mg total) by mouth every morning    glucose blood (ONE TOUCH ULTRA TEST) test strip test 6 times a day    hydroCHLOROthiazide 50 mg tablet Take 1 tablet (50 mg total) by mouth daily    hydrOXYzine HCL (ATARAX) 25 mg tablet TAKE 1 TABLET BY MOUTH AT BEDTIME    insulin isophane-insulin regular (NovoLIN 70/30 FlexPen) 100 units/mL injection pen Inject under the skin    insulin NPH (HumuLIN N,NovoLIN N) 100 Units/mL subcutaneous injection Inject 30 Units under the skin 2 (two) times a day before meals    ketoconazole (NIZORAL) 2 % shampoo Apply to affected area ON SCALP every other day FOR 5 MINUTES THEN RINSE    lisinopril (ZESTRIL) 5 mg tablet Take 1 tablet (5 mg total) by mouth daily    meloxicam (MOBIC) 15 mg tablet Take 1 tablet (15 mg total) by mouth daily as needed for moderate pain    metFORMIN (GLUCOPHAGE-XR) 500 mg 24 hr tablet TAKE 2 TABLETS BY MOUTH TWICE DAILY    methocarbamol (ROBAXIN) 500 mg tablet     metoprolol tartrate (LOPRESSOR) 25 mg tablet Take 1 tablet (25 mg total) by mouth every 12 (twelve) hours    NovoLOG FlexPen 100 units/mL injection pen 8 Units 3 (three) times a day with meals    omeprazole (PriLOSEC) 40 MG capsule Take 1 capsule (40 mg total) by mouth daily    potassium chloride (MICRO-K) 10 MEQ CR capsule TAKE 2 CAPSULES BY MOUTH DAILY    rosuvastatin (CRESTOR) 20 MG tablet Take 1 tablet (20 mg total) by mouth daily     Allergies   Allergen  "Reactions    Atorvastatin Other (See Comments)     Reaction Date: 25Apr2011;     Furosemide Other (See Comments)     Reaction Date: 25Apr2011;     Latex Other (See Comments)     Pt doesn't remember reaction      Immunization History   Administered Date(s) Administered    Pneumococcal Conjugate Vaccine 20-valent (Pcv20), Polysace 10/23/2024    Tdap 07/06/2012, 12/06/2019     Objective   /64   Pulse 82   Temp (!) 97.2 °F (36.2 °C)   Ht 5' 4\" (1.626 m)   Wt 82.4 kg (181 lb 9.6 oz)   SpO2 97%   BMI 31.17 kg/m²     Physical Exam  Vitals and nursing note reviewed.   Constitutional:       General: She is not in acute distress.     Appearance: Normal appearance. She is well-developed. She is not ill-appearing or diaphoretic.   HENT:      Head: Normocephalic and atraumatic.      Right Ear: Tympanic membrane, ear canal and external ear normal.      Left Ear: Tympanic membrane, ear canal and external ear normal.      Nose: Nose normal. No congestion or rhinorrhea.      Mouth/Throat:      Mouth: Mucous membranes are moist.      Pharynx: Oropharynx is clear. No oropharyngeal exudate or posterior oropharyngeal erythema.   Eyes:      General: No scleral icterus.        Right eye: No discharge.         Left eye: No discharge.      Extraocular Movements: Extraocular movements intact.      Conjunctiva/sclera: Conjunctivae normal.      Pupils: Pupils are equal, round, and reactive to light.   Neck:      Thyroid: No thyromegaly.      Vascular: No carotid bruit or JVD.      Trachea: No tracheal deviation.   Cardiovascular:      Rate and Rhythm: Normal rate and regular rhythm.      Pulses: Normal pulses.      Heart sounds: Normal heart sounds. No murmur heard.  Pulmonary:      Effort: Pulmonary effort is normal. No respiratory distress.      Breath sounds: Normal breath sounds. No stridor. No wheezing, rhonchi or rales.   Abdominal:      General: Abdomen is flat. Bowel sounds are normal. There is no distension.      " Palpations: Abdomen is soft. There is no mass.      Tenderness: There is no abdominal tenderness. There is no guarding or rebound.   Musculoskeletal:         General: No swelling, tenderness or deformity. Normal range of motion.      Cervical back: Normal range of motion and neck supple. No rigidity.      Right lower leg: No edema.      Left lower leg: No edema.   Lymphadenopathy:      Cervical: No cervical adenopathy.   Skin:     General: Skin is warm and dry.      Capillary Refill: Capillary refill takes less than 2 seconds.      Coloration: Skin is not jaundiced.      Findings: No bruising, erythema or rash.   Neurological:      General: No focal deficit present.      Mental Status: She is alert and oriented to person, place, and time.      Cranial Nerves: No cranial nerve deficit.      Sensory: No sensory deficit.      Motor: No abnormal muscle tone.      Coordination: Coordination normal.      Gait: Gait normal.      Deep Tendon Reflexes: Reflexes are normal and symmetric. Reflexes normal.   Psychiatric:         Mood and Affect: Mood normal.         Behavior: Behavior normal.         Thought Content: Thought content normal.         Judgment: Judgment normal.       Administrative Statements   I have spent a total time of 20 minutes in caring for this patient on the day of the visit/encounter including Diagnostic results, Prognosis, Risks and benefits of tx options, Instructions for management, Patient and family education, Importance of tx compliance, Risk factor reductions, and Impressions.

## 2025-03-11 NOTE — ASSESSMENT & PLAN NOTE
Continue gabapentin therapy.    Lab Results   Component Value Date    HGBA1C 7.7 (H) 12/16/2024       Orders:    gabapentin (NEURONTIN) 400 mg capsule; Take 1 capsule (400 mg total) by mouth 3 (three) times a day

## 2025-03-11 NOTE — ASSESSMENT & PLAN NOTE
Continue current therapy.    Lab Results   Component Value Date    HGBA1C 7.7 (H) 12/16/2024       Orders:    Ambulatory Referral to Podiatry; Future

## 2025-03-12 NOTE — PROGRESS NOTES
"Daily Note     Today's date: 3/18/2025  Patient name: Jacquie Viveros  : 1960  MRN: [id number]  Referring provider: Anshu Rios MD  Dx:   Encounter Diagnosis     ICD-10-CM    1. Cubital tunnel syndrome on right  G56.21       2. Weakness of both hands  R29.898       3. Cramping of hands  R25.2           Start Time: 1145  Stop Time: 1235  Total time in clinic (min): 50 minutes    Subjective: \"They have not felt too bad over the next week.\"      Objective: See treatment diary below      Assessment: Tolerated treatment well. Pt reports that symptoms in BUE have dec slightly compared to previous session. But still displays inc edema around b/l index MCP joints. Will attempt to make pt trigger splint for R IF to attempt to avoid triggering moving forward during regular function. Cont EDU on manual techniques to attempt to break up soft tissue tightness at this time. EDU pt on theraband HEP to attempt to work on proximal strengthening. PT participated in all exercises well, req min-mod rest breaks to manage fatigue. Patient would benefit from continued OT      Plan: Continue per plan of care.  Progress treatment as tolerated.       Precautions: R Cubital tunnel syndrome, b/l hand weakness, cramping of both hands (Osteoarthritis in BUE & R IF Trigger finger)  2 times a week for 6 weeks for intrinsic strengthening, due to patient experiencing cramping in the hands, thenar strengthening and tendon glides.  All modalities as seen fit by therapist, emphasis on home exercise program  Ulnar nerve glides    Manuals   IE  2/25  2/27  3/4  3/11 3/18        edema mobs    5'              STM/IASTM  10' (BUE cubital tunnel, intrinsics) 15' (BUE cubital tunnel, intrinsics) 15' (BUE cubital & carpal tunnels, intrinsics) 15' (BUE cubital & carpal tunnel, intrinsics) 15' (BUE cubital & carpal tunnel; intrinsics)        digisleeves / compression gloves                                                        Ther Ex     "  HEP: Wrist/digit/ thumb AROM; UNGs/flosses, upper trap stretch, wrist stretches, intrinsic stretches                 scap squeezes    x15  x15  x15  x15 x15       Upper trap stretch  2x15 secs 2x15 secs 2x15 secs 2x15 secs 2x15 secs       Elbow/FA AROM  X15 FA X15 FA X15 FA  x15 FA        Wrist stretches  2x15s BUE 2x15s BUE 2x15 secs BUE 2x15 secs BUE 2x15 secs BUE       Wrist AROM  X15 flex/ext  X15 rad/uln X15 flex/ext  X15 rad/uln X15 flex/ext  X15 rad/uln  x15 flex/ext  X15 rad/uln        TGEs  X10 BUE X10 BUE X10 BUE  x10 BUE X10 BUE       Digit abd/add  X15 BUE X15 BUE X10 BUE  x10 BUE        UNGs/flosses  X15 BUE X15 BUE X15 BUE X15 BUE X15 BUE       Intrinsic stretches  2x20 secs BUE each digit 4x20 secs b/l index fingers    2x20 secs other digits 4x20 secs b/l index fingers    2x20 secs other digits 4x20 secs b/l index fingers    2x20 secs other digits 4x20 secs b/l index fingers    2x20 secs other digits       Thumb AROM / strengthening Next session                         Graded strengthening   Pink foam between each set of fingers BUE x10 each Pink foam between each set of fingers BUE x10 each    Pinch clips (R,G) 3 jaw manuel x1 lap RUE; (G,G) LUE     Pink foam between each set of fingers BUE x10 each    Pinch clips (G,G) 3 jaw cuck x1 lap RUE; (B,G) LUE Pinky foam b/w each set of fingers BUE x10 each       Theraputty HEP Next session Provided Y theraputty HEP  Cont EDU         Theraband HEP      Provided R; EDU on scap retrac, shoulder ext, rev flys                                             Ther Activity                   FMC                  Gripper & pegs                Activity modification EDU                                                                                   Neuro Re-Ed                                                               Ortho Fit                  Towel splint             Trigger splint      NEXT SESSION                          Modalities                  heat  5'  5'   5'  5'  5' 5'        paraffin          next session 5'

## 2025-03-13 ENCOUNTER — PATIENT MESSAGE (OUTPATIENT)
Dept: FAMILY MEDICINE CLINIC | Facility: CLINIC | Age: 65
End: 2025-03-13

## 2025-03-13 DIAGNOSIS — E11.42 DIABETIC PERIPHERAL NEUROPATHY (HCC): ICD-10-CM

## 2025-03-13 RX ORDER — GABAPENTIN 400 MG/1
400 CAPSULE ORAL 3 TIMES DAILY
Qty: 90 CAPSULE | Refills: 3 | Status: SHIPPED | OUTPATIENT
Start: 2025-03-13 | End: 2025-03-14 | Stop reason: CLARIF

## 2025-03-13 NOTE — ASSESSMENT & PLAN NOTE
Wt Readings from Last 3 Encounters:   03/10/25 82.4 kg (181 lb 9.6 oz)   03/06/25 85.3 kg (188 lb)   03/05/25 85.3 kg (188 lb)   Continue bumex, diuretic therapy and low sodium therapy.

## 2025-03-14 ENCOUNTER — NURSE TRIAGE (OUTPATIENT)
Age: 65
End: 2025-03-14

## 2025-03-14 DIAGNOSIS — E11.42 DIABETIC PERIPHERAL NEUROPATHY (HCC): ICD-10-CM

## 2025-03-14 RX ORDER — GABAPENTIN 400 MG/1
400 CAPSULE ORAL 3 TIMES DAILY
Qty: 90 CAPSULE | Refills: 3 | Status: SHIPPED | OUTPATIENT
Start: 2025-03-14

## 2025-03-14 NOTE — TELEPHONE ENCOUNTER
FOLLOW UP: None     REASON FOR CONVERSATION: Medication Problem    SYMPTOMS: N/A    OTHER: [facility] told the patient they did not receive the order for the Gabapentin.  Resent at this time.     DISPOSITION: Home Care    Reason for Disposition   Prescription prescribed recently is not at pharmacy and triager has access to patient's EMR and prescription is recorded in the EMR    Answer Assessment - Initial Assessment Questions  gabapentin (NEURONTIN) 400 mg capsule Take 1 capsule (400 mg total) by mouth 3 (three) times a day          Summary: Take 1 capsule (400 mg total) by mouth 3 (three) times a day, Starting Thu 3/13/2025, Normal  Guidelines: Dose, Route, Frequency: 400 mg, Oral, 3 times dailyStart: 03/13/2025Ord/Sold: 03/13/2025 (O)Ordered On: 03/13/2025Pharmacy: [id number] - [place], [place] - [address]: Taking: Long-term: Med Note:           Ordering Department: [facility]  Authorized By: Charito Pereira DO  Dispense: 90 capsule  Refills: 3 ordered    Protocols used: Medication Question Call-Adult-[place]

## 2025-03-18 ENCOUNTER — TELEPHONE (OUTPATIENT)
Age: 65
End: 2025-03-18

## 2025-03-18 ENCOUNTER — OFFICE VISIT (OUTPATIENT)
Dept: OCCUPATIONAL THERAPY | Age: 65
End: 2025-03-18
Payer: MEDICARE

## 2025-03-18 DIAGNOSIS — R29.898 WEAKNESS OF BOTH HANDS: ICD-10-CM

## 2025-03-18 DIAGNOSIS — R25.2 CRAMPING OF HANDS: ICD-10-CM

## 2025-03-18 DIAGNOSIS — G56.21 CUBITAL TUNNEL SYNDROME ON RIGHT: Primary | ICD-10-CM

## 2025-03-18 PROCEDURE — 97110 THERAPEUTIC EXERCISES: CPT

## 2025-03-18 PROCEDURE — 97140 MANUAL THERAPY 1/> REGIONS: CPT

## 2025-03-18 NOTE — PROGRESS NOTES
Cardiology Follow Up    Jacquie Viveros  1960 [id number]  [facility] [place]  [address]  [place] [id number] [phone]    1. Primary hypertension  Ambulatory  Referral to Cardiac Rehabilitation      2. Mixed hyperlipidemia  Ambulatory  Referral to Cardiac Rehabilitation      3. Ascending aorta dilation (HCC)  Ambulatory  Referral to Cardiac Rehabilitation      4. S/P AVR  Ambulatory  Referral to Cardiac Rehabilitation      5. Coronary artery disease involving native coronary artery of native heart without angina pectoris  Ambulatory  Referral to Cardiac Rehabilitation      6. Bicuspid aortic valve  Ambulatory  Referral to Cardiac Rehabilitation      7. Chronic diastolic CHF (congestive heart failure) (HCC)  Ambulatory  Referral to Cardiac Rehabilitation          Interval History:  Patient is here for a FU.  Patient had BAV with AVR and ascending aortic replacement 6/2023.  Patient has CAD with cardiac catheterization 5/2023 revealing very small caliber LAD with diffuse proximal to mid disease and large caliber LCX with 90% proximal lesion.  There was a nondominant RCA.  Patient had CABG x 2 with AVR (LIMA to LAD and SVG to OMB #3).  Patient has HTN, HLD, chronic diastolic CHF and DM.  Echocardiogram 3/2025 demonstrated LVEF of 60% with mild LVH.  Appropriately functioning bio AVR #21 Inspiris was noted.  Lipid profile 12/16/2024 demonstrated TC of 144 with an HDL of 40 and a calculated LDL of 81.  Patient is on Rosuvastatin.  Abdominal U/S 11/5/2024 showed no evidence of AAA.  EKG demonstrates NSR with NS T wave changes with no change compared to 9/18/2024.  Patient has intermittent issues with fluid retention.  She is on 2 diuretics.  Her VS are stable today.  She is here today with her .  She feels she is out of condition physically.  She is not physically active.  I recommended cardiac rehab.    Patient Active  Problem List   Diagnosis   • Type 2 diabetes mellitus with mild nonproliferative retinopathy of both eyes without macular edema (HCC)   • Hyperlipidemia   • Hypertension   • Arthritis   • Diabetic peripheral neuropathy (HCC)   • Bicuspid aortic valve   • Coronary artery disease involving native coronary artery   • S/P AVR   • Spinal stenosis of lumbar region with neurogenic claudication   • Anterolisthesis of lumbar spine   • Chronic diastolic heart failure (HCC)   • Ascending aorta dilation (HCC)   • Platelets decreased ([facility])   • Hyperparathyroidism ([facility])     Past Medical History:   Diagnosis Date   • Arthritis    • Chronic diastolic CHF (congestive heart failure) ([facility]) 2024   • Chronic kidney disease    • CTS (carpal tunnel syndrome)     had surgery   • Diabetes mellitus ([facility])    • Fibroid     dr gutierrez removed   •  2 para 2      x2 -, -   • Hyperlipidemia      Social History     Socioeconomic History   • Marital status: Single     Spouse name: Not on file   • Number of children: Not on file   • Years of education: Not on file   • Highest education level: Not on file   Occupational History   • Occupation: Retired   Tobacco Use   • Smoking status: Never     Passive exposure: Never   • Smokeless tobacco: Never   Vaping Use   • Vaping status: Never Used   Substance and Sexual Activity   • Alcohol use: Never   • Drug use: No   • Sexual activity: Yes     Partners: Male     Birth control/protection: Male Sterilization     Comment: declines std/hiv testing   Other Topics Concern   • Not on file   Social History Narrative    Personal Protective Equipment Seatbelts     Social Drivers of Health     Financial Resource Strain: Low Risk  (2023)    [facility]    Overall Financial Resource Strain (CARDIA)    • Difficulty of Paying Living Expenses: Not hard at all   Food Insecurity: Patient Declined (10/21/2024)    Hunger Vital Sign    •  Worried About Running Out of Food in the Last Year: Patient declined    • Ran Out of Food in the Last Year: Patient declined   Transportation Needs: Patient Declined (10/21/2024)    PRAPARE - Transportation    • Lack of Transportation (Medical): Patient declined    • Lack of Transportation (Non-Medical): Patient declined   Physical Activity: Not on file   Stress: Not on file   Social Connections: Not on file   Intimate Partner Violence: Not At Risk (6/7/2023)    [facility]    Humiliation, Afraid, Rape, and Kick questionnaire    • Fear of Current or Ex-Partner: No    • Emotionally Abused: No    • Physically Abused: No    • Sexually Abused: No   Housing Stability: Patient Declined (10/21/2024)    Housing Stability Vital Sign    • Unable to Pay for Housing in the Last Year: Patient declined    • Number of Times Moved in the Last Year: Not on file    • Homeless in the Last Year: Patient declined      Family History   Adopted: Yes   Family history unknown: Yes     Past Surgical History:   Procedure Laterality Date   • CARDIAC CATHETERIZATION  5/4/2023    Procedure: Cardiac catheterization;  Surgeon: Hilary Mondragon DO;  Location: [facility];  Service: Cardiology   • CARDIAC CATHETERIZATION N/A 5/4/2023    Procedure: Cardiac Coronary Angiogram;  Surgeon: Hilayr Mondragon DO;  Location: [facility];  Service: Cardiology   • CARDIAC CATHETERIZATION N/A 5/4/2023    Procedure: Cardiac other - DFR for Hemodaynamic Assessment;  Surgeon: Hilary Mondragon DO;  Location: [facility];  Service: Cardiology   • CARDIAC SURGERY     • HYSTERECTOMY     • MYOMECTOMY      dr gutierrez removed   • TOTAL ABDOMINAL HYSTERECTOMY      4/2010       Current Outpatient Medications:   •  albuterol (Ventolin HFA) 90 mcg/act inhaler, Inhale 2 puffs every 6 (six) hours as needed for wheezing, Disp: 18 g, Rfl: 5  •  amoxicillin (AMOXIL) 500 MG tablet, Take 500  mg by mouth 2 (two) times a day predental, Disp: , Rfl:   •  Aspirin Low Dose 81 MG chewable tablet, , Disp: , Rfl:   •  bumetanide (BUMEX) 0.5 MG tablet, Take 1 tablet (0.5 mg total) by mouth daily, Disp: 90 tablet, Rfl: 3  •  Continuous Glucose Sensor (FreeStyle Nataly 3 Sensor) MISC, APPLY 1 SENSOR EVERY 14 DAYS, Disp: 2 each, Rfl: 5  •  Empagliflozin (Jardiance) 10 MG TABS tablet, Take 1 tablet (10 mg total) by mouth every morning, Disp: 30 tablet, Rfl: 3  •  gabapentin (NEURONTIN) 400 mg capsule, Take 1 capsule (400 mg total) by mouth 3 (three) times a day, Disp: 90 capsule, Rfl: 3  •  glucose blood (ONE TOUCH ULTRA TEST) test strip, test 6 times a day, Disp: 600 each, Rfl: 0  •  hydroCHLOROthiazide 50 mg tablet, Take 1 tablet (50 mg total) by mouth daily, Disp: 90 tablet, Rfl: 3  •  hydrOXYzine HCL (ATARAX) 25 mg tablet, TAKE 1 TABLET BY MOUTH AT BEDTIME, Disp: 90 tablet, Rfl: 1  •  insulin isophane-insulin regular (NovoLIN 70/30 FlexPen) 100 units/mL injection pen, Inject under the skin, Disp: , Rfl:   •  insulin NPH (HumuLIN N,NovoLIN N) 100 Units/mL subcutaneous injection, Inject 30 Units under the skin 2 (two) times a day before meals, Disp: , Rfl:   •  ketoconazole (NIZORAL) 2 % shampoo, Apply to affected area ON SCALP every other day FOR 5 MINUTES THEN RINSE, Disp: 120 mL, Rfl: 0  •  lisinopril (ZESTRIL) 5 mg tablet, Take 1 tablet (5 mg total) by mouth daily, Disp: 90 tablet, Rfl: 3  •  meloxicam (MOBIC) 15 mg tablet, Take 1 tablet (15 mg total) by mouth daily as needed for moderate pain, Disp: 30 tablet, Rfl: 3  •  metFORMIN (GLUCOPHAGE-XR) 500 mg 24 hr tablet, TAKE 2 TABLETS BY MOUTH TWICE DAILY, Disp: 360 tablet, Rfl: 1  •  methocarbamol (ROBAXIN) 500 mg tablet, if needed, Disp: , Rfl:   •  metoprolol tartrate (LOPRESSOR) 25 mg tablet, Take 1 tablet (25 mg total) by mouth every 12 (twelve) hours, Disp: 180 tablet, Rfl: 3  •  NovoLOG FlexPen 100 units/mL injection pen, 8 Units 3 (three) times a day with  meals, Disp: , Rfl:   •  omeprazole (PriLOSEC) 40 MG capsule, Take 1 capsule (40 mg total) by mouth daily, Disp: 90 capsule, Rfl: 3  •  potassium chloride (MICRO-K) 10 MEQ CR capsule, TAKE 2 CAPSULES BY MOUTH DAILY, Disp: 180 capsule, Rfl: 1  •  rosuvastatin (CRESTOR) 20 MG tablet, Take 1 tablet (20 mg total) by mouth daily, Disp: 90 tablet, Rfl: 3  •  desvenlafaxine succinate (PRISTIQ) 50 mg 24 hr tablet, Take 1 tablet (50 mg total) by mouth daily (Patient not taking: Reported on 3/19/2025), Disp: 30 tablet, Rfl: 3  •  diazepam (VALIUM) 5 mg tablet, take 1 tablet by mouth once a day at bedtime as needed for sleep (Patient not taking: Reported on 3/19/2025), Disp: 30 tablet, Rfl: 0  Allergies   Allergen Reactions   • Atorvastatin Other (See Comments)     Reaction Date: 25Apr2011;    • Furosemide Other (See Comments)     Reaction Date: 25Apr2011;    • Latex Other (See Comments)     Pt doesn't remember reaction        Labs:not applicable  Imaging: Echo complete w/ contrast if indicated  Result Date: 3/6/2025  Narrative: •  Left Ventricle: Left ventricular cavity size is normal. Wall thickness is mildly increased. There is mild concentric hypertrophy. The left ventricular ejection fraction is 60% by visual estimation. Systolic function is hyperdynamic. Global longitudinal strain is borderline at -16%. Wall motion is normal. Diastolic function is mildly abnormal, consistent with grade I (abnormal) relaxation. •  IVS: There is abnormal septal motion consistent with post-operative status. •  Right Ventricle: Right ventricular cavity size is normal. Systolic function is normal. •  Aortic Valve: There is a bioprosthetic valve. #21 Inspiris with aortic root replacement There is no evidence of paravalvular regurgitation. The gradient recorded across the prosthetic aortic valve is within the expected range.  DVI of 0.62 and mean gradient of 6 mmHg. •  Tricuspid Valve: Right ventricular systolic pressure could not be assessed.  "•  Pulmonic Valve: There is mild regurgitation. •  Compared to previous exam, there has been interval insertion of aortic valve prosthesis.     US Carpal Tunnel  Result Date: 3/1/2025  Narrative: ULTRASOUND BILATERAL WRISTS (CARPAL TUNNEL SYNDROME PROTOCOL) TECHNIQUE:   Using a high frequency transducer, the right and left median nerves were individually scanned to evaluate for carpal tunnel syndrome. Gray scale and color doppler ultrasound was used. INDICATION:   G56.03: Carpal tunnel syndrome, bilateral upper limbs. COMPARISON:  None. FINDINGS: RIGHT SIDE: Median nerve cross sectional area distal forearm (CSAp): 9.8 sq mm. Maximum median nerve cross sectional area within carpal tunnel (CSAc): 16.1 sq mm. Delta CSA (CSAc-CSAp): 6.3 sq mm. Wrist to Forearm ratio (WFR ratio) (CSAc/CSAp): 1.6 Vascularity: No hypervascularity. LEFT SIDE: Median nerve cross sectional area distal forearm (CSAp): 7.4 sq mm. Maximum median nerve cross sectional area within carpal tunnel (CSAc): 12.8 sq mm. Delta CSA (CSAc-CSAp): 5.4 sq mm. Wrist to Forearm ratio (WFR ratio) (CSAc/CSAp): 1.7 Vascularity: No hypervascularity.     Impression: RIGHT SIDE: Carpal tunnel syndrome ruled in based on marked abnormal CSA >/= 14 sq mm. LEFT SIDE: Findings suspicious for carpal tunnel syndrome based on delta CSA >/= 2 sq mm (for single median nerve.) Workstation performed: [id number]     Review of Systems:  Review of Systems   All other systems reviewed and are negative.    Physical Exam:  /68 (BP Location: Right arm, Patient Position: Sitting, Cuff Size: Standard)   Pulse 87   Ht 5' 4\" (1.626 m)   Wt 84.3 kg (185 lb 14.4 oz)   SpO2 97%   BMI 31.91 kg/m²   Physical Exam  Vitals reviewed.   Constitutional:       Appearance: She is well-developed.   HENT:      Head: Normocephalic and atraumatic.   Eyes:      Conjunctiva/sclera: Conjunctivae normal.   Cardiovascular:      Rate and Rhythm: Normal rate.      Heart sounds: Normal heart sounds. "   Pulmonary:      Effort: Pulmonary effort is normal.      Breath sounds: Normal breath sounds.   Musculoskeletal:      Cervical back: Normal range of motion and neck supple.   Skin:     General: Skin is warm and dry.   Neurological:      Mental Status: She is alert and oriented to person, place, and time.       Discussion/Summary: We will continue her present medical regimen.  Have ordered cardiac rehab.  She did not participate in this after her CABG and AVR.  Asked her to call if there is a problem in the interim.  I will see her in follow-up in 6 months and sooner as is necessary.

## 2025-03-18 NOTE — TELEPHONE ENCOUNTER
Caller: Patient    Doctor: Dr. Garcia    Call back #: [phone]    Reason for call: Patient rescheduled her appointment to a sooner appointment with Dr. Garcia as she has a couple questions she would like to go over with him as well as go over her echocardiogram results. Patient was originally scheduled for 8/7/25, but she got an opening notification from the wait list for tomorrow, 3/19/25. Patient had some concerns regarding her recent open heart surgery and wanted to know if Dr. Garcia could go over her echo results tomorrow at her appointment. I just wanted to give Dr. Garcia a heads up about the echo results before patient's appointment tomorrow.

## 2025-03-19 ENCOUNTER — OFFICE VISIT (OUTPATIENT)
Dept: CARDIOLOGY CLINIC | Facility: CLINIC | Age: 65
End: 2025-03-19
Payer: MEDICARE

## 2025-03-19 VITALS
BODY MASS INDEX: 31.74 KG/M2 | HEART RATE: 87 BPM | OXYGEN SATURATION: 97 % | HEIGHT: 64 IN | SYSTOLIC BLOOD PRESSURE: 140 MMHG | DIASTOLIC BLOOD PRESSURE: 68 MMHG | WEIGHT: 185.9 LBS

## 2025-03-19 DIAGNOSIS — Z95.2 S/P AVR: ICD-10-CM

## 2025-03-19 DIAGNOSIS — I50.32 CHRONIC DIASTOLIC CHF (CONGESTIVE HEART FAILURE) (HCC): ICD-10-CM

## 2025-03-19 DIAGNOSIS — I25.10 CORONARY ARTERY DISEASE INVOLVING NATIVE CORONARY ARTERY OF NATIVE HEART WITHOUT ANGINA PECTORIS: ICD-10-CM

## 2025-03-19 DIAGNOSIS — Q23.81 BICUSPID AORTIC VALVE: ICD-10-CM

## 2025-03-19 DIAGNOSIS — E78.2 MIXED HYPERLIPIDEMIA: ICD-10-CM

## 2025-03-19 DIAGNOSIS — I10 PRIMARY HYPERTENSION: Primary | ICD-10-CM

## 2025-03-19 DIAGNOSIS — I77.810 ASCENDING AORTA DILATION (HCC): ICD-10-CM

## 2025-03-19 PROCEDURE — 99214 OFFICE O/P EST MOD 30 MIN: CPT | Performed by: INTERNAL MEDICINE

## 2025-03-19 NOTE — PATIENT INSTRUCTIONS
I will continue the patient's present medical regimen.  I have asked the patient to call if there is a problem in the interim otherwise I will see the patient in six months time.  I did order cardiac rehab.

## 2025-03-19 NOTE — PROGRESS NOTES
"Daily Note     Today's date: 3/21/2025  Patient name: Jacquie Viveros  : 1960  MRN: [id number]  Referring provider: Anshu Rios MD  Dx:   Encounter Diagnosis     ICD-10-CM    1. Cubital tunnel syndrome on right  G56.21       2. Weakness of both hands  R29.898       3. Cramping of hands  R25.2           Start Time: 1140  Stop Time: 1235  Total time in clinic (min): 55 minutes    Subjective: \"Everything has been pretty good besides the right index finger.\"      Objective: See treatment diary below      Assessment: Tolerated treatment well. Pt reports that both hands have been feeling good overall function wise, except for her R IF, which cont to trigger occasionally. Created custom R IF trigger splint for pt, as well as provided her with digisleeves for edema management. Pt participated in all exercises well, req min-mod rest breaks to manage fatigue. Cont to encourage pt to remain complaint with exercises and other recc to manage symptoms. Patient would benefit from continued OT      Plan: Continue per plan of care.  Progress treatment as tolerated.       Precautions: R Cubital tunnel syndrome, b/l hand weakness, cramping of both hands (Osteoarthritis in BUE & R IF Trigger finger)  2 times a week for 6 weeks for intrinsic strengthening, due to patient experiencing cramping in the hands, thenar strengthening and tendon glides.  All modalities as seen fit by therapist, emphasis on home exercise program  Ulnar nerve glides    Manuals   IE  2/25  2/27  3/4  3/11 3/18 3/21       edema mobs    5'              STM/IASTM  10' (BUE cubital tunnel, intrinsics) 15' (BUE cubital tunnel, intrinsics) 15' (BUE cubital & carpal tunnels, intrinsics) 15' (BUE cubital & carpal tunnel, intrinsics) 15' (BUE cubital & carpal tunnel; intrinsics) 15' (BUE cubital & carpal tunnel; intrinsics)       digisleeves / compression gloves            Provided L & XL digisleeves b/l index fingers                                  "           Ther Ex      HEP: Wrist/digit/ thumb AROM; UNGs/flosses, upper trap stretch, wrist stretches, intrinsic stretches                 scap squeezes    x15  x15  x15  x15 x15 x15      Upper trap stretch  2x15 secs 2x15 secs 2x15 secs 2x15 secs 2x15 secs 2x15 secs      Elbow/FA AROM  X15 FA X15 FA X15 FA  x15 FA        Wrist stretches  2x15s BUE 2x15s BUE 2x15 secs BUE 2x15 secs BUE 2x15 secs BUE 2x15 secs      Wrist AROM  X15 flex/ext  X15 rad/uln X15 flex/ext  X15 rad/uln X15 flex/ext  X15 rad/uln  x15 flex/ext  X15 rad/uln        TGEs  X10 BUE X10 BUE X10 BUE  x10 BUE X10 BUE X10 BUE      Digit abd/add  X15 BUE X15 BUE X10 BUE  x10 BUE        UNGs/flosses  X15 BUE X15 BUE X15 BUE X15 BUE X15 BUE X15 BUE      Intrinsic stretches  2x20 secs BUE each digit 4x20 secs b/l index fingers    2x20 secs other digits 4x20 secs b/l index fingers    2x20 secs other digits 4x20 secs b/l index fingers    2x20 secs other digits 4x20 secs b/l index fingers    2x20 secs other digits 4x20 secs b/l index fingers    2x20 secs others digits      Thumb AROM / strengthening Next session                         Graded strengthening   Pink foam between each set of fingers BUE x10 each Pink foam between each set of fingers BUE x10 each    Pinch clips (R,G) 3 jaw manuel x1 lap RUE; (G,G) LUE     Pink foam between each set of fingers BUE x10 each    Pinch clips (G,G) 3 jaw cuck x1 lap RUE; (B,G) LUE Pinky foam b/w each set of fingers BUE x10 each Pink foam b/w each set of fingers BUE x10 each    Pinch clips (B,B) 3 jaw manuel x1 lap RUE; LUE      Theraputty HEP Next session Provided Y theraputty HEP  Cont EDU         Theraband HEP      Provided R; EDU on scap retrac, shoulder ext, rev flys                                             Ther Activity                   FMC                  Gripper & pegs                Activity modification EDU                                                                                   Neuro Re-Ed                                                                Ortho Fit                  Towel splint             Trigger splint      NEXT SESSION 10' R IF                         Modalities                  heat  5'  5'  5'  5'  5' 5' 5'       paraffin          next session 5' 5'

## 2025-03-21 ENCOUNTER — OFFICE VISIT (OUTPATIENT)
Dept: OCCUPATIONAL THERAPY | Age: 65
End: 2025-03-21
Payer: MEDICARE

## 2025-03-21 DIAGNOSIS — R29.898 WEAKNESS OF BOTH HANDS: ICD-10-CM

## 2025-03-21 DIAGNOSIS — G56.21 CUBITAL TUNNEL SYNDROME ON RIGHT: Primary | ICD-10-CM

## 2025-03-21 DIAGNOSIS — R25.2 CRAMPING OF HANDS: ICD-10-CM

## 2025-03-21 PROCEDURE — 97140 MANUAL THERAPY 1/> REGIONS: CPT

## 2025-03-21 PROCEDURE — 97110 THERAPEUTIC EXERCISES: CPT

## 2025-03-24 NOTE — PROGRESS NOTES
"Daily Note     Today's date: 3/25/2025  Patient name: Jacquie Viveros  : 1960  MRN: [id number]  Referring provider: Anshu Rios MD  Dx:   Encounter Diagnosis     ICD-10-CM    1. Cubital tunnel syndrome on right  G56.21       2. Weakness of both hands  R29.898       3. Cramping of hands  R25.2           Start Time: 1140  Stop Time: 1232  Total time in clinic (min): 52 minutes    Subjective: \"it was pretty achy yesterday.\"      Objective: See treatment diary below      Assessment: Tolerated treatment well. Pt reports that overall symptoms of cramping & arthritis in BUE have been intermittent since previous session. Pt reports that trigger splint has helped reduce frequency of triggering of R IF. Cont to review HEP, which pt appears to be compliant with. Min inc in swelling still present around b/l index fingers. Pt participated in all exercises & activities well, req mod rest breaks to manage fatigue. Will cont to progress as pt can tolerate. Patient would benefit from continued OT      Plan: Continue per plan of care.  Progress treatment as tolerated.       Precautions: R Cubital tunnel syndrome, b/l hand weakness, cramping of both hands (Osteoarthritis in BUE & R IF Trigger finger)  2 times a week for 6 weeks for intrinsic strengthening, due to patient experiencing cramping in the hands, thenar strengthening and tendon glides.  All modalities as seen fit by therapist, emphasis on home exercise program  Ulnar nerve glides    Manuals   IE  2/25  2/27  3/4  3/11 3/18 3/21 3/25      edema mobs    5'              STM/IASTM  10' (BUE cubital tunnel, intrinsics) 15' (BUE cubital tunnel, intrinsics) 15' (BUE cubital & carpal tunnels, intrinsics) 15' (BUE cubital & carpal tunnel, intrinsics) 15' (BUE cubital & carpal tunnel; intrinsics) 15' (BUE cubital & carpal tunnel; intrinsics) 15' (BUE cubital; & carpal tunnel; intrinsics)      digisleeves / compression gloves            Provided L & XL " digisleeves b/l index fingers                                            Ther Ex      HEP: Wrist/digit/ thumb AROM; UNGs/flosses, upper trap stretch, wrist stretches, intrinsic stretches                 scap squeezes    x15  x15  x15  x15 x15 x15 x15     Upper trap stretch  2x15 secs 2x15 secs 2x15 secs 2x15 secs 2x15 secs 2x15 secs 2x15 secs     Elbow/FA AROM  X15 FA X15 FA X15 FA  x15 FA        Wrist stretches  2x15s BUE 2x15s BUE 2x15 secs BUE 2x15 secs BUE 2x15 secs BUE 2x15 secs 2x15 secs     Wrist AROM  X15 flex/ext  X15 rad/uln X15 flex/ext  X15 rad/uln X15 flex/ext  X15 rad/uln  x15 flex/ext  X15 rad/uln        TGEs  X10 BUE X10 BUE X10 BUE  x10 BUE X10 BUE X10 BUE X10 BUE     Digit abd/add  X15 BUE X15 BUE X10 BUE  x10 BUE        UNGs/flosses  X15 BUE X15 BUE X15 BUE X15 BUE X15 BUE X15 BUE X15 BUE     Intrinsic stretches  2x20 secs BUE each digit 4x20 secs b/l index fingers    2x20 secs other digits 4x20 secs b/l index fingers    2x20 secs other digits 4x20 secs b/l index fingers    2x20 secs other digits 4x20 secs b/l index fingers    2x20 secs other digits 4x20 secs b/l index fingers    2x20 secs others digits 4x20 secs b/l index fingers    2x20 secs all other digits     Thumb AROM / strengthening Next session                         Graded strengthening   Pink foam between each set of fingers BUE x10 each Pink foam between each set of fingers BUE x10 each    Pinch clips (R,G) 3 jaw manuel x1 lap RUE; (G,G) LUE     Pink foam between each set of fingers BUE x10 each    Pinch clips (G,G) 3 jaw cuck x1 lap RUE; (B,G) LUE Pinky foam b/w each set of fingers BUE x10 each Pink foam b/w each set of fingers BUE x10 each    Pinch clips (B,B) 3 jaw manuel x1 lap RUE; LUE Pinch clips (B,B) 3 jaw manuel x1 lap RUE, LUE     Theraputty HEP Next session Provided Y theraputty HEP  Cont EDU         Theraband HEP      Provided R; EDU on scap retrac, shoulder ext, rev flys                                             Ther  Activity                   FMC                  Gripper & pegs                Activity modification EDU                Putty press           3# x10 reps BUE                                                        Neuro Re-Ed                                                               Ortho Fit                  Towel splint             Trigger splint      NEXT SESSION 10' R IF                         Modalities                  heat  5'  5'  5'  5'  5' 5' 5' 5'      paraffin          next session 5' 5' 5'

## 2025-03-25 ENCOUNTER — OFFICE VISIT (OUTPATIENT)
Dept: OCCUPATIONAL THERAPY | Age: 65
End: 2025-03-25
Payer: MEDICARE

## 2025-03-25 DIAGNOSIS — E11.3293 TYPE 2 DIABETES MELLITUS WITH BOTH EYES AFFECTED BY MILD NONPROLIFERATIVE RETINOPATHY WITHOUT MACULAR EDEMA, WITHOUT LONG-TERM CURRENT USE OF INSULIN (HCC): ICD-10-CM

## 2025-03-25 DIAGNOSIS — I25.10 CORONARY ARTERY DISEASE INVOLVING NATIVE CORONARY ARTERY OF NATIVE HEART WITHOUT ANGINA PECTORIS: ICD-10-CM

## 2025-03-25 DIAGNOSIS — R29.898 WEAKNESS OF BOTH HANDS: ICD-10-CM

## 2025-03-25 DIAGNOSIS — R25.2 CRAMPING OF HANDS: ICD-10-CM

## 2025-03-25 DIAGNOSIS — G56.21 CUBITAL TUNNEL SYNDROME ON RIGHT: Primary | ICD-10-CM

## 2025-03-25 DIAGNOSIS — I50.32 CHRONIC DIASTOLIC HEART FAILURE (HCC): ICD-10-CM

## 2025-03-25 DIAGNOSIS — I10 PRIMARY HYPERTENSION: ICD-10-CM

## 2025-03-25 PROCEDURE — 97110 THERAPEUTIC EXERCISES: CPT

## 2025-03-25 PROCEDURE — 97140 MANUAL THERAPY 1/> REGIONS: CPT

## 2025-03-25 NOTE — TELEPHONE ENCOUNTER
Medication: Empagliflozin (Jardiance) 10 MG TABS tablet     Dose/Frequency: Take 1 tablet (10 mg total) by mouth every morning     Quantity: 90    Pharmacy: [place]    Office:   [x] PCP/Provider -   [] Speciality/Provider -     Does the patient have enough for 3 days?   [] Yes   [x] No - Send as HP to POD

## 2025-03-26 ENCOUNTER — HOSPITAL ENCOUNTER (OUTPATIENT)
Dept: VASCULAR ULTRASOUND | Facility: HOSPITAL | Age: 65
Discharge: HOME | End: 2025-03-26
Payer: MEDICARE

## 2025-03-26 ENCOUNTER — RESULTS FOLLOW-UP (OUTPATIENT)
Dept: FAMILY MEDICINE CLINIC | Facility: CLINIC | Age: 65
End: 2025-03-26

## 2025-03-26 DIAGNOSIS — I73.9 CLAUDICATION (HCC): ICD-10-CM

## 2025-03-26 PROCEDURE — 93925 LOWER EXTREMITY STUDY: CPT

## 2025-03-26 PROCEDURE — 93922 UPR/L XTREMITY ART 2 LEVELS: CPT | Performed by: SURGERY

## 2025-03-26 PROCEDURE — 93925 LOWER EXTREMITY STUDY: CPT | Performed by: SURGERY

## 2025-03-26 PROCEDURE — 93923 UPR/LXTR ART STDY 3+ LVLS: CPT

## 2025-03-26 NOTE — PROGRESS NOTES
"Daily Note     Today's date: 3/26/2025  Patient name: Jacquie Viveros  : 1960  MRN: [id number]  Referring provider: Anshu Rios MD  Dx:   Encounter Diagnosis     ICD-10-CM    1. Cubital tunnel syndrome on right  G56.21       2. Weakness of both hands  R29.898       3. Cramping of hands  R25.2                      Subjective: \"***.\"      Objective: See treatment diary below      Assessment: Tolerated treatment well. ***. Patient would benefit from continued OT      Plan: Continue per plan of care.  Progress treatment as tolerated.       Precautions: R Cubital tunnel syndrome, b/l hand weakness, cramping of both hands (Osteoarthritis in BUE & R IF Trigger finger)  2 times a week for 6 weeks for intrinsic strengthening, due to patient experiencing cramping in the hands, thenar strengthening and tendon glides.  All modalities as seen fit by therapist, emphasis on home exercise program  Ulnar nerve glides    Manuals   IE  2/25  2/27  3/4  3/11 3/18 3/21 3/25 4/1     edema mobs    5'              STM/IASTM  10' (BUE cubital tunnel, intrinsics) 15' (BUE cubital tunnel, intrinsics) 15' (BUE cubital & carpal tunnels, intrinsics) 15' (BUE cubital & carpal tunnel, intrinsics) 15' (BUE cubital & carpal tunnel; intrinsics) 15' (BUE cubital & carpal tunnel; intrinsics) 15' (BUE cubital; & carpal tunnel; intrinsics)      digisleeves / compression gloves            Provided L & XL digisleeves b/l index fingers                                            Ther Ex      HEP: Wrist/digit/ thumb AROM; UNGs/flosses, upper trap stretch, wrist stretches, intrinsic stretches                 scap squeezes    x15  x15  x15  x15 x15 x15 x15     Upper trap stretch  2x15 secs 2x15 secs 2x15 secs 2x15 secs 2x15 secs 2x15 secs 2x15 secs     Elbow/FA AROM  X15 FA X15 FA X15 FA  x15 FA        Wrist stretches  2x15s BUE 2x15s BUE 2x15 secs BUE 2x15 secs BUE 2x15 secs BUE 2x15 secs 2x15 secs     Wrist AROM  X15 flex/ext  X15 " rad/uln X15 flex/ext  X15 rad/uln X15 flex/ext  X15 rad/uln  x15 flex/ext  X15 rad/uln        TGEs  X10 BUE X10 BUE X10 BUE  x10 BUE X10 BUE X10 BUE X10 BUE     Digit abd/add  X15 BUE X15 BUE X10 BUE  x10 BUE        UNGs/flosses  X15 BUE X15 BUE X15 BUE X15 BUE X15 BUE X15 BUE X15 BUE     Intrinsic stretches  2x20 secs BUE each digit 4x20 secs b/l index fingers    2x20 secs other digits 4x20 secs b/l index fingers    2x20 secs other digits 4x20 secs b/l index fingers    2x20 secs other digits 4x20 secs b/l index fingers    2x20 secs other digits 4x20 secs b/l index fingers    2x20 secs others digits 4x20 secs b/l index fingers    2x20 secs all other digits     Thumb AROM / strengthening Next session                         Graded strengthening   Pink foam between each set of fingers BUE x10 each Pink foam between each set of fingers BUE x10 each    Pinch clips (R,G) 3 jaw manuel x1 lap RUE; (G,G) LUE     Pink foam between each set of fingers BUE x10 each    Pinch clips (G,G) 3 jaw cuck x1 lap RUE; (B,G) LUE Pinky foam b/w each set of fingers BUE x10 each Pink foam b/w each set of fingers BUE x10 each    Pinch clips (B,B) 3 jaw manuel x1 lap RUE; LUE Pinch clips (B,B) 3 jaw manuel x1 lap RUE, LUE     Theraputty HEP Next session Provided Y theraputty HEP  Cont EDU         Theraband HEP      Provided R; EDU on scap retrac, shoulder ext, rev flys                                             Ther Activity                   FMC                  Gripper & pegs                Activity modification EDU                Putty press           3# x10 reps BUE                                                        Neuro Re-Ed                                                               Ortho Fit                  Towel splint             Trigger splint      NEXT SESSION 10' R IF                         Modalities                  heat  5'  5'  5'  5'  5' 5' 5' 5'      paraffin          next session 5' 5' 5'

## 2025-03-27 DIAGNOSIS — L21.9 SEBORRHEA: ICD-10-CM

## 2025-03-28 RX ORDER — KETOCONAZOLE 20 MG/ML
SHAMPOO, SUSPENSION TOPICAL
Qty: 120 ML | Refills: 0 | Status: SHIPPED | OUTPATIENT
Start: 2025-03-28

## 2025-04-01 ENCOUNTER — APPOINTMENT (OUTPATIENT)
Dept: OCCUPATIONAL THERAPY | Age: 65
End: 2025-04-01
Payer: MEDICARE

## 2025-04-01 NOTE — PROGRESS NOTES
"Daily Note     Today's date: 4/3/2025  Patient name: Jacquie Viveros  : 1960  MRN: [id number]  Referring provider: Anshu Rios MD  Dx:   Encounter Diagnosis     ICD-10-CM    1. Cubital tunnel syndrome on right  G56.21       2. Weakness of both hands  R29.898       3. Cramping of hands  R25.2           Start Time: 1055  Stop Time: 1145  Total time in clinic (min): 50 minutes    Subjective: \"It's felt good overall except for some more swelling.\"      Objective: See treatment diary below      Assessment: Tolerated treatment well. Pt reported that she had two instances of inc pain/swelling over the past week, which occurred during inc repetitive activity with her b/l hands/digits. Cont EDU on activity modification and joint protection strategies in regard to her current arthritis. Pt did present with inc swelling still at this time around b/l index fingers, will cont to monitor in upcoming sessions. Pt participated in all exercises/activities fair, req min-mod rest breaks to manage min fatigue and instances of inc pain in index fingers. Patient would benefit from continued OT      Plan: Continue per plan of care.  Progress treatment as tolerated.       Precautions: R Cubital tunnel syndrome, b/l hand weakness, cramping of both hands (Osteoarthritis in BUE & R IF Trigger finger)  2 times a week for 6 weeks for intrinsic strengthening, due to patient experiencing cramping in the hands, thenar strengthening and tendon glides.  All modalities as seen fit by therapist, emphasis on home exercise program  Ulnar nerve glides    Manuals   IE  2/25  2/27  3/4  3/11 3/18 3/21 3/25 4/3     edema mobs    5'          8'    STM/IASTM  10' (BUE cubital tunnel, intrinsics) 15' (BUE cubital tunnel, intrinsics) 15' (BUE cubital & carpal tunnels, intrinsics) 15' (BUE cubital & carpal tunnel, intrinsics) 15' (BUE cubital & carpal tunnel; intrinsics) 15' (BUE cubital & carpal tunnel; intrinsics) 15' (BUE cubital; & " carpal tunnel; intrinsics) 7' (hand & intrinsics)     digisleeves / compression gloves            Provided L & XL digisleeves b/l index fingers                                            Ther Ex      HEP: Wrist/digit/ thumb AROM; UNGs/flosses, upper trap stretch, wrist stretches, intrinsic stretches                 scap squeezes    x15  x15  x15  x15 x15 x15 x15     Upper trap stretch  2x15 secs 2x15 secs 2x15 secs 2x15 secs 2x15 secs 2x15 secs 2x15 secs 2x15 secs    Elbow/FA AROM  X15 FA X15 FA X15 FA  x15 FA        Wrist stretches  2x15s BUE 2x15s BUE 2x15 secs BUE 2x15 secs BUE 2x15 secs BUE 2x15 secs 2x15 secs 2x15 secs    Wrist AROM  X15 flex/ext  X15 rad/uln X15 flex/ext  X15 rad/uln X15 flex/ext  X15 rad/uln  x15 flex/ext  X15 rad/uln        TGEs  X10 BUE X10 BUE X10 BUE  x10 BUE X10 BUE X10 BUE X10 BUE X10 BUE    Digit abd/add  X15 BUE X15 BUE X10 BUE  x10 BUE        UNGs/flosses  X15 BUE X15 BUE X15 BUE X15 BUE X15 BUE X15 BUE X15 BUE X15 BUE    Intrinsic stretches  2x20 secs BUE each digit 4x20 secs b/l index fingers    2x20 secs other digits 4x20 secs b/l index fingers    2x20 secs other digits 4x20 secs b/l index fingers    2x20 secs other digits 4x20 secs b/l index fingers    2x20 secs other digits 4x20 secs b/l index fingers    2x20 secs others digits 4x20 secs b/l index fingers    2x20 secs all other digits 4x20 secs b/l index fingers    2x20 secs all other digits    Thumb AROM / strengthening Next session                         Graded strengthening   Pink foam between each set of fingers BUE x10 each Pink foam between each set of fingers BUE x10 each    Pinch clips (R,G) 3 jaw manuel x1 lap RUE; (G,G) LUE     Pink foam between each set of fingers BUE x10 each    Pinch clips (G,G) 3 jaw cuck x1 lap RUE; (B,G) LUE Pinky foam b/w each set of fingers BUE x10 each Pink foam b/w each set of fingers BUE x10 each    Pinch clips (B,B) 3 jaw manuel x1 lap RUE; LUE Pinch clips (B,B) 3 jaw manuel x1 lap RUE, LUE      Theraputty HEP Next session Provided Y theraputty HEP  Cont EDU         Theraband HEP      Provided R; EDU on scap retrac, shoulder ext, rev flys   P webslide x15 scap retrac    P shoulder ext x15                                          Ther Activity                   FMC                  Gripper & pegs                Activity modification EDU                Putty press           3# x10 reps BUE 3# x10 reps BUE                                                       Neuro Re-Ed                                                               Ortho Fit                  Towel splint             Trigger splint      NEXT SESSION 10' R IF                         Modalities                  heat  5'  5'  5'  5'  5' 5' 5' 5' 5'     paraffin          next session 5' 5' 5'

## 2025-04-03 ENCOUNTER — OFFICE VISIT (OUTPATIENT)
Dept: OCCUPATIONAL THERAPY | Age: 65
End: 2025-04-03
Payer: MEDICARE

## 2025-04-03 DIAGNOSIS — G56.21 CUBITAL TUNNEL SYNDROME ON RIGHT: Primary | ICD-10-CM

## 2025-04-03 DIAGNOSIS — R29.898 WEAKNESS OF BOTH HANDS: ICD-10-CM

## 2025-04-03 DIAGNOSIS — R25.2 CRAMPING OF HANDS: ICD-10-CM

## 2025-04-03 PROCEDURE — 97140 MANUAL THERAPY 1/> REGIONS: CPT

## 2025-04-03 PROCEDURE — 97110 THERAPEUTIC EXERCISES: CPT

## 2025-04-03 NOTE — PROGRESS NOTES
"OT-Discharge    Today's date: 2025  Patient name: Jacquie Viveros  : 1960  MRN: [id number]  Referring provider: Anshu Rios MD  Dx:   Encounter Diagnosis     ICD-10-CM    1. Cubital tunnel syndrome on right  G56.21       2. Weakness of both hands  R29.898       3. Cramping of hands  R25.2           Start Time: 1140  Stop Time: 1230  Total time in clinic (min): 50 minutes    Subjective: \"They feel pretty good today.\"      Objective: See treatment diary below     Goals  Short term (within 3-4 visits of IE):  Pt will be indep with recc HEP focusing on digit/thumb/wrist AROM, UNGs/flosses, and upper trap/wrist/intrinsic stretches MET  Pt will be EDU on appropriate activity modifications, modalities and manual techniques to dec current symptoms of pain and inc overall function. MET  Pt will display indep in the management and wearing schedule of their custom orthosis if necessary MET  Pt will display indep in appropriate edema/scar mob techniques  MET  Pt will display tolerance to begin intrinsic strengthening exercises MET  Long term (by time of d/c):  Pt will report dec pain (<4/10) during affected ADL & IADL tasks compared to IE to improve overall activity tolerance. MET  Pt will display inc AROM (>5 degree inc) of affected UEs compared to IE in order to inc ability to participate in ADL & IADL tasks MET  Pt will display inc  & pinch strength of affected UE compared to IE in order to return to full participation in affected ADL & IADL tasks MET  Pt will display inc of 10 or more points in FOTO score compared to IE. MET  Pt will report that she is able to maintain grasp on everyday objects for extended period of time (ex. Pen) with BUEs. MET  Pt will be agreeable to d/c from OT. MET    Compared to IE on 25    Pain  Current pain ratin was 4  At best pain ratin was 3  At worst pain ratin was 9    Tissue Integrity: pt still displays inc swelling around b/l index finger MCP " joints     Sensation (Ten-Test )  Right Hand (thumb to small finger): 10/10, 10/10, 10/10 was 9/10, 10/10, 10/10  Left Hand (thumb to small finger): 10/10, 10/10, 10/10 was 9/10, 10/10, 10/10     Sensation (McIntyre-Desmond)  R thumb: 2.83mm  was 3.61 mm (diminished light touch)  All other digits: 2.83 mm (normal)     Special Tests:   Elbow flexion test (R): -; L: -  Tinel's cubital tunnel (R): -; L: -  Wartenberg's (R): - was +; L: - was +  Crossed finger test (R): - was +; L: - was +     MMTs  Intrinsics: R: 5/5 was 4/5; L: 5/5 was 4-/5     Edema (circumferential) (cm):     Right Left   Wrist crease 16.1 was 16.3 16.4 was 16.4   Palm 19.3 was 19.3 20 was 20   MCPs 19 was 19 19.1 was 19.3         AROM      Elbow/Forearm    Right Left   Extension/Flexion WFL WFL   Supination WFL WFL   Pronation WFL WFL      Wrist    Right Left   Flexion 70 was 59 60 was 55   Extension 58 was 46 49 was 39   Radial Dev. 20 was 10 16 was 5   Ulnar Dev. 29 was 29 35 was 30      Right Hand (ext/flex)    D2 D3 D4 D5   MCP 0/65 was 0/35 0/75 was 0/40 0/75 was 0/35 0/80 was 0/28   PIP 0/94 was 0/81 0/98 was 0/81 0/95 was 0/85 0/90 was 0/85   DIP 0/75 was 0/50 0/75 was 0/60 0/75 was 0/48 0/75 was 0/40   Kapandji Score (Opposition) 10/10 was 6/10            Left Hand (ext/flex)    D2 D3 D4 D5   MCP 0/55 was 0/20 0/80 was 0/15 0/75 was 0/15 0/75 was 0/20   PIP 0/100 was 0/75 0/100 was 0/75 0/104 was 0/75 0/80 was 0/73   DIP 0/70 was 0/55 0/78 was 0/60 0/78 was 0/55 0/75 was 0/60   Kapandji Score (opposition) 10/10 was 7/10               Thumb    Right Left   MP  60 was 30 63 was 39   IP 70 was 51 83 was 48   Palmar Abd. 65 was 35 65 was 45   Radial Abd. 75 was 39 80 was 48      /Pinch Strength  Dynamometer R UE  L UE comments   Position #2 (lbs) 30.0 was 5.1 37.1 was 6.9     Pinch Meter          Lateral 12 was 6 13 was 4      3 JAW KITTY 10 was 2 14 was 2      2-point 6 was 1.5  10.5 was 1.5          Assessment: Tolerated treatment well.  Performed OT Re-Eval upon d/c today, where pt has met all goals at this time. She displayed full protective sensation in all digits. She also displayed all negative tests in regard to ulnar nerve impairment at this time. Pt displayed signficant inc in b/l wrist, digit and thumb Rom compared to IE. She also displayed significant inc in BUE  and pinch strength. Pt participated in all exercises well, req min rest breaks to manage fatigue. Cont EDU to pt about HEP, activity modifications, modality techniques and joint protection techniques. Patient agreeable to d/c from OT services.    Plan: D/c from OT services.      Precautions: R Cubital tunnel syndrome, b/l hand weakness, cramping of both hands (Osteoarthritis in BUE & R IF Trigger finger)  2 times a week for 6 weeks for intrinsic strengthening, due to patient experiencing cramping in the hands, thenar strengthening and tendon glides.  All modalities as seen fit by therapist, emphasis on home exercise program  Ulnar nerve glides    Manuals  2/11 IE  2/25  2/27  3/4  3/11 3/18 3/21 3/25 4/3 4/8 Re-Eval FOTO    edema mobs    5'          8' 8'   STM/IASTM  10' (BUE cubital tunnel, intrinsics) 15' (BUE cubital tunnel, intrinsics) 15' (BUE cubital & carpal tunnels, intrinsics) 15' (BUE cubital & carpal tunnel, intrinsics) 15' (BUE cubital & carpal tunnel; intrinsics) 15' (BUE cubital & carpal tunnel; intrinsics) 15' (BUE cubital; & carpal tunnel; intrinsics) 7' (hand & intrinsics) 7'    digisleeves / compression gloves            Provided L & XL digisleeves b/l index fingers                                            Ther Ex      HEP: Wrist/digit/ thumb AROM; UNGs/flosses, upper trap stretch, wrist stretches, intrinsic stretches                 scap squeezes    x15  x15  x15  x15 x15 x15 x15     Upper trap stretch  2x15 secs 2x15 secs 2x15 secs 2x15 secs 2x15 secs 2x15 secs 2x15 secs 2x15 secs 2x15 secs   Elbow/FA AROM  X15 FA X15 FA X15 FA  x15 FA        Wrist  stretches  2x15s BUE 2x15s BUE 2x15 secs BUE 2x15 secs BUE 2x15 secs BUE 2x15 secs 2x15 secs 2x15 secs 2x15 secs   Wrist AROM  X15 flex/ext  X15 rad/uln X15 flex/ext  X15 rad/uln X15 flex/ext  X15 rad/uln  x15 flex/ext  X15 rad/uln        TGEs  X10 BUE X10 BUE X10 BUE  x10 BUE X10 BUE X10 BUE X10 BUE X10 BUE    Digit abd/add  X15 BUE X15 BUE X10 BUE  x10 BUE        UNGs/flosses  X15 BUE X15 BUE X15 BUE X15 BUE X15 BUE X15 BUE X15 BUE X15 BUE X15 BUE   Intrinsic stretches  2x20 secs BUE each digit 4x20 secs b/l index fingers    2x20 secs other digits 4x20 secs b/l index fingers    2x20 secs other digits 4x20 secs b/l index fingers    2x20 secs other digits 4x20 secs b/l index fingers    2x20 secs other digits 4x20 secs b/l index fingers    2x20 secs others digits 4x20 secs b/l index fingers    2x20 secs all other digits 4x20 secs b/l index fingers    2x20 secs all other digits 4x20 secs b/l  index fingers    2x20 secs all other digits   Thumb AROM / strengthening Next session                         Graded strengthening   Pink foam between each set of fingers BUE x10 each Pink foam between each set of fingers BUE x10 each    Pinch clips (R,G) 3 jaw manuel x1 lap RUE; (G,G) LUE     Pink foam between each set of fingers BUE x10 each    Pinch clips (G,G) 3 jaw cuck x1 lap RUE; (B,G) LUE Pinky foam b/w each set of fingers BUE x10 each Pink foam b/w each set of fingers BUE x10 each    Pinch clips (B,B) 3 jaw manuel x1 lap RUE; LUE Pinch clips (B,B) 3 jaw manuel x1 lap RUE, LUE     Theraputty HEP Next session Provided Y theraputty HEP  Cont EDU         Theraband HEP      Provided R; EDU on scap retrac, shoulder ext, rev flys   P webslide x15 scap retrac    P shoulder ext x15                                          Ther Activity                   FMC                  Gripper & pegs                Activity modification EDU                Putty press           3# x10 reps BUE 3# x10 reps BUE                                                        Neuro Re-Ed                                                               Ortho Fit                  Towel splint             Trigger splint      NEXT SESSION 10' R IF                         Modalities                  heat  5'  5'  5'  5'  5' 5' 5' 5' 5' 5'    paraffin          next session 5' 5' 5'

## 2025-04-08 ENCOUNTER — OFFICE VISIT (OUTPATIENT)
Dept: OCCUPATIONAL THERAPY | Age: 65
End: 2025-04-08
Payer: MEDICARE

## 2025-04-08 DIAGNOSIS — R25.2 CRAMPING OF HANDS: ICD-10-CM

## 2025-04-08 DIAGNOSIS — G56.21 CUBITAL TUNNEL SYNDROME ON RIGHT: Primary | ICD-10-CM

## 2025-04-08 DIAGNOSIS — R29.898 WEAKNESS OF BOTH HANDS: ICD-10-CM

## 2025-04-08 PROCEDURE — 97168 OT RE-EVAL EST PLAN CARE: CPT

## 2025-04-08 PROCEDURE — 97140 MANUAL THERAPY 1/> REGIONS: CPT

## 2025-04-08 PROCEDURE — 97110 THERAPEUTIC EXERCISES: CPT

## 2025-05-21 ENCOUNTER — RA CDI HCC (OUTPATIENT)
Dept: OTHER | Facility: HOSPITAL | Age: 65
End: 2025-05-21

## 2025-05-21 NOTE — PROGRESS NOTES
HCC coding opportunities          Chart Reviewed number of suggestions sent to Provider: 3     Patients Insurance   I11.0, E11.36 and E11.51  Medicare Insurance: Medicare

## 2025-05-22 ENCOUNTER — HOSPITAL ENCOUNTER (OUTPATIENT)
Facility: HOSPITAL | Age: 65
Setting detail: OBSERVATION
Discharge: HOME | End: 2025-05-23
Attending: EMERGENCY MEDICINE | Admitting: INTERNAL MEDICINE
Payer: MEDICARE

## 2025-05-22 ENCOUNTER — APPOINTMENT (EMERGENCY)
Dept: RADIOLOGY | Facility: HOSPITAL | Age: 65
End: 2025-05-22
Payer: MEDICARE

## 2025-05-22 ENCOUNTER — APPOINTMENT (EMERGENCY)
Dept: CT IMAGING | Facility: HOSPITAL | Age: 65
End: 2025-05-22
Payer: MEDICARE

## 2025-05-22 DIAGNOSIS — R07.9 CHEST PAIN: ICD-10-CM

## 2025-05-22 DIAGNOSIS — E87.1 HYPONATREMIA: ICD-10-CM

## 2025-05-22 DIAGNOSIS — R11.2 NAUSEA & VOMITING: Primary | ICD-10-CM

## 2025-05-22 DIAGNOSIS — N17.9 AKI (ACUTE KIDNEY INJURY) (HCC): ICD-10-CM

## 2025-05-22 DIAGNOSIS — E83.52 HYPERCALCEMIA: ICD-10-CM

## 2025-05-22 DIAGNOSIS — D50.9 MICROCYTIC ANEMIA: ICD-10-CM

## 2025-05-22 DIAGNOSIS — R06.02 SHORTNESS OF BREATH: ICD-10-CM

## 2025-05-22 LAB
ALBUMIN SERPL BCG-MCNC: 4.6 G/DL (ref 3.5–5)
ALP SERPL-CCNC: 51 U/L (ref 34–104)
ALT SERPL W P-5'-P-CCNC: 17 U/L (ref 7–52)
ANION GAP SERPL CALCULATED.3IONS-SCNC: 8 MMOL/L (ref 4–13)
AST SERPL W P-5'-P-CCNC: 16 U/L (ref 13–39)
B-OH-BUTYR SERPL-MCNC: 0.33 MMOL/L (ref 0.2–0.6)
BASOPHILS # BLD AUTO: 0.07 THOUSANDS/ÂΜL (ref 0–0.1)
BASOPHILS NFR BLD AUTO: 1 % (ref 0–1)
BILIRUB SERPL-MCNC: 0.32 MG/DL (ref 0.2–1)
BNP SERPL-MCNC: 26 PG/ML (ref 0–100)
BUN SERPL-MCNC: 28 MG/DL (ref 5–25)
CALCIUM SERPL-MCNC: 11.1 MG/DL (ref 8.4–10.2)
CARDIAC TROPONIN I PNL SERPL HS: 5 NG/L (ref ?–50)
CHLORIDE SERPL-SCNC: 97 MMOL/L (ref 96–108)
CO2 SERPL-SCNC: 28 MMOL/L (ref 21–32)
CREAT SERPL-MCNC: 1.02 MG/DL (ref 0.6–1.3)
D DIMER PPP FEU-MCNC: 0.59 UG/ML FEU
EOSINOPHIL # BLD AUTO: 0.23 THOUSAND/ÂΜL (ref 0–0.61)
EOSINOPHIL NFR BLD AUTO: 2 % (ref 0–6)
ERYTHROCYTE [DISTWIDTH] IN BLOOD BY AUTOMATED COUNT: 19.7 % (ref 11.6–15.1)
GFR SERPL CREATININE-BSD FRML MDRD: 57 ML/MIN/1.73SQ M
GLUCOSE SERPL-MCNC: 152 MG/DL (ref 65–140)
HCT VFR BLD AUTO: 36.5 % (ref 34.8–46.1)
HGB BLD-MCNC: 11.2 G/DL (ref 11.5–15.4)
IMM GRANULOCYTES # BLD AUTO: 0.05 THOUSAND/UL (ref 0–0.2)
IMM GRANULOCYTES NFR BLD AUTO: 1 % (ref 0–2)
LIPASE SERPL-CCNC: 28 U/L (ref 11–82)
LYMPHOCYTES # BLD AUTO: 1.78 THOUSANDS/ÂΜL (ref 0.6–4.47)
LYMPHOCYTES NFR BLD AUTO: 18 % (ref 14–44)
MCH RBC QN AUTO: 22.2 PG (ref 26.8–34.3)
MCHC RBC AUTO-ENTMCNC: 30.7 G/DL (ref 31.4–37.4)
MCV RBC AUTO: 72 FL (ref 82–98)
MONOCYTES # BLD AUTO: 0.53 THOUSAND/ÂΜL (ref 0.17–1.22)
MONOCYTES NFR BLD AUTO: 5 % (ref 4–12)
NEUTROPHILS # BLD AUTO: 7.3 THOUSANDS/ÂΜL (ref 1.85–7.62)
NEUTS SEG NFR BLD AUTO: 73 % (ref 43–75)
NRBC BLD AUTO-RTO: 0 /100 WBCS
PLATELET # BLD AUTO: 205 THOUSANDS/UL (ref 149–390)
PMV BLD AUTO: 9.5 FL (ref 8.9–12.7)
POTASSIUM SERPL-SCNC: 3.9 MMOL/L (ref 3.5–5.3)
PROT SERPL-MCNC: 7.4 G/DL (ref 6.4–8.4)
RBC # BLD AUTO: 5.05 MILLION/UL (ref 3.81–5.12)
SODIUM SERPL-SCNC: 133 MMOL/L (ref 135–147)
WBC # BLD AUTO: 9.96 THOUSAND/UL (ref 4.31–10.16)

## 2025-05-22 PROCEDURE — 84484 ASSAY OF TROPONIN QUANT: CPT

## 2025-05-22 PROCEDURE — 96365 THER/PROPH/DIAG IV INF INIT: CPT

## 2025-05-22 PROCEDURE — 83970 ASSAY OF PARATHORMONE: CPT | Performed by: EMERGENCY MEDICINE

## 2025-05-22 PROCEDURE — 85025 COMPLETE CBC W/AUTO DIFF WBC: CPT

## 2025-05-22 PROCEDURE — 80053 COMPREHEN METABOLIC PANEL: CPT

## 2025-05-22 PROCEDURE — 83880 ASSAY OF NATRIURETIC PEPTIDE: CPT

## 2025-05-22 PROCEDURE — 85379 FIBRIN DEGRADATION QUANT: CPT

## 2025-05-22 PROCEDURE — 96361 HYDRATE IV INFUSION ADD-ON: CPT

## 2025-05-22 PROCEDURE — 82010 KETONE BODYS QUAN: CPT

## 2025-05-22 PROCEDURE — 71045 X-RAY EXAM CHEST 1 VIEW: CPT

## 2025-05-22 PROCEDURE — 96375 TX/PRO/DX INJ NEW DRUG ADDON: CPT

## 2025-05-22 PROCEDURE — 99285 EMERGENCY DEPT VISIT HI MDM: CPT

## 2025-05-22 PROCEDURE — 96376 TX/PRO/DX INJ SAME DRUG ADON: CPT

## 2025-05-22 PROCEDURE — 36415 COLL VENOUS BLD VENIPUNCTURE: CPT

## 2025-05-22 PROCEDURE — 83690 ASSAY OF LIPASE: CPT

## 2025-05-22 PROCEDURE — 74177 CT ABD & PELVIS W/CONTRAST: CPT

## 2025-05-22 PROCEDURE — 93005 ELECTROCARDIOGRAM TRACING: CPT

## 2025-05-22 PROCEDURE — 99285 EMERGENCY DEPT VISIT HI MDM: CPT | Performed by: EMERGENCY MEDICINE

## 2025-05-22 RX ORDER — ACETAMINOPHEN 10 MG/ML
1000 INJECTION, SOLUTION INTRAVENOUS ONCE
Status: COMPLETED | OUTPATIENT
Start: 2025-05-22 | End: 2025-05-22

## 2025-05-22 RX ORDER — ONDANSETRON 2 MG/ML
4 INJECTION INTRAMUSCULAR; INTRAVENOUS ONCE
Status: COMPLETED | OUTPATIENT
Start: 2025-05-22 | End: 2025-05-22

## 2025-05-22 RX ORDER — FAMOTIDINE 10 MG/ML
20 INJECTION, SOLUTION INTRAVENOUS ONCE
Status: COMPLETED | OUTPATIENT
Start: 2025-05-22 | End: 2025-05-22

## 2025-05-22 RX ORDER — SUCRALFATE 1 G/1
1 TABLET ORAL ONCE
Status: COMPLETED | OUTPATIENT
Start: 2025-05-22 | End: 2025-05-22

## 2025-05-22 RX ADMIN — IOHEXOL 100 ML: 350 INJECTION, SOLUTION INTRAVENOUS at 22:37

## 2025-05-22 RX ADMIN — ACETAMINOPHEN 1000 MG: 10 INJECTION INTRAVENOUS at 22:09

## 2025-05-22 RX ADMIN — ONDANSETRON 4 MG: 2 INJECTION INTRAMUSCULAR; INTRAVENOUS at 22:05

## 2025-05-22 RX ADMIN — SODIUM CHLORIDE 500 ML: 0.9 INJECTION, SOLUTION INTRAVENOUS at 23:49

## 2025-05-22 RX ADMIN — SUCRALFATE 1 G: 1 TABLET ORAL at 22:48

## 2025-05-22 RX ADMIN — SODIUM CHLORIDE 500 ML: 0.9 INJECTION, SOLUTION INTRAVENOUS at 22:05

## 2025-05-22 RX ADMIN — FAMOTIDINE 20 MG: 10 INJECTION, SOLUTION INTRAVENOUS at 22:52

## 2025-05-22 RX ADMIN — ONDANSETRON 4 MG: 2 INJECTION INTRAMUSCULAR; INTRAVENOUS at 23:31

## 2025-05-23 VITALS
HEART RATE: 59 BPM | SYSTOLIC BLOOD PRESSURE: 126 MMHG | DIASTOLIC BLOOD PRESSURE: 58 MMHG | OXYGEN SATURATION: 94 % | TEMPERATURE: 98.8 F | RESPIRATION RATE: 16 BRPM

## 2025-05-23 PROBLEM — R07.9 CHEST PAIN: Chronic | Status: ACTIVE | Noted: 2025-05-23

## 2025-05-23 PROBLEM — R06.02 SHORTNESS OF BREATH: Status: ACTIVE | Noted: 2025-05-23

## 2025-05-23 PROBLEM — R07.9 CHEST PAIN: Status: RESOLVED | Noted: 2025-05-23 | Resolved: 2025-05-23

## 2025-05-23 PROBLEM — N17.9 AKI (ACUTE KIDNEY INJURY) (HCC): Status: ACTIVE | Noted: 2025-05-23

## 2025-05-23 PROBLEM — R07.9 CHEST PAIN: Status: ACTIVE | Noted: 2025-05-23

## 2025-05-23 PROBLEM — E87.1 HYPONATREMIA: Status: ACTIVE | Noted: 2025-05-23

## 2025-05-23 PROBLEM — E83.42 HYPOMAGNESEMIA: Status: ACTIVE | Noted: 2025-05-23

## 2025-05-23 PROBLEM — R11.2 INTRACTABLE NAUSEA AND VOMITING: Status: ACTIVE | Noted: 2025-05-23

## 2025-05-23 PROBLEM — N17.9 AKI (ACUTE KIDNEY INJURY) (HCC): Status: RESOLVED | Noted: 2025-05-23 | Resolved: 2025-05-23

## 2025-05-23 PROBLEM — E83.52 HYPERCALCEMIA: Status: ACTIVE | Noted: 2025-05-23

## 2025-05-23 PROBLEM — E87.8 ELECTROLYTE ABNORMALITY: Status: ACTIVE | Noted: 2025-05-23

## 2025-05-23 LAB
25(OH)D3 SERPL-MCNC: 56.1 NG/ML (ref 30–100)
2HR DELTA HS TROPONIN: 2 NG/L
ALBUMIN SERPL BCG-MCNC: 4.2 G/DL (ref 3.5–5)
ALP SERPL-CCNC: 49 U/L (ref 34–104)
ALT SERPL W P-5'-P-CCNC: 14 U/L (ref 7–52)
ANION GAP SERPL CALCULATED.3IONS-SCNC: 9 MMOL/L (ref 4–13)
AST SERPL W P-5'-P-CCNC: 14 U/L (ref 13–39)
ATRIAL RATE: 67 BPM
ATRIAL RATE: 81 BPM
BACTERIA UR QL AUTO: ABNORMAL /HPF
BASOPHILS # BLD AUTO: 0.05 THOUSANDS/ÂΜL (ref 0–0.1)
BASOPHILS NFR BLD AUTO: 1 % (ref 0–1)
BILIRUB SERPL-MCNC: 0.33 MG/DL (ref 0.2–1)
BILIRUB UR QL STRIP: NEGATIVE
BUN SERPL-MCNC: 27 MG/DL (ref 5–25)
CA-I BLD-SCNC: 1.31 MMOL/L (ref 1.12–1.32)
CALCIUM SERPL-MCNC: 10.1 MG/DL (ref 8.4–10.2)
CARDIAC TROPONIN I PNL SERPL HS: 7 NG/L (ref ?–50)
CHLORIDE SERPL-SCNC: 100 MMOL/L (ref 96–108)
CLARITY UR: CLEAR
CO2 SERPL-SCNC: 26 MMOL/L (ref 21–32)
COLOR UR: COLORLESS
CREAT SERPL-MCNC: 1.08 MG/DL (ref 0.6–1.3)
EOSINOPHIL # BLD AUTO: 0.07 THOUSAND/ÂΜL (ref 0–0.61)
EOSINOPHIL NFR BLD AUTO: 1 % (ref 0–6)
ERYTHROCYTE [DISTWIDTH] IN BLOOD BY AUTOMATED COUNT: 19.6 % (ref 11.6–15.1)
EST. AVERAGE GLUCOSE BLD GHB EST-MCNC: 160 MG/DL
FLUAV RNA RESP QL NAA+PROBE: NEGATIVE
FLUBV RNA RESP QL NAA+PROBE: NEGATIVE
GFR SERPL CREATININE-BSD FRML MDRD: 53 ML/MIN/1.73SQ M
GLUCOSE P FAST SERPL-MCNC: 166 MG/DL (ref 65–99)
GLUCOSE SERPL-MCNC: 166 MG/DL (ref 65–140)
GLUCOSE SERPL-MCNC: 171 MG/DL (ref 65–140)
GLUCOSE SERPL-MCNC: 185 MG/DL (ref 65–140)
GLUCOSE UR STRIP-MCNC: ABNORMAL MG/DL
HBA1C MFR BLD: 7.2 %
HCT VFR BLD AUTO: 34.1 % (ref 34.8–46.1)
HGB BLD-MCNC: 10.5 G/DL (ref 11.5–15.4)
HGB UR QL STRIP.AUTO: NEGATIVE
IMM GRANULOCYTES # BLD AUTO: 0.02 THOUSAND/UL (ref 0–0.2)
IMM GRANULOCYTES NFR BLD AUTO: 0 % (ref 0–2)
KETONES UR STRIP-MCNC: NEGATIVE MG/DL
LEUKOCYTE ESTERASE UR QL STRIP: ABNORMAL
LYMPHOCYTES # BLD AUTO: 2.22 THOUSANDS/ÂΜL (ref 0.6–4.47)
LYMPHOCYTES NFR BLD AUTO: 25 % (ref 14–44)
MAGNESIUM SERPL-MCNC: 1.7 MG/DL (ref 1.9–2.7)
MCH RBC QN AUTO: 22.3 PG (ref 26.8–34.3)
MCHC RBC AUTO-ENTMCNC: 30.8 G/DL (ref 31.4–37.4)
MCV RBC AUTO: 73 FL (ref 82–98)
MONOCYTES # BLD AUTO: 0.64 THOUSAND/ÂΜL (ref 0.17–1.22)
MONOCYTES NFR BLD AUTO: 7 % (ref 4–12)
NEUTROPHILS # BLD AUTO: 5.91 THOUSANDS/ÂΜL (ref 1.85–7.62)
NEUTS SEG NFR BLD AUTO: 66 % (ref 43–75)
NITRITE UR QL STRIP: NEGATIVE
NON-SQ EPI CELLS URNS QL MICRO: ABNORMAL /HPF
NRBC BLD AUTO-RTO: 0 /100 WBCS
P AXIS: 77 DEGREES
P AXIS: 80 DEGREES
PH UR STRIP.AUTO: 5.5 [PH]
PHOSPHATE SERPL-MCNC: 3.5 MG/DL (ref 2.3–4.1)
PLATELET # BLD AUTO: 217 THOUSANDS/UL (ref 149–390)
PMV BLD AUTO: 10.6 FL (ref 8.9–12.7)
POTASSIUM SERPL-SCNC: 4.3 MMOL/L (ref 3.5–5.3)
PR INTERVAL: 138 MS
PR INTERVAL: 140 MS
PROT SERPL-MCNC: 6.6 G/DL (ref 6.4–8.4)
PROT UR STRIP-MCNC: NEGATIVE MG/DL
PTH-INTACT SERPL-MCNC: 92.7 PG/ML (ref 12–88)
QRS AXIS: 69 DEGREES
QRS AXIS: 72 DEGREES
QRSD INTERVAL: 78 MS
QRSD INTERVAL: 82 MS
QT INTERVAL: 370 MS
QT INTERVAL: 380 MS
QTC INTERVAL: 401 MS
QTC INTERVAL: 429 MS
RBC # BLD AUTO: 4.7 MILLION/UL (ref 3.81–5.12)
RBC #/AREA URNS AUTO: ABNORMAL /HPF
RSV RNA RESP QL NAA+PROBE: NEGATIVE
SARS-COV-2 RNA RESP QL NAA+PROBE: NEGATIVE
SODIUM SERPL-SCNC: 135 MMOL/L (ref 135–147)
SP GR UR STRIP.AUTO: >=1.05 (ref 1–1.03)
T WAVE AXIS: 33 DEGREES
T WAVE AXIS: 65 DEGREES
UROBILINOGEN UR STRIP-ACNC: <2 MG/DL
VENTRICULAR RATE: 67 BPM
VENTRICULAR RATE: 81 BPM
WBC # BLD AUTO: 8.91 THOUSAND/UL (ref 4.31–10.16)
WBC #/AREA URNS AUTO: ABNORMAL /HPF

## 2025-05-23 PROCEDURE — 99236 HOSP IP/OBS SAME DATE HI 85: CPT | Performed by: INTERNAL MEDICINE

## 2025-05-23 PROCEDURE — 81001 URINALYSIS AUTO W/SCOPE: CPT

## 2025-05-23 PROCEDURE — 93010 ELECTROCARDIOGRAM REPORT: CPT | Performed by: INTERNAL MEDICINE

## 2025-05-23 PROCEDURE — 85025 COMPLETE CBC W/AUTO DIFF WBC: CPT

## 2025-05-23 PROCEDURE — 80053 COMPREHEN METABOLIC PANEL: CPT

## 2025-05-23 PROCEDURE — 83036 HEMOGLOBIN GLYCOSYLATED A1C: CPT

## 2025-05-23 PROCEDURE — 82948 REAGENT STRIP/BLOOD GLUCOSE: CPT

## 2025-05-23 PROCEDURE — 82330 ASSAY OF CALCIUM: CPT

## 2025-05-23 PROCEDURE — 84100 ASSAY OF PHOSPHORUS: CPT

## 2025-05-23 PROCEDURE — 83735 ASSAY OF MAGNESIUM: CPT

## 2025-05-23 PROCEDURE — 82306 VITAMIN D 25 HYDROXY: CPT

## 2025-05-23 PROCEDURE — 0241U HB NFCT DS VIR RESP RNA 4 TRGT: CPT

## 2025-05-23 RX ORDER — METOPROLOL TARTRATE 25 MG/1
25 TABLET, FILM COATED ORAL EVERY 12 HOURS SCHEDULED
Status: DISCONTINUED | OUTPATIENT
Start: 2025-05-23 | End: 2025-05-23 | Stop reason: HOSPADM

## 2025-05-23 RX ORDER — ENOXAPARIN SODIUM 100 MG/ML
40 INJECTION SUBCUTANEOUS DAILY
Status: DISCONTINUED | OUTPATIENT
Start: 2025-05-23 | End: 2025-05-23 | Stop reason: HOSPADM

## 2025-05-23 RX ORDER — ACETAMINOPHEN 325 MG/1
975 TABLET ORAL EVERY 8 HOURS
Status: DISCONTINUED | OUTPATIENT
Start: 2025-05-23 | End: 2025-05-23 | Stop reason: HOSPADM

## 2025-05-23 RX ORDER — MAGNESIUM SULFATE HEPTAHYDRATE 40 MG/ML
2 INJECTION, SOLUTION INTRAVENOUS ONCE
Status: COMPLETED | OUTPATIENT
Start: 2025-05-23 | End: 2025-05-23

## 2025-05-23 RX ORDER — HYDROCHLOROTHIAZIDE 25 MG/1
50 TABLET ORAL DAILY
Status: DISCONTINUED | OUTPATIENT
Start: 2025-05-23 | End: 2025-05-23 | Stop reason: HOSPADM

## 2025-05-23 RX ORDER — TRAZODONE HYDROCHLORIDE 50 MG/1
50 TABLET ORAL
Status: DISCONTINUED | OUTPATIENT
Start: 2025-05-23 | End: 2025-05-23 | Stop reason: HOSPADM

## 2025-05-23 RX ORDER — LISINOPRIL 5 MG/1
5 TABLET ORAL DAILY
Status: DISCONTINUED | OUTPATIENT
Start: 2025-05-23 | End: 2025-05-23 | Stop reason: HOSPADM

## 2025-05-23 RX ORDER — ALBUTEROL SULFATE 90 UG/1
2 INHALANT RESPIRATORY (INHALATION) EVERY 6 HOURS PRN
Status: DISCONTINUED | OUTPATIENT
Start: 2025-05-23 | End: 2025-05-23 | Stop reason: HOSPADM

## 2025-05-23 RX ORDER — PANTOPRAZOLE SODIUM 40 MG/10ML
40 INJECTION, POWDER, LYOPHILIZED, FOR SOLUTION INTRAVENOUS EVERY 12 HOURS SCHEDULED
Status: DISCONTINUED | OUTPATIENT
Start: 2025-05-23 | End: 2025-05-23 | Stop reason: HOSPADM

## 2025-05-23 RX ORDER — ONDANSETRON 2 MG/ML
4 INJECTION INTRAMUSCULAR; INTRAVENOUS EVERY 6 HOURS PRN
Status: DISCONTINUED | OUTPATIENT
Start: 2025-05-23 | End: 2025-05-23 | Stop reason: HOSPADM

## 2025-05-23 RX ORDER — SUCRALFATE 1 G/1
1 TABLET ORAL
Status: DISCONTINUED | OUTPATIENT
Start: 2025-05-23 | End: 2025-05-23 | Stop reason: HOSPADM

## 2025-05-23 RX ORDER — ASPIRIN 81 MG/1
81 TABLET, CHEWABLE ORAL DAILY
Status: DISCONTINUED | OUTPATIENT
Start: 2025-05-23 | End: 2025-05-23 | Stop reason: HOSPADM

## 2025-05-23 RX ORDER — BUMETANIDE 1 MG/1
0.5 TABLET ORAL DAILY
Status: DISCONTINUED | OUTPATIENT
Start: 2025-05-23 | End: 2025-05-23 | Stop reason: HOSPADM

## 2025-05-23 RX ORDER — METOCLOPRAMIDE HYDROCHLORIDE 5 MG/ML
10 INJECTION INTRAMUSCULAR; INTRAVENOUS EVERY 6 HOURS PRN
Status: DISCONTINUED | OUTPATIENT
Start: 2025-05-23 | End: 2025-05-23 | Stop reason: HOSPADM

## 2025-05-23 RX ORDER — SODIUM CHLORIDE 9 MG/ML
75 INJECTION, SOLUTION INTRAVENOUS CONTINUOUS
Status: DISPENSED | OUTPATIENT
Start: 2025-05-23 | End: 2025-05-23

## 2025-05-23 RX ORDER — HYDROXYZINE HYDROCHLORIDE 25 MG/1
25 TABLET, FILM COATED ORAL
Status: DISCONTINUED | OUTPATIENT
Start: 2025-05-23 | End: 2025-05-23 | Stop reason: HOSPADM

## 2025-05-23 RX ORDER — GABAPENTIN 400 MG/1
400 CAPSULE ORAL 3 TIMES DAILY
Status: DISCONTINUED | OUTPATIENT
Start: 2025-05-23 | End: 2025-05-23 | Stop reason: HOSPADM

## 2025-05-23 RX ADMIN — LISINOPRIL 5 MG: 5 TABLET ORAL at 10:08

## 2025-05-23 RX ADMIN — SODIUM CHLORIDE 75 ML/HR: 0.9 INJECTION, SOLUTION INTRAVENOUS at 02:53

## 2025-05-23 RX ADMIN — INSULIN HUMAN 30 UNITS: 100 INJECTION, SUSPENSION SUBCUTANEOUS at 10:08

## 2025-05-23 RX ADMIN — METOCLOPRAMIDE 10 MG: 5 INJECTION, SOLUTION INTRAMUSCULAR; INTRAVENOUS at 02:06

## 2025-05-23 RX ADMIN — MAGNESIUM SULFATE HEPTAHYDRATE 2 G: 40 INJECTION, SOLUTION INTRAVENOUS at 08:00

## 2025-05-23 RX ADMIN — SUCRALFATE 1 G: 1 TABLET ORAL at 07:48

## 2025-05-23 RX ADMIN — SUCRALFATE 1 G: 1 TABLET ORAL at 12:25

## 2025-05-23 RX ADMIN — ENOXAPARIN SODIUM 40 MG: 40 INJECTION SUBCUTANEOUS at 10:12

## 2025-05-23 RX ADMIN — METOPROLOL TARTRATE 25 MG: 25 TABLET, FILM COATED ORAL at 04:28

## 2025-05-23 RX ADMIN — ASPIRIN 81 MG: 81 TABLET, CHEWABLE ORAL at 10:08

## 2025-05-23 RX ADMIN — GABAPENTIN 400 MG: 400 CAPSULE ORAL at 10:08

## 2025-05-23 RX ADMIN — ACETAMINOPHEN 975 MG: 325 TABLET ORAL at 04:27

## 2025-05-23 RX ADMIN — PANTOPRAZOLE SODIUM 40 MG: 40 INJECTION, POWDER, LYOPHILIZED, FOR SOLUTION INTRAVENOUS at 10:12

## 2025-05-23 NOTE — ASSESSMENT & PLAN NOTE
Lab Results   Component Value Date    HGBA1C 7.2 (H) 05/23/2025       Recent Labs     05/23/25  0715 05/23/25  1112   POCGLU 171* 185*       Blood Sugar Average: Last 72 hrs:  (P) 178PTA regimen NPH 30 units twice daily before meals, metformin 1000 g twice daily, and Jardiance 10 mg tablet daily  Continue PTA NPH 30 units twice daily before meals.  Will hold PTA Jardiance and metformin while inpatient.  Continue 4 times daily Accu-Cheks with sliding scale insulin  Update hemoglobin A1c  Hypoglycemia protocol  Outpatient follow-up with primary care physician and according the patient she has a follow-up with her endocrinologist this coming week.  Continue patient's diabetes medications as previously prescribed (this was reviewed with the patient).

## 2025-05-23 NOTE — ASSESSMENT & PLAN NOTE
Sodium on admission noted to be 133  Suspect in the setting of poor p.o. intake with nausea and vomiting  On IV fluids 75 mL an hour    Today, 5/23: Hyponatremia had resolved.  Outpatient follow-up with primary care physician.  I am recommending rechecking/monitoring patient's BMP in the outpatient.

## 2025-05-23 NOTE — ED CARE HANDOFF
Emergency Department Sign Out Note        Sign out and transfer of care from Dr. Hill. See Separate Emergency Department note.     The patient, Jacquie Viveros, was evaluated by the previous provider for abd pain, N/V.    Workup Completed:  Temp:  [98.1 °F (36.7 °C)] 98.1 °F (36.7 °C)  HR:  [78-95] 78  BP: (119-136)/(57-63) 136/63  Resp:  [18] 18  SpO2:  [93 %-97 %] 97 %  O2 Device: None (Room air)  Recent Results (from the past 12 hours)   ECG 12 lead    Collection Time: 05/22/25  9:32 PM   Result Value Ref Range    Ventricular Rate 81 BPM    Atrial Rate 81 BPM    SC Interval 140 ms    QRSD Interval 78 ms    QT Interval 370 ms    QTC Interval 429 ms    P Axis 77 degrees    QRS Axis 69 degrees    T Wave Axis 65 degrees   CBC and differential    Collection Time: 05/22/25  9:37 PM   Result Value Ref Range    WBC 9.96 4.31 - 10.16 Thousand/uL    RBC 5.05 3.81 - 5.12 Million/uL    Hemoglobin 11.2 (L) 11.5 - 15.4 g/dL    Hematocrit 36.5 34.8 - 46.1 %    MCV 72 (L) 82 - 98 fL    MCH 22.2 (L) 26.8 - 34.3 pg    MCHC 30.7 (L) 31.4 - 37.4 g/dL    RDW 19.7 (H) 11.6 - 15.1 %    MPV 9.5 8.9 - 12.7 fL    Platelets 205 149 - 390 Thousands/uL    nRBC 0 /100 WBCs    Segmented % 73 43 - 75 %    Immature Grans % 1 0 - 2 %    Lymphocytes % 18 14 - 44 %    Monocytes % 5 4 - 12 %    Eosinophils Relative 2 0 - 6 %    Basophils Relative 1 0 - 1 %    Absolute Neutrophils 7.30 1.85 - 7.62 Thousands/µL    Absolute Immature Grans 0.05 0.00 - 0.20 Thousand/uL    Absolute Lymphocytes 1.78 0.60 - 4.47 Thousands/µL    Absolute Monocytes 0.53 0.17 - 1.22 Thousand/µL    Eosinophils Absolute 0.23 0.00 - 0.61 Thousand/µL    Basophils Absolute 0.07 0.00 - 0.10 Thousands/µL   Comprehensive metabolic panel    Collection Time: 05/22/25  9:37 PM   Result Value Ref Range    Sodium 133 (L) 135 - 147 mmol/L    Potassium 3.9 3.5 - 5.3 mmol/L    Chloride 97 96 - 108 mmol/L    CO2 28 21 - 32 mmol/L    ANION GAP 8 4 - 13 mmol/L    BUN 28 (H) 5 - 25 mg/dL     "Creatinine 1.02 0.60 - 1.30 mg/dL    Glucose 152 (H) 65 - 140 mg/dL    Calcium 11.1 (H) 8.4 - 10.2 mg/dL    AST 16 13 - 39 U/L    ALT 17 7 - 52 U/L    Alkaline Phosphatase 51 34 - 104 U/L    Total Protein 7.4 6.4 - 8.4 g/dL    Albumin 4.6 3.5 - 5.0 g/dL    Total Bilirubin 0.32 0.20 - 1.00 mg/dL    eGFR 57 ml/min/1.73sq m   HS Troponin 0hr (reflex protocol)    Collection Time: 05/22/25  9:37 PM   Result Value Ref Range    hs TnI 0hr 5 \"Refer to ACS Flowchart\"- see link ng/L   Lipase    Collection Time: 05/22/25  9:37 PM   Result Value Ref Range    Lipase 28 11 - 82 u/L   B-Type Natriuretic Peptide(BNP)    Collection Time: 05/22/25  9:37 PM   Result Value Ref Range    BNP 26 0 - 100 pg/mL   Beta Hydroxybutyrate    Collection Time: 05/22/25  9:37 PM   Result Value Ref Range    Beta- Hydroxybutyrate 0.33 0.20 - 0.60 mmol/L   D-dimer, quantitative    Collection Time: 05/22/25  9:37 PM   Result Value Ref Range    D-Dimer, Quant 0.59 (H) <0.50 ug/ml FEU   ECG 12 lead    Collection Time: 05/22/25 11:41 PM   Result Value Ref Range    Ventricular Rate 67 BPM    Atrial Rate 67 BPM    DE Interval 138 ms    QRSD Interval 82 ms    QT Interval 380 ms    QTC Interval 401 ms    P Axis 80 degrees    QRS Axis 72 degrees    T Wave Emmalena 33 degrees   HS Troponin I 2hr    Collection Time: 05/22/25 11:48 PM   Result Value Ref Range    hs TnI 2hr 7 \"Refer to ACS Flowchart\"- see link ng/L    Delta 2hr hsTnI 2 <20 ng/L     XR chest 1 view portable   ED Interpretation   No acute cardiopulmonary pathology       CT abdomen pelvis with contrast   ED Interpretation   PROCEDURE INFORMATION:  Exam: CT Abdomen And Pelvis With Contrast  Exam date and time: 5/22/2025 10:36 PM  Age: 65 years old  Clinical indication: Other: Vomiting for 2 days. Only dry heaving now. +weakness  TECHNIQUE:  Imaging protocol: Computed tomography of the abdomen and pelvis with contrast.  Contrast material: OMNI 350; Contrast volume: 100 ml; Contrast route: INTRAVENOUS " "(IV);  COMPARISON:  No relevant prior studies available.  FINDINGS:  Heart: Prosthetic aortic valve.  Liver: Normal. No mass.  Gallbladder and biliary ducts: Normal. No calcified stones. No ductal dilation.  Pancreas: Unremarkable.  Spleen: Normal.  Adrenal glands: Normal. No mass.  Kidneys and ureters: Normal. No hydronephrosis.  Stomach and bowel: No pneumatosis or portal/mesenteric venous gas.  Appendix: Normal appendix.  Intraperitoneal space: No pneumoperitoneum or abscess.  Vasculature: See \"Stomach and bowel\" finding.  Lymph nodes: Unremarkable.  Urinary bladder: Unremarkable as visualized.  Reproductiv   e: Hysterectomy.  Bones/joints: Moderate to severe central canal stenosis at L3-L4 secondary to disc bulge,  ligamentum flavum thickening, and facet arthropathy.  Soft tissues: Unremarkable.  IMPRESSION:  No acute findings.  Dictated and Authenticated by: Dirk Baldwin MD  05/23/2025 12:36 AM Eastern Time ([place] & [place])            Given:    Medication Administration - last 24 hours from 05/22/2025 0119 to 05/23/2025 0119         Date/Time Order Dose Route Action Action by     05/22/2025 2333 EDT sodium chloride 0.9 % bolus 500 mL 0 mL Intravenous Stopped Alejandra Dockery RN     05/22/2025 2205 EDT sodium chloride 0.9 % bolus 500 mL 500 mL Intravenous New Bag Alejandra Dockery RN     05/22/2025 2205 EDT ondansetron (ZOFRAN) injection 4 mg 4 mg Intravenous Given Alejandra Dockery RN     05/22/2025 2314 EDT acetaminophen (Ofirmev) injection 1,000 mg 0 mg Intravenous Stopped Alejandra Dockery RN     05/22/2025 2209 EDT acetaminophen (Ofirmev) injection 1,000 mg 1,000 mg Intravenous New Bag Alejandra Dockery RN     05/22/2025 2252 EDT Famotidine (PF) (PEPCID) injection 20 mg 20 mg Intravenous Given Alejandra Dockery RN     05/22/2025 2248 EDT sucralfate (CARAFATE) tablet 1 g 1 g Oral Given Alejandra Dockery RN     05/22/2025 2237 EDT iohexol (OMNIPAQUE) 350 MG/ML injection (MULTI-DOSE) 100 mL 100 mL Intravenous Given " Olena Peterson     05/23/2025 0059 EDT sodium chloride 0.9 % bolus 500 mL 0 mL Intravenous Stopped Alejandra Dockery RN     05/22/2025 2349 EDT sodium chloride 0.9 % bolus 500 mL 500 mL Intravenous New Bag Alejandra Dockery RN     05/22/2025 2331 EDT ondansetron (ZOFRAN) injection 4 mg 4 mg Intravenous Given Alejandra Dockery RN              ED Course / Workup Pending (followup):  Pending CT read, plan for admission      CT with no acute pertinent findings.  Discussed with internal medicine service who accept the patient for further evaluation management.        HEART Risk Score      Flowsheet Row Most Recent Value   Heart Score Risk Calculator    History 2 Filed at: 05/22/2025 2346   ECG 1 Filed at: 05/22/2025 2346   Age 2 Filed at: 05/22/2025 2346   Risk Factors 2 Filed at: 05/22/2025 2346   Troponin 0 Filed at: 05/22/2025 2346   HEART Score 7 Filed at: 05/22/2025 2346          No data recorded                          ED Course as of 05/23/25 0119   Fri May 23, 2025   0025 Signout received     Procedures  Medical Decision Making  Amount and/or Complexity of Data Reviewed  Labs: ordered.  Radiology: ordered and independent interpretation performed.    Risk  Prescription drug management.  Decision regarding hospitalization.            Disposition  Final diagnoses:   Microcytic anemia   CY (acute kidney injury) (HCC)   Hyponatremia   Hypercalcemia   Chest pain   Shortness of breath   Nausea & vomiting     Time reflects when diagnosis was documented in both MDM as applicable and the Disposition within this note       Time User Action Codes Description Comment    5/22/2025 10:00 PM Rocky Hill [D50.9] Microcytic anemia     5/22/2025 10:37 PM Rocky Hill [N17.9] CY (acute kidney injury) (HCC)     5/22/2025 10:37 PM Rocky Hill [E87.1] Hyponatremia     5/22/2025 11:01 PM Rocky Hill [E83.52] Hypercalcemia     5/22/2025 11:45 PM Rocky Hill [R07.9] Chest pain     5/22/2025 11:45 PM Liz  Rocky Byrd [R06.02] Shortness of breath     5/23/2025 12:48 AM Rocky Hill [R11.2] Nausea & vomiting     5/23/2025 12:48 AM Rocky Hill [D50.9] Microcytic anemia     5/23/2025 12:48 AM Rocky Hill [R11.2] Nausea & vomiting           ED Disposition       ED Disposition   Admit    Condition   Stable    Date/Time   Fri May 23, 2025 0049    Comment   Case was discussed with GALO and the patient's admission status was agreed to be Admission Status: observation status to the service of Dr. Lynne.               Follow-up Information    None       Patient's Medications   Discharge Prescriptions    No medications on file     No discharge procedures on file.       ED Provider  Electronically Signed by     Tong Fletcher MD  05/23/25 0119

## 2025-05-23 NOTE — ASSESSMENT & PLAN NOTE
Wt Readings from Last 3 Encounters:   03/19/25 84.3 kg (185 lb 14.4 oz)   03/10/25 82.4 kg (181 lb 9.6 oz)   03/06/25 85.3 kg (188 lb)   Appears euvolemic on examination  BNP 26  Continue Bumex 0.5 mg daily  Monitor intake and output  Monitor daily weights standing

## 2025-05-23 NOTE — ED PROVIDER NOTES
Time reflects when diagnosis was documented in both MDM as applicable and the Disposition within this note       Time User Action Codes Description Comment    5/22/2025 10:00 PM LizRocky [D50.9] Microcytic anemia     5/22/2025 10:37 PM LizRocky [N17.9] CY (acute kidney injury) (HCC)     5/22/2025 10:37 PM Liz, Rocky Add [E87.1] Hyponatremia     5/22/2025 11:01 PM Liz Rocky Rochelle [E83.52] Hypercalcemia     5/22/2025 11:45 PM Mayurflash Rocky Add [R07.9] Chest pain     5/22/2025 11:45 PM Liz Rocky Add [R06.02] Shortness of breath           ED Disposition       None          Assessment & Plan       Medical Decision Making  Labs not indicative of pancreatitis, DKA, or CHF. Labs showed elevated Ca which could explain her abdominal pain and nausea/vomiting. Per the chart, Pt has been worked up for hyperparathyroidism with an elevated PTH. However, she has not been treated for it yet. EKG without acute ischemic changes. Troponin not markedly elevated. Given no clear cause of her SOB and CP, Ddimer was obtained to help r/o PE which was wnl for age. Pt continued to have profuse nausea and vomiting. Given this, her complex cardiac history, and hypercalcemia, Pt would benefit from admission for further management. Pt was signed out to Dr. Fletcher pending CT A/P read.     Amount and/or Complexity of Data Reviewed  Labs: ordered. Decision-making details documented in ED Course.  Radiology: ordered and independent interpretation performed. Decision-making details documented in ED Course.    Risk  Prescription drug management.        ED Course as of 05/23/25 0031   Thu May 22, 2025   2200 Ddx includes but not limited to euglycemic DKA, ACS, CHF, PNA, pancreatitis, PUD.   2236 ANION GAP: 8   2236 Carbon Dioxide: 28   2236 BUN(!): 28   2236 GFR, Calculated: 57  Lower than baseline   2237 LIPASE: 28   2243 BNP: 26   2300 Calcium(!): 11.1   2302 XR chest 1 view portable   2348 D-Dimer, Quant(!):  0.59  Age adjusted wnl         Medications   sodium chloride 0.9 % bolus 500 mL (500 mL Intravenous New Bag 5/22/25 2349)   sodium chloride 0.9 % bolus 500 mL (0 mL Intravenous Stopped 5/22/25 2333)   ondansetron (ZOFRAN) injection 4 mg (4 mg Intravenous Given 5/22/25 2205)   acetaminophen (Ofirmev) injection 1,000 mg (0 mg Intravenous Stopped 5/22/25 2314)   Famotidine (PF) (PEPCID) injection 20 mg (20 mg Intravenous Given 5/22/25 2252)   sucralfate (CARAFATE) tablet 1 g (1 g Oral Given 5/22/25 2248)   iohexol (OMNIPAQUE) 350 MG/ML injection (MULTI-DOSE) 100 mL (100 mL Intravenous Given 5/22/25 2237)   ondansetron (ZOFRAN) injection 4 mg (4 mg Intravenous Given 5/22/25 2331)       ED Risk Strat Scores   HEART Risk Score      Flowsheet Row Most Recent Value   Heart Score Risk Calculator    History 2 Filed at: 05/22/2025 2346   ECG 1 Filed at: 05/22/2025 2346   Age 2 Filed at: 05/22/2025 2346   Risk Factors 2 Filed at: 05/22/2025 2346   Troponin 0 Filed at: 05/22/2025 2346   HEART Score 7 Filed at: 05/22/2025 2346          HEART Risk Score      Flowsheet Row Most Recent Value   Heart Score Risk Calculator    History 2 Filed at: 05/22/2025 2346   ECG 1 Filed at: 05/22/2025 2346   Age 2 Filed at: 05/22/2025 2346   Risk Factors 2 Filed at: 05/22/2025 2346   Troponin 0 Filed at: 05/22/2025 2346   HEART Score 7 Filed at: 05/22/2025 2346                      No data recorded        SBIRT 22yo+      Flowsheet Row Most Recent Value   Initial Alcohol Screen: US AUDIT-C     1. How often do you have a drink containing alcohol? 0 Filed at: 05/22/2025 2140   2. How many drinks containing alcohol do you have on a typical day you are drinking?  0 Filed at: 05/22/2025 2140   3b. FEMALE Any Age, or MALE 65+: How often do you have 4 or more drinks on one occassion? 0 Filed at: 05/22/2025 2140   Audit-C Score 0 Filed at: 05/22/2025 2140   CIERRA: How many times in the past year have you...    Used an illegal drug or used a prescription  medication for non-medical reasons? Never Filed at: 05/22/2025 2140                            History of Present Illness       Chief Complaint   Patient presents with    Vomiting     Vomiting for 2 days. Only dry heaving now. +weakness Hx of cardiac history. Now with chest pain/HTN (140s). Took BP meds prior to arrival. No fevers. No ill contacts.       Past Medical History[1]   Past Surgical History[2]   Family History[3]   Social History[4]   E-Cigarette/Vaping    E-Cigarette Use Never User       E-Cigarette/Vaping Substances    Nicotine No     THC No     CBD No     Flavoring No     Other No     Unknown No       I have reviewed and agree with the history as documented.     65y.o F w/ h/o DM, CHF, CKD, aortic valve replacement (June 2023) presenting for vomiting. Pt states that since the aortic valve replacement she would have intermittent episodes of edema and BAUMAN. For the past 2 days Pt has had several episodes of NBNB emesis, abdominal pain similar to her chronic pain, HA, CP, generalized weakness. Denies fever, dysuria, PE risk factors, SOB worse when flat, pleurisy, hematochezia, diarrhea.      History provided by:  Patient      Review of Systems   Constitutional:  Positive for activity change and appetite change. Negative for fever.   Respiratory:  Positive for shortness of breath.    Cardiovascular:  Positive for chest pain and leg swelling.   Gastrointestinal:  Positive for abdominal pain, nausea and vomiting. Negative for blood in stool and diarrhea.   Genitourinary:  Negative for dysuria and hematuria.   Neurological:  Positive for weakness and headaches. Negative for light-headedness.           Objective       ED Triage Vitals   Temperature Pulse Blood Pressure Respirations SpO2 Patient Position - Orthostatic VS   05/22/25 2133 05/22/25 2129 05/22/25 2131 05/22/25 2129 05/22/25 2129 05/22/25 2129   98.1 °F (36.7 °C) 93 119/57 18 94 % Sitting      Temp Source Heart Rate Source BP Location FiO2 (%) Pain  Score    05/22/25 2133 05/22/25 2129 05/22/25 2129 -- 05/22/25 2339    Oral Monitor Right arm  7      Vitals      Date and Time Temp Pulse SpO2 Resp BP Pain Score FACES Pain Rating User   05/23/25 0000 -- 78 97 % 18 136/63 -- -- CG   05/22/25 2339 -- 95 93 % 18 123/57 7 -- DM   05/22/25 2133 98.1 °F (36.7 °C) -- -- -- -- -- -- EM   05/22/25 2131 -- -- -- -- 119/57 -- -- EM   05/22/25 2129 -- 93 94 % 18 -- -- -- EM            Physical Exam  Constitutional:       General: She is not in acute distress.     Appearance: Normal appearance.   HENT:      Head: Normocephalic and atraumatic.      Nose: Nose normal. No rhinorrhea.      Mouth/Throat:      Mouth: Mucous membranes are dry.      Pharynx: Oropharynx is clear.     Eyes:      Extraocular Movements: Extraocular movements intact.      Conjunctiva/sclera: Conjunctivae normal.       Cardiovascular:      Rate and Rhythm: Normal rate and regular rhythm.      Pulses: Normal pulses.      Heart sounds: Normal heart sounds.   Pulmonary:      Effort: Pulmonary effort is normal.      Breath sounds: Normal breath sounds.   Abdominal:      General: Abdomen is flat.      Tenderness: There is abdominal tenderness (epigastric).     Musculoskeletal:         General: No tenderness.      Right lower leg: No edema.      Left lower leg: No edema.     Skin:     General: Skin is warm and dry.      Capillary Refill: Capillary refill takes less than 2 seconds.     Neurological:      General: No focal deficit present.      Mental Status: She is alert and oriented to person, place, and time.         Results Reviewed       Procedure Component Value Units Date/Time    HS Troponin I 2hr [[id number]] Collected: 05/22/25 2348    Lab Status: In process Specimen: Blood from Arm, Right Updated: 05/23/25 0024    D-dimer, quantitative [[id number]]  (Abnormal) Collected: 05/22/25 2137    Lab Status: Final result Specimen: Blood from Arm, Right Updated: 05/22/25 2347     D-Dimer, Quant 0.59 ug/ml FEU      Narrative:      In the evaluation for possible pulmonary embolism, in the appropriate (Well's Score of 4 or less) patient, the age adjusted d-dimer cutoff for this patient can be calculated as:    Age x 0.01 (in ug/mL) for Age-adjusted D-dimer exclusion threshold for a patient over 50 years.    HS Troponin I 4hr [[id number]]     Lab Status: No result Specimen: Blood     Beta Hydroxybutyrate [[id number]]  (Normal) Collected: 05/22/25 2137    Lab Status: Final result Specimen: Blood from Arm, Right Updated: 05/22/25 2319     Beta- Hydroxybutyrate 0.33 mmol/L     PTH, intact [[id number]] Collected: 05/22/25 2137    Lab Status: In process Specimen: Blood from Arm, Right Updated: 05/22/25 2304    B-Type Natriuretic Peptide(BNP) [[id number]]  (Normal) Collected: 05/22/25 2137    Lab Status: Final result Specimen: Blood from Arm, Right Updated: 05/22/25 2240     BNP 26 pg/mL     Comprehensive metabolic panel [[id number]]  (Abnormal) Collected: 05/22/25 2137    Lab Status: Final result Specimen: Blood from Arm, Right Updated: 05/22/25 2228     Sodium 133 mmol/L      Potassium 3.9 mmol/L      Chloride 97 mmol/L      CO2 28 mmol/L      ANION GAP 8 mmol/L      BUN 28 mg/dL      Creatinine 1.02 mg/dL      Glucose 152 mg/dL      Calcium 11.1 mg/dL      AST 16 U/L      ALT 17 U/L      Alkaline Phosphatase 51 U/L      Total Protein 7.4 g/dL      Albumin 4.6 g/dL      Total Bilirubin 0.32 mg/dL      eGFR 57 ml/min/1.73sq m     Narrative:      National Kid[employer] Foundation guidelines for Chronic Kidney Disease (CKD):     Stage 1 with normal or high GFR (GFR > 90 mL/min/1.73 square meters)    Stage 2 Mild CKD (GFR = 60-89 mL/min/1.73 square meters)    Stage 3A Moderate CKD (GFR = 45-59 mL/min/1.73 square meters)    Stage 3B Moderate CKD (GFR = 30-44 mL/min/1.73 square meters)    Stage 4 Severe CKD (GFR = 15-29 mL/min/1.73 square meters)    Stage 5 End Stage CKD (GFR <15 mL/min/1.73 square meters)  Note: GFR calculation is accurate  only with a steady state creatinine    Lipase [[id number]]  (Normal) Collected: 05/22/25 2137    Lab Status: Final result Specimen: Blood from Arm, Right Updated: 05/22/25 2228     Lipase 28 u/L     HS Troponin 0hr (reflex protocol) [[id number]]  (Normal) Collected: 05/22/25 2137    Lab Status: Final result Specimen: Blood from Arm, Right Updated: 05/22/25 2219     hs TnI 0hr 5 ng/L     CBC and differential [[id number]]  (Abnormal) Collected: 05/22/25 2137    Lab Status: Final result Specimen: Blood from Arm, Right Updated: 05/22/25 2147     WBC 9.96 Thousand/uL      RBC 5.05 Million/uL      Hemoglobin 11.2 g/dL      Hematocrit 36.5 %      MCV 72 fL      MCH 22.2 pg      MCHC 30.7 g/dL      RDW 19.7 %      MPV 9.5 fL      Platelets 205 Thousands/uL      nRBC 0 /100 WBCs      Segmented % 73 %      Immature Grans % 1 %      Lymphocytes % 18 %      Monocytes % 5 %      Eosinophils Relative 2 %      Basophils Relative 1 %      Absolute Neutrophils 7.30 Thousands/µL      Absolute Immature Grans 0.05 Thousand/uL      Absolute Lymphocytes 1.78 Thousands/µL      Absolute Monocytes 0.53 Thousand/µL      Eosinophils Absolute 0.23 Thousand/µL      Basophils Absolute 0.07 Thousands/µL             XR chest 1 view portable   ED Interpretation by Rocky Hill MD (05/22 2302)   No acute cardiopulmonary pathology       CT abdomen pelvis with contrast    (Results Pending)       ECG 12 Lead Documentation Only    Date/Time: 5/22/2025 9:45 PM    Performed by: Rocky Hill MD  Authorized by: Rocky Hill MD    Patient location:  ED  Interpretation:     Interpretation: normal    Rate:     ECG rate:  81    ECG rate assessment: normal    Rhythm:     Rhythm: sinus rhythm    Ectopy:     Ectopy: none    QRS:     QRS axis:  Normal    QRS intervals:  Normal  Conduction:     Conduction: normal    ST segments:     ST segments:  Normal  T waves:     T waves: normal        ED Medication and Procedure Management   Prior to Admission Medications    Prescriptions Last Dose Informant Patient Reported? Taking?   Aspirin Low Dose 81 MG chewable tablet  Self Yes No   Continuous Glucose Sensor (FreeStyle Nataly 3 Sensor) MISC  Self No No   Sig: APPLY 1 SENSOR EVERY 14 DAYS   Empagliflozin (Jardiance) 10 MG TABS tablet   No No   Sig: Take 1 tablet (10 mg total) by mouth every morning   NovoLOG FlexPen 100 units/mL injection pen  Self Yes No   Si Units 3 (three) times a day with meals   albuterol (Ventolin HFA) 90 mcg/act inhaler  Self No No   Sig: Inhale 2 puffs every 6 (six) hours as needed for wheezing   amoxicillin (AMOXIL) 500 MG tablet  Self Yes No   Sig: Take 500 mg by mouth 2 (two) times a day predental   bumetanide (BUMEX) 0.5 MG tablet  Self No No   Sig: Take 1 tablet (0.5 mg total) by mouth daily   desvenlafaxine succinate (PRISTIQ) 50 mg 24 hr tablet  Self No No   Sig: Take 1 tablet (50 mg total) by mouth daily   Patient not taking: Reported on 3/19/2025   diazepam (VALIUM) 5 mg tablet  Self No No   Sig: take 1 tablet by mouth once a day at bedtime as needed for sleep   Patient not taking: Reported on 3/19/2025   gabapentin (NEURONTIN) 400 mg capsule  Self No No   Sig: Take 1 capsule (400 mg total) by mouth 3 (three) times a day   glucose blood (ONE TOUCH ULTRA TEST) test strip  Self No No   Sig: test 6 times a day   hydrOXYzine HCL (ATARAX) 25 mg tablet  Self No No   Sig: TAKE 1 TABLET BY MOUTH AT BEDTIME   hydroCHLOROthiazide 50 mg tablet  Self No No   Sig: Take 1 tablet (50 mg total) by mouth daily   insulin NPH (HumuLIN N,NovoLIN N) 100 Units/mL subcutaneous injection  Self Yes No   Sig: Inject 30 Units under the skin 2 (two) times a day before meals   insulin isophane-insulin regular (NovoLIN 70/30 FlexPen) 100 units/mL injection pen  Self Yes No   Sig: Inject under the skin   ketoconazole (NIZORAL) 2 % shampoo   No No   Sig: Apply to affected area ON SCALP every other day FOR 5 MINUTES THEN RINSE   lisinopril (ZESTRIL) 5 mg tablet  Self No No    Sig: Take 1 tablet (5 mg total) by mouth daily   meloxicam (MOBIC) 15 mg tablet  Self No No   Sig: Take 1 tablet (15 mg total) by mouth daily as needed for moderate pain   metFORMIN (GLUCOPHAGE-XR) 500 mg 24 hr tablet  Self No No   Sig: TAKE 2 TABLETS BY MOUTH TWICE DAILY   methocarbamol (ROBAXIN) 500 mg tablet  Self Yes No   Sig: if needed   metoprolol tartrate (LOPRESSOR) 25 mg tablet  Self No No   Sig: Take 1 tablet (25 mg total) by mouth every 12 (twelve) hours   omeprazole (PriLOSEC) 40 MG capsule  Self No No   Sig: Take 1 capsule (40 mg total) by mouth daily   potassium chloride (MICRO-K) 10 MEQ CR capsule  Self No No   Sig: TAKE 2 CAPSULES BY MOUTH DAILY   rosuvastatin (CRESTOR) 20 MG tablet  Self No No   Sig: Take 1 tablet (20 mg total) by mouth daily      Facility-Administered Medications: None     Patient's Medications   Discharge Prescriptions    No medications on file     No discharge procedures on file.  ED SEPSIS DOCUMENTATION   Time reflects when diagnosis was documented in both MDM as applicable and the Disposition within this note       Time User Action Codes Description Comment    2025 10:00 PM Rocky Hill [D50.9] Microcytic anemia     2025 10:37 PM Rocky Hill [N17.9] CY (acute kidney injury) ([facility])     2025 10:37 PM Rocky Hill [E87.1] Hyponatremia     2025 11:01 PM Rocky Hill [E83.52] Hypercalcemia     2025 11:45 PM Rocky Hill [R07.9] Chest pain     2025 11:45 PM Rocky Hill [R06.02] Shortness of breath                      [1]   Past Medical History:  Diagnosis Date    Arthritis     Chronic diastolic CHF (congestive heart failure) ([facility]) 2024    Chronic kidney disease     CTS (carpal tunnel syndrome)     had surgery    Diabetes mellitus (HCC)     Fibroid     dr gutierrez removed     2 para 2      x2 -, -    Hyperlipidemia    [2]   Past Surgical History:  Procedure Laterality Date    CARDIAC  CATHETERIZATION  5/4/2023    Procedure: Cardiac catheterization;  Surgeon: Hilary Mondragon DO;  Location: [facility];  Service: Cardiology    CARDIAC CATHETERIZATION N/A 5/4/2023    Procedure: Cardiac Coronary Angiogram;  Surgeon: Hilary Mondragon DO;  Location: [facility];  Service: Cardiology    CARDIAC CATHETERIZATION N/A 5/4/2023    Procedure: Cardiac other - DFR for Hemodaynamic Assessment;  Surgeon: Hilary Mondragon DO;  Location: [facility];  Service: Cardiology    CARDIAC SURGERY      HYSTERECTOMY      MYOMECTOMY      dr gutierrez removed    TOTAL ABDOMINAL HYSTERECTOMY      4/2010   [3]   Family History  Adopted: Yes   Family history unknown: Yes   [4]   Social History  Tobacco Use    Smoking status: Never     Passive exposure: Never    Smokeless tobacco: Never   Vaping Use    Vaping status: Never Used   Substance Use Topics    Alcohol use: Never    Drug use: No        Rocky Hill MD  05/23/25 0032

## 2025-05-23 NOTE — ASSESSMENT & PLAN NOTE
Continue patient's blood pressure medications as previously prescribed and maintained discharge.  I am recommending to patient's primary care physician and/or endocrinologist, to reevaluate the patient if she needs to continue with her hydrochlorothiazide this patient has history of hypercalcemia secondary to hyperparathyroidism.  Outpatient follow-up with primary care physician.  Continue blood pressure monitoring and management in the outpatient.

## 2025-05-23 NOTE — DISCHARGE INSTR - AVS FIRST PAGE
As per discussed with you and what we have agreed upon, you may continue with your omeprazole for now, as previously prescribed.  Outpatient follow-up with your primary care physician in 1 week.  Outpatient follow-up with your cardiologist and gastroenterologist.  As you mentioned, you will have a follow-up with your endocrinologist, thus follow-up as scheduled.  If you develop significant nausea/vomiting again, and/or significant abdominal pains and/or fever/chills and/or abdominal or chest pains and/or shortness of breath and/or persistent dizziness, you may go back to the nearest emergency room or call your primary care physician.  As discussed with you, continue with your maintenance insulin doses, as previously prescribed by your doctor.  Continue with diabetic and heart healthy diet.  Take care.

## 2025-05-23 NOTE — ASSESSMENT & PLAN NOTE
Patient reports ongoing shortness of breath intermittently since her aortic valve replacement  CXR: Without acute cardiopulmonary disease  Currently maintaining sats on room air  Negative COVID/flu/RSV  Encourage IS    Today, 5/23: Patient's shortness of breath had resolved.  BNP was normal.  No evidence of volume overload.  Outpatient follow-up with primary care physician and cardiologist.

## 2025-05-23 NOTE — ASSESSMENT & PLAN NOTE
Patient reports in the past two days has had multiple episodes of nonbloody bilious vomiting and abdominal pain.  In the ED has been requiring multiple antiemetics without relief of symptoms. She states has associated belching, epigastric discomfort, and dry heaving.  Denies any tobacco, alcohol, or frequent NSAID use.  CT abdomen pelvis: No acute intra-abdominal pathology  Lipase and CMP WNL  Suspect abdominal pain/nausea vomiting in the setting of hypercalcemia vs GERD vs PUD vs gastritis  Patient previously on omeprazole for gastritis  Plan   Continue IVF  Start protonix BID and Carafate  As needed antiemetics  Clear liquid diet advance as tolerated  No improvement consider GI consult

## 2025-05-23 NOTE — ED ATTENDING ATTESTATION
5/22/2025  I, Yanci Nunez MD, saw and evaluated the patient. I have discussed the patient with the resident/non-physician practitioner and agree with the resident's/non-physician practitioner's findings, Plan of Care, and MDM as documented in the resident's/non-physician practitioner's note, except where noted. All available labs and Radiology studies were reviewed.  I was present for key portions of any procedure(s) performed by the resident/non-physician practitioner and I was immediately available to provide assistance.       At this point I agree with the current assessment done in the Emergency Department.  I have conducted an independent evaluation of this patient a history and physical is as follows:    Patient is a 65-year-old female who presents to the emergency department having had minimal intake and repeated nausea, small amounts of emesis and intense dry heaves over the last couple of days.  She appreciates fatigue with minimal exertion-explaining that if she walked from the stretcher a few feet away towards the cart in room she would not feel she has the energy to continue.  She describes waxing and waning of edema in both her arms and legs.  She has additionally been appreciating ongoing abdominal discomfort.  She expresses greatest concern for her heart.  History of aortic valve replacement for bicuspid valve in 2023.  Most recent echo reveals EF of 60% and findings of grade 1 diastolic dysfunction.  History additionally significant for diabetes and history of having been on PPI for her stomach discomfort.  With concern for longer-term complications she self-discontinued this 1 to 2 weeks ago.    On exam she is malaised in appearance-seated fully upright and voicing return of nausea.  Heart sounds regular.  Lungs are clear.  No rales or rhonchi.  No CVA tenderness.  Mild generalized abdominal tenderness.  Mild edema in the bilateral legs without discoloration or increased warmth.   +1 PT pulses.    ED Course     Uncertain whether all of her symptoms are related to 1 [employer] diagnosis/condition.  With consideration of nausea vomiting have concern for esophagitis, GERD, gastritis, ulcer, gastroparesis, early gastroenteritis, anxiety mediated.  Timeline does correlate to when PPI had been discontinued.  Additionally with concern for possible ACS, or CHF exacerbation with volume overload.  Must consider infection such as pneumonia but this is less likely.  Doubt PE.  Have concern for possible CY/electrolyte abnormality.  Additional antiemetic ordered.  Labs pending.    Hypercalcemia appreciated on labs.  Reviewed prior records.  She has had mildly elevated calcium levels going back to mid 2023.  Subsequent studies were performed including check of parathyroid hormone and 24-hour urine testing.  Parathyroid hormone level was noted to be elevated in mid October 2024 recommendation made to follow-up with an endocrinologist for these things.  Patient did see an endocrinologist at the end of October to establish care for her diabetes.  She does not recall discussing calcium/parathyroid related conditions.  She has had prior calcium levels higher than that today.  Although this certainly could be factoring into her nausea and vomiting other possibilities remain within the differential.    She ultimately had persistent nausea and malaise for which decision made to bring her in for further evaluation.    Critical Care Time  Procedures

## 2025-05-23 NOTE — ASSESSMENT & PLAN NOTE
Calcium 11.1 on admission.  Baseline appears to be around 10-11.  She denies any excessive calcium intake such as vitamin D or Tums.  Patient is on thiazide diuretic which  may be contributing  Check PTH, magnesium, ionized calcium, vitamin D, and phosphorus

## 2025-05-23 NOTE — ASSESSMENT & PLAN NOTE
RENY score 4  Troponin 5, 7  EKG showing sinus rhythm with nonspecific ST-T wave abnormalities similar to previous  Suspect chest pain likely secondary to GERD/gastritis     Today, 5/23: Patient's chest pain had resolved.  No ACS.  Patient was instructed to follow-up with his cardiologist in the outpatient.  Patient will continue with his cardiovascular medications.

## 2025-05-23 NOTE — ASSESSMENT & PLAN NOTE
Sodium on admission noted to be 133  Suspect in the setting of poor p.o. intake with nausea and vomiting  Continue IV fluids 75 mL an hour  Recheck BMP in morning

## 2025-05-23 NOTE — ASSESSMENT & PLAN NOTE
This was replaced today.  Outpatient follow-up with primary care physician.  I am recommending rechecking/monitoring patient's serum magnesium in the outpatient.  PCP may facilitate this.

## 2025-05-23 NOTE — ASSESSMENT & PLAN NOTE
Patient reports ongoing shortness of breath intermittently since her aortic valve replacement  CXR: Without acute cardiopulmonary disease  Currently maintaining sats on room air  Check COVID/flu/RSV  Encourage IS

## 2025-05-23 NOTE — ASSESSMENT & PLAN NOTE
"Lab Results   Component Value Date    HGBA1C 7.7 (H) 12/16/2024       No results for input(s): \"POCGLU\" in the last 72 hours.    Blood Sugar Average: Last 72 hrs:  PTA regimen NPH 30 units twice daily before meals, metformin 1000 g twice daily, and Jardiance 10 mg tablet daily  Continue PTA NPH 30 units twice daily before meals.  Will hold PTA Jardiance and metformin while inpatient.  Continue 4 times daily Accu-Cheks with sliding scale insulin  Update hemoglobin A1c  Hypoglycemia protocol    "

## 2025-05-23 NOTE — PLAN OF CARE
Problem: Potential for Falls  Goal: Patient will remain free of falls  Description: INTERVENTIONS:  - Educate patient/family on patient safety including physical limitations  - Instruct patient to call for assistance with activity   - Consider consulting OT/PT to assist with strengthening/mobility based on AM PAC & JH-HLM score  - Consult OT/PT to assist with strengthening/mobility   - Keep Call bell within reach  - Keep bed low and locked with side rails adjusted as appropriate  - Keep care items and personal belongings within reach  - Initiate and maintain comfort rounds  - Make Fall Risk Sign visible to staff  - Offer Toileting every  Hours, in advance of need  - Initiate/Maintain alarm  - Obtain necessary fall risk management equipment:   - Apply yellow socks and bracelet for high fall risk patients  - Consider moving patient to room near nurses station  5/23/2025 0230 by Artemio Joiner RN  Outcome: Progressing  5/23/2025 0230 by Artemio Joiner RN  Outcome: Progressing     Problem: PAIN - ADULT  Goal: Verbalizes/displays adequate comfort level or baseline comfort level  Description: Interventions:  - Encourage patient to monitor pain and request assistance  - Assess pain using appropriate pain scale  - Administer analgesics as ordered based on type and severity of pain and evaluate response  - Implement non-pharmacological measures as appropriate and evaluate response  - Consider cultural and social influences on pain and pain management  - Notify physician/advanced practitioner if interventions unsuccessful or patient reports new pain  - Educate patient/family on pain management process including their role and importance of  reporting pain   - Provide non-pharmacologic/complimentary pain relief interventions  Outcome: Progressing     Problem: INFECTION - ADULT  Goal: Absence or prevention of progression during hospitalization  Description: INTERVENTIONS:  - Assess and monitor for signs and symptoms of  infection  - Monitor lab/diagnostic results  - Monitor all insertion sites, i.e. indwelling lines, tubes, and drains  - Monitor endotracheal if appropriate and nasal secretions for changes in amount and color  - [place] appropriate cooling/warming therapies per order  - Administer medications as ordered  - Instruct and encourage patient and family to use good hand hygiene technique  - Identify and instruct in appropriate isolation precautions for identified infection/condition  Outcome: Progressing  Goal: Absence of fever/infection during neutropenic period  Description: INTERVENTIONS:  - Monitor WBC  - Perform strict hand hygiene  - Limit to healthy visitors only  - No plants, dried, fresh or silk flowers with villegas in patient room  Outcome: Progressing     Problem: SAFETY ADULT  Goal: Patient will remain free of falls  Description: INTERVENTIONS:  - Educate patient/family on patient safety including physical limitations  - Instruct patient to call for assistance with activity   - Consider consulting OT/PT to assist with strengthening/mobility based on AM PAC & -HLM score  - Consult OT/PT to assist with strengthening/mobility   - Keep Call bell within reach  - Keep bed low and locked with side rails adjusted as appropriate  - Keep care items and personal belongings within reach  - Initiate and maintain comfort rounds  - Make Fall Risk Sign visible to staff  - Offer Toileting every  Hours, in advance of need  - Initiate/Maintain alarm  - Obtain necessary fall risk management equipment:   - Apply yellow socks and bracelet for high fall risk patients  - Consider moving patient to room near nurses station  5/23/2025 0230 by Artemio Joiner RN  Outcome: Progressing  5/23/2025 0230 by Artemio Joiner RN  Outcome: Progressing  Goal: Maintain or return to baseline ADL function  Description: INTERVENTIONS:  -  Assess patient's ability to carry out ADLs; assess patient's baseline for ADL function and identify physical  deficits which impact ability to perform ADLs (bathing, care of mouth/teeth, toileting, grooming, dressing, etc.)  - Assess/evaluate cause of self-care deficits   - Assess range of motion  - Assess patient's mobility; develop plan if impaired  - Assess patient's need for assistive devices and provide as appropriate  - Encourage maximum independence but intervene and supervise when necessary  - Involve family in performance of ADLs  - Assess for home care needs following discharge   - Consider OT consult to assist with ADL evaluation and planning for discharge  - Provide patient education as appropriate  - Monitor functional capacity and physical performance, use of AM PAC & JH-HLM   - Monitor gait, balance and fatigue with ambulation    Outcome: Progressing  Goal: Maintains/Returns to pre admission functional level  Description: INTERVENTIONS:  - Perform AM-PAC 6 Click Basic Mobility/ Daily Activity assessment daily.  - Set and communicate daily mobility goal to care team and patient/family/caregiver.   - Collaborate with rehabilitation services on mobility goals if consulted  - Perform Range of Motion  times a day.  - Reposition patient every  hours.  - Dangle patient  times a day  - Stand patient  times a day  - Ambulate patient  times a day  - Out of bed to chair  times a day   - Out of bed for meals times a day  - Out of bed for toileting  - Record patient progress and toleration of activity level   Outcome: Progressing     Problem: DISCHARGE PLANNING  Goal: Discharge to home or other facility with appropriate resources  Description: INTERVENTIONS:  - Identify barriers to discharge w/patient and caregiver  - Arrange for needed discharge resources and transportation as appropriate  - Identify discharge learning needs (meds, wound care, etc.)  - Arrange for interpretive services to assist at discharge as needed  - Refer to Case Management Department for coordinating discharge planning if the patient needs  post-hospital services based on physician/advanced practitioner order or complex needs related to functional status, cognitive ability, or social support system  Outcome: Progressing     Problem: Knowledge Deficit  Goal: Patient/family/caregiver demonstrates understanding of disease process, treatment plan, medications, and discharge instructions  Description: Complete learning assessment and assess knowledge base.  Interventions:  - Provide teaching at level of understanding  - Provide teaching via preferred learning methods  Outcome: Progressing

## 2025-05-23 NOTE — ASSESSMENT & PLAN NOTE
Continue PTA Bumex, metoprolol, and lisinopril  Hold hydrochlorothiazide in the setting of hypercalcemia  Avoid hypotension

## 2025-05-23 NOTE — ASSESSMENT & PLAN NOTE
Has prior history of hyperparathyroidism was to follow with endocrinology in the outpatient setting.  According to the patient, she has an appointment with endocrinologist this coming week.  See plan under hypercalcemia

## 2025-05-23 NOTE — ASSESSMENT & PLAN NOTE
Has prior history of hyperparathyroidism was to follow with endocrinology in the outpatient setting  See plan under hypercalcemia

## 2025-05-23 NOTE — ASSESSMENT & PLAN NOTE
RENY score 4  EKG showing sinus rhythm with nonspecific ST changes similar to previous EKGs  Troponins 5, 7  Suspect chest pain in the setting of ongoing N/V/abd pain

## 2025-05-23 NOTE — PLAN OF CARE
Problem: Potential for Falls  Goal: Patient will remain free of falls  Description: INTERVENTIONS:  - Educate patient/family on patient safety including physical limitations  - Instruct patient to call for assistance with activity   - Consider consulting OT/PT to assist with strengthening/mobility based on AM PAC & JH-HLM score  - Consult OT/PT to assist with strengthening/mobility   - Keep Call bell within reach  - Keep bed low and locked with side rails adjusted as appropriate  - Keep care items and personal belongings within reach  - Initiate and maintain comfort rounds  - Make Fall Risk Sign visible to staff  - Offer Toileting every  Hours, in advance of need  - Initiate/Maintain alarm  - Obtain necessary fall risk management equipment:   - Apply yellow socks and bracelet for high fall risk patients  - Consider moving patient to room near nurses station  Outcome: Progressing     Problem: PAIN - ADULT  Goal: Verbalizes/displays adequate comfort level or baseline comfort level  Description: Interventions:  - Encourage patient to monitor pain and request assistance  - Assess pain using appropriate pain scale  - Administer analgesics as ordered based on type and severity of pain and evaluate response  - Implement non-pharmacological measures as appropriate and evaluate response  - Consider cultural and social influences on pain and pain management  - Notify physician/advanced practitioner if interventions unsuccessful or patient reports new pain  - Educate patient/family on pain management process including their role and importance of  reporting pain   - Provide non-pharmacologic/complimentary pain relief interventions  Outcome: Progressing     Problem: INFECTION - ADULT  Goal: Absence or prevention of progression during hospitalization  Description: INTERVENTIONS:  - Assess and monitor for signs and symptoms of infection  - Monitor lab/diagnostic results  - Monitor all insertion sites, i.e. indwelling lines,  tubes, and drains  - Monitor endotracheal if appropriate and nasal secretions for changes in amount and color  - [place] appropriate cooling/warming therapies per order  - Administer medications as ordered  - Instruct and encourage patient and family to use good hand hygiene technique  - Identify and instruct in appropriate isolation precautions for identified infection/condition  Outcome: Progressing  Goal: Absence of fever/infection during neutropenic period  Description: INTERVENTIONS:  - Monitor WBC  - Perform strict hand hygiene  - Limit to healthy visitors only  - No plants, dried, fresh or silk flowers with villegas in patient room  Outcome: Progressing     Problem: SAFETY ADULT  Goal: Patient will remain free of falls  Description: INTERVENTIONS:  - Educate patient/family on patient safety including physical limitations  - Instruct patient to call for assistance with activity   - Consider consulting OT/PT to assist with strengthening/mobility based on AM PAC & -HLM score  - Consult OT/PT to assist with strengthening/mobility   - Keep Call bell within reach  - Keep bed low and locked with side rails adjusted as appropriate  - Keep care items and personal belongings within reach  - Initiate and maintain comfort rounds  - Make Fall Risk Sign visible to staff  - Offer Toileting every  Hours, in advance of need  - Initiate/Maintain alarm  - Obtain necessary fall risk management equipment:   - Apply yellow socks and bracelet for high fall risk patients  - Consider moving patient to room near nurses station  Outcome: Progressing  Goal: Maintain or return to baseline ADL function  Description: INTERVENTIONS:  -  Assess patient's ability to carry out ADLs; assess patient's baseline for ADL function and identify physical deficits which impact ability to perform ADLs (bathing, care of mouth/teeth, toileting, grooming, dressing, etc.)  - Assess/evaluate cause of self-care deficits   - Assess range of motion  - Assess  patient's mobility; develop plan if impaired  - Assess patient's need for assistive devices and provide as appropriate  - Encourage maximum independence but intervene and supervise when necessary  - Involve family in performance of ADLs  - Assess for home care needs following discharge   - Consider OT consult to assist with ADL evaluation and planning for discharge  - Provide patient education as appropriate  - Monitor functional capacity and physical performance, use of AM PAC & JH-HLM   - Monitor gait, balance and fatigue with ambulation    Outcome: Progressing  Goal: Maintains/Returns to pre admission functional level  Description: INTERVENTIONS:  - Perform AM-PAC 6 Click Basic Mobility/ Daily Activity assessment daily.  - Set and communicate daily mobility goal to care team and patient/family/caregiver.   - Collaborate with rehabilitation services on mobility goals if consulted  - Perform Range of Motion  times a day.  - Reposition patient every  hours.  - Dangle patient  times a day  - Stand patient times a day  - Ambulate patient  times a day  - Out of bed to chair  times a day   - Out of bed for meals times a day  - Out of bed for toileting  - Record patient progress and toleration of activity level   Outcome: Progressing     Problem: DISCHARGE PLANNING  Goal: Discharge to home or other facility with appropriate resources  Description: INTERVENTIONS:  - Identify barriers to discharge w/patient and caregiver  - Arrange for needed discharge resources and transportation as appropriate  - Identify discharge learning needs (meds, wound care, etc.)  - Arrange for interpretive services to assist at discharge as needed  - Refer to Case Management Department for coordinating discharge planning if the patient needs post-hospital services based on physician/advanced practitioner order or complex needs related to functional status, cognitive ability, or social support system  Outcome: Progressing     Problem: Knowledge  Deficit  Goal: Patient/family/caregiver demonstrates understanding of disease process, treatment plan, medications, and discharge instructions  Description: Complete learning assessment and assess knowledge base.  Interventions:  - Provide teaching at level of understanding  - Provide teaching via preferred learning methods  Outcome: Progressing

## 2025-05-23 NOTE — ASSESSMENT & PLAN NOTE
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  Since her aortic valve replacement does report occasional shortness of breath and chest pain  3/2025 Echo: there is no evidence of paravalvular regurgitation. The gradient recorded across the prosthetic aortic valve is within the expected range. DVI of 0.62 and mean gradient of 6 mmHg   Continue outpatient follow-up with cardiology

## 2025-05-23 NOTE — ASSESSMENT & PLAN NOTE
RENY score 4  EKG on admission showing normal sinus rhythm with nonspecific ST changes.  EKG appears similar to prior  Troponin 5, 7  Suspect chest pain in the setting of ongoing nausea/vomiting.    Patient reports ongoing shortness of breath intermittently since her aortic valve replacement  CXR: Without acute cardiopulmonary disease  Currently maintaining sats on room air  Check COVID/flu/RSV  Obtain echocardiogram

## 2025-05-23 NOTE — ASSESSMENT & PLAN NOTE
Calcium 11.1 on admission.  Baseline appears to be around 10-11.  She denies any excessive calcium intake such as vitamin D or Tums.  Patient is on thiazide diuretic which  may be contributing  Patient's PTH was elevated.  Patient has history of hyperparathyroidism.  Patient's previous PTH was higher than today's level.  Vitamin D level was normal.  Serum phosphorus was normal.  Serum magnesium was mildly low.  Ionized calcium was normal.    Today, 5/23: Patient's hypercalcemia had resolved with IV fluid hydration.  Outpatient follow-up with primary care physician.  I am recommending rechecking/monitoring patient's serum calcium in the outpatient.  Patient's primary care physician may facilitate this.

## 2025-05-23 NOTE — ASSESSMENT & PLAN NOTE
Wt Readings from Last 3 Encounters:   03/19/25 84.3 kg (185 lb 14.4 oz)   03/10/25 82.4 kg (181 lb 9.6 oz)   03/06/25 85.3 kg (188 lb)   Appears euvolemic on examination.  No volume overload.  CHF was compensated.  BNP 26  Continue Bumex 0.5 mg daily  Monitor intake and output  Monitor daily weights standing  Outpatient follow-up with cardiologist.

## 2025-05-23 NOTE — H&P
H&P - Hospitalist   Name: Jacquie Viveros 65 y.o. female I MRN: [id number]  Unit/Bed#: W -01 I Date of Admission: 5/22/2025   Date of Service: 5/23/2025 I Hospital Day: 0     Assessment & Plan  Intractable nausea and vomiting with abdominal pain  Patient reports in the past two days has had multiple episodes of nonbloody bilious vomiting and abdominal pain.  In the ED has been requiring multiple antiemetics without relief of symptoms. She states has associated belching, epigastric discomfort, and dry heaving.  Denies any tobacco, alcohol, or frequent NSAID use.  CT abdomen pelvis: No acute intra-abdominal pathology  Lipase and CMP WNL  Suspect abdominal pain/nausea vomiting in the setting of hypercalcemia vs GERD vs PUD vs gastritis  Patient previously on omeprazole for gastritis  Plan   Continue IVF  Start protonix BID and Carafate  As needed antiemetics  Clear liquid diet advance as tolerated  No improvement consider GI consult  Chest pain  RENY score 4  Troponin 5, 7  EKG showing sinus rhythm with nonspecific ST-T wave abnormalities similar to previous  Suspect chest pain likely secondary to GERD/gastritis   Hyponatremia  Sodium on admission noted to be 133  Suspect in the setting of poor p.o. intake with nausea and vomiting  Continue IV fluids 75 mL an hour  Recheck BMP in morning  Shortness of breath  Patient reports ongoing shortness of breath intermittently since her aortic valve replacement  CXR: Without acute cardiopulmonary disease  Currently maintaining sats on room air  Check COVID/flu/RSV  Encourage IS  S/P AVR  S/p AVR#21 Inspiris with ascending aortic replacement in June 2023.  Since her aortic valve replacement does report occasional shortness of breath and chest pain  3/2025 Echo: there is no evidence of paravalvular regurgitation. The gradient recorded across the prosthetic aortic valve is within the expected range. DVI of 0.62 and mean gradient of 6 mmHg   Continue outpatient follow-up  "with cardiology    Hypercalcemia  Calcium 11.1 on admission.  Baseline appears to be around 10-11.  She denies any excessive calcium intake such as vitamin D or Tums.  Patient is on thiazide diuretic which  may be contributing  Check PTH, magnesium, ionized calcium, vitamin D, and phosphorus  Hyperparathyroidism (HCC)  Has prior history of hyperparathyroidism was to follow with endocrinology in the outpatient setting  See plan under hypercalcemia  Type 2 diabetes mellitus with mild nonproliferative retinopathy of both eyes without macular edema (HCC)  Lab Results   Component Value Date    HGBA1C 7.7 (H) 12/16/2024       No results for input(s): \"POCGLU\" in the last 72 hours.    Blood Sugar Average: Last 72 hrs:  PTA regimen NPH 30 units twice daily before meals, metformin 1000 g twice daily, and Jardiance 10 mg tablet daily  Continue PTA NPH 30 units twice daily before meals.  Will hold PTA Jardiance and metformin while inpatient.  Continue 4 times daily Accu-Cheks with sliding scale insulin  Update hemoglobin A1c  Hypoglycemia protocol    Hypertension  Continue PTA Bumex, metoprolol, and lisinopril  Hold hydrochlorothiazide in the setting of hypercalcemia  Avoid hypotension  Chronic diastolic heart failure (HCC)  Wt Readings from Last 3 Encounters:   03/19/25 84.3 kg (185 lb 14.4 oz)   03/10/25 82.4 kg (181 lb 9.6 oz)   03/06/25 85.3 kg (188 lb)   Appears euvolemic on examination  BNP 26  Continue Bumex 0.5 mg daily  Monitor intake and output  Monitor daily weights standing      VTE Pharmacologic Prophylaxis:   Moderate Risk (Score 3-4) - Pharmacological DVT Prophylaxis Ordered: enoxaparin (Lovenox).  Code Status: Level 1 - Full Code   Discussion with family: Updated  () at bedside.    Anticipated Length of Stay: Patient will be admitted on an observation basis with an anticipated length of stay of less than 2 midnights secondary to intractable nausea, vomiting, abdominal pain.  Chest pain, " shortness of breath..    History of Present Illness   Chief Complaint: Nausea and vomiting    Jacquie Viveros is a 65 y.o. female with a PMH of aortic valve replacement, chronic diastolic heart failure, hyperparathyroidism, hypertension, type 2 diabetes, CAD, arthritis who presents with intractable nausea, vomiting, and abdominal pain.  Patient reports in the past 2 days she has had increased belching, dry heaves, and inability to tolerate p.o.  She reports only able to eat bites of rice pudding otherwise unable to tolerate diet.  She denies any new dietary intake, recent travel, sick contacts, or medication changes.  She does report history of GERD and was previous on omeprazole which she stopped as she thought it was contraindicated due to her heart failure.  She stated that when she was on omeprazole that symptoms like this had resolved previously.  She does report associated abdominal pain that she describes aching primarily in her lower abdomen but does note some epigastric pain as well.  She denies any frequent NSAID use, alcohol use, or tobacco use.  Additionally patient is reporting of chest pain that she states chronic for her.  She reports on and off since her aortic valve replacement.  She states that the pain is located on the left side of her chest and radiates up to her shoulder.  She states the pain has been exacerbated due to her nausea and vomiting.  Patient also found to be hypercalcemic patient does have history of hyper parathyroidism and was to follow with endocrinology in the outpatient setting.  Repeat labs pending.  At present patient denies any chest pain, shortness of breath, leg swelling, dysuria, urinary frequency, constipation, or diarrhea.  Does endorse headache, abd pain, and N/V..    Review of Systems   Constitutional:  Positive for appetite change.   Respiratory:  Positive for shortness of breath.    Cardiovascular:  Positive for chest pain.   Gastrointestinal:  Positive for  abdominal pain, nausea and vomiting.   Neurological:  Positive for headaches.       Historical Information   Past Medical History[1]  Past Surgical History[2]  Social History[3]  E-Cigarette/Vaping    E-Cigarette Use Never User      E-Cigarette/Vaping Substances    Nicotine No     THC No     CBD No     Flavoring No     Other No     Unknown No      Family History[4]  Social History:  Marital Status: Single   Occupation: N/A  Patient Pre-hospital Living Situation: Home  Patient Pre-hospital Level of Mobility: walks  Patient Pre-hospital Diet Restrictions: None    Meds/Allergies   I have reviewed home medications with patient personally.  Prior to Admission medications    Medication Sig Start Date End Date Taking? Authorizing Provider   Aspirin Low Dose 81 MG chewable tablet  6/13/23  Yes Historical Provider, MD   bumetanide (BUMEX) 0.5 MG tablet Take 1 tablet (0.5 mg total) by mouth daily 6/10/24  Yes Ruddy Salas MD   Continuous Glucose Sensor (FreeStyle Nataly 3 Sensor) MISC APPLY 1 SENSOR EVERY 14 DAYS 1/27/25  Yes MARYLIN López   Empagliflozin (Jardiance) 10 MG TABS tablet Take 1 tablet (10 mg total) by mouth every morning 3/25/25  Yes Ruddy Salas MD   gabapentin (NEURONTIN) 400 mg capsule Take 1 capsule (400 mg total) by mouth 3 (three) times a day 3/14/25  Yes Charito Pereira DO   glucose blood (ONE TOUCH ULTRA TEST) test strip test 6 times a day 3/9/20  Yes Charito Pereira DO   hydroCHLOROthiazide 50 mg tablet Take 1 tablet (50 mg total) by mouth daily 6/10/24  Yes Ruddy Salas MD   hydrOXYzine HCL (ATARAX) 25 mg tablet TAKE 1 TABLET BY MOUTH AT BEDTIME 12/20/24  Yes Charito Pereira DO   lisinopril (ZESTRIL) 5 mg tablet Take 1 tablet (5 mg total) by mouth daily 6/10/24  Yes Ruddy Salas MD   metFORMIN (GLUCOPHAGE-XR) 500 mg 24 hr tablet TAKE 2 TABLETS BY MOUTH TWICE DAILY 12/20/24  Yes Charito Pereira DO   metoprolol tartrate (LOPRESSOR) 25 mg tablet  Take 1 tablet (25 mg total) by mouth every 12 (twelve) hours 6/10/24  Yes Ruddy Salas MD   potassium chloride (MICRO-K) 10 MEQ CR capsule TAKE 2 CAPSULES BY MOUTH DAILY 12/20/24  Yes Charito Pereira DO   rosuvastatin (CRESTOR) 20 MG tablet Take 1 tablet (20 mg total) by mouth daily 6/10/24  Yes Ruddy Salas MD   albuterol (Ventolin HFA) 90 mcg/act inhaler Inhale 2 puffs every 6 (six) hours as needed for wheezing 10/24/23   Charito Pereira DO   amoxicillin (AMOXIL) 500 MG tablet Take 500 mg by mouth 2 (two) times a day predental  Patient not taking: Reported on 5/23/2025 10/1/24   Historical Provider, MD   desvenlafaxine succinate (PRISTIQ) 50 mg 24 hr tablet Take 1 tablet (50 mg total) by mouth daily  Patient not taking: Reported on 3/19/2025 8/12/24   Charito Pereira DO   diazepam (VALIUM) 5 mg tablet take 1 tablet by mouth once a day at bedtime as needed for sleep  Patient not taking: Reported on 3/19/2025 2/20/23   Charito Pereira DO   insulin isophane-insulin regular (NovoLIN 70/30 FlexPen) 100 units/mL injection pen Inject under the skin 10/21/24   Historical Provider, MD   insulin NPH (HumuLIN N,NovoLIN N) 100 Units/mL subcutaneous injection Inject 30 Units under the skin in the morning and 30 Units in the evening. Inject before meals.    Historical Provider, MD   ketoconazole (NIZORAL) 2 % shampoo Apply to affected area ON SCALP every other day FOR 5 MINUTES THEN RINSE 3/28/25   Charito Pereira DO   meloxicam (MOBIC) 15 mg tablet Take 1 tablet (15 mg total) by mouth daily as needed for moderate pain 10/8/24   Charito Pereira DO   methocarbamol (ROBAXIN) 500 mg tablet if needed 3/18/24   Historical Provider, MD   NovoLOG FlexPen 100 units/mL injection pen 8 Units 3 (three) times a day with meals  Patient not taking: Reported on 5/23/2025 6/13/23   Historical Provider, MD   omeprazole (PriLOSEC) 40 MG capsule Take 1 capsule (40 mg total) by mouth  daily  Patient not taking: Reported on 5/23/2025 10/10/24   Vero Antony MD     Allergies   Allergen Reactions    Atorvastatin Other (See Comments)     Reaction Date: 25Apr2011;     Furosemide Other (See Comments)     Reaction Date: 25Apr2011;     Latex Other (See Comments)     Pt doesn't remember reaction        Objective :  Temp:  [98.1 °F (36.7 °C)-98.4 °F (36.9 °C)] 98.4 °F (36.9 °C)  HR:  [65-95] 65  BP: (116-136)/(56-63) 116/56  Resp:  [16-18] 16  SpO2:  [93 %-98 %] 98 %  O2 Device: None (Room air)    Physical Exam  Vitals and nursing note reviewed.   Constitutional:       General: She is not in acute distress.     Appearance: She is well-developed. She is not ill-appearing.   HENT:      Head: Normocephalic and atraumatic.      Mouth/Throat:      Mouth: Mucous membranes are dry.     Eyes:      Conjunctiva/sclera: Conjunctivae normal.       Cardiovascular:      Rate and Rhythm: Normal rate and regular rhythm.      Heart sounds: Murmur heard.   Pulmonary:      Effort: Pulmonary effort is normal. No tachypnea, accessory muscle usage or respiratory distress.      Breath sounds: Normal breath sounds. No decreased air movement or transmitted upper airway sounds.      Comments: CTA B/L  Abdominal:      General: Bowel sounds are normal. There is no distension.      Palpations: Abdomen is soft.      Tenderness: There is no abdominal tenderness. There is no guarding or rebound.     Musculoskeletal:         General: No swelling.      Cervical back: Neck supple.      Right lower leg: No edema.      Left lower leg: No edema.     Skin:     General: Skin is warm and dry.      Capillary Refill: Capillary refill takes less than 2 seconds.      Coloration: Skin is pale.     Neurological:      General: No focal deficit present.      Mental Status: She is alert and oriented to person, place, and time.     Psychiatric:         Mood and Affect: Mood normal.          Lines/Drains:            Lab Results: I have reviewed the  following results:  Results from last 7 days   Lab Units 05/22/25  2137   WBC Thousand/uL 9.96   HEMOGLOBIN g/dL 11.2*   HEMATOCRIT % 36.5   PLATELETS Thousands/uL 205   SEGS PCT % 73   LYMPHO PCT % 18   MONO PCT % 5   EOS PCT % 2     Results from last 7 days   Lab Units 05/22/25  2137   SODIUM mmol/L 133*   POTASSIUM mmol/L 3.9   CHLORIDE mmol/L 97   CO2 mmol/L 28   BUN mg/dL 28*   CREATININE mg/dL 1.02   ANION GAP mmol/L 8   CALCIUM mg/dL 11.1*   ALBUMIN g/dL 4.6   TOTAL BILIRUBIN mg/dL 0.32   ALK PHOS U/L 51   ALT U/L 17   AST U/L 16   GLUCOSE RANDOM mg/dL 152*             Lab Results   Component Value Date    HGBA1C 7.7 (H) 12/16/2024    HGBA1C 9.9 (H) 08/10/2024    HGBA1C 9.3 (H) 03/05/2024           Imaging Results Review: I personally reviewed the following image studies/reports in PACS and discussed pertinent findings with Radiology: chest xray. My interpretation of the radiology images/reports is: Without acute cardiopulmonary disease.  Other Study Results Review: EKG was reviewed.     Administrative Statements   I have spent a total time of 75 minutes in caring for this patient on the day of the visit/encounter including Diagnostic results, Prognosis, Risks and benefits of tx options, Instructions for management, Patient and family education, Importance of tx compliance, Risk factor reductions, Impressions, Counseling / Coordination of care, Documenting in the medical record, Reviewing/placing orders in the medical record (including tests, medications, and/or procedures), Obtaining or reviewing history  , and Communicating with other healthcare professionals .    ** Please Note: This note has been constructed using a voice recognition system. **         [1]   Past Medical History:  Diagnosis Date    Arthritis     Chronic diastolic CHF (congestive heart failure) (HCC) 05/07/2024    Chronic kidney disease     CTS (carpal tunnel syndrome)     had surgery    Diabetes mellitus (HCC)     Fibroid     dr gutierrez  removed     2 para 2      x2 -F, -F    Hyperlipidemia    [2]   Past Surgical History:  Procedure Laterality Date    CARDIAC CATHETERIZATION  2023    Procedure: Cardiac catheterization;  Surgeon: Hilary Mondragon DO;  Location: [facility];  Service: Cardiology    CARDIAC CATHETERIZATION N/A 2023    Procedure: Cardiac Coronary Angiogram;  Surgeon: Hilary Mondragon DO;  Location: [facility];  Service: Cardiology    CARDIAC CATHETERIZATION N/A 2023    Procedure: Cardiac other - DFR for Hemodaynamic Assessment;  Surgeon: Hilary Mondragon DO;  Location: [facility];  Service: Cardiology    CARDIAC SURGERY      HYSTERECTOMY      MYOMECTOMY      dr gutierrez removed    TOTAL ABDOMINAL HYSTERECTOMY      2010   [3]   Social History  Tobacco Use    Smoking status: Never     Passive exposure: Never    Smokeless tobacco: Never   Vaping Use    Vaping status: Never Used   Substance and Sexual Activity    Alcohol use: Never    Drug use: No    Sexual activity: Yes     Partners: Male     Birth control/protection: Male Sterilization     Comment: declines std/hiv testing   [4]   Family History  Adopted: Yes   Family history unknown: Yes

## 2025-05-23 NOTE — ASSESSMENT & PLAN NOTE
RENY score 4  Troponin 5, 7  EKG showing sinus rhythm with nonspecific ST-T wave abnormalities similar to previous  Suspect chest pain likely secondary to GERD/gastritis

## 2025-05-23 NOTE — DISCHARGE SUMMARY
Discharge Summary - Hospitalist   Name: Jacquie Viveros 65 y.o. female I MRN: [id number]  Unit/Bed#: W -01 I Date of Admission: 5/22/2025   Date of Service: 5/23/2025 I Hospital Day: 0     Assessment & Plan  Intractable nausea and vomiting with abdominal pain  Patient reports in the past two days has had multiple episodes of nonbloody bilious vomiting and abdominal pain.  In the ED has been requiring multiple antiemetics without relief of symptoms. She states has associated belching, epigastric discomfort, and dry heaving.  Denies any tobacco, alcohol, or frequent NSAID use.  CT abdomen pelvis: No acute intra-abdominal pathology  Lipase and CMP WNL  Suspect abdominal pain/nausea vomiting in the setting of hypercalcemia vs GERD vs gastritis  Patient previously on omeprazole for gastritis  Plan   Status post IVF  On protonix BID and Carafate  As needed antiemetics  Clear liquid diet advance as tolerated    Today, 5/23: Patient's condition improved.  Patient did not have any nausea or vomiting or abdominal pains anymore.  Patient tolerated advancement of her diet well without any problems.  Patient was okay with her discharge and was instructed to continue with her omeprazole, that was previously prescribed and maintained.  Patient told me, she stopped taking omeprazole approximately 2 weeks ago and started having GI symptoms since then.  Patient was instructed to follow-up with primary care physician and gastroenterologist.  Chest pain  RENY score 4  Troponin 5, 7  EKG showing sinus rhythm with nonspecific ST-T wave abnormalities similar to previous  Suspect chest pain likely secondary to GERD/gastritis     Today, 5/23: Patient's chest pain had resolved.  No ACS.  Patient was instructed to follow-up with his cardiologist in the outpatient.  Patient will continue with his cardiovascular medications.  Hyponatremia  Sodium on admission noted to be 133  Suspect in the setting of poor p.o. intake with nausea and  vomiting  On IV fluids 75 mL an hour    Today, 5/23: Hyponatremia had resolved.  Outpatient follow-up with primary care physician.  I am recommending rechecking/monitoring patient's BMP in the outpatient.  Shortness of breath  Patient reports ongoing shortness of breath intermittently since her aortic valve replacement  CXR: Without acute cardiopulmonary disease  Currently maintaining sats on room air  Negative COVID/flu/RSV  Encourage IS    Today, 5/23: Patient's shortness of breath had resolved.  BNP was normal.  No evidence of volume overload.  Outpatient follow-up with primary care physician and cardiologist.  S/P AVR  S/p AVR#21 Inspiris with ascending aortic replacement in June 2023.  Since her aortic valve replacement does report occasional shortness of breath and chest pain  3/2025 Echo: there is no evidence of paravalvular regurgitation. The gradient recorded across the prosthetic aortic valve is within the expected range. DVI of 0.62 and mean gradient of 6 mmHg   Continue outpatient follow-up with cardiology    Hypercalcemia  Calcium 11.1 on admission.  Baseline appears to be around 10-11.  She denies any excessive calcium intake such as vitamin D or Tums.  Patient is on thiazide diuretic which  may be contributing  Patient's PTH was elevated.  Patient has history of hyperparathyroidism.  Patient's previous PTH was higher than today's level.  Vitamin D level was normal.  Serum phosphorus was normal.  Serum magnesium was mildly low.  Ionized calcium was normal.    Today, 5/23: Patient's hypercalcemia had resolved with IV fluid hydration.  Outpatient follow-up with primary care physician.  I am recommending rechecking/monitoring patient's serum calcium in the outpatient.  Patient's primary care physician may facilitate this.  Hyperparathyroidism (HCC)  Has prior history of hyperparathyroidism was to follow with endocrinology in the outpatient setting.  According to the patient, she has an appointment with  endocrinologist this coming week.  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  Will hold PTA Jardiance and metformin while inpatient.  Continue 4 times daily Accu-Cheks with sliding scale insulin  Update hemoglobin A1c  Hypoglycemia protocol  Outpatient follow-up with primary care physician and according the patient she has a follow-up with her endocrinologist this coming week.  Continue patient's diabetes medications as previously prescribed (this was reviewed with the patient).    Hypertension  Continue patient's blood pressure medications as previously prescribed and maintained discharge.  I am recommending to patient's primary care physician and/or endocrinologist, to reevaluate the patient if she needs to continue with her hydrochlorothiazide this patient has history of hypercalcemia secondary to hyperparathyroidism.  Outpatient follow-up with primary care physician.  Continue blood pressure monitoring and management in the outpatient.    Chronic diastolic heart failure (HCC)  Wt Readings from Last 3 Encounters:   03/19/25 84.3 kg (185 lb 14.4 oz)   03/10/25 82.4 kg (181 lb 9.6 oz)   03/06/25 85.3 kg (188 lb)   Appears euvolemic on examination.  No volume overload.  CHF was compensated.  BNP 26  Continue Bumex 0.5 mg daily  Monitor intake and output  Monitor daily weights standing  Outpatient follow-up with cardiologist.  Hypomagnesemia  This was replaced today.  Outpatient follow-up with primary care physician.  I am recommending rechecking/monitoring patient's serum  magnesium in the outpatient.  PCP may facilitate this.     Medical Problems       Resolved Problems  Date Reviewed: 5/23/2025          Resolved    CY (acute kidney injury) (HCC) 5/23/2025     Resolved by  MARYLIN Sheridan    Chest pain 5/23/2025     Resolved by  MARYLIN Sheridan        Discharging Physician / Practitioner: Jairo Hogue MD  PCP: Charito Pereira,   Admission Date:   Admission Orders (From admission, onward)       Ordered        05/23/25 0049  Place in Observation  Once                          Discharge Date: 05/23/25    Next Steps for Physician/AP Assuming Care:  Monitor/recheck patient's serum calcium and thus I am recommending CMP, serum electrolytes particularly serum magnesium in the outpatient.  Outpatient follow-up with cardiologist and gastroenterologist.  Patient expressed to me, that she stopped taking omeprazole and started having GI symptoms.  She stopped taking omeprazole as she read in the Internet that it is not good for the heart.  However, with patient coming here with GI symptoms, weighing pros and cons as well as benefit and risk of the PPI, with relief of her symptoms here with PPI, I advised the patient that she may continue omeprazole for now and have further discussions with her cardiologist and gastroenterologist and with her primary care physician.  Since patient has hypercalcemia secondary to hyperparathyroidism, please reevaluate the patient if she can be taken off from hydrochlorothiazide.    Test Results Pending at Discharge (will require follow up):  None.    Medication Changes for Discharge & Rationale:   None.  Patient was instructed to continue with her prescribed and maintenance dose of omeprazole, for now, with outpatient follow-up with both gastroenterologist and cardiologist, as mentioned above.  See after visit summary for reconciled discharge medications provided to patient and/or family.     Consultations  During Hospital Stay:  None.    Procedures Performed:   Please see notes above.    Significant Findings / Test Results:   Please see notes above.    Incidental Findings:   Incidental finding of small right hepatic AVM, in retrospect, unchanged.  I reviewed the above mentioned incidental findings with the patient and/or family and they expressed understanding.    Hospital Course:   Jacquie Viveros is a 65 y.o. female patient who originally presented to the hospital on 5/22/2025 due to nausea and vomiting and abdominal pains.  On this admission, patient was started on IV fluids, Protonix and Carafate and on as needed antiemetics.  Patient was initially on clear liquid diet.  Patient's condition improved.  Patient's nausea, vomiting abdominal pains had all resolved.  Patient was prescribed with omeprazole due to gastritis, however, I learned from the patient that she stopped this medication approximately 2 weeks ago when she read about that this medication is not good for the heart, and since stopping this medication she told me that she started again having GI symptoms.  Thus, after weighing pros and cons and benefits and the risk of PPI at this point with her case, I instructed her to continue with omeprazole as previously prescribed for now and follow-up with both her gastroenterologist and cardiologist as well as primary care physician for further guidance about this.  Patient was okay with her discharge to home today.    Please see above list of diagnoses and related plan for additional information.     Discharge Day Visit / Exam:   Subjective:    Patient was seen and examined earlier this morning.  Patient was doing fine and better.  Patient did not have any complaints and denied any symptoms.  Patient denied any morning nausea, vomiting or any abdominal pains or any chest pains or any other pains.  Patient denied any more shortness of breath.  Patient denied any dizziness or any fever or chills.  Patient  denied any urinary or any bowel movement problems.  Patient was okay with her discharge to home today.    Vitals: Blood Pressure: 126/58 (05/23/25 0700)  Pulse: 59 (05/23/25 0900)  Temperature: 98.8 °F (37.1 °C) (05/23/25 0700)  Temp Source: Oral (05/22/25 2133)  Respirations: 16 (05/23/25 0700)  SpO2: 94 % (05/23/25 0900)  Physical Exam  Vitals and nursing note reviewed. Exam conducted with a chaperone present.   Constitutional:       General: She is not in acute distress.     Appearance: Normal appearance. She is not ill-appearing, toxic-appearing or diaphoretic.     Cardiovascular:      Rate and Rhythm: Normal rate and regular rhythm.      Heart sounds: Murmur heard.      No friction rub. No gallop.   Pulmonary:      Effort: Pulmonary effort is normal. No respiratory distress.      Breath sounds: Normal breath sounds. No stridor. No wheezing, rhonchi or rales.   Chest:      Chest wall: No tenderness.   Abdominal:      General: There is no distension.      Palpations: Abdomen is soft.      Tenderness: There is no abdominal tenderness. There is no guarding or rebound.     Musculoskeletal:      Right lower leg: No edema.      Left lower leg: No edema.     Skin:     General: Skin is warm.      Coloration: Skin is not pale.      Findings: No erythema or rash.     Neurological:      General: No focal deficit present.      Mental Status: She is alert and oriented to person, place, and time.     Psychiatric:         Mood and Affect: Mood normal.         Behavior: Behavior normal.         Thought Content: Thought content normal.              Discussion with Family: Updated  (ex-) at bedside.  Patient introduced me to his ex- and was okay with me to update him about her condition and findings.    Discharge instructions/Information to patient and family:   See after visit summary for information provided to patient and family.      Provisions for Follow-Up Care:  See after visit summary for  information related to follow-up care and any pertinent home health orders.      Mobility at time of Discharge:   Basic Mobility Inpatient Raw Score: 23  JH-HLM Goal: 7: Walk 25 feet or more  JH-HLM Achieved: 7: Walk 25 feet or more  HLM Goal achieved. Continue to encourage appropriate mobility.     Disposition:   Home    Planned Readmission: None.    Administrative Statements   Discharge Statement:  I have spent a total time of 45 minutes in caring for this patient on the day of the visit/encounter. >30 minutes of time was spent on: Diagnostic results, Risks and benefits of tx options, Instructions for management, Patient and family education, Importance of tx compliance, Risk factor reductions, Impressions, Counseling / Coordination of care, Documenting in the medical record, Reviewing / ordering tests, medicine, procedures  , and Communicating with other healthcare professionals .    **Please Note: This note may have been constructed using a voice recognition system**

## 2025-05-26 ENCOUNTER — HOSPITAL ENCOUNTER (EMERGENCY)
Facility: HOSPITAL | Age: 65
Discharge: HOME | End: 2025-05-26
Attending: STUDENT IN AN ORGANIZED HEALTH CARE EDUCATION/TRAINING PROGRAM | Admitting: STUDENT IN AN ORGANIZED HEALTH CARE EDUCATION/TRAINING PROGRAM
Payer: MEDICARE

## 2025-05-26 VITALS
SYSTOLIC BLOOD PRESSURE: 144 MMHG | RESPIRATION RATE: 18 BRPM | HEART RATE: 87 BPM | DIASTOLIC BLOOD PRESSURE: 65 MMHG | TEMPERATURE: 98.7 F | OXYGEN SATURATION: 96 %

## 2025-05-26 DIAGNOSIS — R42 DIZZINESS: ICD-10-CM

## 2025-05-26 DIAGNOSIS — R11.2 NAUSEA AND VOMITING: Primary | ICD-10-CM

## 2025-05-26 DIAGNOSIS — R51.9 HEADACHE: ICD-10-CM

## 2025-05-26 LAB
ANION GAP SERPL CALCULATED.3IONS-SCNC: 9 MMOL/L (ref 4–13)
ATRIAL RATE: 61 BPM
BASOPHILS # BLD AUTO: 0.06 THOUSANDS/ÂΜL (ref 0–0.1)
BASOPHILS NFR BLD AUTO: 1 % (ref 0–1)
BUN SERPL-MCNC: 23 MG/DL (ref 5–25)
CALCIUM SERPL-MCNC: 11.7 MG/DL (ref 8.4–10.2)
CHLORIDE SERPL-SCNC: 96 MMOL/L (ref 96–108)
CO2 SERPL-SCNC: 30 MMOL/L (ref 21–32)
CREAT SERPL-MCNC: 1.02 MG/DL (ref 0.6–1.3)
EOSINOPHIL # BLD AUTO: 0.15 THOUSAND/ÂΜL (ref 0–0.61)
EOSINOPHIL NFR BLD AUTO: 2 % (ref 0–6)
ERYTHROCYTE [DISTWIDTH] IN BLOOD BY AUTOMATED COUNT: 20.2 % (ref 11.6–15.1)
GFR SERPL CREATININE-BSD FRML MDRD: 57 ML/MIN/1.73SQ M
GLUCOSE SERPL-MCNC: 233 MG/DL (ref 65–140)
HCT VFR BLD AUTO: 39.4 % (ref 34.8–46.1)
HGB BLD-MCNC: 11.8 G/DL (ref 11.5–15.4)
IMM GRANULOCYTES # BLD AUTO: 0.05 THOUSAND/UL (ref 0–0.2)
IMM GRANULOCYTES NFR BLD AUTO: 1 % (ref 0–2)
LYMPHOCYTES # BLD AUTO: 1.45 THOUSANDS/ÂΜL (ref 0.6–4.47)
LYMPHOCYTES NFR BLD AUTO: 15 % (ref 14–44)
MCH RBC QN AUTO: 21.9 PG (ref 26.8–34.3)
MCHC RBC AUTO-ENTMCNC: 29.9 G/DL (ref 31.4–37.4)
MCV RBC AUTO: 73 FL (ref 82–98)
MONOCYTES # BLD AUTO: 0.4 THOUSAND/ÂΜL (ref 0.17–1.22)
MONOCYTES NFR BLD AUTO: 4 % (ref 4–12)
NEUTROPHILS # BLD AUTO: 7.59 THOUSANDS/ÂΜL (ref 1.85–7.62)
NEUTS SEG NFR BLD AUTO: 77 % (ref 43–75)
NRBC BLD AUTO-RTO: 0 /100 WBCS
P AXIS: 81 DEGREES
PLATELET # BLD AUTO: 241 THOUSANDS/UL (ref 149–390)
PMV BLD AUTO: 10 FL (ref 8.9–12.7)
POTASSIUM SERPL-SCNC: 4.4 MMOL/L (ref 3.5–5.3)
PR INTERVAL: 124 MS
QRS AXIS: 73 DEGREES
QRSD INTERVAL: 78 MS
QT INTERVAL: 394 MS
QTC INTERVAL: 396 MS
RBC # BLD AUTO: 5.38 MILLION/UL (ref 3.81–5.12)
SODIUM SERPL-SCNC: 135 MMOL/L (ref 135–147)
T WAVE AXIS: 64 DEGREES
VENTRICULAR RATE: 61 BPM
WBC # BLD AUTO: 9.7 THOUSAND/UL (ref 4.31–10.16)

## 2025-05-26 PROCEDURE — 80048 BASIC METABOLIC PNL TOTAL CA: CPT

## 2025-05-26 PROCEDURE — 99285 EMERGENCY DEPT VISIT HI MDM: CPT

## 2025-05-26 PROCEDURE — 96372 THER/PROPH/DIAG INJ SC/IM: CPT

## 2025-05-26 PROCEDURE — 99285 EMERGENCY DEPT VISIT HI MDM: CPT | Performed by: STUDENT IN AN ORGANIZED HEALTH CARE EDUCATION/TRAINING PROGRAM

## 2025-05-26 PROCEDURE — 36415 COLL VENOUS BLD VENIPUNCTURE: CPT

## 2025-05-26 PROCEDURE — 93005 ELECTROCARDIOGRAM TRACING: CPT

## 2025-05-26 PROCEDURE — 96365 THER/PROPH/DIAG IV INF INIT: CPT

## 2025-05-26 PROCEDURE — 96375 TX/PRO/DX INJ NEW DRUG ADDON: CPT

## 2025-05-26 PROCEDURE — 93010 ELECTROCARDIOGRAM REPORT: CPT | Performed by: INTERNAL MEDICINE

## 2025-05-26 PROCEDURE — 85025 COMPLETE CBC W/AUTO DIFF WBC: CPT

## 2025-05-26 PROCEDURE — 96361 HYDRATE IV INFUSION ADD-ON: CPT

## 2025-05-26 RX ORDER — ONDANSETRON 4 MG/1
4 TABLET, ORALLY DISINTEGRATING ORAL EVERY 6 HOURS PRN
Qty: 20 TABLET | Refills: 0 | Status: SHIPPED | OUTPATIENT
Start: 2025-05-26

## 2025-05-26 RX ORDER — DIPHENHYDRAMINE HYDROCHLORIDE 50 MG/ML
25 INJECTION, SOLUTION INTRAMUSCULAR; INTRAVENOUS ONCE
Status: COMPLETED | OUTPATIENT
Start: 2025-05-26 | End: 2025-05-26

## 2025-05-26 RX ORDER — MAGNESIUM SULFATE HEPTAHYDRATE 40 MG/ML
2 INJECTION, SOLUTION INTRAVENOUS ONCE
Status: COMPLETED | OUTPATIENT
Start: 2025-05-26 | End: 2025-05-26

## 2025-05-26 RX ORDER — DROPERIDOL 2.5 MG/ML
1.25 INJECTION, SOLUTION INTRAMUSCULAR; INTRAVENOUS ONCE
Status: COMPLETED | OUTPATIENT
Start: 2025-05-26 | End: 2025-05-26

## 2025-05-26 RX ORDER — FAMOTIDINE 20 MG/1
20 TABLET, FILM COATED ORAL DAILY
Qty: 30 TABLET | Refills: 0 | Status: SHIPPED | OUTPATIENT
Start: 2025-05-26

## 2025-05-26 RX ORDER — ALUMINA, MAGNESIA, AND SIMETHICONE 2400; 2400; 240 MG/30ML; MG/30ML; MG/30ML
10 SUSPENSION ORAL EVERY 6 HOURS PRN
Qty: 355 ML | Refills: 0 | Status: SHIPPED | OUTPATIENT
Start: 2025-05-26

## 2025-05-26 RX ORDER — ACETAMINOPHEN 325 MG/1
975 TABLET ORAL ONCE
Status: COMPLETED | OUTPATIENT
Start: 2025-05-26 | End: 2025-05-26

## 2025-05-26 RX ADMIN — DROPERIDOL 1.25 MG: 2.5 INJECTION, SOLUTION INTRAMUSCULAR; INTRAVENOUS at 11:19

## 2025-05-26 RX ADMIN — DIPHENHYDRAMINE HYDROCHLORIDE 25 MG: 50 INJECTION, SOLUTION INTRAMUSCULAR; INTRAVENOUS at 11:18

## 2025-05-26 RX ADMIN — MAGNESIUM SULFATE HEPTAHYDRATE 2 G: 40 INJECTION, SOLUTION INTRAVENOUS at 11:21

## 2025-05-26 RX ADMIN — RIMEGEPANT SULFATE 75 MG: 75 TABLET, ORALLY DISINTEGRATING ORAL at 11:21

## 2025-05-26 RX ADMIN — ACETAMINOPHEN 975 MG: 325 TABLET ORAL at 14:13

## 2025-05-26 RX ADMIN — SODIUM CHLORIDE 1000 ML: 0.9 INJECTION, SOLUTION INTRAVENOUS at 11:16

## 2025-05-26 NOTE — DISCHARGE INSTRUCTIONS
Please take Zofran 4 mg oral dissolvable tablet once every 6 hours as needed for nausea or vomiting    Please call neurology for further evaluation of your headaches, nausea, and dizziness    Follow-up with your gastroenterologist for further evaluation of your belching, acid reflux, and decreased appetite    You may take Pepcid 1 pill daily for acid reflux    You may take Maalox max 10 mL by mouth once every 6 hours as needed for indigestion or heartburn

## 2025-05-26 NOTE — ED PROVIDER NOTES
Time reflects when diagnosis was documented in both MDM as applicable and the Disposition within this note       Time User Action Codes Description Comment    5/26/2025  2:17 PM De La Cruz, Luc Add [R11.0] Nausea     5/26/2025  2:17 PM De La Cruz, Luc Add [R42] Dizziness     5/26/2025  2:17 PM De La Cruz, Luc Modify [R42] Dizziness     5/26/2025  2:17 PM De La Cruz, Luc Remove [R11.0] Nausea     5/26/2025  2:18 PM De La Cruz, Luc Add [R11.2] Nausea and vomiting     5/26/2025  2:18 PM De La Cruz, Luc Modify [R42] Dizziness     5/26/2025  2:18 PM De La Cruz, Luc Modify [R11.2] Nausea and vomiting     5/26/2025  2:18 PM De La Cruz, Luc Add [R51.9] Headache           ED Disposition       ED Disposition   Discharge    Condition   Stable    Date/Time   Mon May 26, 2025  2:17 PM    Comment   Jacquie Viveros discharge to home/self care.                   Assessment & Plan       Medical Decision Making  66 yo female presents with nausea, headaches, dizziness, and occasional emesis  At risk for ocular headaches, migraine, viral illness, BPPV, electrolyte abnormality, arrhythmia, anemia, ect  CBC shows no leukocytosis and no anemia  CMP shows elevated blood sugar however no electrolyte abnormalities  Kidney and hepatic function near baseline  ECG shows normal sinus rhythm with no acute changes  PE is reassuring with normal neurologic exam  Pt given tylenol, NS, and headache cocktail for current headache  Description of headache consistent with ocular migraine  Pt reevaluated after medications and states she feels better but not 100%  Recommended following with neurology, PCP, and GI  Will prescribed zofran outpatient for nausea as pt states this works best for her  Pt requesting discharge and PO challenge completed with no vomiting  Strict follow up and return precautions given  Discussed all lab and diagnostic findings  Pt in stable condition and cleared for discharge       Amount and/or Complexity of Data Reviewed  Labs: ordered.    Risk  OTC  drugs.  Prescription drug management.        ED Course as of 06/04/25 0245   Mon May 26, 2025   1305 Patient says nausea significantly improved.  Will p.o. challenge to determine if acceptable for discharge.  Otherwise, patient has no complaints at this time.       Medications   droperidol (INAPSINE) injection 1.25 mg (1.25 mg Intramuscular Given 5/26/25 1119)   diphenhydrAMINE (BENADRYL) injection 25 mg (25 mg Intravenous Given 5/26/25 1118)   magnesium sulfate 2 g/50 mL IVPB (premix) 2 g (0 g Intravenous Stopped 5/26/25 1221)   sodium chloride 0.9 % bolus 1,000 mL (0 mL Intravenous Stopped 5/26/25 1300)   rimegepant sulfate (NURTEC) disintegrating tablet 75 mg (75 mg Oral Given 5/26/25 1121)   acetaminophen (TYLENOL) tablet 975 mg (975 mg Oral Given 5/26/25 1413)       ED Risk Strat Scores                    No data recorded        SBIRT 22yo+      Flowsheet Row Most Recent Value   Initial Alcohol Screen: US AUDIT-C     1. How often do you have a drink containing alcohol? 0 Filed at: 05/26/2025 1056   2. How many drinks containing alcohol do you have on a typical day you are drinking?  0 Filed at: 05/26/2025 1056   3b. FEMALE Any Age, or MALE 65+: How often do you have 4 or more drinks on one occassion? 0 Filed at: 05/26/2025 1056   Audit-C Score 0 Filed at: 05/26/2025 1056   CIERRA: How many times in the past year have you...    Used an illegal drug or used a prescription medication for non-medical reasons? Never Filed at: 05/26/2025 1056                            History of Present Illness       Chief Complaint   Patient presents with    Nausea    Dizziness     Pt was discharged on Thursday from here for similar symptoms. Pt reports zofran has been the only thing to help her but did not get prescription. C/o nausea/dizziness/chest pain       Past Medical History[1]   Past Surgical History[2]   Family History[3]   Social History[4]   E-Cigarette/Vaping    E-Cigarette Use Never User       E-Cigarette/Vaping  Substances    Nicotine No     THC No     CBD No     Flavoring No     Other No     Unknown No       I have reviewed and agree with the history as documented.     66 yo female presents with nausea and dizziness. Pt was seen earlier in the week and admitted to the hospital for similar symptoms. Pt states she has had trouble eating over the past week and feels nauseous. Pt admits to a few episodes of vomiting. Pt also admits to feeling as though the room is spinning at times. Pt also admits to frontal headaches that are sharp and sometimes spread to the occiput. During these headaches, pt admits to having visual changes.          Review of Systems   Constitutional:  Positive for appetite change and fatigue. Negative for chills and fever.   HENT:  Negative for ear pain, sore throat and trouble swallowing.    Eyes:  Positive for visual disturbance. Negative for pain.   Respiratory:  Negative for cough, chest tightness and shortness of breath.    Cardiovascular:  Negative for chest pain and palpitations.   Gastrointestinal:  Positive for abdominal pain, nausea and vomiting. Negative for abdominal distention.   Genitourinary:  Negative for dysuria and hematuria.   Musculoskeletal:  Negative for arthralgias and back pain.   Skin:  Negative for color change and rash.   Neurological:  Positive for dizziness and headaches. Negative for seizures and syncope.   All other systems reviewed and are negative.          Objective       ED Triage Vitals   Temperature Pulse Blood Pressure Respirations SpO2 Patient Position - Orthostatic VS   05/26/25 1005 05/26/25 1002 05/26/25 1002 05/26/25 1002 05/26/25 1002 05/26/25 1246   98.7 °F (37.1 °C) 66 145/75 18 98 % Lying      Temp src Heart Rate Source BP Location FiO2 (%) Pain Score    -- 05/26/25 1246 05/26/25 1246 -- 05/26/25 1055     Monitor Right arm  9      Vitals      Date and Time Temp Pulse SpO2 Resp BP Pain Score FACES Pain Rating User   05/26/25 1413 -- -- -- -- -- 4 -- KD    05/26/25 1246 -- 87 96 % 18 144/65 -- -- KD   05/26/25 1055 -- -- -- -- -- 9 -- KD   05/26/25 1005 98.7 °F (37.1 °C) -- -- -- -- -- -- AD   05/26/25 1002 -- 66 98 % 18 145/75 -- -- AD            Physical Exam  Vitals and nursing note reviewed.   Constitutional:       General: She is not in acute distress.     Appearance: She is well-developed. She is not ill-appearing or toxic-appearing.   HENT:      Head: Normocephalic and atraumatic.      Mouth/Throat:      Mouth: Mucous membranes are moist.     Eyes:      General: No scleral icterus.        Right eye: No discharge.         Left eye: No discharge.      Conjunctiva/sclera: Conjunctivae normal.       Cardiovascular:      Rate and Rhythm: Normal rate and regular rhythm.      Heart sounds: Normal heart sounds. No murmur heard.     No friction rub. No gallop.   Pulmonary:      Effort: Pulmonary effort is normal. No respiratory distress.      Breath sounds: Normal breath sounds. No stridor. No rhonchi.   Abdominal:      General: There is no distension.      Palpations: Abdomen is soft.      Tenderness: There is no abdominal tenderness.     Musculoskeletal:         General: No swelling, tenderness or signs of injury.      Cervical back: Normal range of motion and neck supple. No rigidity.      Right lower leg: No edema.      Left lower leg: No edema.   Lymphadenopathy:      Cervical: No cervical adenopathy.     Skin:     General: Skin is warm and dry.      Capillary Refill: Capillary refill takes less than 2 seconds.      Coloration: Skin is not jaundiced or pale.      Findings: No bruising or erythema.     Neurological:      General: No focal deficit present.      Mental Status: She is alert and oriented to person, place, and time.      Cranial Nerves: No cranial nerve deficit.      Sensory: No sensory deficit.      Motor: No weakness.      Comments: Face symmetric, tongue midline, 5/5 strength in the proximal and distal upper and lower extremities bilaterally with  intact sensation to light touch throughout.  CN II-XII intact.  Normal speech, normal gait.   Psychiatric:         Mood and Affect: Mood normal.         Behavior: Behavior normal.         Results Reviewed       Procedure Component Value Units Date/Time    Basic metabolic panel [[id number]]  (Abnormal) Collected: 05/26/25 1114    Lab Status: Final result Specimen: Blood from Arm, Right Updated: 05/26/25 1204     Sodium 135 mmol/L      Potassium 4.4 mmol/L      Chloride 96 mmol/L      CO2 30 mmol/L      ANION GAP 9 mmol/L      BUN 23 mg/dL      Creatinine 1.02 mg/dL      Glucose 233 mg/dL      Calcium 11.7 mg/dL      eGFR 57 ml/min/1.73sq m     Narrative:      National Kid[employer] Disease Foundation guidelines for Chronic Kidney Disease (CKD):     Stage 1 with normal or high GFR (GFR > 90 mL/min/1.73 square meters)    Stage 2 Mild CKD (GFR = 60-89 mL/min/1.73 square meters)    Stage 3A Moderate CKD (GFR = 45-59 mL/min/1.73 square meters)    Stage 3B Moderate CKD (GFR = 30-44 mL/min/1.73 square meters)    Stage 4 Severe CKD (GFR = 15-29 mL/min/1.73 square meters)    Stage 5 End Stage CKD (GFR <15 mL/min/1.73 square meters)  Note: GFR calculation is accurate only with a steady state creatinine    CBC and differential [[id number]]  (Abnormal) Collected: 05/26/25 1114    Lab Status: Final result Specimen: Blood from Arm, Right Updated: 05/26/25 1145     WBC 9.70 Thousand/uL      RBC 5.38 Million/uL      Hemoglobin 11.8 g/dL      Hematocrit 39.4 %      MCV 73 fL      MCH 21.9 pg      MCHC 29.9 g/dL      RDW 20.2 %      MPV 10.0 fL      Platelets 241 Thousands/uL      nRBC 0 /100 WBCs      Segmented % 77 %      Immature Grans % 1 %      Lymphocytes % 15 %      Monocytes % 4 %      Eosinophils Relative 2 %      Basophils Relative 1 %      Absolute Neutrophils 7.59 Thousands/µL      Absolute Immature Grans 0.05 Thousand/uL      Absolute Lymphocytes 1.45 Thousands/µL      Absolute Monocytes 0.40 Thousand/µL      Eosinophils Absolute  0.15 Thousand/µL      Basophils Absolute 0.06 Thousands/µL             No orders to display       ECG 12 Lead Documentation Only    Date/Time: 5/26/2025 11:59 AM    Performed by: Luc De La Cruz DO  Authorized by: Luc De La Cruz DO    Indications / Diagnosis:  Dizziness  ECG reviewed by me, the ED Provider: yes    Patient location:  ED  Previous ECG:     Previous ECG:  Compared to current    Comparison ECG info:  5/22/25    Similarity:  No change    Comparison to cardiac monitor: Yes    Interpretation:     Interpretation: normal    Rate:     ECG rate:  61    ECG rate assessment: normal    Rhythm:     Rhythm: sinus rhythm    Ectopy:     Ectopy: none    QRS:     QRS axis:  Normal  Conduction:     Conduction: normal    ST segments:     ST segments:  Normal  T waves:     T waves: normal    Comments:      Normal sinus rhythm. Normal ECG when compared to previous      ED Medication and Procedure Management   Prior to Admission Medications   Prescriptions Last Dose Informant Patient Reported? Taking?   Aspirin Low Dose 81 MG chewable tablet  Self Yes No   Continuous Glucose Sensor (FreeStyle Nataly 3 Sensor) MISC  Self No No   Sig: APPLY 1 SENSOR EVERY 14 DAYS   Empagliflozin (Jardiance) 10 MG TABS tablet   No No   Sig: Take 1 tablet (10 mg total) by mouth every morning   bumetanide (BUMEX) 0.5 MG tablet  Self No No   Sig: Take 1 tablet (0.5 mg total) by mouth daily   gabapentin (NEURONTIN) 400 mg capsule  Self No No   Sig: Take 1 capsule (400 mg total) by mouth 3 (three) times a day   glucose blood (ONE TOUCH ULTRA TEST) test strip  Self No No   Sig: test 6 times a day   hydrOXYzine HCL (ATARAX) 25 mg tablet  Self No No   Sig: TAKE 1 TABLET BY MOUTH AT BEDTIME   hydroCHLOROthiazide 50 mg tablet  Self No No   Sig: Take 1 tablet (50 mg total) by mouth daily   insulin NPH (HumuLIN N,NovoLIN N) 100 Units/mL subcutaneous injection  Self Yes No   Sig: Inject 30 Units under the skin in the morning and 30 Units in the evening. Inject  before meals.   ketoconazole (NIZORAL) 2 % shampoo   No No   Sig: Apply to affected area ON SCALP every other day FOR 5 MINUTES THEN RINSE   metFORMIN (GLUCOPHAGE-XR) 500 mg 24 hr tablet  Self No No   Sig: TAKE 2 TABLETS BY MOUTH TWICE DAILY   metoprolol tartrate (LOPRESSOR) 25 mg tablet  Self No No   Sig: Take 1 tablet (25 mg total) by mouth every 12 (twelve) hours   omeprazole (PriLOSEC) 40 MG capsule  Self No No   Sig: Take 1 capsule (40 mg total) by mouth daily   potassium chloride (MICRO-K) 10 MEQ CR capsule  Self No No   Sig: TAKE 2 CAPSULES BY MOUTH DAILY   rosuvastatin (CRESTOR) 20 MG tablet  Self No No   Sig: Take 1 tablet (20 mg total) by mouth daily      Facility-Administered Medications: None     Discharge Medication List as of 5/26/2025  2:25 PM        START taking these medications    Details   aluminum-magnesium hydroxide-simethicone (MAALOX MAX) 400-400-40 MG/5ML suspension Take 10 mL by mouth every 6 (six) hours as needed for indigestion or heartburn, Starting Mon 5/26/2025, Normal      famotidine (PEPCID) 20 mg tablet Take 1 tablet (20 mg total) by mouth daily, Starting Mon 5/26/2025, Normal      ondansetron (ZOFRAN-ODT) 4 mg disintegrating tablet Take 1 tablet (4 mg total) by mouth every 6 (six) hours as needed for nausea or vomiting, Starting Mon 5/26/2025, Normal           CONTINUE these medications which have NOT CHANGED    Details   Aspirin Low Dose 81 MG chewable tablet Starting Tue 6/13/2023, Historical Med      bumetanide (BUMEX) 0.5 MG tablet Take 1 tablet (0.5 mg total) by mouth daily, Starting Mon 6/10/2024, Normal      Continuous Glucose Sensor (FreeStyle Nataly 3 Sensor) MISC APPLY 1 SENSOR EVERY 14 DAYS, Normal      Empagliflozin (Jardiance) 10 MG TABS tablet Take 1 tablet (10 mg total) by mouth every morning, Starting Tue 3/25/2025, Normal      gabapentin (NEURONTIN) 400 mg capsule Take 1 capsule (400 mg total) by mouth 3 (three) times a day, Starting Fri 3/14/2025, Normal       glucose blood (ONE TOUCH ULTRA TEST) test strip test 6 times a day, Normal      hydroCHLOROthiazide 50 mg tablet Take 1 tablet (50 mg total) by mouth daily, Starting Mon 6/10/2024, Normal      hydrOXYzine HCL (ATARAX) 25 mg tablet TAKE 1 TABLET BY MOUTH AT BEDTIME, Starting Fri 12/20/2024, Normal      insulin NPH (HumuLIN N,NovoLIN N) 100 Units/mL subcutaneous injection Inject 30 Units under the skin in the morning and 30 Units in the evening. Inject before meals., Historical Med      ketoconazole (NIZORAL) 2 % shampoo Apply to affected area ON SCALP every other day FOR 5 MINUTES THEN RINSE, Normal      metFORMIN (GLUCOPHAGE-XR) 500 mg 24 hr tablet TAKE 2 TABLETS BY MOUTH TWICE DAILY, Starting Fri 12/20/2024, Normal      metoprolol tartrate (LOPRESSOR) 25 mg tablet Take 1 tablet (25 mg total) by mouth every 12 (twelve) hours, Starting Mon 6/10/2024, Normal      omeprazole (PriLOSEC) 40 MG capsule Take 1 capsule (40 mg total) by mouth daily, Starting Thu 10/10/2024, Normal      potassium chloride (MICRO-K) 10 MEQ CR capsule TAKE 2 CAPSULES BY MOUTH DAILY, Starting Fri 12/20/2024, Normal      rosuvastatin (CRESTOR) 20 MG tablet Take 1 tablet (20 mg total) by mouth daily, Starting Mon 6/10/2024, Normal      albuterol (Ventolin HFA) 90 mcg/act inhaler Inhale 2 puffs every 6 (six) hours as needed for wheezing, Starting Tue 10/24/2023, Normal      lisinopril (ZESTRIL) 5 mg tablet Take 1 tablet (5 mg total) by mouth daily, Starting Mon 6/10/2024, Normal      meloxicam (MOBIC) 15 mg tablet Take 1 tablet (15 mg total) by mouth daily as needed for moderate pain, Starting Tue 10/8/2024, Normal      methocarbamol (ROBAXIN) 500 mg tablet if needed, Starting Mon 3/18/2024, Historical Med             ED SEPSIS DOCUMENTATION   Time reflects when diagnosis was documented in both MDM as applicable and the Disposition within this note       Time User Action Codes Description Comment    5/26/2025  2:17 PM Luc De La Cruz Add [R11.0]  Nausea     2025  2:17 PM Luc De La Cruz Add [R42] Dizziness     2025  2:17 PM De La CruzLuc reynolds Modify [R42] Dizziness     2025  2:17 PM Luc De La Cruz Remove [R11.0] Nausea     2025  2:18 PM De La CruzCayden reynoldsey Add [R11.2] Nausea and vomiting     2025  2:18 PM Luc De La Cruz Modify [R42] Dizziness     2025  2:18 PM Luc De La Cruz Modify [R11.2] Nausea and vomiting     2025  2:18 PM De La Cruz, Luc Add [R51.9] Headache                      [1]   Past Medical History:  Diagnosis Date    Arthritis     Chronic diastolic CHF (congestive heart failure) (HCC) 2024    Chronic kidney disease     CTS (carpal tunnel syndrome)     had surgery    Diabetes mellitus (HCC)     Fibroid     dr gutierrez removed     2 para 2      x2 -, -    Hyperlipidemia    [2]   Past Surgical History:  Procedure Laterality Date    CARDIAC CATHETERIZATION  2023    Procedure: Cardiac catheterization;  Surgeon: Hilary Mondragon DO;  Location: [facility];  Service: Cardiology    CARDIAC CATHETERIZATION N/A 2023    Procedure: Cardiac Coronary Angiogram;  Surgeon: Hilary Mondragon DO;  Location: [facility];  Service: Cardiology    CARDIAC CATHETERIZATION N/A 2023    Procedure: Cardiac other - DFR for Hemodaynamic Assessment;  Surgeon: Hilary oMndragon DO;  Location: [facility];  Service: Cardiology    CARDIAC SURGERY      HYSTERECTOMY      MYOMECTOMY      dr gutierrez removed    TOTAL ABDOMINAL HYSTERECTOMY      2010   [3]   Family History  Adopted: Yes   Family history unknown: Yes   [4]   Social History  Tobacco Use    Smoking status: Never     Passive exposure: Never    Smokeless tobacco: Never   Vaping Use    Vaping status: Never Used   Substance Use Topics    Alcohol use: Never    Drug use: No        Luc De La Cruz DO  25 0245

## 2025-05-27 ENCOUNTER — TELEPHONE (OUTPATIENT)
Age: 65
End: 2025-05-27

## 2025-05-27 ENCOUNTER — TRANSITIONAL CARE MANAGEMENT (OUTPATIENT)
Dept: FAMILY MEDICINE CLINIC | Facility: CLINIC | Age: 65
End: 2025-05-27

## 2025-05-27 ENCOUNTER — NURSE TRIAGE (OUTPATIENT)
Age: 65
End: 2025-05-27

## 2025-05-27 DIAGNOSIS — R11.2 NAUSEA AND VOMITING, UNSPECIFIED VOMITING TYPE: Primary | ICD-10-CM

## 2025-05-27 RX ORDER — PROMETHAZINE HYDROCHLORIDE 25 MG/1
25 TABLET ORAL EVERY 6 HOURS PRN
Qty: 120 TABLET | Refills: 1 | Status: SHIPPED | OUTPATIENT
Start: 2025-05-27

## 2025-05-27 NOTE — TELEPHONE ENCOUNTER
"REASON FOR CONVERSATION: Vomiting    SYMPTOMS: nausea and vomiting/dry heaving for 9 days    OTHER HEALTH INFORMATION:  Was admitted overnight on 5/22-5/23.  Was seen in ED yesterday as well.  Dry heaving continues.  Carlos coates is making dry heave d/t taste    PROTOCOL DISPOSITION: Callback From Office Today (overriding Go to ED/[facility] Now (Or to Office with PCP Approval))    CARE ADVICE PROVIDED: clear liquids, ED but patient was just there and sent home.  Patient was told to follow up with GI.      PRACTICE FOLLOW-UP: Please advise. Thanks!              Reason for Disposition   MODERATE vomiting (e.g., 3 - 5 times/day) and age > 60 years    Answer Assessment - Initial Assessment Questions  1. VOMITING SEVERITY: \"How many times have you vomited in the past 24 hours?\"       Too many to count.  Just dry heaving at this time.    2. ONSET: \"When did the vomiting begin?\"      9 days ago  3. FLUIDS: \"What fluids or food have you vomited up today?\" \"Have you been able to keep any fluids down?\"      Keeps some fluids down  4. ABDOMEN PAIN: \"Are your having any abdomen pain?\" If Yes : \"How bad is it and what does it feel like?\" (e.g., crampy, dull, intermittent, constant)       Abdominal ache - 7/10 - feels like when takes laxative.  Has pain from dry heaving as well.    5. DIARRHEA: \"Is there any diarrhea?\" If Yes, ask: \"How many times today?\"       denies  6. CONTACTS: \"Is there anyone else in the family with the same symptoms?\"       denies  7. CAUSE: \"What do you think is causing your vomiting?\"      unsure  8. HYDRATION STATUS: \"Any signs of dehydration?\" (e.g., dry mouth [not only dry lips], too weak to stand) \"When did you last urinate?\"      Not at this time.  Was seen in ED yesterday and Thursday.    9. OTHER SYMPTOMS: \"Do you have any other symptoms?\" (e.g., fever, headache, vertigo, vomiting blood or coffee grounds, recent head injury)      Was having headaches and dizziness but that is better today.    Protocols " used: Vomiting-Adult-[place]

## 2025-05-27 NOTE — TELEPHONE ENCOUNTER
Regarding: nausea, vomiting  ----- Message from Elvira VEGA sent at 5/27/2025 10:54 AM EDT -----  Pt has nausea, vomiting, seen in ED. Transferred call to [place]

## 2025-05-27 NOTE — TELEPHONE ENCOUNTER
Spoke to pt, relayed info and RX sent to [facility] pharmacy. Pt verbalized understanding.

## 2025-05-27 NOTE — TELEPHONE ENCOUNTER
Pt called the office asking if she can take Zofran and phenergan from a cardiac perspective. While on the phone pt experiencing dry heaves, gagging, coughing. Pt sounded very weak and sick. I asked pt to report back to the ED. Pt will go to [facility].

## 2025-05-28 ENCOUNTER — HOSPITAL ENCOUNTER (EMERGENCY)
Facility: HOSPITAL | Age: 65
Discharge: HOME | End: 2025-05-28
Attending: EMERGENCY MEDICINE | Admitting: EMERGENCY MEDICINE
Payer: MEDICARE

## 2025-05-28 ENCOUNTER — OFFICE VISIT (OUTPATIENT)
Dept: FAMILY MEDICINE CLINIC | Facility: CLINIC | Age: 65
End: 2025-05-28
Payer: MEDICARE

## 2025-05-28 ENCOUNTER — APPOINTMENT (EMERGENCY)
Dept: RADIOLOGY | Facility: HOSPITAL | Age: 65
End: 2025-05-28
Payer: MEDICARE

## 2025-05-28 VITALS
WEIGHT: 173.6 LBS | OXYGEN SATURATION: 97 % | HEIGHT: 64 IN | DIASTOLIC BLOOD PRESSURE: 66 MMHG | TEMPERATURE: 97.8 F | BODY MASS INDEX: 29.64 KG/M2 | HEART RATE: 110 BPM | SYSTOLIC BLOOD PRESSURE: 134 MMHG

## 2025-05-28 VITALS
OXYGEN SATURATION: 96 % | TEMPERATURE: 98.6 F | SYSTOLIC BLOOD PRESSURE: 161 MMHG | RESPIRATION RATE: 18 BRPM | DIASTOLIC BLOOD PRESSURE: 75 MMHG | HEART RATE: 84 BPM

## 2025-05-28 DIAGNOSIS — E86.0 ACUTE DEHYDRATION: Primary | ICD-10-CM

## 2025-05-28 DIAGNOSIS — E86.0 DEHYDRATION: Primary | ICD-10-CM

## 2025-05-28 LAB
2HR DELTA HS TROPONIN: -1 NG/L
ALBUMIN SERPL BCG-MCNC: 4.7 G/DL (ref 3.5–5)
ALP SERPL-CCNC: 61 U/L (ref 34–104)
ALT SERPL W P-5'-P-CCNC: 15 U/L (ref 7–52)
ANION GAP SERPL CALCULATED.3IONS-SCNC: 10 MMOL/L (ref 4–13)
AST SERPL W P-5'-P-CCNC: 12 U/L (ref 13–39)
BASOPHILS # BLD AUTO: 0.06 THOUSANDS/ÂΜL (ref 0–0.1)
BASOPHILS NFR BLD AUTO: 1 % (ref 0–1)
BILIRUB SERPL-MCNC: 0.37 MG/DL (ref 0.2–1)
BUN SERPL-MCNC: 29 MG/DL (ref 5–25)
CALCIUM SERPL-MCNC: 11.5 MG/DL (ref 8.4–10.2)
CARDIAC TROPONIN I PNL SERPL HS: 8 NG/L (ref ?–50)
CARDIAC TROPONIN I PNL SERPL HS: 9 NG/L (ref ?–50)
CHLORIDE SERPL-SCNC: 99 MMOL/L (ref 96–108)
CO2 SERPL-SCNC: 28 MMOL/L (ref 21–32)
CREAT SERPL-MCNC: 1.09 MG/DL (ref 0.6–1.3)
EOSINOPHIL # BLD AUTO: 0.2 THOUSAND/ÂΜL (ref 0–0.61)
EOSINOPHIL NFR BLD AUTO: 2 % (ref 0–6)
ERYTHROCYTE [DISTWIDTH] IN BLOOD BY AUTOMATED COUNT: 20 % (ref 11.6–15.1)
GFR SERPL CREATININE-BSD FRML MDRD: 53 ML/MIN/1.73SQ M
GLUCOSE SERPL-MCNC: 159 MG/DL (ref 65–140)
HCT VFR BLD AUTO: 38.4 % (ref 34.8–46.1)
HGB BLD-MCNC: 11.9 G/DL (ref 11.5–15.4)
IMM GRANULOCYTES # BLD AUTO: 0.03 THOUSAND/UL (ref 0–0.2)
IMM GRANULOCYTES NFR BLD AUTO: 0 % (ref 0–2)
LIPASE SERPL-CCNC: 32 U/L (ref 11–82)
LYMPHOCYTES # BLD AUTO: 2.6 THOUSANDS/ÂΜL (ref 0.6–4.47)
LYMPHOCYTES NFR BLD AUTO: 30 % (ref 14–44)
MAGNESIUM SERPL-MCNC: 2.1 MG/DL (ref 1.9–2.7)
MCH RBC QN AUTO: 22.2 PG (ref 26.8–34.3)
MCHC RBC AUTO-ENTMCNC: 31 G/DL (ref 31.4–37.4)
MCV RBC AUTO: 72 FL (ref 82–98)
MONOCYTES # BLD AUTO: 0.74 THOUSAND/ÂΜL (ref 0.17–1.22)
MONOCYTES NFR BLD AUTO: 9 % (ref 4–12)
NEUTROPHILS # BLD AUTO: 5.09 THOUSANDS/ÂΜL (ref 1.85–7.62)
NEUTS SEG NFR BLD AUTO: 58 % (ref 43–75)
NRBC BLD AUTO-RTO: 0 /100 WBCS
PLATELET # BLD AUTO: 226 THOUSANDS/UL (ref 149–390)
PMV BLD AUTO: 9.7 FL (ref 8.9–12.7)
POTASSIUM SERPL-SCNC: 4.2 MMOL/L (ref 3.5–5.3)
PROT SERPL-MCNC: 7.8 G/DL (ref 6.4–8.4)
RBC # BLD AUTO: 5.36 MILLION/UL (ref 3.81–5.12)
SODIUM SERPL-SCNC: 137 MMOL/L (ref 135–147)
WBC # BLD AUTO: 8.72 THOUSAND/UL (ref 4.31–10.16)

## 2025-05-28 PROCEDURE — 84484 ASSAY OF TROPONIN QUANT: CPT

## 2025-05-28 PROCEDURE — 99285 EMERGENCY DEPT VISIT HI MDM: CPT

## 2025-05-28 PROCEDURE — 99285 EMERGENCY DEPT VISIT HI MDM: CPT | Performed by: EMERGENCY MEDICINE

## 2025-05-28 PROCEDURE — 85025 COMPLETE CBC W/AUTO DIFF WBC: CPT

## 2025-05-28 PROCEDURE — 83690 ASSAY OF LIPASE: CPT

## 2025-05-28 PROCEDURE — 80053 COMPREHEN METABOLIC PANEL: CPT

## 2025-05-28 PROCEDURE — 71045 X-RAY EXAM CHEST 1 VIEW: CPT

## 2025-05-28 PROCEDURE — 96361 HYDRATE IV INFUSION ADD-ON: CPT

## 2025-05-28 PROCEDURE — 96360 HYDRATION IV INFUSION INIT: CPT

## 2025-05-28 PROCEDURE — 99213 OFFICE O/P EST LOW 20 MIN: CPT | Performed by: FAMILY MEDICINE

## 2025-05-28 PROCEDURE — 83735 ASSAY OF MAGNESIUM: CPT

## 2025-05-28 PROCEDURE — 93005 ELECTROCARDIOGRAM TRACING: CPT

## 2025-05-28 PROCEDURE — G2211 COMPLEX E/M VISIT ADD ON: HCPCS | Performed by: FAMILY MEDICINE

## 2025-05-28 PROCEDURE — 36415 COLL VENOUS BLD VENIPUNCTURE: CPT

## 2025-05-28 RX ADMIN — SODIUM CHLORIDE 1000 ML: 0.9 INJECTION, SOLUTION INTRAVENOUS at 16:23

## 2025-05-28 NOTE — DISCHARGE INSTRUCTIONS
You were evaluated in the emergency department for: feelings of dehydration. You can access your current and pending results through [employer]. A radiologist will take a second look at your X-Rays, if you had any, and you will be contacted with any new findings.    - You should follow-up with your primary care provider, as soon as possible, for re-evaluation.    Please, return to the emergency department if you experience new or worsening symptoms, fever, chest pain, shortness of breath, difficulty breathing, dizziness, abdominal pain, persistent nausea/vomiting, syncope or passing out, blood in your urine or stool, coughing up blood, leg swelling/pain, urinary retention, bowel or bladder incontinence, numbness between your legs.

## 2025-05-28 NOTE — ED PROVIDER NOTES
Time reflects when diagnosis was documented in both MDM as applicable and the Disposition within this note       Time User Action Codes Description Comment    5/28/2025  7:14 PM Yennifer Love Add [E86.0] Dehydration           ED Disposition       ED Disposition   Discharge    Condition   Stable    Date/Time   Wed May 28, 2025  7:09 PM    Comment   Jacquie Viveros discharge to home/self care.                   Assessment & Plan       Medical Decision Making  Jacquie Viveros is a 65 y.o. female presenting with feeling dehydrated due to multiple days of intermittent nasuea, gagging and headache, not present on evaluation. Vitals remarkable for elevated blood pressure reading. Exam grossly unremarkable.    DDx including but not limited to: dehydration, metabolic abnormality, dehydration, GI etiology, GERD; doubt acute surgical intraabdominal process, ACS, MI, intracranial etiology.    See ED course for further updates and interpretation of results.    Based on these results and H&P, suspect ongoing GI issue, possibly reflux. Exam and lab work unremarkable for acute findings, no signs of severe dehydration. She was given IVF. She did not have worsening or development of symptoms. She was able to tolerate PO in the ED.    Results, clinical impressions, and plan were discussed with patient and family. They expressed understanding and were in agreement with plan. Patient was given the opportunity to ask questions in ED. All questions and concerns were addressed in ED.    After evaluation and workup in the emergency department Patient appears well, is nontoxic appearing, expresses understanding and agrees with plan of care at this time.  In light of this patient would benefit from outpatient management. Discussed strict return precautions.  Discussed importance of following up with PCP in the next few days.  All questions answered.  Patient is agreeable to discharge.      Amount and/or Complexity of Data  "Reviewed  Labs: ordered. Decision-making details documented in ED Course.  Radiology: ordered and independent interpretation performed.  ECG/medicine tests:  Decision-making details documented in ED Course.        ED Course as of 05/30/25 0019   Wed May 28, 2025   1547 ECG 12 lead   1639 CBC and differential(!)  Grossly stable from previous   1710 hs TnI 0hr: 9  No chest pain   1712 LIPASE: 32   1712 Comprehensive metabolic panel(!)  Grossly at patient baseline, no acute interventions   1723 Patient declined COVID/Flu/RSV   1836 Pending 2 hr. If normal, will d/c   1857 Delta 2hr hsTnI: -1  Doubt ACS       Medications   sodium chloride 0.9 % bolus 1,000 mL (0 mL Intravenous Stopped 5/28/25 1814)       ED Risk Strat Scores   HEART Risk Score      Flowsheet Row Most Recent Value   Heart Score Risk Calculator    History 0 Filed at: 05/28/2025 1710   ECG 1 Filed at: 05/28/2025 1710   Age 2 Filed at: 05/28/2025 1710   Risk Factors 2 Filed at: 05/28/2025 1710   Troponin 0 Filed at: 05/28/2025 1710   HEART Score 5 Filed at: 05/28/2025 1710          HEART Risk Score      Flowsheet Row Most Recent Value   Heart Score Risk Calculator    History 0 Filed at: 05/28/2025 1710   ECG 1 Filed at: 05/28/2025 1710   Age 2 Filed at: 05/28/2025 1710   Risk Factors 2 Filed at: 05/28/2025 1710   Troponin 0 Filed at: 05/28/2025 1710   HEART Score 5 Filed at: 05/28/2025 1710                      No data recorded                            History of Present Illness       Chief Complaint   Patient presents with    Dehydration     PCP told pt to return to ED for possible dehydration due to low PO intake.    Chest Pain     Pt has presented to ED several times recently for nausea, dizziness, CP, dry heaving, nausea, headache. Pt also reports R eye visioin problem that started approx 2 weeks ago.  Pt also reports \"hallucinations\" where she was talking to someone who wasn't there and seeing a spot on her mattress that also wasn't there.  "       Past Medical History[1]   Past Surgical History[2]   Family History[3]   Social History[4]   E-Cigarette/Vaping    E-Cigarette Use Never User       E-Cigarette/Vaping Substances    Nicotine No     THC No     CBD No     Flavoring No     Other No     Unknown No       I have reviewed and agree with the history as documented.     Jacquie Viveros is a 65 y.o. 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    Akhil reports ongoing nausea and dry heaving without full emesis whenever not taking zofran. Zofran seems to improve symptoms. Has also been taking Protonix as instructed. She reports she was seen by her family doctor who was concerned for dehydration due to her poor PO intake. Patient reports she has a hard time eating while nauseous. Has not eaten much over the last few days, though did eat and drink today and yesterday a little. Has been able to take her medications.    On evaluation, despite what she told triage, she currently denies chest pain, palpitations, shortness of breath, dizziness, lightheadedness, fevers, chills, headaches, abdominal pain, nausea, vomiting, constipation, diarrhea, urinary frequency, dysuria, and new extremity weakness, swelling and pain. Also denies hallucinations, though does report ongoing visual changes for which she is working with a ophthalmologist, unchanged.          Review of Systems   Constitutional:  Positive for fatigue. Negative for chills and fever.   HENT:  Negative for congestion, hearing loss and trouble swallowing.    Respiratory:  Negative for cough and shortness of breath.    Cardiovascular:  Negative for chest pain, palpitations and leg swelling.   Gastrointestinal:  Positive for nausea (None at presentation). Negative for abdominal pain, constipation, diarrhea and vomiting.   Genitourinary:  Negative for dysuria, frequency and hematuria.   Musculoskeletal:   Negative for arthralgias and back pain.   Skin:  Negative for color change and rash.   Neurological:  Negative for dizziness, seizures, syncope, weakness, light-headedness and headaches.   Psychiatric/Behavioral:  Negative for dysphoric mood, hallucinations, sleep disturbance and suicidal ideas.    All other systems reviewed and are negative.          Objective       ED Triage Vitals   Temperature Pulse Blood Pressure Respirations SpO2 Patient Position - Orthostatic VS   05/28/25 1537 05/28/25 1535 05/28/25 1535 05/28/25 1711 05/28/25 1535 05/28/25 1535   98.6 °F (37 °C) 99 164/98 18 97 % Sitting      Temp Source Heart Rate Source BP Location FiO2 (%) Pain Score    05/28/25 1537 05/28/25 1711 05/28/25 1535 -- --    Oral Monitor Right arm        Vitals      Date and Time Temp Pulse SpO2 Resp BP Pain Score FACES Pain Rating User   05/28/25 1824 -- 84 96 % 18 161/75 -- -- LR   05/28/25 1711 -- 74 97 % 18 155/69 -- -- ROBERT   05/28/25 1537 98.6 °F (37 °C) -- -- -- -- -- -- KD   05/28/25 1535 -- 99 97 % -- 164/98 -- -- KD            Physical Exam  Vitals and nursing note reviewed.   Constitutional:       General: She is not in acute distress.     Appearance: She is well-developed.   HENT:      Head: Normocephalic and atraumatic.      Mouth/Throat:      Mouth: Mucous membranes are moist.      Pharynx: Oropharynx is clear.     Eyes:      Extraocular Movements: Extraocular movements intact.      Conjunctiva/sclera: Conjunctivae normal.      Pupils: Pupils are equal, round, and reactive to light.       Cardiovascular:      Rate and Rhythm: Normal rate and regular rhythm.      Pulses: Normal pulses.      Heart sounds: Normal heart sounds.   Pulmonary:      Effort: Pulmonary effort is normal. No respiratory distress.      Breath sounds: Normal breath sounds. No wheezing, rhonchi or rales.   Abdominal:      General: Abdomen is flat. There is no distension.      Palpations: Abdomen is soft.      Tenderness: There is no abdominal  tenderness. There is no right CVA tenderness, left CVA tenderness, guarding or rebound.      Hernia: No hernia is present.     Musculoskeletal:      Cervical back: Normal range of motion.      Right lower leg: No edema.      Left lower leg: No edema.     Skin:     General: Skin is warm and dry.      Capillary Refill: Capillary refill takes less than 2 seconds.     Neurological:      General: No focal deficit present.      Mental Status: She is alert and oriented to person, place, and time.      Cranial Nerves: No cranial nerve deficit.      Sensory: No sensory deficit.      Motor: No weakness.      Coordination: Coordination normal.      Gait: Gait normal.     Psychiatric:         Mood and Affect: Mood normal.         Behavior: Behavior normal.         Results Reviewed       Procedure Component Value Units Date/Time    HS Troponin I 2hr [[id number]]  (Normal) Collected: 05/28/25 1819    Lab Status: Final result Specimen: Blood from Arm, Right Updated: 05/28/25 1855     hs TnI 2hr 8 ng/L      Delta 2hr hsTnI -1 ng/L     Comprehensive metabolic panel [[id number]]  (Abnormal) Collected: 05/28/25 1622    Lab Status: Final result Specimen: Blood from Arm, Right Updated: 05/28/25 1711     Sodium 137 mmol/L      Potassium 4.2 mmol/L      Chloride 99 mmol/L      CO2 28 mmol/L      ANION GAP 10 mmol/L      BUN 29 mg/dL      Creatinine 1.09 mg/dL      Glucose 159 mg/dL      Calcium 11.5 mg/dL      AST 12 U/L      ALT 15 U/L      Alkaline Phosphatase 61 U/L      Total Protein 7.8 g/dL      Albumin 4.7 g/dL      Total Bilirubin 0.37 mg/dL      eGFR 53 ml/min/1.73sq m     Narrative:      [employer] Kid[employer] guidelines for Chronic Kidney Disease (CKD):     Stage 1 with normal or high GFR (GFR > 90 mL/min/1.73 square meters)    Stage 2 Mild CKD (GFR = 60-89 mL/min/1.73 square meters)    Stage 3A Moderate CKD (GFR = 45-59 mL/min/1.73 square meters)    Stage 3B Moderate CKD (GFR = 30-44 mL/min/1.73 square meters)     Stage 4 Severe CKD (GFR = 15-29 mL/min/1.73 square meters)    Stage 5 End Stage CKD (GFR <15 mL/min/1.73 square meters)  Note: GFR calculation is accurate only with a steady state creatinine    Magnesium [[id number]]  (Normal) Collected: 05/28/25 1622    Lab Status: Final result Specimen: Blood from Arm, Right Updated: 05/28/25 1711     Magnesium 2.1 mg/dL     Lipase [[id number]]  (Normal) Collected: 05/28/25 1622    Lab Status: Final result Specimen: Blood from Arm, Right Updated: 05/28/25 1711     Lipase 32 u/L     HS Troponin 0hr (reflex protocol) [[id number]]  (Normal) Collected: 05/28/25 1622    Lab Status: Final result Specimen: Blood from Arm, Right Updated: 05/28/25 1659     hs TnI 0hr 9 ng/L     CBC and differential [[id number]]  (Abnormal) Collected: 05/28/25 1622    Lab Status: Final result Specimen: Blood from Arm, Right Updated: 05/28/25 1636     WBC 8.72 Thousand/uL      RBC 5.36 Million/uL      Hemoglobin 11.9 g/dL      Hematocrit 38.4 %      MCV 72 fL      MCH 22.2 pg      MCHC 31.0 g/dL      RDW 20.0 %      MPV 9.7 fL      Platelets 226 Thousands/uL      nRBC 0 /100 WBCs      Segmented % 58 %      Immature Grans % 0 %      Lymphocytes % 30 %      Monocytes % 9 %      Eosinophils Relative 2 %      Basophils Relative 1 %      Absolute Neutrophils 5.09 Thousands/µL      Absolute Immature Grans 0.03 Thousand/uL      Absolute Lymphocytes 2.60 Thousands/µL      Absolute Monocytes 0.74 Thousand/µL      Eosinophils Absolute 0.20 Thousand/µL      Basophils Absolute 0.06 Thousands/µL     FLU/COVID Rapid Antigen (30 min. TAT) - Preferred screening test in ED [[id number]]     Lab Status: No result Specimen: Nares from Nose             XR chest 1 view portable   ED Interpretation by Yennifer Love MD (05/28 1914)   Per my interpretation: No acute cardiopulmonary disease      Final Interpretation by Hector Jaramillo MD (05/29 0348)      No acute cardiopulmonary disease.            Workstation performed: [id number]              ECG 12 Lead Documentation Only    Date/Time: 5/28/2025 3:38 PM    Performed by: Yennifer Love MD  Authorized by: Yennifer Love MD    Indications / Diagnosis:  Dehydration  ECG reviewed by me, the ED Provider: yes    Patient location:  ED  Previous ECG:     Previous ECG:  Compared to current    Similarity:  Changes noted    Comparison to cardiac monitor: Yes    Interpretation:     Interpretation: non-specific    Rate:     ECG rate:  89    ECG rate assessment: normal    Rhythm:     Rhythm: sinus rhythm    Ectopy:     Ectopy: none    QRS:     QRS axis:  Normal    QRS intervals:  Normal  Conduction:     Conduction: normal    ST segments:     ST segments:  Non-specific    Depression:  AVF, V4, V5 and V6  T waves:     T waves: non-specific    Comments:      QTc 411      ED Medication and Procedure Management   Prior to Admission Medications   Prescriptions Last Dose Informant Patient Reported? Taking?   Aspirin Low Dose 81 MG chewable tablet  Self Yes No   Continuous Glucose Sensor (FreeStyle Nataly 3 Sensor) MISC  Self No No   Sig: APPLY 1 SENSOR EVERY 14 DAYS   Empagliflozin (Jardiance) 10 MG TABS tablet   No No   Sig: Take 1 tablet (10 mg total) by mouth every morning   aluminum-magnesium hydroxide-simethicone (MAALOX MAX) 400-400-40 MG/5ML suspension   No No   Sig: Take 10 mL by mouth every 6 (six) hours as needed for indigestion or heartburn   bumetanide (BUMEX) 0.5 MG tablet  Self No No   Sig: Take 1 tablet (0.5 mg total) by mouth daily   famotidine (PEPCID) 20 mg tablet   No No   Sig: Take 1 tablet (20 mg total) by mouth daily   gabapentin (NEURONTIN) 400 mg capsule  Self No No   Sig: Take 1 capsule (400 mg total) by mouth 3 (three) times a day   glucose blood (ONE TOUCH ULTRA TEST) test strip  Self No No   Sig: test 6 times a day   hydrOXYzine HCL (ATARAX) 25 mg tablet  Self No No   Sig: TAKE 1 TABLET BY MOUTH AT BEDTIME   hydroCHLOROthiazide 50 mg tablet  Self No No   Sig: Take 1 tablet (50 mg  total) by mouth daily   insulin NPH (HumuLIN N,NovoLIN N) 100 Units/mL subcutaneous injection  Self Yes No   Sig: Inject 30 Units under the skin in the morning and 30 Units in the evening. Inject before meals.   ketoconazole (NIZORAL) 2 % shampoo   No No   Sig: Apply to affected area ON SCALP every other day FOR 5 MINUTES THEN RINSE   lisinopril (ZESTRIL) 5 mg tablet  Self No No   Sig: Take 1 tablet (5 mg total) by mouth daily   metFORMIN (GLUCOPHAGE-XR) 500 mg 24 hr tablet  Self No No   Sig: TAKE 2 TABLETS BY MOUTH TWICE DAILY   metoprolol tartrate (LOPRESSOR) 25 mg tablet  Self No No   Sig: Take 1 tablet (25 mg total) by mouth every 12 (twelve) hours   omeprazole (PriLOSEC) 40 MG capsule  Self No No   Sig: Take 1 capsule (40 mg total) by mouth daily   ondansetron (ZOFRAN-ODT) 4 mg disintegrating tablet   No No   Sig: Take 1 tablet (4 mg total) by mouth every 6 (six) hours as needed for nausea or vomiting   potassium chloride (MICRO-K) 10 MEQ CR capsule  Self No No   Sig: TAKE 2 CAPSULES BY MOUTH DAILY   promethazine (PHENERGAN) 25 mg tablet   No No   Sig: Take 1 tablet (25 mg total) by mouth every 6 (six) hours as needed for nausea or vomiting   rosuvastatin (CRESTOR) 20 MG tablet  Self No No   Sig: Take 1 tablet (20 mg total) by mouth daily      Facility-Administered Medications: None     Discharge Medication List as of 5/28/2025  7:14 PM        CONTINUE these medications which have NOT CHANGED    Details   aluminum-magnesium hydroxide-simethicone (MAALOX MAX) 400-400-40 MG/5ML suspension Take 10 mL by mouth every 6 (six) hours as needed for indigestion or heartburn, Starting Mon 5/26/2025, Normal      Aspirin Low Dose 81 MG chewable tablet Starting Tue 6/13/2023, Historical Med      bumetanide (BUMEX) 0.5 MG tablet Take 1 tablet (0.5 mg total) by mouth daily, Starting Mon 6/10/2024, Normal      Continuous Glucose Sensor (FreeStyle Nataly 3 Sensor) MISC APPLY 1 SENSOR EVERY 14 DAYS, Normal      Empagliflozin  (Jardiance) 10 MG TABS tablet Take 1 tablet (10 mg total) by mouth every morning, Starting Tue 3/25/2025, Normal      famotidine (PEPCID) 20 mg tablet Take 1 tablet (20 mg total) by mouth daily, Starting Mon 5/26/2025, Normal      gabapentin (NEURONTIN) 400 mg capsule Take 1 capsule (400 mg total) by mouth 3 (three) times a day, Starting Fri 3/14/2025, Normal      glucose blood (ONE TOUCH ULTRA TEST) test strip test 6 times a day, Normal      hydroCHLOROthiazide 50 mg tablet Take 1 tablet (50 mg total) by mouth daily, Starting Mon 6/10/2024, Normal      hydrOXYzine HCL (ATARAX) 25 mg tablet TAKE 1 TABLET BY MOUTH AT BEDTIME, Starting Fri 12/20/2024, Normal      insulin NPH (HumuLIN N,NovoLIN N) 100 Units/mL subcutaneous injection Inject 30 Units under the skin in the morning and 30 Units in the evening. Inject before meals., Historical Med      ketoconazole (NIZORAL) 2 % shampoo Apply to affected area ON SCALP every other day FOR 5 MINUTES THEN RINSE, Normal      lisinopril (ZESTRIL) 5 mg tablet Take 1 tablet (5 mg total) by mouth daily, Starting Mon 6/10/2024, Normal      metFORMIN (GLUCOPHAGE-XR) 500 mg 24 hr tablet TAKE 2 TABLETS BY MOUTH TWICE DAILY, Starting Fri 12/20/2024, Normal      metoprolol tartrate (LOPRESSOR) 25 mg tablet Take 1 tablet (25 mg total) by mouth every 12 (twelve) hours, Starting Mon 6/10/2024, Normal      omeprazole (PriLOSEC) 40 MG capsule Take 1 capsule (40 mg total) by mouth daily, Starting Thu 10/10/2024, Normal      ondansetron (ZOFRAN-ODT) 4 mg disintegrating tablet Take 1 tablet (4 mg total) by mouth every 6 (six) hours as needed for nausea or vomiting, Starting Mon 5/26/2025, Normal      potassium chloride (MICRO-K) 10 MEQ CR capsule TAKE 2 CAPSULES BY MOUTH DAILY, Starting Fri 12/20/2024, Normal      promethazine (PHENERGAN) 25 mg tablet Take 1 tablet (25 mg total) by mouth every 6 (six) hours as needed for nausea or vomiting, Starting Tue 5/27/2025, Normal      rosuvastatin  (CRESTOR) 20 MG tablet Take 1 tablet (20 mg total) by mouth daily, Starting Mon 6/10/2024, Normal           No discharge procedures on file.  ED SEPSIS DOCUMENTATION   Time reflects when diagnosis was documented in both MDM as applicable and the Disposition within this note       Time User Action Codes Description Comment    2025  7:14 PM Yennifer Love Add [E86.0] Dehydration                      [1]   Past Medical History:  Diagnosis Date    Arthritis     Chronic diastolic CHF (congestive heart failure) (HCC) 2024    Chronic kidney disease     CTS (carpal tunnel syndrome)     had surgery    Diabetes mellitus (HCC)     Fibroid     dr gutierrez removed     2 para 2      x2 -, -    Hyperlipidemia    [2]   Past Surgical History:  Procedure Laterality Date    CARDIAC CATHETERIZATION  2023    Procedure: Cardiac catheterization;  Surgeon: Hilary Mondragon DO;  Location: [facility];  Service: Cardiology    CARDIAC CATHETERIZATION N/A 2023    Procedure: Cardiac Coronary Angiogram;  Surgeon: Hilary Mondragon DO;  Location: [facility];  Service: Cardiology    CARDIAC CATHETERIZATION N/A 2023    Procedure: Cardiac other - DFR for Hemodaynamic Assessment;  Surgeon: Hilary Mondragon DO;  Location: [facility];  Service: Cardiology    CARDIAC SURGERY      HYSTERECTOMY      MYOMECTOMY      dr gutierrez removed    TOTAL ABDOMINAL HYSTERECTOMY      2010   [3]   Family History  Adopted: Yes   Family history unknown: Yes   [4]   Social History  Tobacco Use    Smoking status: Never     Passive exposure: Never    Smokeless tobacco: Never   Vaping Use    Vaping status: Never Used   Substance Use Topics    Alcohol use: Never    Drug use: No        Yennifer Love MD  25 0019

## 2025-05-28 NOTE — ED ATTENDING ATTESTATION
5/28/2025  IBrittani DO, saw and evaluated the patient. I have discussed the patient with the resident/non-physician practitioner and agree with the resident's/non-physician practitioner's findings, Plan of Care, and MDM as documented in the resident's/non-physician practitioner's note, except where noted. All available labs and Radiology studies were reviewed.  I was present for key portions of any procedure(s) performed by the resident/non-physician practitioner and I was immediately available to provide assistance.       At this point I agree with the current assessment done in the Emergency Department.  I have conducted an independent evaluation of this patient a history and physical is as follows:    65-year-old female presenting to the emergency department with concern for dehydration, ongoing issues with nausea and vomiting.  States this is her third ER visit.  History of TAVR 2 years ago, states she is only on aspirin does not have to take anticoagulants.  Also has had intermittent abdominal pain.  No fevers or chills.  No urinary symptoms.  States she feels dehydrated.    Upon his examination, patient overall appears well, not in acute distress, heart regular rate and rhythm,  abdomen soft nontender, extremities well-perfused without any lower extremity pitting edema.  Not responding to any internal stimuli.    ED Course  ED Course as of 05/28/25 2342   Wed May 28, 2025   1700 XR chest 1 view portable  Per my independent interpretation, no acute cardiopulmonary processes.     Per my independent interpretation of EKG normal sinus rhythm with heart rate 89, narrow QRS, normal axis, intervals reassuring, no STEMI, nonspecific ST abnormalities are noted which are similar to prior.    Workup overall reassuring in the emergency department.  Patient continues to feel well, is asymptomatic currently, updated with all results.  Advise hydration, use her antiemetics as prescribed previously.  Outpatient  follow-up, return precautions were discussed.    Critical Care Time  Procedures

## 2025-05-28 NOTE — PROGRESS NOTES
Transition of Care Visit:  Name: Jacquie Viveros      : 1960      MRN: [id number]  Encounter Provider: Charito Pereira DO  Encounter Date: 2025   Encounter department: [facility]    Assessment & Plan         History of Present Illness   {?Quick Links Encounters * My Last Note * Last Note in Specialty * Snapshot * Since Last Visit * History :[id number]}  Transitional Care Management Review:   Jacquie Viveros is a 65 y.o. female here for TCM follow up.     During the TCM phone call patient stated:  TCM Call (since 2025)       Date and time call was made  2025  3:29 PM    Hospital care reviewed  Discussed with Inpatient Physician    Patient was hospitialized at  [facility]    Date of Admission  25    Date of discharge  25    Diagnosis  Intractable nausea and vomiting with abdominal pain    Disposition  Home    Were the patients medications reviewed and updated  Yes    Current Symptoms  Vomitting; Neausea; Anorexia          TCM Call (since 2025)       Post hospital issues  Reduced activity; Poor medication adherence; Poor ADL (Activities of Daily Living) performance    Scheduled for follow up?  Yes    Did you obtain your prescribed medications  Yes    Do you need help managing your prescriptions or medications  No    Is transportation to your appointment needed  No    I have advised the patient to call PCP with any new or worsening symptoms  Angelica Vee Ma    Living Arrangements  Alone    Support System  Family    The type of support provided  Emotional; Physical    Do you have social support  Yes, some    Are you recieving home care services  No          HPI  Review of Systems  Objective {?Quick Links Trend Vitals * Enter New Vitals * Results Review * Timeline (Adult) * Labs * Imaging * Cardiology * Procedures * Lung Cancer Screening * Surgical eConsent :31595}  /66 (BP Location: Right arm, Patient Position: Sitting, Cuff  "Size: Standard)   Pulse (!) 110   Temp 97.8 °F (36.6 °C)   Ht 5' 4\" (1.626 m)   Wt 78.7 kg (173 lb 9.6 oz)   SpO2 97%   Breastfeeding No   BMI 29.80 kg/m²     Physical Exam  { Medications have NOT been reviewed by provider in current encounter. Once medications have been reviewed, please refresh your progress note so that you can sign the visit }    {Administrative / Billing Section (Optional):38645}  "

## 2025-05-28 NOTE — ED NOTES
Patient declined Covid/Flu swab, as she just recently had this done. Carolyn Love, resident provider of same.      Allie Xie RN  05/28/25 0307

## 2025-05-29 NOTE — TELEPHONE ENCOUNTER
Please advise:    Pt and  calling back in, pt was in the ER again last night for dehydration. Able to hear pt audibly heaving in the background during call today. States phenergan, zofran, and pepcid not helping. Pt describes belching starts in her chest and throat leading to dry heaving. Constant severe nausea. Also reports cramping of her lower abdomen. Pt has been in the ER 3x this past week. Questioning if there is a test or imaging, like an MRI or PET scan, that can help diagnose the problem or if she can be directly admitted to the hospital for further treatment. ER precautions reviewed and understanding verbalized.

## 2025-05-29 NOTE — TELEPHONE ENCOUNTER
Reviewed provider's latest message/recommendations with pt and her . She reports she is feeling slightly improved. She is agreeable to trying promethazine and will hydrate with beverages that have electrolytes. Pt will return call to update if she decides to return to the ED    Pt is requesting a referral to neurology for a brain MRI d/t recent headaches. Pt sees Neurologist Dr.Martina Costello with [facility]

## 2025-05-30 ENCOUNTER — PATIENT MESSAGE (OUTPATIENT)
Dept: FAMILY MEDICINE CLINIC | Facility: CLINIC | Age: 65
End: 2025-05-30

## 2025-05-30 DIAGNOSIS — G44.009 CLUSTER HEADACHE, NOT INTRACTABLE, UNSPECIFIED CHRONICITY PATTERN: ICD-10-CM

## 2025-05-30 DIAGNOSIS — H53.9 VISION CHANGES: Primary | ICD-10-CM

## 2025-05-30 NOTE — PATIENT COMMUNICATION
Spoke with patient, patient is requesting to get medication for vomitting, nausea and dry heaves. Patient had gone to the ER and was given a script, for nausea and vomitting, patient stated the one medication is not working. Please advise.

## 2025-05-30 NOTE — PROGRESS NOTES
":  Assessment & Plan  Acute dehydration  Pt to go to ER for evaluation and hydration.    Orders:    Transfer to other facility        History of Present Illness     Jacquie Viveros is a 65 y.o. female   HPI  Review of Systems   Constitutional:  Positive for fatigue. Negative for appetite change, chills and fever.   HENT:  Negative for ear pain, facial swelling, rhinorrhea, sinus pain, sore throat and trouble swallowing.         + dry oral mucosa   Eyes:  Negative for discharge and redness.   Respiratory:  Negative for chest tightness, shortness of breath and wheezing.    Cardiovascular:  Positive for palpitations. Negative for chest pain.   Gastrointestinal:  Positive for nausea and vomiting. Negative for abdominal pain and diarrhea.   Endocrine: Negative for polyuria.   Genitourinary:  Negative for dysuria and urgency.   Musculoskeletal:  Negative for arthralgias and back pain.   Skin:  Negative for rash.        + dry skin with poor turgor   Neurological:  Negative for dizziness, weakness and headaches.        + ataxia   Hematological:  Negative for adenopathy.   Psychiatric/Behavioral:  Negative for behavioral problems, confusion and sleep disturbance.    All other systems reviewed and are negative.    Objective   /66 (BP Location: Right arm, Patient Position: Sitting, Cuff Size: Standard)   Pulse (!) 110   Temp 97.8 °F (36.6 °C)   Ht 5' 4\" (1.626 m)   Wt 78.7 kg (173 lb 9.6 oz)   SpO2 97%   Breastfeeding No   BMI 29.80 kg/m²      Physical Exam  Vitals reviewed.   Constitutional:       Appearance: She is ill-appearing.   HENT:      Head: Normocephalic.      Right Ear: Tympanic membrane and ear canal normal.      Left Ear: Tympanic membrane and ear canal normal.      Nose: No congestion.      Mouth/Throat:      Mouth: Mucous membranes are dry.     Eyes:      Extraocular Movements: Extraocular movements intact.      Conjunctiva/sclera: Conjunctivae normal.      Pupils: Pupils are equal, round, and " reactive to light.       Cardiovascular:      Rate and Rhythm: Tachycardia present.      Pulses: Normal pulses.      Heart sounds: Normal heart sounds. No murmur heard.  Pulmonary:      Effort: No respiratory distress.   Abdominal:      General: There is no distension.      Palpations: There is no mass.      Tenderness: There is no abdominal tenderness.     Musculoskeletal:         General: No swelling.      Cervical back: Normal range of motion.      Right lower leg: No edema.      Left lower leg: No edema.   Lymphadenopathy:      Cervical: No cervical adenopathy.     Skin:     General: Skin is warm.      Capillary Refill: Capillary refill takes less than 2 seconds.      Comments: Poor skin turgor     Neurological:      General: No focal deficit present.      Mental Status: She is oriented to person, place, and time. Mental status is at baseline.      Gait: Gait abnormal.     Psychiatric:         Mood and Affect: Mood normal.         Behavior: Behavior normal.         Thought Content: Thought content normal.         Judgment: Judgment normal.         Administrative Statements   I have spent a total time of 15 minutes in caring for this patient on the day of the visit/encounter including Diagnostic results, Prognosis, Risks and benefits of tx options, Instructions for management, Patient and family education, Importance of tx compliance, Risk factor reductions, and Impressions.

## 2025-06-02 ENCOUNTER — NURSE TRIAGE (OUTPATIENT)
Age: 65
End: 2025-06-02

## 2025-06-02 ENCOUNTER — TELEPHONE (OUTPATIENT)
Age: 65
End: 2025-06-02

## 2025-06-02 ENCOUNTER — TELEPHONE (OUTPATIENT)
Dept: ADMINISTRATIVE | Facility: HOSPITAL | Age: 65
End: 2025-06-02

## 2025-06-02 DIAGNOSIS — R42 DIZZINESS: ICD-10-CM

## 2025-06-02 DIAGNOSIS — G44.009 CLUSTER HEADACHE, NOT INTRACTABLE, UNSPECIFIED CHRONICITY PATTERN: ICD-10-CM

## 2025-06-02 DIAGNOSIS — R11.2 NAUSEA AND VOMITING, UNSPECIFIED VOMITING TYPE: ICD-10-CM

## 2025-06-02 DIAGNOSIS — H53.9 VISION CHANGES: Primary | ICD-10-CM

## 2025-06-02 DIAGNOSIS — I10 ESSENTIAL HYPERTENSION: ICD-10-CM

## 2025-06-02 LAB
ATRIAL RATE: 89 BPM
P AXIS: 84 DEGREES
PR INTERVAL: 130 MS
QRS AXIS: 84 DEGREES
QRSD INTERVAL: 78 MS
QT INTERVAL: 338 MS
QTC INTERVAL: 411 MS
T WAVE AXIS: -3 DEGREES
VENTRICULAR RATE: 89 BPM

## 2025-06-02 PROCEDURE — 93010 ELECTROCARDIOGRAM REPORT: CPT | Performed by: INTERNAL MEDICINE

## 2025-06-02 RX ORDER — LISINOPRIL 10 MG/1
10 TABLET ORAL DAILY
Qty: 90 TABLET | Refills: 3 | Status: SHIPPED | OUTPATIENT
Start: 2025-06-02

## 2025-06-02 NOTE — TELEPHONE ENCOUNTER
Medication lisinopril was changed because her heart rate was increasing. Patient scheduled with Dr. Pereira  for June 27

## 2025-06-02 NOTE — TELEPHONE ENCOUNTER
"REASON FOR CONVERSATION: Advice Only (MRI)  Brain MRI    SYMPTOMS: 3wks of nausea, dry heaves (reason for MRI); claustrophobic    OTHER HEALTH INFORMATION: lost 15lbs;    PROTOCOL DISPOSITION: Callback by PCP Today    CARE ADVICE PROVIDED: advised will ask Dr Garcia if she is ok to have MRI of brain with valve replacement; is she safe to be sedated for the MRI (claustrophobia)    PRACTICE FOLLOW-UP: contact patient with recommendations.        Reason for Disposition   Nursing judgment     MRI questions    Answer Assessment - Initial Assessment Questions  1. REASON FOR CALL: \"What is the main reason for your call?\" or \"How can I best help you?\"      I need to have an MRI of the brain and I want to make sure with the valve replacement I had it is ok to do.   2. SYMPTOMS : \"Do you have any symptoms?\"       Ongoing nausea, dry heaves (reason for MRI)  3. OTHER QUESTIONS: \"Do you have any other questions?\"      I am asking my PCP to order an MRI where they sedate me for it (claustrophobia) and I want to make sure from cardiac point this is ok to do.     Is she ok to have MRI with valve replacement? From cardiac standpoint is she ok to be sedated for the MRI?    Protocols used: Information Only Call - No Triage-Adult-[place]    "

## 2025-06-02 NOTE — TELEPHONE ENCOUNTER
Okay to have MRI and okay to be sedated for the test. Thank you.     Spoke with Jacquie and relayed message as given per Dr. Garcia.    Jacquie did have a question, today and yesterday BP has been around 150/100. Jacquie is just wondering if there is anything she could to to help lower it at this time. I will ask Dr. Garcia and I will call her back. Jacquie is aware and understood.

## 2025-06-02 NOTE — TELEPHONE ENCOUNTER
Good morning Dr Pereira!     I spoke with patient this morning regarding the MRI order. Patient informed me that she does go to an outside facility because she needs an open MRI machine. However, patient explained to me that because she has a heart device, she can not have the open MRI and needs the inclosed one which requires a mask to be put over her face and patient stated she just can not have that.     Patient is wondering if she can have this MRI ordered with sedation and she will coordinate from there.     Patient also asked if you can place a referral for [facility]

## 2025-06-02 NOTE — TELEPHONE ENCOUNTER
Please confirm with patient - I see a heart valve replacement. Is this what she's referring to? Does she have  an internal defibrillator? A pacemaker? Does she know the  or model # or more information so out MRI team can confirm the compatibility of this?      All I see noted in the most recent cardiology note is an aortic valve replacement (bio AVR #21 Inspiris) - briefly this looks MRI conditional and we would need to check with the team if this is what she's referring to.   Also does she have an appt with neurology.

## 2025-06-02 NOTE — PATIENT COMMUNICATION
Patient called back and stated she spoke with her cardiologist as advised and he is fine with her having the MRI of the brain and she is allowed to have it under sedation.  They will need a new order stating with sedation to schedule.  Please advise if the order can be updated.  Thank you!

## 2025-06-02 NOTE — TELEPHONE ENCOUNTER
Order for ophtho placed. Would need to see regular ophtho before specialist.   Neuro referral already in from ER provider from 5/26.  For MRI to get covered, it would help for her ot have another follow up with PCP for her to document her current symptoms and concerns. The previous note does not contain much documentation/reason for MRI. Please schedule soonest avail with PCP only.

## 2025-06-02 NOTE — TELEPHONE ENCOUNTER
She should increase her lisinopril to 10 MG a day. She is currently taking 5 MG a day. Recheck blood pressure in 2 to 3 weeks. Thank you.     Spoke with Jacquie and relayed message as given per Dr. Garcia. Jacquie is aware and understood.

## 2025-06-03 ENCOUNTER — OFFICE VISIT (OUTPATIENT)
Age: 65
End: 2025-06-03

## 2025-06-03 ENCOUNTER — PATIENT MESSAGE (OUTPATIENT)
Dept: FAMILY MEDICINE CLINIC | Facility: CLINIC | Age: 65
End: 2025-06-03

## 2025-06-03 DIAGNOSIS — H25.13 NUCLEAR SCLEROSIS OF BOTH EYES: ICD-10-CM

## 2025-06-03 DIAGNOSIS — H43.813 POSTERIOR VITREOUS DETACHMENT OF BOTH EYES: ICD-10-CM

## 2025-06-03 DIAGNOSIS — G44.009 CLUSTER HEADACHE, NOT INTRACTABLE, UNSPECIFIED CHRONICITY PATTERN: ICD-10-CM

## 2025-06-03 DIAGNOSIS — G43.109 OCULAR MIGRAINE: ICD-10-CM

## 2025-06-03 DIAGNOSIS — H53.9 VISION CHANGES: Primary | ICD-10-CM

## 2025-06-03 DIAGNOSIS — E11.3293 MILD NONPROLIFERATIVE DIABETIC RETINOPATHY OF BOTH EYES WITHOUT MACULAR EDEMA ASSOCIATED WITH TYPE 2 DIABETES MELLITUS (HCC): Primary | ICD-10-CM

## 2025-06-03 NOTE — PATIENT COMMUNICATION
Order placed for MRI wo contrast w sedation    If she has failed zofran and phenergan and is still vomiting/dry heaving she must return to the hospital.

## 2025-06-03 NOTE — TELEPHONE ENCOUNTER
Theres a message from patient's cardiologist, per Dr. Garcia, increase Lisinopril to 10 mg daily. Patient following up with Dr. Pereira 6/27

## 2025-06-03 NOTE — PATIENT COMMUNICATION
Patient called back.  Given Dr. Prater's message and recommendation.  At this time patient is not vomiting and she is keeping herself hydrated.      Encouraged to call back with any questions or concerns.

## 2025-06-03 NOTE — ED ATTENDING ATTESTATION
I, Laina Dash MD, saw and evaluated the patient. I have discussed the patient with the resident/non-physician practitioner and agree with the resident's/non-physician practitioner's findings, Plan of Care, and MDM as documented in the resident's/non-physician practitioner's note, except where noted. All available labs and Radiology studies were reviewed.  I was present for key portions of any procedure(s) performed by the resident/non-physician practitioner and I was immediately available to provide assistance.       At this point I agree with the current assessment done in the Emergency Department.  I have conducted an independent evaluation of this patient a history and physical is as follows:    Subjective: 65-year-old female with history of diabetes, CAD, status post aortic valve replacement, hyperparathyroidism, hypercalcemia who presents with nausea, vomiting, headache, dizziness and chest tightness.  Of note she was just discharged for similar complaints and states that these are the same symptoms as prior.    Objective: Vital signs stable and afebrile.  No murmurs/rubs/gallops on cardiac auscultation and 2+ radial pulse bilaterally.  Lungs clear to auscultation.  No lower extremity edema bilaterally.  Negative Homans' sign bilaterally.  Abdomen soft, nontender.  Cranial nerves II through XII intact, PERRLA, EOMI, no field cuts, 5 out of 5 strength and sensation intact to light touch throughout with no dysmetria and with normal gait. No dysarthria or aphasia with intact naming and repetition.    Assessment/Plan: 65-year-old female with history of diabetes, CAD, aortic valve replacement, hypoparathyroidism and hypercalcemia presenting with nausea and vomiting as well as lightheadedness and chest tightness.  Vitals are stable and her exam is benign.  Labs with mild hyperglycemia without anion gap acidosis.  ECG unchanged from prior.  Presentation may be consistent with migraine headache, gastroparesis, cyclic  vomiting syndrome, doubt ACS or significant intra-abdominal pathology given recent unremarkable workup and reassuring exam today.    Patient provided with antiemetics, migraine cocktail with improvement in symptoms.    Patient tolerated p.o. and ambulatory challenges in the emergency department and had stable vitals at time of discharge.    Patient was discharged to home with strict return precautions. Patient acknowledged understanding of plan and diagnostic results, and all their questions were answered to their satisfaction.

## 2025-06-03 NOTE — PROGRESS NOTES
Name: Jacquie Viveros      : 1960      MRN: [id number]  Encounter Provider: Monae Snow MD  Encounter Date: 6/3/2025   Encounter department: [facility] OPHTHALMOLOGY  :  Assessment & Plan  Mild nonproliferative diabetic retinopathy of both eyes without macular edema associated with type 2 diabetes mellitus (HCC)  Mild diabetic retinopathy on exam, no macular edema, no NV. The importance of proper blood sugar, blood lipids, and blood pressure control for minimizing the risk of vision loss due to retinopathy associated with diabetes mellitus and hypertension was discussed with the patient. Stressed the importance of following up for monitoring and management by a primary care physician.     Lab Results   Component Value Date    HGBA1C 7.2 (H) 2025       Orders:    OCT, Retina - OU - Both Eyes    Fundus Photos - OU - Both Eyes    Posterior vitreous detachment of both eyes  Posterior vitreous detachment with mitchell ring both eyes: symptomatic. Negative janet's sign. No retinal tears, breaks, or detachments on dilated examination with scleral depression 360 degrees.  Retinal detachment precautions were discussed with the patient. The patient was instructed to call or return immediately for an increase in flashes of light, floaters (dark spots in vision), or curtaining of the visual field.          Nuclear sclerosis of both eyes  Not visually significant at this time. Continue to monitor; Pt denies any difficulty with glare or decrease in vision.         Ocular migraine  Monitor; No ocular pathology.               Jacquie Viveros is a 65 y.o. female who presents Today for Visual Disturbance. Pt reports nausea, dizziness, weakness, vomiting for 3 weeks. Has been in ED 3 times and received fluids.     She reports blurry vision OU, spots, flashes OU and she stated that when she focusing on something it moves from side to side.     History of  Ocular Migraines. She has T2DM for 30+ years. She has been  told that she has Diabetic Retinopathy, Her sugars are currently uncontrolled,  she denies any treatment. She has been seeing Dr. Bey  History obtained from: patient    Review of Systems  Medical History Reviewed by provider this encounter:  Tobacco  Allergies  Meds  Problems  Med Hx  Surg Hx  Fam Hx     .     Past Ocular History: T2DM, Diabetic Retinopathy, Cataracts OU   Ocular Meds/Drops: None    Base Eye Exam       Visual Acuity (Snellen - Linear)         Right Left    Dist sc 20/40 20/40    Dist ph sc 20/20 20/20              Tonometry (Applanation, 1:17 PM)         Right Left    Pressure 10 10              Pupils         Pupils Dark APD    Right PERRL 4 -    Left PERRL 4 -              Visual Fields         Left Right     Full Full              Extraocular Movement         Right Left     Full, Ortho Full, Ortho              Neuro/Psych       Oriented x3: Yes              Dilation       Both eyes: 2.5% Phenylephrine, 1% Tropicamide @ 1:19 PM                  Slit Lamp and Fundus Exam       External Exam         Right Left    External Normal Normal              Slit Lamp Exam         Right Left    Lids/Lashes Normal Normal    Conjunctiva/Sclera White and quiet White and quiet    Cornea Clear Clear    Anterior Chamber Deep and quiet Deep and quiet    Iris Round and reactive Round and reactive    Lens 2+ NSC with 2+ cortical 2+ NSC with 2+ cortical    Anterior Vitreous PVD PVD              Fundus Exam         Right Left    Disc sharp, no pallor sharp, no pallor    C/D Ratio 0.25 0.25    Macula Fine drusen Fine drusen    Vessels normal in caliber normal in caliber    Periphery flat, no retinal tear or detachment; few dot heme flat, no retinal tear or detachment; few dot heme                      IMAGING:  OCT, Retina - OU - Both Eyes          Right Eye  Quality was good. Findings include PVD, Drusen.     Left Eye  Quality was good. Findings include PVD, Drusen.          Fundus Photos - OU - Both Eyes           Right Eye  Disc findings include normal observations. Macula findings include drusen. Vessel findings include normal observations. Periphery findings include normal observations.     Left Eye  Disc findings include normal observations. Macula findings include drusen. Vessel findings include normal observations. Periphery findings include normal observations.

## 2025-06-04 ENCOUNTER — OFFICE VISIT (OUTPATIENT)
Dept: GASTROENTEROLOGY | Facility: AMBULARY SURGERY CENTER | Age: 65
End: 2025-06-04
Payer: MEDICARE

## 2025-06-04 ENCOUNTER — TELEPHONE (OUTPATIENT)
Age: 65
End: 2025-06-04

## 2025-06-04 VITALS
HEART RATE: 100 BPM | SYSTOLIC BLOOD PRESSURE: 108 MMHG | HEIGHT: 64 IN | OXYGEN SATURATION: 99 % | BODY MASS INDEX: 28.82 KG/M2 | DIASTOLIC BLOOD PRESSURE: 64 MMHG | WEIGHT: 168.8 LBS

## 2025-06-04 DIAGNOSIS — R60.9 EDEMA, UNSPECIFIED TYPE: ICD-10-CM

## 2025-06-04 DIAGNOSIS — I50.32 CHRONIC DIASTOLIC CHF (CONGESTIVE HEART FAILURE) (HCC): ICD-10-CM

## 2025-06-04 DIAGNOSIS — R13.19 ESOPHAGEAL DYSPHAGIA: ICD-10-CM

## 2025-06-04 DIAGNOSIS — K59.09 CHRONIC CONSTIPATION: Primary | ICD-10-CM

## 2025-06-04 DIAGNOSIS — R45.89 ANXIETY ABOUT HEALTH: Primary | ICD-10-CM

## 2025-06-04 DIAGNOSIS — Z95.2 S/P AVR: ICD-10-CM

## 2025-06-04 DIAGNOSIS — I25.10 CORONARY ARTERY DISEASE INVOLVING NATIVE CORONARY ARTERY OF NATIVE HEART WITHOUT ANGINA PECTORIS: ICD-10-CM

## 2025-06-04 DIAGNOSIS — R11.2 NAUSEA AND VOMITING, UNSPECIFIED VOMITING TYPE: ICD-10-CM

## 2025-06-04 DIAGNOSIS — R14.2 BELCHING: ICD-10-CM

## 2025-06-04 DIAGNOSIS — E87.6 HYPOKALEMIA: ICD-10-CM

## 2025-06-04 DIAGNOSIS — E13.9 DIABETES 1.5, MANAGED AS TYPE 2 (HCC): ICD-10-CM

## 2025-06-04 DIAGNOSIS — E78.00 HYPERCHOLESTEROLEMIA: ICD-10-CM

## 2025-06-04 DIAGNOSIS — K21.9 GASTROESOPHAGEAL REFLUX DISEASE WITHOUT ESOPHAGITIS: ICD-10-CM

## 2025-06-04 PROCEDURE — G2211 COMPLEX E/M VISIT ADD ON: HCPCS | Performed by: PHYSICIAN ASSISTANT

## 2025-06-04 PROCEDURE — 99214 OFFICE O/P EST MOD 30 MIN: CPT | Performed by: PHYSICIAN ASSISTANT

## 2025-06-04 RX ORDER — OMEPRAZOLE 40 MG/1
40 CAPSULE, DELAYED RELEASE ORAL
Qty: 180 CAPSULE | Refills: 1 | Status: SHIPPED | OUTPATIENT
Start: 2025-06-04

## 2025-06-04 RX ORDER — LUBIPROSTONE 24 UG/1
24 CAPSULE ORAL 2 TIMES DAILY WITH MEALS
Qty: 60 CAPSULE | Refills: 3 | Status: SHIPPED | OUTPATIENT
Start: 2025-06-04

## 2025-06-04 NOTE — TELEPHONE ENCOUNTER
"Received call from Latoya of  [employer] post call from patient for MRI brain wo contrast. Patient is scheduled for Sat 6/7. Requesting copy of order be faxed to #[phone]. Patient has decided to do test without sedation. There are 2 orders in system; please send order by Dr. Pereira which says \"no sedation\" required instead of order by Dr. Prater which notes \"conscious sedation\"     Latoya is requesting patient be ordered an oral medication to take 1 hour before test since she is very anxious; if needed.     Please follow up with Latoya if there are any questions for order. Please follow up with patient if provider orders medication to take prior to test.            "

## 2025-06-04 NOTE — PROGRESS NOTES
Name: Jacquie Viveros      : 1960      MRN: [id number]  Encounter Provider: Tamica Pal PA-C  Encounter Date: 2025   Encounter department: [facility]  :  Assessment & Plan  Gastroesophageal reflux disease without esophagitis  Esophageal dysphagia  Belching  With the patient's current belching, sour taste in her mouth, will trial omeprazole twice daily to see if this improves her symptoms  -Will also plan for barium esophagram to further assess due to her dry heaves, nausea, vomiting, and esophageal dysphagia.  -No indication repeat EGD at this time  -Encourage hydration and p.o. intake as tolerated      Orders:    omeprazole (PriLOSEC) 40 MG capsule; Take 1 capsule (40 mg total) by mouth 2 (two) times a day before meals    FL barium swallow; Future    Chronic constipation  could be contributing some of her symptoms in regards to bloating, nausea, decreased intake. Was on Linzess in the past but has not been taking this regularly.  Reports bowel movements every 3 to 4 days.  Just uses Senokot and fleets enemas over-the-counter.  Has not been on any other prescription medications for constipation.  She has failed all over-the-counter regimen  -Will start Amitiza twice daily  -Discussed possible side effect profile with the patient  -Advised to call us in 1 to 2 weeks if no improvement  Orders:    lubiprostone (AMITIZA) 24 mcg capsule; Take 1 capsule (24 mcg total) by mouth 2 (two) times a day with meals    Nausea and vomiting, unspecified vomiting type  - Can continue to use antiemetics as needed.  She reports that Zofran causes sour taste in her mouth  -Barium swallow and increase in PPI as above  -Treat constipation as above  Orders:    FL barium swallow; Future    Follow-up in a few months.    History of Present Illness   Nausea  Associated symptoms include abdominal pain, headaches, nausea, vomiting and weakness. Pertinent negatives include no arthralgias,  chest pain, coughing, fever, myalgias, rash or sore throat.     Jacquie Viveros is a 65 y.o. female with a history of hypertension, coronary artery disease status post heart surgery, diabetes, spinal stenosis, hyperlipidemia who presents for a follow-up for nausea, weight loss, vomiting, constipation.  Patient has been feeling very unwell for the last few weeks.  She has been in the emergency room 3 times and has had extensive workup with blood work and CAT scan which have been relatively unremarkable.  She reports that she is nauseous and that everything she eats has a sour taste.  She has had vomiting of bile and dry heaves.  She has belching as well.  She has just abdominal discomfort.  She is on omeprazole once daily and denies any significant reflux symptoms but does report that she feels like things get stuck in her esophagus.  She also has a history of constipation and had been taking Linzess in the past but this was not helping.  She is currently just taking Senokot and enemas as needed.  She moves her bowels about once every 3 to 4 days.  She gets very bloated with this.  She also has been having a lot of issues with significant weakness, vision changes, dizziness, severe headaches, being off balance.  She is scheduled to have an MRI of the brain on Saturday.  She is trying to get in with a new neurologist.  She had an EGD and colonoscopy in October 2024 with some mild inflammation at the bottom of the GE junction but otherwise this was unremarkable.  She was recommended have repeat colonoscopy in 5 years due to history of colon polyps.  She has lost a little under 15 pounds in the last 3 weeks      Review of Systems   Constitutional:  Positive for appetite change and unexpected weight change. Negative for activity change and fever.   HENT:  Positive for trouble swallowing. Negative for drooling, mouth sores, sore throat and voice change.    Eyes:  Positive for visual disturbance. Negative for pain and  "discharge.   Respiratory:  Positive for chest tightness. Negative for cough, choking and shortness of breath.    Cardiovascular:  Negative for chest pain, palpitations and leg swelling.   Gastrointestinal:  Positive for abdominal pain, constipation, nausea and vomiting. Negative for abdominal distention, anal bleeding, blood in stool, diarrhea and rectal pain.   Genitourinary:  Negative for decreased urine volume, difficulty urinating, dysuria, flank pain and urgency.   Musculoskeletal:  Negative for arthralgias, back pain, gait problem, myalgias and neck stiffness.   Skin:  Negative for color change, pallor and rash.   Neurological:  Positive for dizziness, weakness and headaches. Negative for syncope and light-headedness.   Hematological:  Negative for adenopathy.   Psychiatric/Behavioral:  Negative for confusion. The patient is not nervous/anxious.           Objective   /64 (BP Location: Left arm, Patient Position: Sitting, Cuff Size: Standard)   Pulse 100   Ht 5' 4\" (1.626 m)   Wt 76.6 kg (168 lb 12.8 oz)   SpO2 99%   BMI 28.97 kg/m²      Physical Exam  Constitutional:       General: She is not in acute distress.     Appearance: Normal appearance. She is well-developed.   HENT:      Head: Normocephalic and atraumatic.      Mouth/Throat:      Mouth: Mucous membranes are moist.     Eyes:      General: No scleral icterus.    Neck:      Trachea: No tracheal deviation.     Cardiovascular:      Rate and Rhythm: Normal rate and regular rhythm.      Heart sounds: Normal heart sounds. No murmur heard.  Pulmonary:      Effort: Pulmonary effort is normal. No respiratory distress.      Breath sounds: Normal breath sounds. No wheezing or rales.   Abdominal:      General: Bowel sounds are normal. There is no distension.      Palpations: Abdomen is soft. There is no mass.      Tenderness: There is no abdominal tenderness. There is no guarding or rebound.     Musculoskeletal:         General: Normal range of motion. "      Cervical back: Normal range of motion and neck supple.     Skin:     General: Skin is warm and dry.      Coloration: Skin is not pale.      Findings: No rash.     Neurological:      Mental Status: She is alert and oriented to person, place, and time. Mental status is at baseline.     Psychiatric:         Mood and Affect: Mood normal.         Behavior: Behavior normal.

## 2025-06-05 ENCOUNTER — TELEPHONE (OUTPATIENT)
Age: 65
End: 2025-06-05

## 2025-06-05 RX ORDER — ROSUVASTATIN CALCIUM 20 MG/1
20 TABLET, COATED ORAL DAILY
Qty: 90 TABLET | Refills: 1 | Status: SHIPPED | OUTPATIENT
Start: 2025-06-05

## 2025-06-05 RX ORDER — METOPROLOL TARTRATE 25 MG/1
25 TABLET, FILM COATED ORAL 2 TIMES DAILY
Qty: 180 TABLET | Refills: 1 | Status: SHIPPED | OUTPATIENT
Start: 2025-06-05

## 2025-06-05 RX ORDER — METFORMIN HYDROCHLORIDE 500 MG/1
1000 TABLET, EXTENDED RELEASE ORAL 2 TIMES DAILY
Qty: 360 TABLET | Refills: 1 | Status: SHIPPED | OUTPATIENT
Start: 2025-06-05

## 2025-06-05 RX ORDER — DIAZEPAM 5 MG/1
5 TABLET ORAL
Qty: 2 TABLET | Refills: 0 | Status: SHIPPED | OUTPATIENT
Start: 2025-06-05

## 2025-06-05 RX ORDER — HYDROCHLOROTHIAZIDE 50 MG/1
50 TABLET ORAL DAILY
Qty: 90 TABLET | Refills: 1 | Status: SHIPPED | OUTPATIENT
Start: 2025-06-05

## 2025-06-05 RX ORDER — BUMETANIDE 0.5 MG/1
0.5 TABLET ORAL DAILY
Qty: 90 TABLET | Refills: 1 | Status: SHIPPED | OUTPATIENT
Start: 2025-06-05

## 2025-06-05 RX ORDER — POTASSIUM CHLORIDE 750 MG/1
20 CAPSULE, EXTENDED RELEASE ORAL DAILY
Qty: 180 CAPSULE | Refills: 1 | Status: SHIPPED | OUTPATIENT
Start: 2025-06-05

## 2025-06-05 NOTE — TELEPHONE ENCOUNTER
Received call from pt's .  He called to inform Dr. Garcia that pt is being transported by EMS to [facility].  She was at her endocrinology appointment and her BP is 64/48.

## 2025-06-05 NOTE — TELEPHONE ENCOUNTER
Valium for her to take 30min before. She must have someone drive her there and home due to sedative affects of medication.

## 2025-06-09 ENCOUNTER — TRANSITIONAL CARE MANAGEMENT (OUTPATIENT)
Dept: FAMILY MEDICINE CLINIC | Facility: CLINIC | Age: 65
End: 2025-06-09

## 2025-06-09 ENCOUNTER — TELEPHONE (OUTPATIENT)
Dept: FAMILY MEDICINE CLINIC | Facility: CLINIC | Age: 65
End: 2025-06-09

## 2025-06-09 ENCOUNTER — TELEPHONE (OUTPATIENT)
Age: 65
End: 2025-06-09

## 2025-06-09 NOTE — TELEPHONE ENCOUNTER
Patient called to schedule a Transitional Care Management . Discharged 6/7/2025 from [facility]. Warm transfer to clerical to assist with the appointment

## 2025-06-09 NOTE — TELEPHONE ENCOUNTER
Pt called in to schedule TCM, soonest appt was 6/19. Pt was told she should be seen within 1 week. Pt was advised that PCP has been out of office.     Please advise if you'd like to see her sooner

## 2025-06-19 ENCOUNTER — OFFICE VISIT (OUTPATIENT)
Dept: FAMILY MEDICINE CLINIC | Facility: CLINIC | Age: 65
End: 2025-06-19
Payer: MEDICARE

## 2025-06-19 VITALS
RESPIRATION RATE: 17 BRPM | OXYGEN SATURATION: 96 % | HEART RATE: 99 BPM | WEIGHT: 179.6 LBS | DIASTOLIC BLOOD PRESSURE: 62 MMHG | BODY MASS INDEX: 30.66 KG/M2 | SYSTOLIC BLOOD PRESSURE: 124 MMHG | TEMPERATURE: 97.8 F | HEIGHT: 64 IN

## 2025-06-19 DIAGNOSIS — E83.52 HYPERCALCEMIA: ICD-10-CM

## 2025-06-19 DIAGNOSIS — E21.3 HYPERPARATHYROIDISM (HCC): ICD-10-CM

## 2025-06-19 DIAGNOSIS — E83.39 HYPERPHOSPHATEMIA: Primary | ICD-10-CM

## 2025-06-19 DIAGNOSIS — K90.0 ACD (ADULT CELIAC DISEASE): ICD-10-CM

## 2025-06-19 DIAGNOSIS — N18.31 STAGE 3A CHRONIC KIDNEY DISEASE (HCC): ICD-10-CM

## 2025-06-19 PROCEDURE — 99495 TRANSJ CARE MGMT MOD F2F 14D: CPT | Performed by: FAMILY MEDICINE

## 2025-06-19 RX ORDER — EMPAGLIFLOZIN 25 MG/1
TABLET, FILM COATED ORAL
COMMUNITY
Start: 2025-06-12

## 2025-06-21 ENCOUNTER — APPOINTMENT (OUTPATIENT)
Dept: LAB | Facility: MEDICAL CENTER | Age: 65
End: 2025-06-21
Attending: FAMILY MEDICINE
Payer: MEDICARE

## 2025-06-21 DIAGNOSIS — E83.39 HYPERPHOSPHATEMIA: ICD-10-CM

## 2025-06-21 DIAGNOSIS — E83.52 HYPERCALCEMIA: ICD-10-CM

## 2025-06-21 DIAGNOSIS — E21.3 HYPERPARATHYROIDISM (HCC): ICD-10-CM

## 2025-06-21 DIAGNOSIS — K90.0 ACD (ADULT CELIAC DISEASE): ICD-10-CM

## 2025-06-21 LAB
ANION GAP SERPL CALCULATED.3IONS-SCNC: 8 MMOL/L (ref 4–13)
BUN SERPL-MCNC: 22 MG/DL (ref 5–25)
CALCIUM SERPL-MCNC: 10.4 MG/DL (ref 8.4–10.2)
CHLORIDE SERPL-SCNC: 104 MMOL/L (ref 96–108)
CO2 SERPL-SCNC: 31 MMOL/L (ref 21–32)
CREAT SERPL-MCNC: 0.72 MG/DL (ref 0.6–1.3)
GFR SERPL CREATININE-BSD FRML MDRD: 87 ML/MIN/1.73SQ M
GLUCOSE P FAST SERPL-MCNC: 116 MG/DL (ref 65–99)
PHOSPHATE SERPL-MCNC: 2.5 MG/DL (ref 2.3–4.1)
POTASSIUM SERPL-SCNC: 4.5 MMOL/L (ref 3.5–5.3)
PTH-INTACT SERPL-MCNC: 141.7 PG/ML (ref 12–88)
SODIUM SERPL-SCNC: 143 MMOL/L (ref 135–147)

## 2025-06-21 PROCEDURE — 36415 COLL VENOUS BLD VENIPUNCTURE: CPT

## 2025-06-21 PROCEDURE — 83970 ASSAY OF PARATHORMONE: CPT

## 2025-06-21 PROCEDURE — 80048 BASIC METABOLIC PNL TOTAL CA: CPT

## 2025-06-21 PROCEDURE — 84100 ASSAY OF PHOSPHORUS: CPT

## 2025-06-23 ENCOUNTER — RESULTS FOLLOW-UP (OUTPATIENT)
Dept: FAMILY MEDICINE CLINIC | Facility: CLINIC | Age: 65
End: 2025-06-23

## 2025-06-29 NOTE — PROGRESS NOTES
Discharge transition of Care Visit:  Name: Jacquie Viveros      : 1960      MRN: [id number]  Encounter Provider: Charito Pereira DO  Encounter Date: 2025   Encounter department: [facility]    Assessment & Plan  Hyperphosphatemia  We will recheck phosphorus level  Orders:    Phosphorus; Future    Hyperparathyroidism (HCC)  Will recheck PTH  Orders:    PTH, intact; Future    Hypercalcemia  Will recheck calcium level  Orders:    Calcium; Future    ACD (adult celiac disease)  Continue with management as per gastroenterology.  Orders:    Basic metabolic panel; Future    Stage 3a chronic kidney disease (HCC)  Lab Results   Component Value Date    EGFR 87 2025    EGFR 58 (L) 2025    EGFR 67 2025    CREATININE 0.72 2025    CREATININE 1.05 2025    CREATININE 0.94 2025   Will continue with renal surveillance              History of Present Illness     Transitional Care Management Review:   Jacquie Viveros is a 65 y.o. female here for TCM follow up.     During the TCM phone call patient stated:  TCM Call (since 6/15/2025)       None          TCM Call (since 6/15/2025)       None          The patient presents for posthospital visit status posthospitalization for hypotension, acute kidney injury, increased PTH level, hyperphosphatemia and hypercalcemia.  She needs follow-up labs for these electrolyte abnormalities.  She is feeling better denies any dyspnea still feels a bit weak but denies any dizziness visual changes chest pressure or shortness of breath.      Review of Systems   Constitutional:  Positive for fatigue. Negative for appetite change, chills and fever.        Positive generalized weakness   HENT:  Negative for ear pain, facial swelling, rhinorrhea, sinus pain, sore throat and trouble swallowing.    Eyes:  Negative for discharge and redness.   Respiratory:  Negative for chest tightness, shortness of breath and wheezing.   Pt signed [employer] forms. Placed in nurse bin. Awaiting MD portion.    "  Cardiovascular:  Negative for chest pain and palpitations.   Gastrointestinal:  Negative for abdominal pain, diarrhea, nausea and vomiting.   Endocrine: Negative for polyuria.   Genitourinary:  Negative for dysuria and urgency.   Musculoskeletal:  Negative for arthralgias and back pain.   Skin:  Negative for rash.   Neurological:  Negative for dizziness, weakness and headaches.   Hematological:  Negative for adenopathy.   Psychiatric/Behavioral:  Negative for behavioral problems, confusion and sleep disturbance.    All other systems reviewed and are negative.    Objective   /62   Pulse 99   Temp 97.8 °F (36.6 °C)   Resp 17   Ht 5' 4\" (1.626 m)   Wt 81.5 kg (179 lb 9.6 oz)   SpO2 96%   BMI 30.83 kg/m²     Physical Exam  Vitals and nursing note reviewed.   Constitutional:       General: She is not in acute distress.     Appearance: Normal appearance. She is well-developed. She is not ill-appearing or diaphoretic.   HENT:      Head: Normocephalic and atraumatic.      Right Ear: Tympanic membrane, ear canal and external ear normal.      Left Ear: Tympanic membrane, ear canal and external ear normal.      Nose: Nose normal. No congestion or rhinorrhea.      Mouth/Throat:      Mouth: Mucous membranes are moist.      Pharynx: Oropharynx is clear. No oropharyngeal exudate or posterior oropharyngeal erythema.     Eyes:      General: No scleral icterus.        Right eye: No discharge.         Left eye: No discharge.      Extraocular Movements: Extraocular movements intact.      Conjunctiva/sclera: Conjunctivae normal.      Pupils: Pupils are equal, round, and reactive to light.     Neck:      Thyroid: No thyromegaly.      Vascular: No carotid bruit or JVD.      Trachea: No tracheal deviation.     Cardiovascular:      Rate and Rhythm: Normal rate and regular rhythm.      Pulses: Normal pulses.      Heart sounds: Normal heart sounds. No murmur heard.  Pulmonary:      Effort: Pulmonary effort is normal. No " respiratory distress.      Breath sounds: Normal breath sounds. No stridor. No wheezing, rhonchi or rales.   Abdominal:      General: Abdomen is flat. Bowel sounds are normal. There is no distension.      Palpations: Abdomen is soft. There is no mass.      Tenderness: There is no abdominal tenderness. There is no guarding or rebound.     Musculoskeletal:         General: No swelling, tenderness or deformity. Normal range of motion.      Cervical back: Normal range of motion and neck supple. No rigidity.      Right lower leg: No edema.      Left lower leg: No edema.   Lymphadenopathy:      Cervical: No cervical adenopathy.     Skin:     General: Skin is warm and dry.      Capillary Refill: Capillary refill takes less than 2 seconds.      Coloration: Skin is not jaundiced.      Findings: No bruising, erythema or rash.     Neurological:      General: No focal deficit present.      Mental Status: She is alert and oriented to person, place, and time.      Cranial Nerves: No cranial nerve deficit.      Sensory: No sensory deficit.      Motor: No abnormal muscle tone.      Coordination: Coordination normal.      Gait: Gait normal.      Deep Tendon Reflexes: Reflexes are normal and symmetric. Reflexes normal.     Psychiatric:         Mood and Affect: Mood normal.         Behavior: Behavior normal.         Thought Content: Thought content normal.         Judgment: Judgment normal.           Administrative Statements   I have spent a total time of 20 minutes in caring for this patient on the day of the visit/encounter including Diagnostic results, Prognosis, Risks and benefits of tx options, Instructions for management, Patient and family education, Importance of tx compliance, Risk factor reductions, and Impressions.

## 2025-06-29 NOTE — ASSESSMENT & PLAN NOTE
Lab Results   Component Value Date    EGFR 87 06/21/2025    EGFR 58 (L) 06/11/2025    EGFR 67 06/07/2025    CREATININE 0.72 06/21/2025    CREATININE 1.05 06/11/2025    CREATININE 0.94 06/07/2025   Will continue with renal surveillance

## 2025-07-03 ENCOUNTER — HOSPITAL ENCOUNTER (OUTPATIENT)
Dept: RADIOLOGY | Facility: HOSPITAL | Age: 65
Discharge: HOME | End: 2025-07-03
Attending: PHYSICIAN ASSISTANT
Payer: MEDICARE

## 2025-07-03 DIAGNOSIS — R14.2 BELCHING: ICD-10-CM

## 2025-07-03 DIAGNOSIS — K21.9 GASTROESOPHAGEAL REFLUX DISEASE WITHOUT ESOPHAGITIS: ICD-10-CM

## 2025-07-03 DIAGNOSIS — R11.2 NAUSEA AND VOMITING, UNSPECIFIED VOMITING TYPE: ICD-10-CM

## 2025-07-03 DIAGNOSIS — R13.19 ESOPHAGEAL DYSPHAGIA: ICD-10-CM

## 2025-07-03 PROCEDURE — 74220 X-RAY XM ESOPHAGUS 1CNTRST: CPT

## 2025-08-22 ENCOUNTER — TELEPHONE (OUTPATIENT)
Age: 65
End: 2025-08-22

## 2025-08-22 DIAGNOSIS — I25.10 CORONARY ARTERY DISEASE INVOLVING NATIVE CORONARY ARTERY, UNSPECIFIED WHETHER ANGINA PRESENT, UNSPECIFIED WHETHER NATIVE OR TRANSPLANTED HEART: Primary | ICD-10-CM

## 2025-08-22 DIAGNOSIS — R06.02 SOB (SHORTNESS OF BREATH): ICD-10-CM

## 2025-08-22 DIAGNOSIS — E78.00 HYPERCHOLESTEROLEMIA: ICD-10-CM

## (undated) DEVICE — PRESSURE GUIDEWIRE: Brand: COMET™ II

## (undated) DEVICE — RADIFOCUS OPTITORQUE ANGIOGRAPHIC CATHETER: Brand: OPTITORQUE

## (undated) DEVICE — TR BAND RADIAL ARTERY COMPRESSION DEVICE: Brand: TR BAND

## (undated) DEVICE — GUIDEWIRE WHOLEY HI TORQUE INTERM MOD J .035 145CM

## (undated) DEVICE — GLIDESHEATH SLENDER STAINLESS STEEL KIT: Brand: GLIDESHEATH SLENDER

## (undated) DEVICE — CATH GUIDE LAUNCHER 5FR EBU 3.5